# Patient Record
Sex: MALE | Race: WHITE | NOT HISPANIC OR LATINO | Employment: OTHER | URBAN - METROPOLITAN AREA
[De-identification: names, ages, dates, MRNs, and addresses within clinical notes are randomized per-mention and may not be internally consistent; named-entity substitution may affect disease eponyms.]

---

## 2018-01-01 ENCOUNTER — READMISSION MANAGEMENT (OUTPATIENT)
Dept: CALL CENTER | Facility: HOSPITAL | Age: 73
End: 2018-01-01

## 2018-01-01 ENCOUNTER — TELEPHONE (OUTPATIENT)
Dept: NEUROLOGY | Facility: CLINIC | Age: 73
End: 2018-01-01

## 2018-01-01 ENCOUNTER — LAB REQUISITION (OUTPATIENT)
Dept: LAB | Facility: HOSPITAL | Age: 73
End: 2018-01-01

## 2018-01-01 ENCOUNTER — HOSPITAL ENCOUNTER (INPATIENT)
Facility: HOSPITAL | Age: 73
LOS: 4 days | Discharge: HOME HEALTH | End: 2018-10-16
Attending: HOSPITALIST | Admitting: HOSPITALIST

## 2018-01-01 ENCOUNTER — TRANSCRIBE ORDERS (OUTPATIENT)
Dept: ADMINISTRATIVE | Facility: HOSPITAL | Age: 73
End: 2018-01-01

## 2018-01-01 ENCOUNTER — HOSPITAL ENCOUNTER (OUTPATIENT)
Dept: ULTRASOUND IMAGING | Facility: HOSPITAL | Age: 73
Discharge: HOME | End: 2018-12-28
Attending: INTERNAL MEDICINE | Admitting: INTERNAL MEDICINE

## 2018-01-01 ENCOUNTER — TELEPHONE (OUTPATIENT)
Dept: INFECTIOUS DISEASES | Facility: CLINIC | Age: 73
End: 2018-01-01

## 2018-01-01 ENCOUNTER — APPOINTMENT (OUTPATIENT)
Dept: CT IMAGING | Facility: HOSPITAL | Age: 73
End: 2018-01-01

## 2018-01-01 ENCOUNTER — OFFICE VISIT (OUTPATIENT)
Dept: NEUROLOGY | Facility: CLINIC | Age: 73
End: 2018-01-01

## 2018-01-01 ENCOUNTER — HOSPITAL ENCOUNTER (OUTPATIENT)
Dept: MRI IMAGING | Facility: HOSPITAL | Age: 73
Discharge: HOME | End: 2018-10-12

## 2018-01-01 ENCOUNTER — OFFICE VISIT (OUTPATIENT)
Dept: INFECTIOUS DISEASES | Facility: CLINIC | Age: 73
End: 2018-01-01

## 2018-01-01 VITALS
WEIGHT: 194.8 LBS | HEART RATE: 78 BPM | DIASTOLIC BLOOD PRESSURE: 81 MMHG | SYSTOLIC BLOOD PRESSURE: 143 MMHG | HEIGHT: 72 IN | BODY MASS INDEX: 26.38 KG/M2 | TEMPERATURE: 98.2 F

## 2018-01-01 VITALS
WEIGHT: 192 LBS | DIASTOLIC BLOOD PRESSURE: 92 MMHG | HEIGHT: 72 IN | SYSTOLIC BLOOD PRESSURE: 144 MMHG | HEART RATE: 79 BPM | BODY MASS INDEX: 26.01 KG/M2 | OXYGEN SATURATION: 98 %

## 2018-01-01 VITALS
WEIGHT: 193.12 LBS | SYSTOLIC BLOOD PRESSURE: 130 MMHG | HEART RATE: 83 BPM | BODY MASS INDEX: 26.16 KG/M2 | RESPIRATION RATE: 18 BRPM | TEMPERATURE: 98.4 F | OXYGEN SATURATION: 97 % | HEIGHT: 72 IN | DIASTOLIC BLOOD PRESSURE: 79 MMHG

## 2018-01-01 DIAGNOSIS — M54.9 OTHER CHRONIC BACK PAIN: Primary | ICD-10-CM

## 2018-01-01 DIAGNOSIS — M60.09 INFECTIVE MYOSITIS OF MULTIPLE SITES: ICD-10-CM

## 2018-01-01 DIAGNOSIS — Z13.6 SCREENING FOR ISCHEMIC HEART DISEASE: ICD-10-CM

## 2018-01-01 DIAGNOSIS — G89.29 OTHER CHRONIC BACK PAIN: Primary | ICD-10-CM

## 2018-01-01 DIAGNOSIS — F17.200 TOBACCO USE DISORDER: ICD-10-CM

## 2018-01-01 DIAGNOSIS — G60.9 IDIOPATHIC PERIPHERAL NEUROPATHY: Primary | ICD-10-CM

## 2018-01-01 DIAGNOSIS — M46.26 OSTEOMYELITIS OF VERTEBRA OF LUMBAR REGION (HCC): ICD-10-CM

## 2018-01-01 DIAGNOSIS — M54.10 RADICULAR LEG PAIN: ICD-10-CM

## 2018-01-01 DIAGNOSIS — Z00.00 ENCOUNTER FOR GENERAL ADULT MEDICAL EXAMINATION WITHOUT ABNORMAL FINDINGS: ICD-10-CM

## 2018-01-01 DIAGNOSIS — M46.46 LUMBAR DISCITIS: Primary | ICD-10-CM

## 2018-01-01 DIAGNOSIS — Z13.6 SCREENING FOR ISCHEMIC HEART DISEASE: Primary | ICD-10-CM

## 2018-01-01 DIAGNOSIS — R53.1 WEAKNESS: Primary | ICD-10-CM

## 2018-01-01 DIAGNOSIS — Z79.2 LONG TERM (CURRENT) USE OF ANTIBIOTICS: ICD-10-CM

## 2018-01-01 DIAGNOSIS — R53.1 WEAKNESS: ICD-10-CM

## 2018-01-01 DIAGNOSIS — Z74.09 IMPAIRED MOBILITY: Primary | ICD-10-CM

## 2018-01-01 DIAGNOSIS — M54.9 BACK PAIN, UNSPECIFIED BACK LOCATION, UNSPECIFIED BACK PAIN LATERALITY, UNSPECIFIED CHRONICITY: ICD-10-CM

## 2018-01-01 DIAGNOSIS — M54.5 LOW BACK PAIN, UNSPECIFIED BACK PAIN LATERALITY, UNSPECIFIED CHRONICITY, WITH SCIATICA PRESENCE UNSPECIFIED: ICD-10-CM

## 2018-01-01 DIAGNOSIS — M06.9 RHEUMATOID ARTHRITIS, INVOLVING UNSPECIFIED SITE, UNSPECIFIED RHEUMATOID FACTOR PRESENCE: ICD-10-CM

## 2018-01-01 LAB
ALBUMIN SERPL-MCNC: 3.4 G/DL (ref 3.5–5.2)
ALBUMIN SERPL-MCNC: 4 G/DL (ref 3.5–5.2)
ALBUMIN/GLOB SERPL: 1.2 G/DL
ALP SERPL-CCNC: 118 U/L (ref 39–117)
ALT SERPL W P-5'-P-CCNC: 11 U/L (ref 1–41)
ANION GAP SERPL CALCULATED.3IONS-SCNC: 10 MMOL/L
ANION GAP SERPL CALCULATED.3IONS-SCNC: 10.8 MMOL/L
ANION GAP SERPL CALCULATED.3IONS-SCNC: 12.5 MMOL/L
ANION GAP SERPL CALCULATED.3IONS-SCNC: 13 MMOL/L
ANION GAP SERPL CALCULATED.3IONS-SCNC: 13.1 MMOL/L
ANISOCYTOSIS BLD QL: ABNORMAL
ANISOCYTOSIS BLD QL: NORMAL
APTT PPP: 33.7 SECONDS (ref 22.7–35.4)
APTT PPP: 33.8 SECONDS (ref 22.7–35.4)
AST SERPL-CCNC: 10 U/L (ref 1–40)
BACTERIA FLD CULT: ABNORMAL
BACTERIA SPEC AEROBE CULT: ABNORMAL
BACTERIA SPEC AEROBE CULT: NORMAL
BACTERIA SPEC AEROBE CULT: NORMAL
BASOPHILS # BLD AUTO: 0.02 10*3/MM3 (ref 0–0.2)
BASOPHILS # BLD AUTO: 0.02 10*3/MM3 (ref 0–0.2)
BASOPHILS # BLD AUTO: 0.03 10*3/MM3 (ref 0–0.2)
BASOPHILS # BLD AUTO: 0.04 10*3/MM3 (ref 0–0.2)
BASOPHILS # BLD AUTO: 0.05 10*3/MM3 (ref 0–0.2)
BASOPHILS # BLD AUTO: 0.05 10*3/MM3 (ref 0–0.2)
BASOPHILS NFR BLD AUTO: 1.1 % (ref 0–2)
BASOPHILS NFR BLD AUTO: 1.3 % (ref 0–2)
BASOPHILS NFR BLD AUTO: 1.5 % (ref 0–1.5)
BASOPHILS NFR BLD AUTO: 1.7 % (ref 0–1.5)
BASOPHILS NFR BLD AUTO: 1.9 % (ref 0–1.5)
BASOPHILS NFR BLD AUTO: 2 % (ref 0–1.5)
BASOPHILS NFR BLD AUTO: 2 % (ref 0–1.5)
BASOPHILS NFR BLD AUTO: 2.1 % (ref 0–1.5)
BASOPHILS NFR BLD AUTO: 2.1 % (ref 0–1.5)
BILIRUB SERPL-MCNC: 0.6 MG/DL (ref 0.1–1.2)
BUN BLD-MCNC: 10 MG/DL (ref 8–23)
BUN BLD-MCNC: 7 MG/DL (ref 8–23)
BUN BLD-MCNC: 7 MG/DL (ref 8–23)
BUN BLD-MCNC: 8 MG/DL (ref 8–23)
BUN BLD-MCNC: 9 MG/DL (ref 8–23)
BUN/CREAT SERPL: 10.1 (ref 7–25)
BUN/CREAT SERPL: 11 (ref 7–25)
BUN/CREAT SERPL: 11.1 (ref 7–25)
BUN/CREAT SERPL: 11.5 (ref 7–25)
BUN/CREAT SERPL: 11.9 (ref 7–25)
CALCIUM SPEC-SCNC: 8.7 MG/DL (ref 8.6–10.5)
CALCIUM SPEC-SCNC: 8.8 MG/DL (ref 8.6–10.5)
CALCIUM SPEC-SCNC: 8.8 MG/DL (ref 8.6–10.5)
CALCIUM SPEC-SCNC: 9 MG/DL (ref 8.6–10.5)
CALCIUM SPEC-SCNC: 9.3 MG/DL (ref 8.6–10.5)
CHLORIDE SERPL-SCNC: 101 MMOL/L (ref 98–107)
CHLORIDE SERPL-SCNC: 103 MMOL/L (ref 98–107)
CHLORIDE SERPL-SCNC: 105 MMOL/L (ref 98–107)
CHLORIDE SERPL-SCNC: 105 MMOL/L (ref 98–107)
CHLORIDE SERPL-SCNC: 98 MMOL/L (ref 98–107)
CK SERPL-CCNC: 56 U/L (ref 20–200)
CO2 SERPL-SCNC: 23.5 MMOL/L (ref 22–29)
CO2 SERPL-SCNC: 25.9 MMOL/L (ref 22–29)
CO2 SERPL-SCNC: 26 MMOL/L (ref 22–29)
CO2 SERPL-SCNC: 26.2 MMOL/L (ref 22–29)
CO2 SERPL-SCNC: 27 MMOL/L (ref 22–29)
CREAT BLD-MCNC: 0.63 MG/DL (ref 0.76–1.27)
CREAT BLD-MCNC: 0.67 MG/DL (ref 0.76–1.27)
CREAT BLD-MCNC: 0.69 MG/DL (ref 0.76–1.27)
CREAT BLD-MCNC: 0.73 MG/DL (ref 0.76–1.27)
CREAT BLD-MCNC: 0.75 MG/DL (ref 0.76–1.27)
CREAT BLD-MCNC: 0.78 MG/DL (ref 0.76–1.27)
CREAT BLD-MCNC: 0.78 MG/DL (ref 0.76–1.27)
CREAT BLD-MCNC: 0.8 MG/DL (ref 0.76–1.27)
CREAT BLD-MCNC: 0.81 MG/DL (ref 0.76–1.27)
CREAT BLD-MCNC: 0.82 MG/DL (ref 0.76–1.27)
CREAT BLD-MCNC: 0.84 MG/DL (ref 0.76–1.27)
CREAT BLDA-MCNC: 0.8 MG/DL (ref 0.6–1.3)
CRP SERPL-MCNC: 2.38 MG/DL (ref 0–0.5)
CRP SERPL-MCNC: 4.23 MG/DL (ref 0–0.5)
CRP SERPL-MCNC: 5.32 MG/DL (ref 0–0.5)
DAT POLY-SP REAG RBC QL: NEGATIVE
DEPRECATED RDW RBC AUTO: 48.1 FL (ref 37–54)
DEPRECATED RDW RBC AUTO: 48.6 FL (ref 37–54)
DEPRECATED RDW RBC AUTO: 48.7 FL (ref 37–54)
DEPRECATED RDW RBC AUTO: 48.8 FL (ref 37–54)
DEPRECATED RDW RBC AUTO: 49.3 FL (ref 37–54)
DEPRECATED RDW RBC AUTO: 52.4 FL (ref 37–54)
DEPRECATED RDW RBC AUTO: 52.7 FL (ref 37–54)
DEPRECATED RDW RBC AUTO: 52.8 FL (ref 37–54)
DEPRECATED RDW RBC AUTO: 57.7 FL (ref 37–54)
DEPRECATED RDW RBC AUTO: 57.7 FL (ref 37–54)
DEPRECATED RDW RBC AUTO: 62 FL (ref 37–54)
EOSINOPHIL # BLD AUTO: 0.07 10*3/MM3 (ref 0.1–0.3)
EOSINOPHIL # BLD AUTO: 0.07 10*3/MM3 (ref 0–0.7)
EOSINOPHIL # BLD AUTO: 0.08 10*3/MM3 (ref 0–0.7)
EOSINOPHIL # BLD AUTO: 0.09 10*3/MM3 (ref 0–0.7)
EOSINOPHIL # BLD AUTO: 0.1 10*3/MM3 (ref 0.1–0.3)
EOSINOPHIL # BLD AUTO: 0.1 10*3/MM3 (ref 0–0.7)
EOSINOPHIL # BLD AUTO: 0.11 10*3/MM3 (ref 0–0.7)
EOSINOPHIL # BLD MANUAL: 0.07 10*3/MM3 (ref 0–0.7)
EOSINOPHIL NFR BLD AUTO: 2.6 % (ref 0.3–6.2)
EOSINOPHIL NFR BLD AUTO: 2.8 % (ref 0.3–6.2)
EOSINOPHIL NFR BLD AUTO: 3.1 % (ref 0.3–6.2)
EOSINOPHIL NFR BLD AUTO: 3.6 % (ref 0.3–6.2)
EOSINOPHIL NFR BLD AUTO: 4.3 % (ref 0.3–6.2)
EOSINOPHIL NFR BLD AUTO: 4.5 % (ref 0–4)
EOSINOPHIL NFR BLD AUTO: 5 % (ref 0.3–6.2)
EOSINOPHIL NFR BLD AUTO: 5.4 % (ref 0–4)
EOSINOPHIL NFR BLD AUTO: 6.9 % (ref 0.3–6.2)
EOSINOPHIL NFR BLD MANUAL: 3 % (ref 0.3–6.2)
ERYTHROCYTE [DISTWIDTH] IN BLOOD BY AUTOMATED COUNT: 16.8 % (ref 11.5–14.5)
ERYTHROCYTE [DISTWIDTH] IN BLOOD BY AUTOMATED COUNT: 16.9 % (ref 11.5–14.5)
ERYTHROCYTE [DISTWIDTH] IN BLOOD BY AUTOMATED COUNT: 17 % (ref 11.5–14.5)
ERYTHROCYTE [DISTWIDTH] IN BLOOD BY AUTOMATED COUNT: 17.9 % (ref 11.5–14.5)
ERYTHROCYTE [DISTWIDTH] IN BLOOD BY AUTOMATED COUNT: 18.1 % (ref 11.5–14.5)
ERYTHROCYTE [DISTWIDTH] IN BLOOD BY AUTOMATED COUNT: 18.4 % (ref 11.5–14.5)
ERYTHROCYTE [DISTWIDTH] IN BLOOD BY AUTOMATED COUNT: 19.5 % (ref 11.5–14.5)
ERYTHROCYTE [DISTWIDTH] IN BLOOD BY AUTOMATED COUNT: 19.6 % (ref 11.5–14.5)
ERYTHROCYTE [DISTWIDTH] IN BLOOD BY AUTOMATED COUNT: 20 % (ref 11.5–14.5)
ERYTHROCYTE [SEDIMENTATION RATE] IN BLOOD: 65 MM/HR (ref 0–20)
ERYTHROCYTE [SEDIMENTATION RATE] IN BLOOD: 65 MM/HR (ref 0–20)
FERRITIN SERPL-MCNC: 299.2 NG/ML (ref 30–400)
FIBRINOGEN PPP-MCNC: 662 MG/DL (ref 219–464)
FOLATE SERPL-MCNC: 12.5 NG/ML (ref 4.78–24.2)
GFR SERPL CREATININE-BSD FRML MDRD: 103 ML/MIN/1.73
GFR SERPL CREATININE-BSD FRML MDRD: 106 ML/MIN/1.73
GFR SERPL CREATININE-BSD FRML MDRD: 113 ML/MIN/1.73
GFR SERPL CREATININE-BSD FRML MDRD: 117 ML/MIN/1.73
GFR SERPL CREATININE-BSD FRML MDRD: 126 ML/MIN/1.73
GFR SERPL CREATININE-BSD FRML MDRD: 90 ML/MIN/1.73
GFR SERPL CREATININE-BSD FRML MDRD: 93 ML/MIN/1.73
GFR SERPL CREATININE-BSD FRML MDRD: 94 ML/MIN/1.73
GFR SERPL CREATININE-BSD FRML MDRD: 95 ML/MIN/1.73
GFR SERPL CREATININE-BSD FRML MDRD: 98 ML/MIN/1.73
GFR SERPL CREATININE-BSD FRML MDRD: 98 ML/MIN/1.73
GLOBULIN UR ELPH-MCNC: 3.4 GM/DL
GLUCOSE BLD-MCNC: 105 MG/DL (ref 65–99)
GLUCOSE BLD-MCNC: 106 MG/DL (ref 65–99)
GLUCOSE BLD-MCNC: 108 MG/DL (ref 65–99)
GLUCOSE BLD-MCNC: 125 MG/DL (ref 65–99)
GLUCOSE BLD-MCNC: 96 MG/DL (ref 65–99)
GRAM STN SPEC: ABNORMAL
HAPTOGLOB SERPL-MCNC: 212 MG/DL (ref 30–200)
HCT VFR BLD AUTO: 31.2 % (ref 40.4–52.2)
HCT VFR BLD AUTO: 31.8 % (ref 40.4–52.2)
HCT VFR BLD AUTO: 32.1 % (ref 40.4–52.2)
HCT VFR BLD AUTO: 32.4 % (ref 42–52)
HCT VFR BLD AUTO: 32.5 % (ref 40.4–52.2)
HCT VFR BLD AUTO: 32.7 % (ref 40.4–52.2)
HCT VFR BLD AUTO: 33.6 % (ref 40.4–52.2)
HCT VFR BLD AUTO: 33.6 % (ref 40.4–52.2)
HCT VFR BLD AUTO: 33.7 % (ref 40.4–52.2)
HCT VFR BLD AUTO: 33.8 % (ref 40.4–52.2)
HCT VFR BLD AUTO: 35.3 % (ref 42–52)
HGB BLD-MCNC: 10.3 G/DL (ref 13.7–17.6)
HGB BLD-MCNC: 10.3 G/DL (ref 14–18)
HGB BLD-MCNC: 10.4 G/DL (ref 13.7–17.6)
HGB BLD-MCNC: 10.5 G/DL (ref 13.7–17.6)
HGB BLD-MCNC: 10.6 G/DL (ref 13.7–17.6)
HGB BLD-MCNC: 10.7 G/DL (ref 13.7–17.6)
HGB BLD-MCNC: 10.7 G/DL (ref 13.7–17.6)
HGB BLD-MCNC: 10.9 G/DL (ref 13.7–17.6)
HGB BLD-MCNC: 10.9 G/DL (ref 13.7–17.6)
HGB BLD-MCNC: 11.6 G/DL (ref 14–18)
HGB BLD-MCNC: 9.9 G/DL (ref 13.7–17.6)
HGB RETIC QN: 29.4 PG (ref 32.7–38.6)
HYPOCHROMIA BLD QL: ABNORMAL
IMM GRANULOCYTES # BLD: 0 10*3/MM3 (ref 0–0.03)
IMM GRANULOCYTES # BLD: 0.01 10*3/MM3 (ref 0–0.03)
IMM GRANULOCYTES NFR BLD: 0 % (ref 0–0.5)
IMM GRANULOCYTES NFR BLD: 0.3 % (ref 0–0.5)
IMM GRANULOCYTES NFR BLD: 0.4 % (ref 0–0.5)
IMM GRANULOCYTES NFR BLD: 0.5 % (ref 0–0.5)
IMM RETICS NFR: 9.8 % (ref 0.7–13.7)
INR PPP: 1.02 (ref 0.9–1.1)
INR PPP: 1.04 (ref 0.9–1.1)
IRON 24H UR-MRATE: 27 MCG/DL (ref 59–158)
IRON SATN MFR SERPL: 10 % (ref 20–50)
LDH SERPL-CCNC: 141 U/L (ref 135–225)
LYMPHOCYTES # BLD AUTO: 0.37 10*3/MM3 (ref 0.9–4.8)
LYMPHOCYTES # BLD AUTO: 0.39 10*3/MM3 (ref 0.9–4.8)
LYMPHOCYTES # BLD AUTO: 0.4 10*3/MM3 (ref 0.6–4.8)
LYMPHOCYTES # BLD AUTO: 0.44 10*3/MM3 (ref 0.9–4.8)
LYMPHOCYTES # BLD AUTO: 0.46 10*3/MM3 (ref 0.6–4.8)
LYMPHOCYTES # BLD AUTO: 0.49 10*3/MM3 (ref 0.9–4.8)
LYMPHOCYTES # BLD AUTO: 0.5 10*3/MM3 (ref 0.9–4.8)
LYMPHOCYTES # BLD AUTO: 0.52 10*3/MM3 (ref 0.9–4.8)
LYMPHOCYTES # BLD AUTO: 0.57 10*3/MM3 (ref 0.9–4.8)
LYMPHOCYTES # BLD MANUAL: 0.46 10*3/MM3 (ref 0.9–4.8)
LYMPHOCYTES NFR BLD AUTO: 16.3 % (ref 19.6–45.3)
LYMPHOCYTES NFR BLD AUTO: 19.1 % (ref 19.6–45.3)
LYMPHOCYTES NFR BLD AUTO: 19.6 % (ref 19.6–45.3)
LYMPHOCYTES NFR BLD AUTO: 20.5 % (ref 19.6–45.3)
LYMPHOCYTES NFR BLD AUTO: 20.7 % (ref 19.6–45.3)
LYMPHOCYTES NFR BLD AUTO: 24.8 % (ref 19.6–45.3)
LYMPHOCYTES NFR BLD AUTO: 25 % (ref 20–45)
LYMPHOCYTES NFR BLD AUTO: 25.8 % (ref 20–45)
LYMPHOCYTES NFR BLD AUTO: 30.6 % (ref 19.6–45.3)
LYMPHOCYTES NFR BLD MANUAL: 21 % (ref 19.6–45.3)
LYMPHOCYTES NFR BLD MANUAL: 7 % (ref 5–12)
MAGNESIUM SERPL-MCNC: 2.1 MG/DL (ref 1.6–2.4)
MCH RBC QN AUTO: 28.5 PG (ref 27–32.7)
MCH RBC QN AUTO: 28.6 PG (ref 27–32.7)
MCH RBC QN AUTO: 28.7 PG (ref 27–32.7)
MCH RBC QN AUTO: 28.8 PG (ref 27–32.7)
MCH RBC QN AUTO: 28.9 PG (ref 27–32.7)
MCH RBC QN AUTO: 28.9 PG (ref 27–32.7)
MCH RBC QN AUTO: 29.1 PG (ref 27–32.7)
MCH RBC QN AUTO: 29.2 PG (ref 27–32.7)
MCH RBC QN AUTO: 29.2 PG (ref 27–32.7)
MCH RBC QN AUTO: 29.6 PG (ref 27–31)
MCH RBC QN AUTO: 29.9 PG (ref 27–31)
MCHC RBC AUTO-ENTMCNC: 31.4 G/DL (ref 32.6–36.4)
MCHC RBC AUTO-ENTMCNC: 31.7 G/DL (ref 32.6–36.4)
MCHC RBC AUTO-ENTMCNC: 31.7 G/DL (ref 32.6–36.4)
MCHC RBC AUTO-ENTMCNC: 31.8 G/DL (ref 31–37)
MCHC RBC AUTO-ENTMCNC: 31.8 G/DL (ref 32.6–36.4)
MCHC RBC AUTO-ENTMCNC: 32.3 G/DL (ref 32.6–36.4)
MCHC RBC AUTO-ENTMCNC: 32.4 G/DL (ref 32.6–36.4)
MCHC RBC AUTO-ENTMCNC: 32.7 G/DL (ref 32.6–36.4)
MCHC RBC AUTO-ENTMCNC: 32.9 G/DL (ref 31–37)
MCV RBC AUTO: 87.9 FL (ref 79.8–96.2)
MCV RBC AUTO: 88.3 FL (ref 79.8–96.2)
MCV RBC AUTO: 89.1 FL (ref 79.8–96.2)
MCV RBC AUTO: 89.6 FL (ref 79.8–96.2)
MCV RBC AUTO: 89.6 FL (ref 79.8–96.2)
MCV RBC AUTO: 90.3 FL (ref 79.8–96.2)
MCV RBC AUTO: 90.5 FL (ref 79.8–96.2)
MCV RBC AUTO: 91 FL (ref 79.8–96.2)
MCV RBC AUTO: 91 FL (ref 80–94)
MCV RBC AUTO: 91.1 FL (ref 79.8–96.2)
MCV RBC AUTO: 93.1 FL (ref 80–94)
MONOCYTES # BLD AUTO: 0.07 10*3/MM3 (ref 0.2–1.2)
MONOCYTES # BLD AUTO: 0.08 10*3/MM3 (ref 0.2–1.2)
MONOCYTES # BLD AUTO: 0.1 10*3/MM3 (ref 0.2–1.2)
MONOCYTES # BLD AUTO: 0.11 10*3/MM3 (ref 0–1)
MONOCYTES # BLD AUTO: 0.12 10*3/MM3 (ref 0–1)
MONOCYTES # BLD AUTO: 0.14 10*3/MM3 (ref 0.2–1.2)
MONOCYTES # BLD AUTO: 0.15 10*3/MM3 (ref 0.2–1.2)
MONOCYTES # BLD AUTO: 0.2 10*3/MM3 (ref 0.2–1.2)
MONOCYTES NFR BLD AUTO: 10.3 % (ref 5–12)
MONOCYTES NFR BLD AUTO: 2.7 % (ref 5–12)
MONOCYTES NFR BLD AUTO: 2.8 % (ref 5–12)
MONOCYTES NFR BLD AUTO: 4 % (ref 5–12)
MONOCYTES NFR BLD AUTO: 5 % (ref 5–12)
MONOCYTES NFR BLD AUTO: 5.2 % (ref 5–12)
MONOCYTES NFR BLD AUTO: 5.3 % (ref 5–12)
MONOCYTES NFR BLD AUTO: 6 % (ref 3–8)
MONOCYTES NFR BLD AUTO: 7.7 % (ref 3–8)
NEUTROPHILS # BLD AUTO: 0.89 10*3/MM3 (ref 1.9–8.1)
NEUTROPHILS # BLD AUTO: 0.94 10*3/MM3 (ref 1.5–8.3)
NEUTROPHILS # BLD AUTO: 1.14 10*3/MM3 (ref 1.5–8.3)
NEUTROPHILS # BLD AUTO: 1.26 10*3/MM3 (ref 1.9–8.1)
NEUTROPHILS # BLD AUTO: 1.27 10*3/MM3 (ref 1.9–8.1)
NEUTROPHILS # BLD AUTO: 1.3 10*3/MM3 (ref 1.9–8.1)
NEUTROPHILS # BLD AUTO: 1.52 10*3/MM3 (ref 1.9–8.1)
NEUTROPHILS # BLD AUTO: 1.83 10*3/MM3 (ref 1.9–8.1)
NEUTROPHILS # BLD AUTO: 2.01 10*3/MM3 (ref 1.9–8.1)
NEUTROPHILS # BLD AUTO: 2.12 10*3/MM3 (ref 1.9–8.1)
NEUTROPHILS NFR BLD AUTO: 55.6 % (ref 42.7–76)
NEUTROPHILS NFR BLD AUTO: 60.7 % (ref 45–70)
NEUTROPHILS NFR BLD AUTO: 62 % (ref 45–70)
NEUTROPHILS NFR BLD AUTO: 64.2 % (ref 42.7–76)
NEUTROPHILS NFR BLD AUTO: 64.9 % (ref 42.7–76)
NEUTROPHILS NFR BLD AUTO: 67.6 % (ref 42.7–76)
NEUTROPHILS NFR BLD AUTO: 71.9 % (ref 42.7–76)
NEUTROPHILS NFR BLD AUTO: 72.9 % (ref 42.7–76)
NEUTROPHILS NFR BLD AUTO: 74.4 % (ref 42.7–76)
NEUTROPHILS NFR BLD MANUAL: 69 % (ref 42.7–76)
NRBC BLD MANUAL-RTO: 0 /100 WBC (ref 0–0)
OVALOCYTES BLD QL SMEAR: ABNORMAL
PHOSPHATE SERPL-MCNC: 3.1 MG/DL (ref 2.5–4.5)
PHOSPHATE SERPL-MCNC: 3.2 MG/DL (ref 2.5–4.5)
PLAT MORPH BLD: NORMAL
PLATELET # BLD AUTO: 102 10*3/MM3 (ref 140–500)
PLATELET # BLD AUTO: 110 10*3/MM3 (ref 140–500)
PLATELET # BLD AUTO: 117 10*3/MM3 (ref 140–500)
PLATELET # BLD AUTO: 120 10*3/MM3 (ref 140–500)
PLATELET # BLD AUTO: 125 10*3/MM3 (ref 140–500)
PLATELET # BLD AUTO: 127 10*3/MM3 (ref 140–500)
PLATELET # BLD AUTO: 132 10*3/MM3 (ref 140–500)
PLATELET # BLD AUTO: 137 10*3/MM3 (ref 140–500)
PLATELET # BLD AUTO: 147 10*3/MM3 (ref 140–500)
PLATELET # BLD AUTO: 153 10*3/MM3 (ref 140–500)
PLATELET # BLD AUTO: 153 10*3/MM3 (ref 140–500)
PLATELET # BLD AUTO: 160 10*3/MM3 (ref 140–500)
PLATELETS.RETICULATED NFR BLD AUTO: 4.1 % (ref 0.9–6.5)
PMV BLD AUTO: 8.2 FL (ref 6–12)
PMV BLD AUTO: 8.2 FL (ref 6–12)
PMV BLD AUTO: 8.3 FL (ref 7.4–10.4)
PMV BLD AUTO: 8.5 FL (ref 6–12)
PMV BLD AUTO: 8.5 FL (ref 7.4–10.4)
PMV BLD AUTO: 8.6 FL (ref 6–12)
PMV BLD AUTO: 8.7 FL (ref 6–12)
PMV BLD AUTO: 8.7 FL (ref 6–12)
PMV BLD AUTO: 8.8 FL (ref 6–12)
POTASSIUM BLD-SCNC: 3.7 MMOL/L (ref 3.5–5.2)
POTASSIUM BLD-SCNC: 3.8 MMOL/L (ref 3.5–5.2)
POTASSIUM BLD-SCNC: 4 MMOL/L (ref 3.5–5.2)
PROCALCITONIN SERPL-MCNC: 0.1 NG/ML (ref 0.1–0.25)
PROT SERPL-MCNC: 7.4 G/DL (ref 6–8.5)
PROTHROMBIN TIME: 13.2 SECONDS (ref 11.7–14.2)
PROTHROMBIN TIME: 13.4 SECONDS (ref 11.7–14.2)
RBC # BLD AUTO: 3.43 10*6/MM3 (ref 4.6–6)
RBC # BLD AUTO: 3.48 10*6/MM3 (ref 4.7–6.1)
RBC # BLD AUTO: 3.59 10*6/MM3 (ref 4.6–6)
RBC # BLD AUTO: 3.6 10*6/MM3 (ref 4.6–6)
RBC # BLD AUTO: 3.65 10*6/MM3 (ref 4.6–6)
RBC # BLD AUTO: 3.67 10*6/MM3 (ref 4.6–6)
RBC # BLD AUTO: 3.71 10*6/MM3 (ref 4.6–6)
RBC # BLD AUTO: 3.73 10*6/MM3 (ref 4.6–6)
RBC # BLD AUTO: 3.75 10*6/MM3 (ref 4.6–6)
RBC # BLD AUTO: 3.75 10*6/MM3 (ref 4.6–6)
RBC # BLD AUTO: 3.88 10*6/MM3 (ref 4.7–6.1)
RETICS/RBC NFR AUTO: 1.46 % (ref 0.5–1.5)
SCHISTOCYTES BLD QL SMEAR: NORMAL
SCHISTOCYTES BLD QL SMEAR: NORMAL
SODIUM BLD-SCNC: 137 MMOL/L (ref 136–145)
SODIUM BLD-SCNC: 140 MMOL/L (ref 136–145)
SODIUM BLD-SCNC: 140 MMOL/L (ref 136–145)
SODIUM BLD-SCNC: 141 MMOL/L (ref 136–145)
SODIUM BLD-SCNC: 142 MMOL/L (ref 136–145)
SPHEROCYTES BLD QL SMEAR: NORMAL
TIBC SERPL-MCNC: 265 MCG/DL (ref 298–536)
TRANSFERRIN SERPL-MCNC: 178 MG/DL (ref 200–360)
VANCOMYCIN TROUGH SERPL-MCNC: 12.5 MCG/ML (ref 5–20)
VIT B12 BLD-MCNC: >2000 PG/ML (ref 211–946)
WBC MORPH BLD: NORMAL
WBC NRBC COR # BLD: 1.55 10*3/MM3 (ref 4.8–10.8)
WBC NRBC COR # BLD: 1.6 10*3/MM3 (ref 4.5–10.7)
WBC NRBC COR # BLD: 1.84 10*3/MM3 (ref 4.8–10.8)
WBC NRBC COR # BLD: 1.88 10*3/MM3 (ref 4.5–10.7)
WBC NRBC COR # BLD: 1.94 10*3/MM3 (ref 4.5–10.7)
WBC NRBC COR # BLD: 2.02 10*3/MM3 (ref 4.5–10.7)
WBC NRBC COR # BLD: 2.21 10*3/MM3 (ref 4.5–10.7)
WBC NRBC COR # BLD: 2.31 10*3/MM3 (ref 4.5–10.7)
WBC NRBC COR # BLD: 2.54 10*3/MM3 (ref 4.5–10.7)
WBC NRBC COR # BLD: 2.7 10*3/MM3 (ref 4.5–10.7)
WBC NRBC COR # BLD: 2.91 10*3/MM3 (ref 4.5–10.7)

## 2018-01-01 PROCEDURE — 85025 COMPLETE CBC W/AUTO DIFF WBC: CPT | Performed by: INTERNAL MEDICINE

## 2018-01-01 PROCEDURE — 76706 US ABDL AORTA SCREEN AAA: CPT

## 2018-01-01 PROCEDURE — 82607 VITAMIN B-12: CPT | Performed by: INTERNAL MEDICINE

## 2018-01-01 PROCEDURE — 85027 COMPLETE CBC AUTOMATED: CPT | Performed by: HOSPITALIST

## 2018-01-01 PROCEDURE — 87015 SPECIMEN INFECT AGNT CONCNTJ: CPT | Performed by: INTERNAL MEDICINE

## 2018-01-01 PROCEDURE — 82565 ASSAY OF CREATININE: CPT | Performed by: INTERNAL MEDICINE

## 2018-01-01 PROCEDURE — 87070 CULTURE OTHR SPECIMN AEROBIC: CPT | Performed by: INTERNAL MEDICINE

## 2018-01-01 PROCEDURE — 87147 CULTURE TYPE IMMUNOLOGIC: CPT | Performed by: INTERNAL MEDICINE

## 2018-01-01 PROCEDURE — 97162 PT EVAL MOD COMPLEX 30 MIN: CPT | Performed by: PHYSICAL THERAPIST

## 2018-01-01 PROCEDURE — 87040 BLOOD CULTURE FOR BACTERIA: CPT | Performed by: HOSPITALIST

## 2018-01-01 PROCEDURE — 85610 PROTHROMBIN TIME: CPT | Performed by: INTERNAL MEDICINE

## 2018-01-01 PROCEDURE — 86140 C-REACTIVE PROTEIN: CPT | Performed by: NEUROLOGICAL SURGERY

## 2018-01-01 PROCEDURE — 83540 ASSAY OF IRON: CPT | Performed by: INTERNAL MEDICINE

## 2018-01-01 PROCEDURE — 99223 1ST HOSP IP/OBS HIGH 75: CPT | Performed by: INTERNAL MEDICINE

## 2018-01-01 PROCEDURE — 85610 PROTHROMBIN TIME: CPT | Performed by: HOSPITALIST

## 2018-01-01 PROCEDURE — 99232 SBSQ HOSP IP/OBS MODERATE 35: CPT | Performed by: INTERNAL MEDICINE

## 2018-01-01 PROCEDURE — 80069 RENAL FUNCTION PANEL: CPT | Performed by: HOSPITALIST

## 2018-01-01 PROCEDURE — 82550 ASSAY OF CK (CPK): CPT | Performed by: HOSPITALIST

## 2018-01-01 PROCEDURE — 99214 OFFICE O/P EST MOD 30 MIN: CPT | Performed by: PSYCHIATRY & NEUROLOGY

## 2018-01-01 PROCEDURE — 85384 FIBRINOGEN ACTIVITY: CPT | Performed by: INTERNAL MEDICINE

## 2018-01-01 PROCEDURE — 85025 COMPLETE CBC W/AUTO DIFF WBC: CPT | Performed by: NEUROLOGICAL SURGERY

## 2018-01-01 PROCEDURE — 25010000002 VANCOMYCIN 10 G RECONSTITUTED SOLUTION: Performed by: INTERNAL MEDICINE

## 2018-01-01 PROCEDURE — 82728 ASSAY OF FERRITIN: CPT | Performed by: INTERNAL MEDICINE

## 2018-01-01 PROCEDURE — C1751 CATH, INF, PER/CENT/MIDLINE: HCPCS

## 2018-01-01 PROCEDURE — 84145 PROCALCITONIN (PCT): CPT | Performed by: HOSPITALIST

## 2018-01-01 PROCEDURE — 86140 C-REACTIVE PROTEIN: CPT | Performed by: HOSPITALIST

## 2018-01-01 PROCEDURE — 85652 RBC SED RATE AUTOMATED: CPT | Performed by: NEUROLOGICAL SURGERY

## 2018-01-01 PROCEDURE — 87205 SMEAR GRAM STAIN: CPT | Performed by: INTERNAL MEDICINE

## 2018-01-01 PROCEDURE — 85730 THROMBOPLASTIN TIME PARTIAL: CPT | Performed by: HOSPITALIST

## 2018-01-01 PROCEDURE — 85007 BL SMEAR W/DIFF WBC COUNT: CPT | Performed by: INTERNAL MEDICINE

## 2018-01-01 PROCEDURE — 80053 COMPREHEN METABOLIC PANEL: CPT | Performed by: HOSPITALIST

## 2018-01-01 PROCEDURE — A9577 INJ MULTIHANCE: HCPCS | Performed by: INTERNAL MEDICINE

## 2018-01-01 PROCEDURE — 83010 ASSAY OF HAPTOGLOBIN QUANT: CPT | Performed by: INTERNAL MEDICINE

## 2018-01-01 PROCEDURE — 0 GADOBENATE DIMEGLUMINE 529 MG/ML SOLUTION: Performed by: INTERNAL MEDICINE

## 2018-01-01 PROCEDURE — 86880 COOMBS TEST DIRECT: CPT | Performed by: INTERNAL MEDICINE

## 2018-01-01 PROCEDURE — 99255 IP/OBS CONSLTJ NEW/EST HI 80: CPT | Performed by: INTERNAL MEDICINE

## 2018-01-01 PROCEDURE — 80048 BASIC METABOLIC PNL TOTAL CA: CPT | Performed by: HOSPITALIST

## 2018-01-01 PROCEDURE — 82565 ASSAY OF CREATININE: CPT

## 2018-01-01 PROCEDURE — 86140 C-REACTIVE PROTEIN: CPT | Performed by: INTERNAL MEDICINE

## 2018-01-01 PROCEDURE — 83615 LACTATE (LD) (LDH) ENZYME: CPT | Performed by: INTERNAL MEDICINE

## 2018-01-01 PROCEDURE — 0S923ZX DRAINAGE OF LUMBAR VERTEBRAL DISC, PERCUTANEOUS APPROACH, DIAGNOSTIC: ICD-10-PCS | Performed by: RADIOLOGY

## 2018-01-01 PROCEDURE — 85730 THROMBOPLASTIN TIME PARTIAL: CPT | Performed by: INTERNAL MEDICINE

## 2018-01-01 PROCEDURE — 85046 RETICYTE/HGB CONCENTRATE: CPT | Performed by: INTERNAL MEDICINE

## 2018-01-01 PROCEDURE — 83735 ASSAY OF MAGNESIUM: CPT | Performed by: HOSPITALIST

## 2018-01-01 PROCEDURE — 85027 COMPLETE CBC AUTOMATED: CPT | Performed by: INTERNAL MEDICINE

## 2018-01-01 PROCEDURE — 80202 ASSAY OF VANCOMYCIN: CPT | Performed by: INTERNAL MEDICINE

## 2018-01-01 PROCEDURE — 82746 ASSAY OF FOLIC ACID SERUM: CPT | Performed by: INTERNAL MEDICINE

## 2018-01-01 PROCEDURE — 87186 SC STD MICRODIL/AGAR DIL: CPT | Performed by: INTERNAL MEDICINE

## 2018-01-01 PROCEDURE — 84466 ASSAY OF TRANSFERRIN: CPT | Performed by: INTERNAL MEDICINE

## 2018-01-01 PROCEDURE — 72158 MRI LUMBAR SPINE W/O & W/DYE: CPT

## 2018-01-01 PROCEDURE — 99214 OFFICE O/P EST MOD 30 MIN: CPT | Performed by: INTERNAL MEDICINE

## 2018-01-01 PROCEDURE — 80048 BASIC METABOLIC PNL TOTAL CA: CPT | Performed by: INTERNAL MEDICINE

## 2018-01-01 PROCEDURE — 85055 RETICULATED PLATELET ASSAY: CPT | Performed by: INTERNAL MEDICINE

## 2018-01-01 PROCEDURE — 84100 ASSAY OF PHOSPHORUS: CPT | Performed by: HOSPITALIST

## 2018-01-01 PROCEDURE — 85025 COMPLETE CBC W/AUTO DIFF WBC: CPT | Performed by: HOSPITALIST

## 2018-01-01 PROCEDURE — 85652 RBC SED RATE AUTOMATED: CPT | Performed by: HOSPITALIST

## 2018-01-01 PROCEDURE — 87176 TISSUE HOMOGENIZATION CULTR: CPT | Performed by: INTERNAL MEDICINE

## 2018-01-01 PROCEDURE — 36415 COLL VENOUS BLD VENIPUNCTURE: CPT | Performed by: INTERNAL MEDICINE

## 2018-01-01 RX ORDER — HYDROCODONE BITARTRATE AND ACETAMINOPHEN 5; 325 MG/1; MG/1
1 TABLET ORAL EVERY 4 HOURS PRN
Qty: 24 TABLET | Refills: 0 | Status: SHIPPED | OUTPATIENT
Start: 2018-01-01 | End: 2018-01-01

## 2018-01-01 RX ORDER — SODIUM CHLORIDE 0.9 % (FLUSH) 0.9 %
3 SYRINGE (ML) INJECTION EVERY 12 HOURS SCHEDULED
Status: DISCONTINUED | OUTPATIENT
Start: 2018-01-01 | End: 2018-01-01

## 2018-01-01 RX ORDER — NITROGLYCERIN 0.4 MG/1
0.4 TABLET SUBLINGUAL
Status: DISCONTINUED | OUTPATIENT
Start: 2018-01-01 | End: 2018-01-01 | Stop reason: HOSPADM

## 2018-01-01 RX ORDER — BISACODYL 10 MG
10 SUPPOSITORY, RECTAL RECTAL DAILY PRN
Status: DISCONTINUED | OUTPATIENT
Start: 2018-01-01 | End: 2018-01-01 | Stop reason: HOSPADM

## 2018-01-01 RX ORDER — SODIUM CHLORIDE 0.9 % (FLUSH) 0.9 %
10 SYRINGE (ML) INJECTION EVERY 12 HOURS SCHEDULED
Status: DISCONTINUED | OUTPATIENT
Start: 2018-01-01 | End: 2018-01-01 | Stop reason: HOSPADM

## 2018-01-01 RX ORDER — ONDANSETRON 4 MG/1
4 TABLET, FILM COATED ORAL EVERY 6 HOURS PRN
Status: DISCONTINUED | OUTPATIENT
Start: 2018-01-01 | End: 2018-01-01 | Stop reason: HOSPADM

## 2018-01-01 RX ORDER — SODIUM CHLORIDE 0.9 % (FLUSH) 0.9 %
3-10 SYRINGE (ML) INJECTION AS NEEDED
Status: DISCONTINUED | OUTPATIENT
Start: 2018-01-01 | End: 2018-01-01 | Stop reason: HOSPADM

## 2018-01-01 RX ORDER — SODIUM CHLORIDE 0.9 % (FLUSH) 0.9 %
10 SYRINGE (ML) INJECTION AS NEEDED
Status: DISCONTINUED | OUTPATIENT
Start: 2018-01-01 | End: 2018-01-01 | Stop reason: HOSPADM

## 2018-01-01 RX ORDER — KETOROLAC TROMETHAMINE 30 MG/ML
15 INJECTION, SOLUTION INTRAMUSCULAR; INTRAVENOUS EVERY 6 HOURS PRN
Status: DISCONTINUED | OUTPATIENT
Start: 2018-01-01 | End: 2018-01-01 | Stop reason: HOSPADM

## 2018-01-01 RX ORDER — ACETAMINOPHEN 325 MG/1
650 TABLET ORAL EVERY 4 HOURS PRN
Status: DISCONTINUED | OUTPATIENT
Start: 2018-01-01 | End: 2018-01-01 | Stop reason: HOSPADM

## 2018-01-01 RX ORDER — ONDANSETRON 2 MG/ML
4 INJECTION INTRAMUSCULAR; INTRAVENOUS EVERY 6 HOURS PRN
Status: DISCONTINUED | OUTPATIENT
Start: 2018-01-01 | End: 2018-01-01 | Stop reason: HOSPADM

## 2018-01-01 RX ORDER — BISACODYL 5 MG/1
5 TABLET, DELAYED RELEASE ORAL DAILY PRN
Status: DISCONTINUED | OUTPATIENT
Start: 2018-01-01 | End: 2018-01-01 | Stop reason: HOSPADM

## 2018-01-01 RX ORDER — SODIUM CHLORIDE 0.9 % (FLUSH) 0.9 %
20 SYRINGE (ML) INJECTION AS NEEDED
Status: DISCONTINUED | OUTPATIENT
Start: 2018-01-01 | End: 2018-01-01 | Stop reason: HOSPADM

## 2018-01-01 RX ORDER — NALOXONE HCL 0.4 MG/ML
0.4 VIAL (ML) INJECTION
Status: DISCONTINUED | OUTPATIENT
Start: 2018-01-01 | End: 2018-01-01 | Stop reason: HOSPADM

## 2018-01-01 RX ORDER — SODIUM CHLORIDE 9 MG/ML
100 INJECTION, SOLUTION INTRAVENOUS CONTINUOUS
Status: DISCONTINUED | OUTPATIENT
Start: 2018-01-01 | End: 2018-01-01

## 2018-01-01 RX ORDER — ONDANSETRON 4 MG/1
4 TABLET, ORALLY DISINTEGRATING ORAL EVERY 6 HOURS PRN
Status: DISCONTINUED | OUTPATIENT
Start: 2018-01-01 | End: 2018-01-01 | Stop reason: HOSPADM

## 2018-01-01 RX ORDER — HYDROCODONE BITARTRATE AND ACETAMINOPHEN 5; 325 MG/1; MG/1
1 TABLET ORAL EVERY 4 HOURS PRN
Status: DISCONTINUED | OUTPATIENT
Start: 2018-01-01 | End: 2018-01-01 | Stop reason: HOSPADM

## 2018-01-01 RX ORDER — SODIUM CHLORIDE 0.9 % (FLUSH) 0.9 %
1-10 SYRINGE (ML) INJECTION AS NEEDED
Status: CANCELLED | OUTPATIENT
Start: 2018-01-01

## 2018-01-01 RX ORDER — SODIUM CHLORIDE 0.9 % (FLUSH) 0.9 %
3 SYRINGE (ML) INJECTION EVERY 12 HOURS SCHEDULED
Status: CANCELLED | OUTPATIENT
Start: 2018-01-01

## 2018-01-01 RX ORDER — LIDOCAINE HYDROCHLORIDE 10 MG/ML
20 INJECTION, SOLUTION INFILTRATION; PERINEURAL ONCE
Status: COMPLETED | OUTPATIENT
Start: 2018-01-01 | End: 2018-01-01

## 2018-01-01 RX ORDER — MORPHINE SULFATE 2 MG/ML
2 INJECTION, SOLUTION INTRAMUSCULAR; INTRAVENOUS
Status: DISCONTINUED | OUTPATIENT
Start: 2018-01-01 | End: 2018-01-01 | Stop reason: HOSPADM

## 2018-01-01 RX ADMIN — VANCOMYCIN HYDROCHLORIDE 1500 MG: 10 INJECTION, POWDER, LYOPHILIZED, FOR SOLUTION INTRAVENOUS at 15:07

## 2018-01-01 RX ADMIN — SODIUM CHLORIDE 100 ML/HR: 9 INJECTION, SOLUTION INTRAVENOUS at 20:22

## 2018-01-01 RX ADMIN — SODIUM CHLORIDE 100 ML/HR: 9 INJECTION, SOLUTION INTRAVENOUS at 14:48

## 2018-01-01 RX ADMIN — LIDOCAINE HYDROCHLORIDE 20 ML: 10 INJECTION, SOLUTION INFILTRATION; PERINEURAL at 12:05

## 2018-01-01 RX ADMIN — BISACODYL 5 MG: 5 TABLET, COATED ORAL at 11:00

## 2018-01-01 RX ADMIN — HYDROCODONE BITARTRATE AND ACETAMINOPHEN 1 TABLET: 5; 325 TABLET ORAL at 12:35

## 2018-01-01 RX ADMIN — ACETAMINOPHEN 650 MG: 325 TABLET, FILM COATED ORAL at 09:02

## 2018-01-01 RX ADMIN — ACETAMINOPHEN 650 MG: 325 TABLET, FILM COATED ORAL at 03:56

## 2018-01-01 RX ADMIN — ACETAMINOPHEN 650 MG: 325 TABLET, FILM COATED ORAL at 19:50

## 2018-01-01 RX ADMIN — HYDROCODONE BITARTRATE AND ACETAMINOPHEN 1 TABLET: 5; 325 TABLET ORAL at 17:57

## 2018-01-01 RX ADMIN — HYDROCODONE BITARTRATE AND ACETAMINOPHEN 1 TABLET: 5; 325 TABLET ORAL at 16:11

## 2018-01-01 RX ADMIN — ACETAMINOPHEN 650 MG: 325 TABLET, FILM COATED ORAL at 21:26

## 2018-01-01 RX ADMIN — HYDROCODONE BITARTRATE AND ACETAMINOPHEN 1 TABLET: 5; 325 TABLET ORAL at 13:49

## 2018-01-01 RX ADMIN — GADOBENATE DIMEGLUMINE 18 ML: 529 INJECTION, SOLUTION INTRAVENOUS at 14:36

## 2018-01-01 RX ADMIN — ACETAMINOPHEN 650 MG: 325 TABLET, FILM COATED ORAL at 03:40

## 2018-01-01 RX ADMIN — HYDROCODONE BITARTRATE AND ACETAMINOPHEN 1 TABLET: 5; 325 TABLET ORAL at 05:02

## 2018-01-01 RX ADMIN — HYDROCODONE BITARTRATE AND ACETAMINOPHEN 1 TABLET: 5; 325 TABLET ORAL at 20:25

## 2018-05-15 ENCOUNTER — OFFICE VISIT (OUTPATIENT)
Dept: NEUROLOGY | Facility: CLINIC | Age: 73
End: 2018-05-15

## 2018-05-15 VITALS — HEIGHT: 72 IN | BODY MASS INDEX: 27.09 KG/M2 | WEIGHT: 200 LBS

## 2018-05-15 DIAGNOSIS — G60.9 IDIOPATHIC PERIPHERAL NEUROPATHY: Primary | ICD-10-CM

## 2018-05-15 PROCEDURE — 99243 OFF/OP CNSLTJ NEW/EST LOW 30: CPT | Performed by: PSYCHIATRY & NEUROLOGY

## 2018-05-15 RX ORDER — UBIDECARENONE 50 MG
CAPSULE ORAL DAILY
COMMUNITY
End: 2018-01-01 | Stop reason: HOSPADM

## 2018-05-15 RX ORDER — CHLORAL HYDRATE 500 MG
1000 CAPSULE ORAL DAILY
COMMUNITY

## 2018-05-15 RX ORDER — OMEPRAZOLE 20 MG/1
20 CAPSULE, DELAYED RELEASE ORAL DAILY
Refills: 1 | COMMUNITY
Start: 2018-03-11 | End: 2019-01-01 | Stop reason: SDUPTHER

## 2018-05-15 RX ORDER — THIAMINE HCL 100 MG
5000 TABLET ORAL DAILY
COMMUNITY
End: 2019-01-01 | Stop reason: HOSPADM

## 2018-05-15 RX ORDER — ASPIRIN 81 MG/1
81 TABLET ORAL DAILY
COMMUNITY
End: 2019-01-01

## 2018-05-15 RX ORDER — FOLIC ACID 1 MG/1
TABLET ORAL
Refills: 2 | COMMUNITY
Start: 2018-03-06 | End: 2018-01-01 | Stop reason: HOSPADM

## 2018-05-15 RX ORDER — FLUTICASONE PROPIONATE 50 MCG
2 SPRAY, SUSPENSION (ML) NASAL DAILY PRN
Refills: 3 | COMMUNITY
Start: 2018-03-27

## 2018-05-15 RX ORDER — ASCORBIC ACID 500 MG
1000 TABLET ORAL DAILY
COMMUNITY

## 2018-05-15 NOTE — PROGRESS NOTES
CC: Numbness and tingling in the feet    HPI:  Baldemar Wellington is a  72 y.o.  right-handed white male who was sent for neurologic consultation by Dr. Proctor regarding numbness and tingling in the feet.  The patient states he has had some symptoms of this type for several years and it predominantly involves the feet but to a much lesser degree in the lower portion of the lower legs.  He subsequently was diagnosed with prostate cancer and he received 42 radiation therapy treatments in 2017.  He had no chemotherapy.  He has rheumatoid arthritis and was treated with Remicade and was off for 6 months and then started on Orencia instead.  He indicates that he spoke with Dr. Barrera who is his rheumatologist who did not feel that the Remicade was related to his neuropathy.    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  He denies a history of diabetes or thyroid trouble.  He says his feet just feel like they're asleep all the time.  He says when he was undergoing radiation therapy has balance was off but this improved and he says he has no balance trouble.  He has no numbness or tingling in the hands but his hands do feel stiff.      Past Medical History:   Diagnosis Date   • Leiomyosarcoma    • Prostate cancer          History reviewed. No pertinent surgical history.        Current Outpatient Prescriptions:   •  Abatacept (ORENCIA IV), Infuse  into a venous catheter., Disp: , Rfl:   •  aspirin 81 MG EC tablet, Take 81 mg by mouth Daily., Disp: , Rfl:   •  coenzyme Q10 50 MG capsule capsule, Take  by mouth Daily., Disp: , Rfl:   •  fluticasone (FLONASE) 50 MCG/ACT nasal spray, 2 sprays by Each Nare route Daily., Disp: , Rfl: 3  •  folic acid (FOLVITE) 1 MG tablet, TAKE 1-4 TABLETS EVERY DAY, Disp: , Rfl: 2  •  loratadine-pseudoephedrine (CLARITIN-D 24-hour)  MG per 24 hr tablet, Take 1 tablet by mouth., Disp: , Rfl:   •  Omega-3 Fatty Acids (FISH OIL) 1000 MG  capsule capsule, Take  by mouth Daily With Breakfast., Disp: , Rfl:   •  omeprazole (priLOSEC) 20 MG capsule, Take 20 mg by mouth Daily., Disp: , Rfl: 1  •  vitamin B-12 (CYANOCOBALAMIN) 2500 MCG sublingual tablet tablet, Place  under the tongue Daily., Disp: , Rfl:   •  vitamin C (ASCORBIC ACID) 500 MG tablet, Take 500 mg by mouth Daily., Disp: , Rfl:   •  methotrexate 2.5 MG tablet, TAKE 4 TABLETS BY MOUTH ONCE WEEKLY, Disp: , Rfl: 2      History reviewed. No pertinent family history.      Social History     Social History   • Marital status:      Spouse name: N/A   • Number of children: N/A   • Years of education: N/A     Occupational History   • Not on file.     Social History Main Topics   • Smoking status: Never Smoker   • Smokeless tobacco: Not on file   • Alcohol use No   • Drug use: Unknown   • Sexual activity: Not on file     Other Topics Concern   • Not on file     Social History Narrative   • No narrative on file         Allergies   Allergen Reactions   • Lorazepam Hives         Pain Scale:Only on the walking pebble concrete this he describes pain in the feet        ROS:  Review of Systems   Constitutional: Negative for activity change, appetite change and fatigue.   HENT: Negative for ear pain, facial swelling and hearing loss.    Eyes: Negative for pain, redness and itching.   Respiratory: Negative for cough, choking and shortness of breath.    Cardiovascular: Positive for leg swelling (feet swelling). Negative for chest pain.   Gastrointestinal: Negative for abdominal pain, nausea and vomiting.   Endocrine: Negative for cold intolerance and heat intolerance.   Skin: Negative for color change, pallor, rash and wound.   Allergic/Immunologic: Negative for environmental allergies and food allergies.   Neurological: Positive for numbness. Negative for dizziness, tremors, seizures, syncope, facial asymmetry, speech difficulty, weakness, light-headedness and headaches.   Psychiatric/Behavioral:  "Negative for agitation, behavioral problems, confusion, decreased concentration, dysphoric mood, hallucinations, self-injury, sleep disturbance and suicidal ideas. The patient is not nervous/anxious and is not hyperactive.            Physical Exam:  Vitals:    05/15/18 1321   Weight: 90.7 kg (200 lb)   Height: 182.9 cm (72\")     Body mass index is 27.12 kg/m².    Physical Exam  Gen.: Overweight white male no acute distress  HEENT: Normocephalic no evidence of trauma.  Discs flat.  Throat negative.  No A-V nicking.  Neck: Supple.  No thyromegaly.Cervical bruits.  Radial pulses are strong and simultaneous.  Heart: Regular rate and rhythm without murmurs  Left great toe is somewhat hammered and the pad on the ball of foot has unusual texture and feels as though there is a floating structure present.  (The patient states this is how his foot has been for his whole life that he can recall)    Neurological Exam:   Mental status: Awake alert oriented and conversant without evidence of an affective disorder thought disorder delusions or hallucinations.  HCF: No aphasia or apraxia or dysarthria.  Recent and remote memory intact.  Knowledge of recent events intact.  Cranial nerves: 2-12 intact  Motor: Normal tone and bulk with full power in all muscles tested in the arms and legs with exception of toe extension is mildly weak.  Sensory: Normal light touch and pinprick over the upper extremities.  In the lower extremities these are normal.  Vibration sense was absent at the ankles and at the knees.  Position sense was mildly abnormal at the great toes more on the left  Cerebellar: Finger to nose rapid movements heel-to-shin were intact  Gait and Station: Casual toe heel and tandem walk were normal no Romberg no drift        Results:      No results found for: GLUCOSE, BUN, CREATININE, EGFRIFNONA, EGFRIFAFRI, BCR, CO2, CALCIUM, PROTENTOTREF, ALBUMIN, LABIL2, AST, ALT    No results found for: WBC, HGB, HCT, MCV, PLT      .No " results found for: RPR      No results found for: TSH, V2QMIGL, G6NUZOO, THYROIDAB      No results found for: [id number]      No results found for: FOLATE      No results found for: HGBA1C            Assessment:   1.  Peripheral neuropathy with mostly sensory findings including more dorsal column findings without clear spinothalamic tract findings  2.  Symptoms worse after radiation therapy for prostate cancer        Plan:  1.  EMG legs  2.  Labs for common treatable causes: Sedimentation rate, RPR, hemoglobin A1c, thyroid functions, B12 and folate, heavy metals, copper level, SPEP and IEP, cryoglobulins  3.  Further discuss results when I see him for his EMG                          Dictated utilizing Dragon dictation.

## 2018-07-17 ENCOUNTER — LAB (OUTPATIENT)
Dept: LAB | Facility: HOSPITAL | Age: 73
End: 2018-07-17

## 2018-07-17 ENCOUNTER — PROCEDURE VISIT (OUTPATIENT)
Dept: NEUROLOGY | Facility: CLINIC | Age: 73
End: 2018-07-17

## 2018-07-17 VITALS — HEIGHT: 72 IN | WEIGHT: 200 LBS | BODY MASS INDEX: 27.09 KG/M2

## 2018-07-17 DIAGNOSIS — G60.9 IDIOPATHIC PERIPHERAL NEUROPATHY: ICD-10-CM

## 2018-07-17 DIAGNOSIS — M54.17 LUMBOSACRAL RADICULOPATHY: Primary | ICD-10-CM

## 2018-07-17 LAB
ERYTHROCYTE [SEDIMENTATION RATE] IN BLOOD: 7 MM/HR (ref 0–20)
FOLATE SERPL-MCNC: 15.77 NG/ML (ref 4.78–24.2)
HBA1C MFR BLD: 4.78 % (ref 4.8–5.6)
T4 FREE SERPL-MCNC: 1.09 NG/DL (ref 0.93–1.7)
TSH SERPL DL<=0.05 MIU/L-ACNC: 2.64 MIU/ML (ref 0.27–4.2)
VIT B12 BLD-MCNC: >2000 PG/ML (ref 211–946)

## 2018-07-17 PROCEDURE — 84443 ASSAY THYROID STIM HORMONE: CPT | Performed by: PSYCHIATRY & NEUROLOGY

## 2018-07-17 PROCEDURE — 82746 ASSAY OF FOLIC ACID SERUM: CPT | Performed by: PSYCHIATRY & NEUROLOGY

## 2018-07-17 PROCEDURE — 82175 ASSAY OF ARSENIC: CPT | Performed by: PSYCHIATRY & NEUROLOGY

## 2018-07-17 PROCEDURE — 82784 ASSAY IGA/IGD/IGG/IGM EACH: CPT

## 2018-07-17 PROCEDURE — 36415 COLL VENOUS BLD VENIPUNCTURE: CPT | Performed by: PSYCHIATRY & NEUROLOGY

## 2018-07-17 PROCEDURE — 84165 PROTEIN E-PHORESIS SERUM: CPT | Performed by: PSYCHIATRY & NEUROLOGY

## 2018-07-17 PROCEDURE — 82525 ASSAY OF COPPER: CPT

## 2018-07-17 PROCEDURE — 82607 VITAMIN B-12: CPT | Performed by: PSYCHIATRY & NEUROLOGY

## 2018-07-17 PROCEDURE — 84439 ASSAY OF FREE THYROXINE: CPT | Performed by: PSYCHIATRY & NEUROLOGY

## 2018-07-17 PROCEDURE — 82595 ASSAY OF CRYOGLOBULIN: CPT

## 2018-07-17 PROCEDURE — 86334 IMMUNOFIX E-PHORESIS SERUM: CPT

## 2018-07-17 PROCEDURE — 84155 ASSAY OF PROTEIN SERUM: CPT | Performed by: PSYCHIATRY & NEUROLOGY

## 2018-07-17 PROCEDURE — 86592 SYPHILIS TEST NON-TREP QUAL: CPT | Performed by: PSYCHIATRY & NEUROLOGY

## 2018-07-17 PROCEDURE — 95886 MUSC TEST DONE W/N TEST COMP: CPT | Performed by: PSYCHIATRY & NEUROLOGY

## 2018-07-17 PROCEDURE — 83036 HEMOGLOBIN GLYCOSYLATED A1C: CPT | Performed by: PSYCHIATRY & NEUROLOGY

## 2018-07-17 PROCEDURE — 85652 RBC SED RATE AUTOMATED: CPT | Performed by: PSYCHIATRY & NEUROLOGY

## 2018-07-17 PROCEDURE — 83655 ASSAY OF LEAD: CPT | Performed by: PSYCHIATRY & NEUROLOGY

## 2018-07-17 PROCEDURE — 95909 NRV CNDJ TST 5-6 STUDIES: CPT | Performed by: PSYCHIATRY & NEUROLOGY

## 2018-07-17 PROCEDURE — 83825 ASSAY OF MERCURY: CPT | Performed by: PSYCHIATRY & NEUROLOGY

## 2018-07-17 NOTE — PROGRESS NOTES
EMG and Nerve Conduction Studies    Please see data sheets for details on methods, temperatures and lab standards. EMG muscles tested for upper extremity studies include the deltoid, biceps, triceps, pronator teres, extensor digitorum communis, first dorsal interosseous and abductor pollicis brevis.  EMG muscles tested for lower extremity studies include the vastus lateralis, tibialis anterior, peroneus longus, medial gastrocnemius and extensor digitorum brevis.  Additional muscles tested as needed.  Paraspinal muscles tested as needed.  The complete report includes the data sheets.    Indication: Peripheral neuropathy  History: 72-year-old male with numbness in the feet.  He denies back pain      Ht: 182.9 cm  Wt: 90.7 kg; BMI 27.1  HbA1C: No results found for: HGBA1C  TSH: No results found for: TSH    Technical summary:  Nerve conduction studies were obtained in the left leg with some comparison on the right.  The feet were warmed but it was difficult to keep them above 32°C.  Temperature correction was used if indicated.  Needle examination was obtained on selected muscles in both legs.    Results:  1.  Mildly prolonged left sural sensory latency at 4.1 ms with low amplitude of 2 µV.  2.  Normal left superficial peroneal sensory latency with low amplitude of 2.3 µV.  3.  Severely prolonged left peroneal motor latency at 9.5 ms with slow velocity below the knee at 30.3 m/s with normal velocity in the short segment across the fibular head.  Mildly low amplitude of 1.3 mV without significant conduction block proximally.  Or.  Slow tibial motor velocities bilaterally at 31.6 m/s on the left and 34.8 m/s on the right.  The distal latencies were at the upper end of normal.  The amplitude was normal on the left from ankle stimulation at 2.4 mV but a little low on the right at 1.7 mV.  5.  Needle examination of selected muscles in both legs showed some irritative changes in the gastrocnemius and extensor digitorum brevis  muscles bilaterally.  There was increased number of large motor units in the extensor digitorum brevis with increased firing rate and reduced interference pattern with the remaining muscles tested showing normal motor units and recruitment.  Lumbar paraspinals at L5 showed occasional positive sharp waves bilaterally.    Impression: Abnormal study showing moderate polyneuropathy.  In addition there were some needle exam changes that can be consistent with bilateral S1 radiculopathies versus root level involvement of the polyneuropathy.  Clinical correlation is suggested.  Study results were discussed with the patient.        Amilcar Wellington M.D.    Addendum:  Given root level involvement on needle exam will obtain MRI lumbar spine  He was also told to go to the lab to obtain the labs that it previously been ordered for common treatable causes of neuropathy        Dictated utilizing Dragon dictation.

## 2018-07-18 LAB
ALBUMIN SERPL-MCNC: 3.8 G/DL (ref 2.9–4.4)
ALBUMIN/GLOB SERPL: 1.4 {RATIO} (ref 0.7–1.7)
ALPHA1 GLOB FLD ELPH-MCNC: 0.2 G/DL (ref 0–0.4)
ALPHA2 GLOB SERPL ELPH-MCNC: 0.6 G/DL (ref 0.4–1)
B-GLOBULIN SERPL ELPH-MCNC: 1 G/DL (ref 0.7–1.3)
GAMMA GLOB SERPL ELPH-MCNC: 1 G/DL (ref 0.4–1.8)
GLOBULIN SER CALC-MCNC: 2.8 G/DL (ref 2.2–3.9)
IGA SERPL-MCNC: 179 MG/DL (ref 61–437)
IGG SERPL-MCNC: 746 MG/DL (ref 700–1600)
IGM SERPL-MCNC: 233 MG/DL (ref 20–172)
Lab: ABNORMAL
M-SPIKE: 0.3 G/DL
PROT PATTERN SERPL ELPH-IMP: ABNORMAL
PROT PATTERN SERPL IFE-IMP: ABNORMAL
PROT SERPL-MCNC: 6.6 G/DL (ref 6–8.5)
RPR SER QL: NORMAL

## 2018-07-19 LAB
ARSENIC BLD-MCNC: 6 UG/L (ref 2–23)
COPPER SERPL-MCNC: 94 UG/DL (ref 72–166)
LEAD BLD-MCNC: 2 UG/DL (ref 0–19)
MERCURY BLD-MCNC: 1.6 UG/L (ref 0–14.9)

## 2018-07-23 LAB — CRYOGLOB SER QL 1D COLD INC: NORMAL

## 2018-07-24 ENCOUNTER — TELEPHONE (OUTPATIENT)
Dept: NEUROLOGY | Facility: CLINIC | Age: 73
End: 2018-07-24

## 2018-07-24 NOTE — TELEPHONE ENCOUNTER
----- Message from Aicha Cueva sent at 7/24/2018 12:31 PM EDT -----  Contact: [phone]  PT RETURNING PHONE CALL PLEASE ADVISE AND CALL PT BACK [phone]

## 2018-07-30 ENCOUNTER — HOSPITAL ENCOUNTER (OUTPATIENT)
Dept: MRI IMAGING | Facility: HOSPITAL | Age: 73
Discharge: HOME | End: 2018-07-30
Attending: PSYCHIATRY & NEUROLOGY | Admitting: PSYCHIATRY & NEUROLOGY

## 2018-07-30 DIAGNOSIS — M54.17 LUMBOSACRAL RADICULOPATHY: ICD-10-CM

## 2018-07-30 LAB — CREAT BLDA-MCNC: 0.9 MG/DL (ref 0.6–1.3)

## 2018-07-30 PROCEDURE — A9577 INJ MULTIHANCE: HCPCS | Performed by: PSYCHIATRY & NEUROLOGY

## 2018-07-30 PROCEDURE — 72158 MRI LUMBAR SPINE W/O & W/DYE: CPT

## 2018-07-30 PROCEDURE — 0 GADOBENATE DIMEGLUMINE 529 MG/ML SOLUTION: Performed by: PSYCHIATRY & NEUROLOGY

## 2018-07-30 PROCEDURE — 82565 ASSAY OF CREATININE: CPT

## 2018-07-30 RX ADMIN — GADOBENATE DIMEGLUMINE 19 ML: 529 INJECTION, SOLUTION INTRAVENOUS at 10:55

## 2018-07-31 ENCOUNTER — TELEPHONE (OUTPATIENT)
Dept: NEUROLOGY | Facility: CLINIC | Age: 73
End: 2018-07-31

## 2018-07-31 ENCOUNTER — TRANSCRIBE ORDERS (OUTPATIENT)
Dept: ADMINISTRATIVE | Facility: HOSPITAL | Age: 73
End: 2018-07-31

## 2018-07-31 DIAGNOSIS — M25.561 RIGHT KNEE PAIN, UNSPECIFIED CHRONICITY: Primary | ICD-10-CM

## 2018-07-31 NOTE — TELEPHONE ENCOUNTER
----- Message from Isabelle Rodriguez sent at 7/31/2018 11:09 AM EDT -----  Regarding: TESTS TO BE ORDERED  Contact: [phone]  PT WAS WANTING TO CHECK WITH YOU ABOUT SOME TESTS YOU WERE GOING TO ORDER.      THANK YOU

## 2018-07-31 NOTE — TELEPHONE ENCOUNTER
S/w pt let know Dr. Wellington wants him to see Mehreen HERRING first before doing additional test.

## 2018-08-07 ENCOUNTER — HOSPITAL ENCOUNTER (OUTPATIENT)
Dept: MRI IMAGING | Facility: HOSPITAL | Age: 73
Discharge: HOME | End: 2018-08-07
Attending: INTERNAL MEDICINE | Admitting: INTERNAL MEDICINE

## 2018-08-07 DIAGNOSIS — M25.561 RIGHT KNEE PAIN, UNSPECIFIED CHRONICITY: ICD-10-CM

## 2018-08-07 PROCEDURE — 73721 MRI JNT OF LWR EXTRE W/O DYE: CPT

## 2018-08-08 ENCOUNTER — TELEPHONE (OUTPATIENT)
Dept: NEUROLOGY | Facility: CLINIC | Age: 73
End: 2018-08-08

## 2018-08-08 NOTE — TELEPHONE ENCOUNTER
----- Message from NIMA Hernandez sent at 8/7/2018  8:43 AM EDT -----  Ok. Thanks!  ----- Message -----  From: Kathleen Yanez MA  Sent: 7/24/2018  10:07 AM  To: NIMA Hernandez Dr. wanted you to see pt in clinic. I put this patient on your list and I'm guessing when schedule opens to go ahead and schedule?    ----- Message -----  From: Nell Taylor MA  Sent: 7/24/2018   8:37 AM  To: Kathleen Yanez MA    I'm going to call him and let him know everything  ----- Message -----  From: Amilcar Wellington MD  Sent: 7/22/2018  10:50 AM  To: NIMA Hernandez, Nell Taylor MA    Heavy metals negative  SPE/IEP with MGUS IgM 0.3 g/dl  RPR NR  B12 >2000, folate 15.77  TSH 2.64, FT4 1.09  ESR 7  HbA1C 4.78    He will need a Motor and Sensory neuropathy panel    He needs to F/U with Mehreen Gomez NP soon for discussion regarding the M+S neuropathy panel as well as LP    gns

## 2018-08-14 ENCOUNTER — PREP FOR SURGERY (OUTPATIENT)
Dept: OTHER | Facility: HOSPITAL | Age: 73
End: 2018-08-14

## 2018-08-14 ENCOUNTER — OFFICE VISIT (OUTPATIENT)
Dept: NEUROLOGY | Facility: CLINIC | Age: 73
End: 2018-08-14

## 2018-08-14 VITALS
WEIGHT: 198 LBS | HEART RATE: 62 BPM | BODY MASS INDEX: 27.72 KG/M2 | SYSTOLIC BLOOD PRESSURE: 132 MMHG | HEIGHT: 71 IN | DIASTOLIC BLOOD PRESSURE: 92 MMHG | OXYGEN SATURATION: 98 %

## 2018-08-14 DIAGNOSIS — M54.17 LUMBOSACRAL RADICULOPATHY: ICD-10-CM

## 2018-08-14 DIAGNOSIS — G60.9 IDIOPATHIC PERIPHERAL NEUROPATHY: ICD-10-CM

## 2018-08-14 DIAGNOSIS — G62.9 POLYNEUROPATHY: Primary | ICD-10-CM

## 2018-08-14 PROCEDURE — 99213 OFFICE O/P EST LOW 20 MIN: CPT | Performed by: NURSE PRACTITIONER

## 2018-08-14 RX ORDER — AMPICILLIN TRIHYDRATE 250 MG
CAPSULE ORAL DAILY
COMMUNITY
End: 2018-01-01 | Stop reason: HOSPADM

## 2018-08-14 RX ORDER — PREDNISONE 1 MG/1
5 TABLET ORAL AS NEEDED
COMMUNITY
End: 2018-01-01 | Stop reason: HOSPADM

## 2018-08-14 NOTE — PROGRESS NOTES
CC: Bilateral foot numbness/tingling     HPI:  Baldemar Wellington is a  72 y.o.  right-handed white male who our service was asked to see d/t numbness and tingling in the feet. He reports that he as had some similar symptoms in the past (several years ago). He started noticing the symptoms around the time he was diagnosed w/ prostrate cancer. He subsequently received 42 radiation treatment (2017) and he notes that symptoms worsened throughout the treatment course.  Patient states that he thought the numbness and tingling could be d/t the fact that he has RA and had to be off his Remicade for 6 months during his radiation therapy.     Since last encounter patient reports that the numbness and tingling has not improved nor has it worsened. He had an EMG completed which was abnormal showing moderate polyneuropathy. Given root level involvement on needle exam Dr. Wellington ordered an MRI lumbar spine to assist with clinical correlation. MRI Lumbar spine revealed  T1 hyperdensity compatible with post radiation fatty marrow changes to visual last L5 through S3 vertebrae in there is abnormal marrow edema and enhancement in the right sacral from S1 to S3 or hairline nondisplaced acute to subacute vertical right sacral fracture from S1 to S3, mild spinal spondylosis is present no canal narrowing is seen in the lumbar spine no disc herniation noted is minimal foraminal narrowing at L4 5 and spurring of the endplates laterally at L5 S1.  Common treatable neuropathy labs completed sedimentation rate and RPR hemoglobin A1c B12 folate heavy metals and copper levels all within normal limits.  SPE/IEP with MGUS GM 0.3g/dl. He is here today to follow-up on the above results.         Past Medical History:   Diagnosis Date   • Leiomyosarcoma (CMS/HCC)    • Prostate cancer (CMS/HCC)          History reviewed. No pertinent surgical history.      Current Outpatient Prescriptions:   •  Abatacept (ORENCIA IV), Infuse  into a venous catheter., Disp: ,  Rfl:   •  aspirin 81 MG EC tablet, Take 81 mg by mouth Daily., Disp: , Rfl:   •  Calcium-Vitamin D-Vitamin K 500-100-40 MG-UNT-MCG chewable tablet, Chew., Disp: , Rfl:   •  coenzyme Q10 50 MG capsule capsule, Take  by mouth Daily., Disp: , Rfl:   •  fluticasone (FLONASE) 50 MCG/ACT nasal spray, 2 sprays by Each Nare route Daily., Disp: , Rfl: 3  •  folic acid (FOLVITE) 1 MG tablet, TAKE 1-4 TABLETS EVERY DAY, Disp: , Rfl: 2  •  loratadine-pseudoephedrine (CLARITIN-D 24-hour)  MG per 24 hr tablet, Take 1 tablet by mouth., Disp: , Rfl:   •  methotrexate 2.5 MG tablet, TAKE 4 TABLETS BY MOUTH ONCE WEEKLY, Disp: , Rfl: 2  •  Omega-3 Fatty Acids (FISH OIL) 1000 MG capsule capsule, Take  by mouth Daily With Breakfast., Disp: , Rfl:   •  omeprazole (priLOSEC) 20 MG capsule, Take 20 mg by mouth Daily., Disp: , Rfl: 1  •  predniSONE (DELTASONE) 5 MG tablet, Take 5 mg by mouth Daily., Disp: , Rfl:   •  Red Yeast Rice 600 MG capsule, Take  by mouth., Disp: , Rfl:   •  vitamin B-12 (CYANOCOBALAMIN) 2500 MCG sublingual tablet tablet, Place  under the tongue Daily., Disp: , Rfl:   •  vitamin C (ASCORBIC ACID) 500 MG tablet, Take 500 mg by mouth Daily., Disp: , Rfl:       Family History   Problem Relation Age of Onset   • Breast cancer Mother    • Prostate cancer Father    • Breast cancer Sister    • Leukemia Brother          Social History     Social History   • Marital status:      Spouse name: N/A   • Number of children: N/A   • Years of education: N/A     Occupational History   • Not on file.     Social History Main Topics   • Smoking status: Former Smoker   • Smokeless tobacco: Never Used      Comment: quit 50 years ago   • Alcohol use Yes      Comment: social   • Drug use: Unknown   • Sexual activity: Not on file     Other Topics Concern   • Not on file     Social History Narrative   • No narrative on file         Allergies   Allergen Reactions   • Lorazepam Hives         Pain Scale:0/10        ROS:  Review of  "Systems   Constitutional: Negative for activity change, appetite change, chills, diaphoresis, fatigue, fever and unexpected weight change.   HENT: Negative for drooling, hearing loss, tinnitus, trouble swallowing and voice change.    Eyes: Negative for photophobia, pain and visual disturbance.   Respiratory: Negative for chest tightness and shortness of breath.    Cardiovascular: Negative for chest pain, palpitations and leg swelling.   Gastrointestinal: Negative for blood in stool, nausea and vomiting.   Endocrine: Negative for cold intolerance and heat intolerance.   Genitourinary: Negative for difficulty urinating.   Musculoskeletal: Negative for gait problem, neck pain and neck stiffness.   Skin: Negative for rash.   Neurological: Positive for numbness (tingling in both feet). Negative for dizziness, tremors, seizures, syncope, facial asymmetry, speech difficulty, weakness, light-headedness and headaches.   Hematological: Does not bruise/bleed easily.   Psychiatric/Behavioral: Negative for agitation, behavioral problems, confusion, hallucinations, sleep disturbance and suicidal ideas. The patient is not nervous/anxious.            Physical Exam:  Vitals:    08/14/18 0940   BP: 132/92   BP Location: Left arm   Patient Position: Sitting   Cuff Size: Adult   Pulse: 62   SpO2: 98%   Weight: 89.8 kg (198 lb)   Height: 180 cm (70.87\")     Body mass index is 27.72 kg/m².    Physical Exam    Gen.: white male no acute distress  HEENT: Normocephalic no evidence of trauma. Throat negative.    Neck: Supple.  No thyromegaly.  Radial pulses are strong and simultaneous.  Heart: Regular rate and rhythm without murmurs  Heels of both feet very callused.      Neurological Exam:   Alert, relaxed, cooperative. Thought process coherent. Oriented to person, place and time. No aphasia or apraxia or dysarthria.  Recent and remote memory intact.  Knowledge of recent events intact.  CN II- XII intact. Good muscle bulk and tone. Strength " 5/5 throughout. Cerebellar: Rapid alternating movements, finger-to-nose, heel-to-shin intact. Gait with normal base. Romberg-maintains balance with eyes closed. No pronator drift. Sensory: Pinprick, light touch, position and vibration intact. Vibration sense was absent at the ankles and at the knees (unchanged from last assessment).  Position sense normal.      Results:  Lab Results   Component Value Date    CREATININE 0.90 07/30/2018    PROTENTOTREF 6.6 07/17/2018    ALBUMIN 3.8 07/17/2018    LABIL2 1.4 07/17/2018       No results found for: WBC, HGB, HCT, MCV, PLT      .  Lab Results   Component Value Date    RPR Non-Reactive 07/17/2018         Lab Results   Component Value Date    TSH 2.640 07/17/2018         Lab Results   Component Value Date    ACCSKCRL74 >2,000 (H) 07/17/2018         Lab Results   Component Value Date    FOLATE 15.77 07/17/2018         Lab Results   Component Value Date    HGBA1C 4.78 (L) 07/17/2018       Lab Results   Component Value Date    FOLATE 15.77 07/17/2018     Lab Results   Component Value Date    RPR Non-Reactive 07/17/2018     Lab Results   Component Value Date    SEDRATE 7 07/17/2018   Heavy metals negative  SPE/IEP with MGUS IgM 0.3 g/dl  RPR NR  B12 >2000, folate 15.77  TSH 2.64, FT4 1.09  ESR 7  HbA1C 4.78    MRI Right Knee   IMPRESSION:  Large right medial meniscus tear with a radial component  near the junction of the body with the posterior horn, horizontal  component along the meniscal body, and some irregularity along the white  white zone of the posterior horn. There is also chondromalacia involving  all 3 compartments of the knee, most advanced at the patellofemoral  compartment. No intra-articular body is identified. Ligaments and  tendons of the knee are intact.    MRI Lumbar Spine   IMPRESSION:  1. There is prominent T1 hyperintensity compatible with postradiation  fatty marrow change to the visualized L5 through S3 vertebrae and there  is abnormal marrow edema and  enhancement in the right sacral ala from S1  to S3 compatible with a hairline nondisplaced acute to subacute vertical  right sacral ala fracture from S1 to S3. The sacrum is incompletely  assessed on this exam and a dedicated pelvic MRI could be obtained to  further evaluate the sacrum for any additional fractures if clinically  indicated.  2. Mild lumbar spondylosis is present. There is mild facet overgrowth  with a 2 mm retrolisthesis of L2 on L3, a 2-3 mm retrolisthesis of L3 on  L4, a 3 mm retrolisthesis of L4 on L5, and a 2 mm retrolisthesis of L5  on S1. There is some disc space narrowing and mild degenerative endplate  changes at all these levels. There is minimal canal narrowing at L3-4.  Otherwise no canal narrowing is seen in the lumbar spine and no disc  herniation identified in the lumbar spine. At L3-4, there is minimal  right foraminal narrowing. There is eccentric disc osteophyte complex  extending into the inferior aspect of the left foramen and to the far  left laterally that at least mild up to mild to moderately narrows the  lateral aspect of the left foramen and abuts the anterior medial aspect  of the left L3 nerve root lateral to the left foramen to the far left  laterally at L3-4. There is minimal foraminal narrowing at L4-5. There  is eccentric spurring off the endplates into the inferior aspect of the  right foramen and to the far right laterally at L5-S1. Facet spurring  into the posterior superior lateral right foramen and there is moderate  narrowing of the right foramen at L5-S1 with endplate and facet spurs  abutting the exiting right L5 nerve root. The remainder of the lumbar  spine MRI is unremarkable.    EMG results   Impression: Abnormal study showing moderate polyneuropathy.  In addition there were some needle exam changes that can be consistent with bilateral S1 radiculopathies versus root level involvement of the polyneuropathy.  Clinical correlation is suggested.  Study results  were discussed with the patient.     Assessment:     · Abnormal EMG; moderate polyneuropathy  · Peripheral neuropathy   · Symptoms worse after radiation therapy for prostate cancer        Plan:  · Lumbar puncture w/ IgG index and IgG synthesis rate, oligoclonal bands (MS Panel)   · Motor/Sensory Neuropathy panel    · Call to schedule follow-up w/ Dr. Wellington after testing complete  ·  Keep planned follow-up w/ Ortho tomorrow; would like for Ortho to comment on hairline nondisplaced sacral fracture noted on MRI Lumbar spine imaging which both radiology and Dr. Wellington feel is secondary to radiation; not pathological disease       NIMA Hernandez              Discussed the above w/ Dr. Wellington and he agrees with plan above.      Dictated utilizing Dragon dictation.

## 2018-08-15 ENCOUNTER — OFFICE VISIT (OUTPATIENT)
Dept: ORTHOPEDIC SURGERY | Facility: CLINIC | Age: 73
End: 2018-08-15

## 2018-08-15 VITALS — WEIGHT: 200 LBS | HEIGHT: 71 IN | BODY MASS INDEX: 28 KG/M2

## 2018-08-15 DIAGNOSIS — G89.29 CHRONIC PAIN OF RIGHT KNEE: Primary | ICD-10-CM

## 2018-08-15 DIAGNOSIS — M25.561 CHRONIC PAIN OF RIGHT KNEE: Primary | ICD-10-CM

## 2018-08-15 PROCEDURE — 73562 X-RAY EXAM OF KNEE 3: CPT | Performed by: ORTHOPAEDIC SURGERY

## 2018-08-15 PROCEDURE — 99202 OFFICE O/P NEW SF 15 MIN: CPT | Performed by: ORTHOPAEDIC SURGERY

## 2018-08-19 RX ORDER — SILDENAFIL CITRATE 20 MG/1
TABLET ORAL
Refills: 11 | COMMUNITY
Start: 2018-08-08 | End: 2019-01-01 | Stop reason: HOSPADM

## 2018-08-19 NOTE — PROGRESS NOTES
Patient: Blademar Wellington    YOB: 1945    Medical Record Number: [id number]    Chief Complaints:  Referral for right knee pain    History of Present Illness:     72 y.o. male patient who presents for evaluation of his right knee.  2 months ago he began to have pain in the right knee.  He does not recall any specific inciting event or factor.  He had an injection that helped transiently but then his symptoms recurred.  Pain was moderate, intermittent and aching.  He did have some associated swelling and clicking.  He is an active amanda.  He walks 2 miles a day.  His symptoms prevented him from doing this.  Since he made this appointment, his symptoms are completely resolved.  He tells me that he feels like it is back to normal.    Allergies:   Allergies   Allergen Reactions   • Lorazepam Hives       Home Medications:      Current Outpatient Prescriptions:   •  Abatacept (ORENCIA IV), Infuse  into a venous catheter., Disp: , Rfl:   •  aspirin 81 MG EC tablet, Take 81 mg by mouth Daily., Disp: , Rfl:   •  Calcium-Vitamin D-Vitamin K 500-100-40 MG-UNT-MCG chewable tablet, Chew., Disp: , Rfl:   •  coenzyme Q10 50 MG capsule capsule, Take  by mouth Daily., Disp: , Rfl:   •  fluticasone (FLONASE) 50 MCG/ACT nasal spray, 2 sprays by Each Nare route Daily., Disp: , Rfl: 3  •  folic acid (FOLVITE) 1 MG tablet, TAKE 1-4 TABLETS EVERY DAY, Disp: , Rfl: 2  •  loratadine-pseudoephedrine (CLARITIN-D 24-hour)  MG per 24 hr tablet, Take 1 tablet by mouth., Disp: , Rfl:   •  methotrexate 2.5 MG tablet, TAKE 4 TABLETS BY MOUTH ONCE WEEKLY, Disp: , Rfl: 2  •  Omega-3 Fatty Acids (FISH OIL) 1000 MG capsule capsule, Take  by mouth Daily With Breakfast., Disp: , Rfl:   •  omeprazole (priLOSEC) 20 MG capsule, Take 20 mg by mouth Daily., Disp: , Rfl: 1  •  predniSONE (DELTASONE) 5 MG tablet, Take 5 mg by mouth Daily., Disp: , Rfl:   •  Red Yeast Rice 600 MG capsule, Take  by mouth., Disp: , Rfl:   •  vitamin B-12  "(CYANOCOBALAMIN) 2500 MCG sublingual tablet tablet, Place  under the tongue Daily., Disp: , Rfl:   •  vitamin C (ASCORBIC ACID) 500 MG tablet, Take 500 mg by mouth Daily., Disp: , Rfl:     Past Medical History:   Diagnosis Date   • Leiomyosarcoma (CMS/HCC)    • Prostate cancer (CMS/HCC)        History reviewed. No pertinent surgical history.    Social History     Occupational History   • Not on file.     Social History Main Topics   • Smoking status: Former Smoker   • Smokeless tobacco: Never Used      Comment: quit 50 years ago   • Alcohol use Yes      Comment: social   • Drug use: Unknown   • Sexual activity: Not on file      Social History     Social History Narrative   • No narrative on file       Family History   Problem Relation Age of Onset   • Breast cancer Mother    • Prostate cancer Father    • Breast cancer Sister    • Leukemia Brother        Review of Systems:      Constitutional: Denies fever, shaking or chills   Eyes: Denies change in visual acuity   HEENT: Denies nasal congestion or sore throat   Respiratory: Denies cough or shortness of breath   Cardiovascular: Denies chest pain or edema  Endocrine: Denies tremors, palpitations, intolerance of heat or cold, polyuria, polydipsia.  GI: Denies abdominal pain, nausea, vomiting, bloody stools or diarrhea  : Denies frequency, urgency, incontinence, retention, or nocturia.  Musculoskeletal: Denies numbness, tingling or loss of motor function except as above  Integument: Denies rash, lesion or ulceration   Neurologic: Denies headache or focal weakness, deficits  Heme: Denies spontaneous or excessive bleeding, epistaxis, hematuria, melena, fatigue, enlarged or tender lymph nodes.      All other pertinent positives and negatives as noted above in HPI.    Physical Exam: 72 y.o. male    Vitals:    08/15/18 1352   Weight: 90.7 kg (200 lb)   Height: 180.3 cm (71\")       General:  Patient is awake and alert.  Appears in no acute distress or discomfort.    Psych:  " Affect and demeanor are appropriate.    Eyes:  Conjunctiva and sclera appear grossly normal.  Eyes track well and EOM seem to be intact.    Ears:  No gross abnormalities.  Hearing adequate for the exam.    Cardiovascular:  Regular rate and rhythm.    Lungs:  Good chest expansion.  Breathing unlabored.    Lymph:  No palpable masses or adenopathy in the affected extremity    Extremities:  Right knee:  Skin is benign.  No gross abnormalities on inspection.  No palpable masses or adenopathy.  No effusion.  No significant joint line tenderness.  Full motion.  No instability.  Good strength with hip flexion, knee extension, ankle and toe plantarflexion and dorsiflexion.  Sensation is intact distally.  Brisk capillary refill in the toes with good skin turgor.         Radiology:   AP, merchants and lateral views the right knee are ordered and reviewed.  No comparison films are available.  He has mild degenerative changes but no other significant findings.  MRI of the right knee is reviewed along with the associated report.  There is a complex medial meniscus tear involving the posterior horn and mid body.  There is tricompartmental osteoarthritis which appears mild to moderate.  There is an area of full-thickness chondral loss in the medial femoral condyle.    Assessment/Plan:  Right knee osteoarthritis and degenerative meniscal tear    We discussed options for this.  I explained the natural history of these tears.  The tear will persist but there is no reason to intervene if he is asymptomatic.  He agrees.  He will follow-up as needed.    Paul Galarza MD    08/15/2018

## 2018-08-20 ENCOUNTER — TELEPHONE (OUTPATIENT)
Dept: NEUROLOGY | Facility: CLINIC | Age: 73
End: 2018-08-20

## 2018-08-20 NOTE — TELEPHONE ENCOUNTER
I s/w pt and he is aware the lab order has been placed per Mehreen Gomez and he can have that drawn Friday as well.

## 2018-08-20 NOTE — TELEPHONE ENCOUNTER
----- Message from Isabelle Rodriguez sent at 8/20/2018  1:29 PM EDT -----  Regarding: Lab work to be called in  Contact: [phone]  Pt [facility] lab orders were supposed to be sent to the lab at [facility] and they do not have them yet.  Would like to have his blood work done as soon as possible.

## 2018-08-22 ENCOUNTER — TELEPHONE (OUTPATIENT)
Dept: NEUROLOGY | Facility: CLINIC | Age: 73
End: 2018-08-22

## 2018-08-22 NOTE — TELEPHONE ENCOUNTER
Left a message letting him know lab order is in computer and ready whenever he wanted to have them done.       ----- Message from Aicha Cueva sent at 8/21/2018  1:03 PM EDT -----  Contact: [phone]  PT CALLING TO MAKE SURE BLOOD WORK ORDERS ARE PUT INTO THE COMPUTER SO HE CAN GET IT DRAWN Friday. PLEASE ADVISE AND CALL PT [phone]

## 2018-08-24 ENCOUNTER — LAB (OUTPATIENT)
Dept: LAB | Facility: HOSPITAL | Age: 73
End: 2018-08-24

## 2018-08-24 ENCOUNTER — HOSPITAL ENCOUNTER (OUTPATIENT)
Dept: GENERAL RADIOLOGY | Facility: HOSPITAL | Age: 73
Discharge: HOME | End: 2018-08-24
Admitting: NURSE PRACTITIONER

## 2018-08-24 VITALS
HEART RATE: 66 BPM | RESPIRATION RATE: 16 BRPM | DIASTOLIC BLOOD PRESSURE: 77 MMHG | SYSTOLIC BLOOD PRESSURE: 133 MMHG | HEIGHT: 72 IN | WEIGHT: 195 LBS | TEMPERATURE: 97.3 F | OXYGEN SATURATION: 97 % | BODY MASS INDEX: 26.41 KG/M2

## 2018-08-24 DIAGNOSIS — G62.9 POLYNEUROPATHY: ICD-10-CM

## 2018-08-24 DIAGNOSIS — G60.9 IDIOPATHIC PERIPHERAL NEUROPATHY: ICD-10-CM

## 2018-08-24 LAB
APPEARANCE CSF: ABNORMAL
APPEARANCE CSF: ABNORMAL
COLOR CSF: ABNORMAL
COLOR CSF: ABNORMAL
COLOR SPUN CSF: COLORLESS
EOSINOPHIL NFR CSF MANUAL: 3 %
GLUCOSE CSF-MCNC: 53 MG/DL (ref 40–70)
LYMPHOCYTES NFR CSF MANUAL: 21 %
METHOD: ABNORMAL
MONOCYTES NFR CSF MANUAL: 5 %
NEUTROPHILS NFR CSF MICRO: 71 %
NUC CELL # CSF MANUAL: 1 /MM3 (ref 0–5)
NUC CELL # CSF MANUAL: 14 /MM3 (ref 0–5)
PROT CSF-MCNC: 86 MG/DL (ref 15–45)
RBC # CSF MANUAL: 2209 /MM3 (ref 0–0)
RBC # CSF MANUAL: ABNORMAL /MM3 (ref 0–0)
SPECIMEN VOL CSF: ABNORMAL ML
TUBE # CSF: 1
TUBE # CSF: 4

## 2018-08-24 PROCEDURE — 83516 IMMUNOASSAY NONANTIBODY: CPT

## 2018-08-24 PROCEDURE — 36415 COLL VENOUS BLD VENIPUNCTURE: CPT

## 2018-08-24 PROCEDURE — 83916 OLIGOCLONAL BANDS: CPT | Performed by: NURSE PRACTITIONER

## 2018-08-24 PROCEDURE — 89051 BODY FLUID CELL COUNT: CPT | Performed by: NURSE PRACTITIONER

## 2018-08-24 PROCEDURE — 84157 ASSAY OF PROTEIN OTHER: CPT | Performed by: NURSE PRACTITIONER

## 2018-08-24 PROCEDURE — 86592 SYPHILIS TEST NON-TREP QUAL: CPT | Performed by: NURSE PRACTITIONER

## 2018-08-24 PROCEDURE — 86256 FLUORESCENT ANTIBODY TITER: CPT

## 2018-08-24 PROCEDURE — 89050 BODY FLUID CELL COUNT: CPT | Performed by: NURSE PRACTITIONER

## 2018-08-24 PROCEDURE — 77003 FLUOROGUIDE FOR SPINE INJECT: CPT

## 2018-08-24 PROCEDURE — 82784 ASSAY IGA/IGD/IGG/IGM EACH: CPT | Performed by: NURSE PRACTITIONER

## 2018-08-24 PROCEDURE — 87070 CULTURE OTHR SPECIMN AEROBIC: CPT | Performed by: NURSE PRACTITIONER

## 2018-08-24 PROCEDURE — 86255 FLUORESCENT ANTIBODY SCREEN: CPT

## 2018-08-24 PROCEDURE — 82945 GLUCOSE OTHER FLUID: CPT | Performed by: NURSE PRACTITIONER

## 2018-08-24 PROCEDURE — 82040 ASSAY OF SERUM ALBUMIN: CPT | Performed by: NURSE PRACTITIONER

## 2018-08-24 PROCEDURE — 82042 OTHER SOURCE ALBUMIN QUAN EA: CPT | Performed by: NURSE PRACTITIONER

## 2018-08-24 PROCEDURE — 87205 SMEAR GRAM STAIN: CPT | Performed by: NURSE PRACTITIONER

## 2018-08-24 PROCEDURE — 87015 SPECIMEN INFECT AGNT CONCNTJ: CPT | Performed by: NURSE PRACTITIONER

## 2018-08-24 RX ORDER — LIDOCAINE HYDROCHLORIDE 10 MG/ML
10 INJECTION, SOLUTION INFILTRATION; PERINEURAL ONCE
Status: COMPLETED | OUTPATIENT
Start: 2018-08-24 | End: 2018-08-24

## 2018-08-24 RX ADMIN — LIDOCAINE HYDROCHLORIDE 10 ML: 10 INJECTION, SOLUTION INFILTRATION; PERINEURAL at 15:02

## 2018-08-24 NOTE — DISCHARGE INSTRUCTIONS
EDUCATION /DISCHARGE INSTRUCTION   A lumbar puncture involves insertion of a sterile needle into the spinal canal by the physician   This procedure is performed for several reasons: to detect increased intracranial pressure, the presence of blood in cerebrospinal fluid (CFS), to obtain CSF specimens for laboratory studies, to administer drugs and to relieve pressure by removing CSF.    You will lie on your stomach with a pillow under your stomach.  This opens the vertebral space to allow access to the spinal needle.  The physician will sterilize the area using antiseptic solution and numb the area where the spinal needle will be placed.  If CSF is being removed for specimen, you may be asked to push or beardown to assist with the flow of the CSF. When the procedure is complete, a band aid will be placed over the injection site and you will be taken to the recovery area.    POTENTIAL RISKS OF A LUMBAR PUNCTURE INCLUDE BUT ARE NOT LIMITED TO:  *  Headache    *  Swelling at puncture site  *  Bleeding into the spinal canal *  Temporary difficulty urinating  *  Leakage of CSF   *  Fever  *  Pain caused by nerve irritation    BENEFIT OF PROCEDURE:  This is the only way to obtain spinal fluid or relieve pressure due to increased CSF.  There is no alternative.  THIS EDUCATION INFORMATION WAS REVIEWED PRIOR TO PROCEDURE AND CONSENT. Patient initials__________________Time_________________    FOLLOWING A LUMBAR PUNCTURE:  *  Drink plenty of liquids -  Caffeine is recommended   *  Lie down flat for the next 8 hours.  If you get a headache, lie down flat for 24 hours,        continue to force fluids and call your doctor if the headache persists.  *  Go straight home.  DO NOT DRIVE    CALL YOUR DOCTOR IF YOU HAVE:  *  A severe headache that will not go away  *  Redness, swelling or drainage from the puncture site  *  Neck stiffness, numbness or weakness  *  Any new or severe symptoms    Following the procedure, you should  continue to take all of your medications as directed by your primary physician unless otherwise instructed.  There have been no changes to the medications you provided us (with the following exceptions if applicable):    Resume taking your blood thinner or Aspirin on Saturday August 25, 2018 after 2pm    YOU ARE THE MOST IMPORTANT FACTOR IN YOUR RECOVERY.  IF YOU HAVE QUESTIONS OR CONCERNS PLEASE CALL THE RADIOLOGY NURSES [phone]

## 2018-08-24 NOTE — H&P (VIEW-ONLY)
CC: Bilateral foot numbness/tingling     HPI:  Baldemar Wellington is a  72 y.o.  right-handed white male who our service was asked to see d/t numbness and tingling in the feet. He reports that he as had some similar symptoms in the past (several years ago). He started noticing the symptoms around the time he was diagnosed w/ prostrate cancer. He subsequently received 42 radiation treatment (2017) and he notes that symptoms worsened throughout the treatment course.  Patient states that he thought the numbness and tingling could be d/t the fact that he has RA and had to be off his Remicade for 6 months during his radiation therapy.     Since last encounter patient reports that the numbness and tingling has not improved nor has it worsened. He had an EMG completed which was abnormal showing moderate polyneuropathy. Given root level involvement on needle exam Dr. Wellington ordered an MRI lumbar spine to assist with clinical correlation. MRI Lumbar spine revealed  T1 hyperdensity compatible with post radiation fatty marrow changes to visual last L5 through S3 vertebrae in there is abnormal marrow edema and enhancement in the right sacral from S1 to S3 or hairline nondisplaced acute to subacute vertical right sacral fracture from S1 to S3, mild spinal spondylosis is present no canal narrowing is seen in the lumbar spine no disc herniation noted is minimal foraminal narrowing at L4 5 and spurring of the endplates laterally at L5 S1.  Common treatable neuropathy labs completed sedimentation rate and RPR hemoglobin A1c B12 folate heavy metals and copper levels all within normal limits.  SPE/IEP with MGUS GM 0.3g/dl. He is here today to follow-up on the above results.         Past Medical History:   Diagnosis Date   • Leiomyosarcoma (CMS/HCC)    • Prostate cancer (CMS/HCC)          History reviewed. No pertinent surgical history.      Current Outpatient Prescriptions:   •  Abatacept (ORENCIA IV), Infuse  into a venous catheter., Disp: ,  Rfl:   •  aspirin 81 MG EC tablet, Take 81 mg by mouth Daily., Disp: , Rfl:   •  Calcium-Vitamin D-Vitamin K 500-100-40 MG-UNT-MCG chewable tablet, Chew., Disp: , Rfl:   •  coenzyme Q10 50 MG capsule capsule, Take  by mouth Daily., Disp: , Rfl:   •  fluticasone (FLONASE) 50 MCG/ACT nasal spray, 2 sprays by Each Nare route Daily., Disp: , Rfl: 3  •  folic acid (FOLVITE) 1 MG tablet, TAKE 1-4 TABLETS EVERY DAY, Disp: , Rfl: 2  •  loratadine-pseudoephedrine (CLARITIN-D 24-hour)  MG per 24 hr tablet, Take 1 tablet by mouth., Disp: , Rfl:   •  methotrexate 2.5 MG tablet, TAKE 4 TABLETS BY MOUTH ONCE WEEKLY, Disp: , Rfl: 2  •  Omega-3 Fatty Acids (FISH OIL) 1000 MG capsule capsule, Take  by mouth Daily With Breakfast., Disp: , Rfl:   •  omeprazole (priLOSEC) 20 MG capsule, Take 20 mg by mouth Daily., Disp: , Rfl: 1  •  predniSONE (DELTASONE) 5 MG tablet, Take 5 mg by mouth Daily., Disp: , Rfl:   •  Red Yeast Rice 600 MG capsule, Take  by mouth., Disp: , Rfl:   •  vitamin B-12 (CYANOCOBALAMIN) 2500 MCG sublingual tablet tablet, Place  under the tongue Daily., Disp: , Rfl:   •  vitamin C (ASCORBIC ACID) 500 MG tablet, Take 500 mg by mouth Daily., Disp: , Rfl:       Family History   Problem Relation Age of Onset   • Breast cancer Mother    • Prostate cancer Father    • Breast cancer Sister    • Leukemia Brother          Social History     Social History   • Marital status:      Spouse name: N/A   • Number of children: N/A   • Years of education: N/A     Occupational History   • Not on file.     Social History Main Topics   • Smoking status: Former Smoker   • Smokeless tobacco: Never Used      Comment: quit 50 years ago   • Alcohol use Yes      Comment: social   • Drug use: Unknown   • Sexual activity: Not on file     Other Topics Concern   • Not on file     Social History Narrative   • No narrative on file         Allergies   Allergen Reactions   • Lorazepam Hives         Pain Scale:0/10        ROS:  Review of  "Systems   Constitutional: Negative for activity change, appetite change, chills, diaphoresis, fatigue, fever and unexpected weight change.   HENT: Negative for drooling, hearing loss, tinnitus, trouble swallowing and voice change.    Eyes: Negative for photophobia, pain and visual disturbance.   Respiratory: Negative for chest tightness and shortness of breath.    Cardiovascular: Negative for chest pain, palpitations and leg swelling.   Gastrointestinal: Negative for blood in stool, nausea and vomiting.   Endocrine: Negative for cold intolerance and heat intolerance.   Genitourinary: Negative for difficulty urinating.   Musculoskeletal: Negative for gait problem, neck pain and neck stiffness.   Skin: Negative for rash.   Neurological: Positive for numbness (tingling in both feet). Negative for dizziness, tremors, seizures, syncope, facial asymmetry, speech difficulty, weakness, light-headedness and headaches.   Hematological: Does not bruise/bleed easily.   Psychiatric/Behavioral: Negative for agitation, behavioral problems, confusion, hallucinations, sleep disturbance and suicidal ideas. The patient is not nervous/anxious.            Physical Exam:  Vitals:    08/14/18 0940   BP: 132/92   BP Location: Left arm   Patient Position: Sitting   Cuff Size: Adult   Pulse: 62   SpO2: 98%   Weight: 89.8 kg (198 lb)   Height: 180 cm (70.87\")     Body mass index is 27.72 kg/m².    Physical Exam    Gen.: white male no acute distress  HEENT: Normocephalic no evidence of trauma. Throat negative.    Neck: Supple.  No thyromegaly.  Radial pulses are strong and simultaneous.  Heart: Regular rate and rhythm without murmurs  Heels of both feet very callused.      Neurological Exam:   Alert, relaxed, cooperative. Thought process coherent. Oriented to person, place and time. No aphasia or apraxia or dysarthria.  Recent and remote memory intact.  Knowledge of recent events intact.  CN II- XII intact. Good muscle bulk and tone. Strength " 5/5 throughout. Cerebellar: Rapid alternating movements, finger-to-nose, heel-to-shin intact. Gait with normal base. Romberg-maintains balance with eyes closed. No pronator drift. Sensory: Pinprick, light touch, position and vibration intact. Vibration sense was absent at the ankles and at the knees (unchanged from last assessment).  Position sense normal.      Results:  Lab Results   Component Value Date    CREATININE 0.90 07/30/2018    PROTENTOTREF 6.6 07/17/2018    ALBUMIN 3.8 07/17/2018    LABIL2 1.4 07/17/2018       No results found for: WBC, HGB, HCT, MCV, PLT      .  Lab Results   Component Value Date    RPR Non-Reactive 07/17/2018         Lab Results   Component Value Date    TSH 2.640 07/17/2018         Lab Results   Component Value Date    HDCPFTKC46 >2,000 (H) 07/17/2018         Lab Results   Component Value Date    FOLATE 15.77 07/17/2018         Lab Results   Component Value Date    HGBA1C 4.78 (L) 07/17/2018       Lab Results   Component Value Date    FOLATE 15.77 07/17/2018     Lab Results   Component Value Date    RPR Non-Reactive 07/17/2018     Lab Results   Component Value Date    SEDRATE 7 07/17/2018   Heavy metals negative  SPE/IEP with MGUS IgM 0.3 g/dl  RPR NR  B12 >2000, folate 15.77  TSH 2.64, FT4 1.09  ESR 7  HbA1C 4.78    MRI Right Knee   IMPRESSION:  Large right medial meniscus tear with a radial component  near the junction of the body with the posterior horn, horizontal  component along the meniscal body, and some irregularity along the white  white zone of the posterior horn. There is also chondromalacia involving  all 3 compartments of the knee, most advanced at the patellofemoral  compartment. No intra-articular body is identified. Ligaments and  tendons of the knee are intact.    MRI Lumbar Spine   IMPRESSION:  1. There is prominent T1 hyperintensity compatible with postradiation  fatty marrow change to the visualized L5 through S3 vertebrae and there  is abnormal marrow edema and  enhancement in the right sacral ala from S1  to S3 compatible with a hairline nondisplaced acute to subacute vertical  right sacral ala fracture from S1 to S3. The sacrum is incompletely  assessed on this exam and a dedicated pelvic MRI could be obtained to  further evaluate the sacrum for any additional fractures if clinically  indicated.  2. Mild lumbar spondylosis is present. There is mild facet overgrowth  with a 2 mm retrolisthesis of L2 on L3, a 2-3 mm retrolisthesis of L3 on  L4, a 3 mm retrolisthesis of L4 on L5, and a 2 mm retrolisthesis of L5  on S1. There is some disc space narrowing and mild degenerative endplate  changes at all these levels. There is minimal canal narrowing at L3-4.  Otherwise no canal narrowing is seen in the lumbar spine and no disc  herniation identified in the lumbar spine. At L3-4, there is minimal  right foraminal narrowing. There is eccentric disc osteophyte complex  extending into the inferior aspect of the left foramen and to the far  left laterally that at least mild up to mild to moderately narrows the  lateral aspect of the left foramen and abuts the anterior medial aspect  of the left L3 nerve root lateral to the left foramen to the far left  laterally at L3-4. There is minimal foraminal narrowing at L4-5. There  is eccentric spurring off the endplates into the inferior aspect of the  right foramen and to the far right laterally at L5-S1. Facet spurring  into the posterior superior lateral right foramen and there is moderate  narrowing of the right foramen at L5-S1 with endplate and facet spurs  abutting the exiting right L5 nerve root. The remainder of the lumbar  spine MRI is unremarkable.    EMG results   Impression: Abnormal study showing moderate polyneuropathy.  In addition there were some needle exam changes that can be consistent with bilateral S1 radiculopathies versus root level involvement of the polyneuropathy.  Clinical correlation is suggested.  Study results  were discussed with the patient.     Assessment:     · Abnormal EMG; moderate polyneuropathy  · Peripheral neuropathy   · Symptoms worse after radiation therapy for prostate cancer        Plan:  · Lumbar puncture w/ IgG index and IgG synthesis rate, oligoclonal bands (MS Panel)   · Motor/Sensory Neuropathy panel    · Call to schedule follow-up w/ Dr. Wellington after testing complete  ·  Keep planned follow-up w/ Ortho tomorrow; would like for Ortho to comment on hairline nondisplaced sacral fracture noted on MRI Lumbar spine imaging which both radiology and Dr. Wellington feel is secondary to radiation; not pathological disease       NIMA Hernandez              Discussed the above w/ Dr. Wellington and he agrees with plan above.      Dictated utilizing Dragon dictation.

## 2018-08-25 ENCOUNTER — TELEPHONE (OUTPATIENT)
Dept: INTERVENTIONAL RADIOLOGY/VASCULAR | Facility: HOSPITAL | Age: 73
End: 2018-08-25

## 2018-08-27 LAB
BACTERIA SPEC AEROBE CULT: NORMAL
GRAM STN SPEC: NORMAL
GRAM STN SPEC: NORMAL
OLIGOCLONAL BANDS.IT SER+CSF QL: NORMAL

## 2018-08-28 ENCOUNTER — ANESTHESIA (OUTPATIENT)
Dept: PAIN MEDICINE | Facility: HOSPITAL | Age: 73
End: 2018-08-28

## 2018-08-28 ENCOUNTER — TELEPHONE (OUTPATIENT)
Dept: NEUROLOGY | Facility: CLINIC | Age: 73
End: 2018-08-28

## 2018-08-28 ENCOUNTER — HOSPITAL ENCOUNTER (OUTPATIENT)
Dept: GENERAL RADIOLOGY | Facility: HOSPITAL | Age: 73
Discharge: HOME | End: 2018-08-28

## 2018-08-28 ENCOUNTER — DOCUMENTATION (OUTPATIENT)
Dept: NEUROLOGY | Facility: CLINIC | Age: 73
End: 2018-08-28

## 2018-08-28 ENCOUNTER — ANESTHESIA EVENT (OUTPATIENT)
Dept: PAIN MEDICINE | Facility: HOSPITAL | Age: 73
End: 2018-08-28

## 2018-08-28 ENCOUNTER — HOSPITAL ENCOUNTER (OUTPATIENT)
Dept: PAIN MEDICINE | Facility: HOSPITAL | Age: 73
Discharge: HOME | End: 2018-08-28
Admitting: ANESTHESIOLOGY

## 2018-08-28 VITALS
OXYGEN SATURATION: 96 % | HEART RATE: 78 BPM | TEMPERATURE: 97.7 F | RESPIRATION RATE: 16 BRPM | SYSTOLIC BLOOD PRESSURE: 145 MMHG | DIASTOLIC BLOOD PRESSURE: 83 MMHG

## 2018-08-28 DIAGNOSIS — R52 PAIN: ICD-10-CM

## 2018-08-28 PROBLEM — G97.1 SPINAL PUNCTURE HEADACHE: Status: ACTIVE | Noted: 2018-08-28

## 2018-08-28 LAB
ALB CSF/SERPL: 15 {RATIO} (ref 0–8)
ALBUMIN CSF-MCNC: 69 MG/DL (ref 11–48)
ALBUMIN SERPL-MCNC: 4.6 G/DL (ref 3.5–4.8)
IGG CSF-MCNC: 6.8 MG/DL (ref 0–8.6)
IGG SERPL-MCNC: 838 MG/DL (ref 700–1600)
IGG SYNTH RATE SER+CSF CALC-MRATE: 3.5 MG/DAY
IGG/ALB CLEAR SER+CSF-RTO: 0.5 (ref 0–0.7)
IGG/ALB CSF: 0.1 {RATIO} (ref 0–0.25)
REAGIN AB CSF QL: NON REACTIVE

## 2018-08-28 PROCEDURE — 77003 FLUOROGUIDE FOR SPINE INJECT: CPT

## 2018-08-28 PROCEDURE — C1755 CATHETER, INTRASPINAL: HCPCS

## 2018-08-28 RX ORDER — SODIUM CHLORIDE 0.9 % (FLUSH) 0.9 %
1-10 SYRINGE (ML) INJECTION AS NEEDED
Status: DISCONTINUED | OUTPATIENT
Start: 2018-08-28 | End: 2018-08-29 | Stop reason: HOSPADM

## 2018-08-28 RX ORDER — METHYLPREDNISOLONE ACETATE 80 MG/ML
80 INJECTION, SUSPENSION INTRA-ARTICULAR; INTRALESIONAL; INTRAMUSCULAR; SOFT TISSUE ONCE
Status: DISCONTINUED | OUTPATIENT
Start: 2018-08-28 | End: 2018-08-29 | Stop reason: HOSPADM

## 2018-08-28 RX ORDER — FENTANYL CITRATE 50 UG/ML
50 INJECTION, SOLUTION INTRAMUSCULAR; INTRAVENOUS AS NEEDED
Status: DISCONTINUED | OUTPATIENT
Start: 2018-08-28 | End: 2018-08-29 | Stop reason: HOSPADM

## 2018-08-28 RX ORDER — MIDAZOLAM HYDROCHLORIDE 1 MG/ML
1 INJECTION INTRAMUSCULAR; INTRAVENOUS AS NEEDED
Status: DISCONTINUED | OUTPATIENT
Start: 2018-08-28 | End: 2018-08-29 | Stop reason: HOSPADM

## 2018-08-28 RX ORDER — LIDOCAINE HYDROCHLORIDE 10 MG/ML
1 INJECTION, SOLUTION INFILTRATION; PERINEURAL ONCE AS NEEDED
Status: DISCONTINUED | OUTPATIENT
Start: 2018-08-28 | End: 2018-08-29 | Stop reason: HOSPADM

## 2018-08-28 NOTE — PROGRESS NOTES
Patient's wife called today for patient regarding post-spinal headache since LP on the 8/24. He reports headache started on Saturday and has been persistent since.  Headache is worse with sitting up and improved by lying flat.  Wife reports mild intermittent nausea and photophobia.  Denies neck stiffness or any other headache specific complaints.  Will refer to pain management for blood patch. I called to speak w/ Deepthi in pain management [phone] who will arrange.     Plan communicated to wife at [phone].       12:39 PM  NIMA Hernandez

## 2018-08-28 NOTE — ANESTHESIA PROCEDURE NOTES
Blood Patch    Patient location during procedure: pain clinic  Blood patch placed: 8/28/2018 2:28 PM  Stop Time:8/28/2018 2:38 PM    Indications for Blood Patch: headache  Staff  Anesthesiologist: ANASTASIA ALCARAZ  Preanesthetic Checklist  Completed: patient identified, site marked, surgical consent, pre-op evaluation, timeout performed, IV checked, risks and benefits discussed and monitors and equipment checked  Blood Patch Prep  Patient position: prone  Sterile Tech: gloves, cap, mask and sterile barrier.  Prep: ChloraPrep  Patient monitoring: blood pressure monitoring, continuous pulse ox and EKG  Location: L5-S1  Blood Patch Procedure  Sedation:no    Approach: midline   Guidance: fluoroscopy  Needle Gauge 17 G  Needle Type: Tuohy  Solution used: autologous blood  Loss of Resistance: 6 cm  Loss of Resistance Medium: saline  Blood Patch Source:venous    Amount injected: 20 mL  Assessment  Patient tolerance:patient tolerated the procedure well with no apparent complications  Complications:no  Additional Notes  Fluoroscopy used for epidural placement  PDPH

## 2018-08-28 NOTE — H&P
[facility]    History and Physical    Patient Name: Baldemar Wellington  :  1945  MRN:  [id number]  Date of Admission: 2018    Subjective     Patient is a 72 y.o. male presents with chief complaint of acute, severe headache pain.  Onset of symptoms was abrupt starting 2 days ago.  Symptoms are associated/aggravated by sitting or standing. Symptoms improve with lying down.  Pt had spinal tap Friday to help dx cause for neuropathy.  Headache started  night.  Despite conservative therapies, headache persists.  Was sent for epidural blood patch.      The following portions of the patients history were reviewed and updated as appropriate: current medications, allergies, past medical history, past surgical history, past family history, past social history and problem list                Objective     Past Medical History:   Past Medical History:   Diagnosis Date   • Arthritis     Reumatoid   • GERD (gastroesophageal reflux disease)    • Leiomyosarcoma (CMS/HCC)    • Prostate cancer (CMS/HCC)      Past Surgical History: No past surgical history on file.  Family History:   Family History   Problem Relation Age of Onset   • Breast cancer Mother    • Prostate cancer Father    • Breast cancer Sister    • Leukemia Brother      Social History:   Social History   Substance Use Topics   • Smoking status: Former Smoker   • Smokeless tobacco: Never Used      Comment: quit 50 years ago   • Alcohol use Yes      Comment: social       Vital Signs Range for the last 24 hours  Temperature:     Temp Source:     BP: BP: ()/()    Pulse:     Respirations:     SPO2:     O2 Amount (l/min):     O2 Devices     Weight:           --------------------------------------------------------------------------------    Current Outpatient Prescriptions   Medication Sig Dispense Refill   • Abatacept (ORENCIA IV) Infuse  into a venous catheter.     • aspirin 81 MG EC tablet Take 81 mg by mouth Daily.     • Calcium-Vitamin D-Vitamin K  500-100-40 MG-UNT-MCG chewable tablet Chew.     • coenzyme Q10 50 MG capsule capsule Take  by mouth Daily.     • fluticasone (FLONASE) 50 MCG/ACT nasal spray 2 sprays by Each Nare route Daily.  3   • folic acid (FOLVITE) 1 MG tablet TAKE 1-4 TABLETS EVERY DAY  2   • loratadine-pseudoephedrine (CLARITIN-D 24-hour)  MG per 24 hr tablet Take 1 tablet by mouth.     • methotrexate 2.5 MG tablet TAKE 4 TABLETS BY MOUTH ONCE WEEKLY  2   • Omega-3 Fatty Acids (FISH OIL) 1000 MG capsule capsule Take  by mouth Daily With Breakfast.     • omeprazole (priLOSEC) 20 MG capsule Take 20 mg by mouth Daily.  1   • predniSONE (DELTASONE) 5 MG tablet Take 5 mg by mouth Daily.     • Red Yeast Rice 600 MG capsule Take  by mouth.     • sildenafil (REVATIO) 20 MG tablet take 1-5 pills 30-60 mins before sexual activity. start with a lower dose & increase as needed  11   • vitamin B-12 (CYANOCOBALAMIN) 2500 MCG sublingual tablet tablet Place  under the tongue Daily.     • vitamin C (ASCORBIC ACID) 500 MG tablet Take 500 mg by mouth Daily.       No current facility-administered medications for this encounter.        --------------------------------------------------------------------------------  Assessment/Plan      Anesthesia Evaluation     Patient summary reviewed and Nursing notes reviewed                Airway   Mallampati: II  TM distance: >3 FB  Dental - normal exam     Pulmonary - normal exam   (+) a smoker Former,   Cardiovascular - negative cardio ROS and normal exam  Exercise tolerance: good (4-7 METS)        Neuro/Psych- neuro exam normal  (+) headaches, numbness,     GI/Hepatic/Renal/Endo    (+)  GERD,      Musculoskeletal (-) normal exam    Abdominal  - normal exam   Substance History - negative use     OB/GYN negative ob/gyn ROS         Other   (+) arthritis   history of cancer               Diagnosis and Plan    Treatment Plan  ASA 2      Procedures: Epidural blood patch, With fluoroscopy,       Anesthetic plan and risks  discussed with patient.          Diagnosis     * Post-dural puncture headache [G97.1]

## 2018-08-28 NOTE — TELEPHONE ENCOUNTER
----- Message from Chacha Oh sent at 8/28/2018  8:27 AM EDT -----  Contact: [phone]  Mr. Wellington is having a headache from his LP.  Would like orders to get a blood patch today.

## 2018-09-05 LAB
ANTI-GM1 (IGG)*: NORMAL TITER
ANTI-RI ANTIBODIES*: NORMAL TITER
GD1A GANGL IGG TITR SER IA: NORMAL TITER
GD1A GANGL IGM TITR SER IA: NORMAL TITER
GD1B GANGL IGG TITR SER: NORMAL TITER
GD1B GANGL IGM TITR SER: NORMAL TITER
GM1 ASIALO IGG TITR SER IA: NORMAL TITER
GM1 ASIALO IGM TITR SER IA: NORMAL TITER
GM1 GANGL IGM TITR SER IA: NORMAL TITER
HU1 AB TITR SER: NORMAL TITER
MAG IGM TITR SER: NORMAL TITER

## 2018-09-10 ENCOUNTER — TELEPHONE (OUTPATIENT)
Dept: NEUROLOGY | Facility: CLINIC | Age: 73
End: 2018-09-10

## 2018-09-10 NOTE — TELEPHONE ENCOUNTER
----- Message from Aicha Cueva sent at 9/6/2018  9:02 AM EDT -----  Contact: [phone]  YINA (WIFE) CALLING TO SEE IF THE RESULTS OF THE LUMBAR PUNCTURE ARE IN. PLEASE ADVISE AND CALL PT [phone]

## 2018-09-11 ENCOUNTER — OFFICE VISIT (OUTPATIENT)
Dept: NEUROLOGY | Facility: CLINIC | Age: 73
End: 2018-09-11

## 2018-09-11 VITALS
SYSTOLIC BLOOD PRESSURE: 140 MMHG | HEART RATE: 80 BPM | OXYGEN SATURATION: 96 % | DIASTOLIC BLOOD PRESSURE: 68 MMHG | HEIGHT: 72 IN

## 2018-09-11 DIAGNOSIS — G60.9 IDIOPATHIC POLYNEUROPATHY: Primary | ICD-10-CM

## 2018-09-11 PROCEDURE — 99215 OFFICE O/P EST HI 40 MIN: CPT | Performed by: PSYCHIATRY & NEUROLOGY

## 2018-09-11 RX ORDER — MELOXICAM 15 MG/1
TABLET ORAL
Refills: 0 | COMMUNITY
Start: 2018-08-31 | End: 2018-01-01

## 2018-09-11 RX ORDER — CYCLOBENZAPRINE HCL 10 MG
10 TABLET ORAL 3 TIMES DAILY PRN
Refills: 0 | COMMUNITY
Start: 2018-08-30 | End: 2018-09-11

## 2018-09-11 RX ORDER — METAXALONE 800 MG/1
TABLET ORAL
Refills: 0 | COMMUNITY
Start: 2018-08-31 | End: 2018-09-11

## 2018-09-11 NOTE — PROGRESS NOTES
CC: Peripheral neuropathy    HPI:  Baldemar Wellington is a  72 y.o. right-handed white male who is here on follow-up regarding his peripheral neuropathy.  His wife is Yris Wellington former  for neuroscience.  He has an approximate one-year history and there is no significant difference he states in the numbness in his feet compared to when I saw him initially 5/15/18.  His EMG was done 7/17/18.  He had moderate polyneuropathy.  The conduction velocities in the legs were in the 30s.  There were some needle exam changes in the lumbar paraspinals indicating root level involvement.  An MRI of the lumbar spine was obtained showing multilevel degenerative disc disease with some root effacement and a sacral insufficiency fracture.  There was radiation change in the bones.     Common treatable neuropathy labs completed cary sedimentation rate and RPR, hemoglobin A1c, B12, folate, heavy metals and copper levels all within normal limits.  SPE/IEP with MGUS IgM kappa  0.3g/dl.     He had a lumbar puncture showing glucose 56 and protein 51 which is slightly elevated.  The IgG index was normal.  The albumin level was elevated and the IgM level was normal.  The IgG synthesis rate was slightly elevated at 3.5.  Oligoclonal bands were 0.    He has developed no rash or other new symptoms of pain in the feet.  He has primarily numbness.      Past Medical History:   Diagnosis Date   • Arthritis     Reumatoid   • GERD (gastroesophageal reflux disease)    • Leiomyosarcoma (CMS/HCC)    • Prostate cancer (CMS/HCC)          No past surgical history on file.        Current Outpatient Prescriptions:   •  Abatacept (ORENCIA IV), Infuse  into a venous catheter., Disp: , Rfl:   •  aspirin 81 MG EC tablet, Take 81 mg by mouth Daily., Disp: , Rfl:   •  Calcium-Vitamin D-Vitamin K 500-100-40 MG-UNT-MCG chewable tablet, Chew., Disp: , Rfl:   •  coenzyme Q10 50 MG capsule capsule, Take  by mouth Daily., Disp: , Rfl:   •  fluticasone (FLONASE)  50 MCG/ACT nasal spray, 2 sprays by Each Nare route Daily., Disp: , Rfl: 3  •  folic acid (FOLVITE) 1 MG tablet, TAKE 1-4 TABLETS EVERY DAY, Disp: , Rfl: 2  •  loratadine-pseudoephedrine (CLARITIN-D 24-hour)  MG per 24 hr tablet, Take 1 tablet by mouth., Disp: , Rfl:   •  meloxicam (MOBIC) 15 MG tablet, TAKE 1 TABLET (15 MG) BY MOUTH DAILY AS NEEDED FOR PAIN, Disp: , Rfl: 0  •  Omega-3 Fatty Acids (FISH OIL) 1000 MG capsule capsule, Take  by mouth Daily With Breakfast., Disp: , Rfl:   •  omeprazole (priLOSEC) 20 MG capsule, Take 20 mg by mouth Daily., Disp: , Rfl: 1  •  predniSONE (DELTASONE) 5 MG tablet, Take 5 mg by mouth Daily., Disp: , Rfl:   •  Red Yeast Rice 600 MG capsule, Take  by mouth., Disp: , Rfl:   •  sildenafil (REVATIO) 20 MG tablet, take 1-5 pills 30-60 mins before sexual activity. start with a lower dose & increase as needed, Disp: , Rfl: 11  •  vitamin B-12 (CYANOCOBALAMIN) 2500 MCG sublingual tablet tablet, Place  under the tongue Daily., Disp: , Rfl:   •  vitamin C (ASCORBIC ACID) 500 MG tablet, Take 500 mg by mouth Daily., Disp: , Rfl:       Family History   Problem Relation Age of Onset   • Breast cancer Mother    • Prostate cancer Father    • Breast cancer Sister    • Leukemia Brother          Social History     Social History   • Marital status:      Spouse name: N/A   • Number of children: N/A   • Years of education: N/A     Occupational History   • Not on file.     Social History Main Topics   • Smoking status: Former Smoker   • Smokeless tobacco: Never Used      Comment: quit 50 years ago   • Alcohol use Yes      Comment: social   • Drug use: Unknown   • Sexual activity: Not on file     Other Topics Concern   • Not on file     Social History Narrative   • No narrative on file         Allergies   Allergen Reactions   • Lorazepam Hives         Pain Scale: 0/10        ROS:  Review of Systems   Constitutional: Negative for chills, fatigue and fever.   HENT: Negative for hearing  "loss, tinnitus and trouble swallowing.    Eyes: Negative for pain, redness and itching.   Respiratory: Negative for cough, shortness of breath and wheezing.    Cardiovascular: Negative for chest pain, palpitations and leg swelling.   Gastrointestinal: Negative for constipation, diarrhea, nausea and vomiting.   Endocrine: Negative for cold intolerance, heat intolerance and polydipsia.   Genitourinary: Negative for decreased urine volume, difficulty urinating, frequency and urgency.   Musculoskeletal: Negative for gait problem, neck pain and neck stiffness.   Skin: Negative for color change, rash and wound.   Allergic/Immunologic: Negative for environmental allergies, food allergies and immunocompromised state.   Neurological: Negative for dizziness, tremors, seizures, syncope, facial asymmetry, speech difficulty, weakness, light-headedness, numbness and headaches.   Hematological: Negative for adenopathy. Does not bruise/bleed easily.   Psychiatric/Behavioral: Negative for agitation, behavioral problems, confusion, decreased concentration, dysphoric mood, hallucinations, self-injury, sleep disturbance and suicidal ideas. The patient is not nervous/anxious and is not hyperactive.            Physical Exam:  Vitals:    09/11/18 1548   BP: 140/68   Pulse: 80   SpO2: 96%   Height: 182.9 cm (72\")     There is no height or weight on file to calculate BMI.    Physical Exam   Gen.: Well-developed white male no acute distress  HEENT: Normocephalic no evidence of trauma.  Neck: Supple.  Heart: Regular rate   No vasculitic rash on the legs     Neurological Exam:    mental status: Awake alert oriented conversant no evidence of an affective disorder thought disorder delusions or hallucinations.  HCF: No aphasia or apraxia or dysarthria.  Recent and remote memory intact.  Knowledge of recent events intact.  Cranial nerves: 2-12 intact  Motor: Normal tone and bulk with weakness to extension.  Reflexes symmetric  Sensory: Mildly " reduced light touch pinprick in the feet absent vibration sense at the ankles    cerebellar: Finger to nose rapid movements normal  Gait and station intact       Results:      Lab Results   Component Value Date    CREATININE 0.90 07/30/2018    PROTENTOTREF 6.6 07/17/2018    ALBUMIN 4.6 08/24/2018    LABIL2 1.4 07/17/2018       No results found for: WBC, HGB, HCT, MCV, PLT      .  Lab Results   Component Value Date    RPR Non-Reactive 07/17/2018         Lab Results   Component Value Date    TSH 2.640 07/17/2018         Lab Results   Component Value Date    PINCYWWH95 >2,000 (H) 07/17/2018         Lab Results   Component Value Date    FOLATE 15.77 07/17/2018         Lab Results   Component Value Date    HGBA1C 4.78 (L) 07/17/2018               Assessment:    1.  Idiopathic polyneuropathy-although the CSF protein was mildly elevated its mostly albumin.  The IgG index was normal.  Oligoclonal bands negative.  IgG synthesis rate slightly elevated at 3.5.  This is not convincing to me that he has autoimmune neuropathy as opposed to idiopathic neuropathy.            Plan:   1.  I had a lengthy discussion with the patient and his wife.  I explained the test results with conflicting evidence for autoimmune neuropathy.  I have advised a second opinion with Dr. Cliff Shankar at [place].  2.  The patient will follow up with me after he sees Dr. Shankar.              At least 25 minutes of this 40 minute follow-up were spent counseling the patient and his wife regarding the extensive evaluation that he has had and the difficulty arriving at a specific origin for his polyneuropathy.                Dictated utilizing Dragon dictation.

## 2018-10-03 NOTE — TELEPHONE ENCOUNTER
I dont see a referral for Dr Shankar.          ----- Message from Aicha Cueva sent at 10/2/2018 11:35 AM EDT -----  Contact: pt [phone]  Pt STILL HAS NOT RECEIVED AN CALL TO SCHEDULE EMG FOR THE SECOND OPINION AT [facility]. PT WONDERING IF REFERRAL WAS PUT IN AND WHY NO ONE HAS CALLED HIM. PLEASE ADVISE AND CALL PT [phone]

## 2018-10-08 NOTE — TELEPHONE ENCOUNTER
----- Message from Chacha Oh sent at 10/8/2018  2:01 PM EDT -----  Contact: [phone]  Mr Wellington is still having pain in his lumbar area from the LP.  Is there anything you can do or tell him to do for the pain.

## 2018-10-12 PROBLEM — M86.9 OSTEOMYELITIS (HCC): Status: ACTIVE | Noted: 2018-01-01

## 2018-10-12 PROBLEM — M06.9 RHEUMATOID ARTHRITIS (HCC): Status: ACTIVE | Noted: 2018-01-01

## 2018-10-12 PROBLEM — M60.09 INFECTIVE MYOSITIS OF MULTIPLE SITES: Status: ACTIVE | Noted: 2018-01-01

## 2018-10-12 PROBLEM — K21.9 GERD (GASTROESOPHAGEAL REFLUX DISEASE): Status: ACTIVE | Noted: 2018-01-01

## 2018-10-12 NOTE — H&P
Name: Baldemar Wellington ADMIT: (Not on file)   : 1945  PCP: Humphrey Proctor MD (Tony)    MRN: [id number] LOS: 0 days   AGE/SEX: 72 y.o. male  ROOM: [facility]/     No chief complaint on file.  CC:  Back Pain    Subjective   Patient is a 72 y.o. male who presents to [facility] with the above chief complaint.  Is all started with a workup for peripheral neuropathy.  He is being seen and evaluated by a neurologist in the outpatient setting.  The underwent an MRI without contrast and an lumbar puncture.  He had significant pain in his back and headache following the procedure and required a lumbar blood patch.  He tolerated that fairly well and headache resolved however he continued to have significant back pain.  He does have a history of rheumatoid arthritis and is on Orencia in the outpatient setting.  He denies any fevers chills nausea vomiting but has had some weight loss and decreased appetite.  He's also noticed some difficulty evacuating his bowels.  He's been trying stool softeners without much assistance.  He's also had urinary frequency.  Following the blood patch he's had progressive increasing back pain.  He's been seen by chiropractor and underwent adjustments they were excruciatingly painful.  He stopped going after the second treatment due to the symptoms.  He went back to see his primary care doctor today and per Dr. Barbosa's report he was significantly weak and had trouble getting up out of the chair.  At that point he proceeded with an MRI lumbar spine to further investigation.  In radiology here he was found to have significant discitis osteomyelitis and myositis of the spine and we been asked to admit him to the hospital for further evaluation management and treatment.        History of Present Illness    Past Medical History:   Diagnosis Date   • Arthritis     Reumatoid   • GERD (gastroesophageal reflux disease)    • Leiomyosarcoma (CMS/HCC)    • Prostate cancer (CMS/HCC)       No past surgical history on file.  Family History   Problem Relation Age of Onset   • Breast cancer Mother    • Prostate cancer Father    • Breast cancer Sister    • Leukemia Brother      Social History   Substance Use Topics   • Smoking status: Former Smoker   • Smokeless tobacco: Never Used      Comment: quit 50 years ago   • Alcohol use Yes      Comment: social     No prescriptions prior to admission.     Allergies:  Lorazepam    Review of Systems   Constitutional: Positive for fatigue. Negative for chills and fever.   HENT: Negative for congestion, sore throat and tinnitus.    Eyes: Negative for photophobia, redness and visual disturbance.   Respiratory: Negative for apnea, cough, choking, shortness of breath and wheezing.    Cardiovascular: Negative for chest pain, palpitations and leg swelling.   Gastrointestinal: Positive for constipation. Negative for diarrhea, nausea and vomiting.   Endocrine: Negative for polydipsia, polyphagia and polyuria.   Genitourinary: Positive for frequency. Negative for difficulty urinating and dysuria.   Musculoskeletal: Negative for arthralgias, back pain and gait problem.   Skin: Negative for pallor and rash.   Allergic/Immunologic: Negative for environmental allergies and immunocompromised state.   Neurological: Negative for dizziness, numbness and headaches.   Hematological: Negative for adenopathy. Does not bruise/bleed easily.   Psychiatric/Behavioral: Negative for agitation, behavioral problems and confusion.        Objective    Vital Signs        on   ;      There is no height or weight on file to calculate BMI.    Physical Exam   Constitutional: He is oriented to person, place, and time. He appears well-developed and well-nourished. No distress.   HENT:   Head: Normocephalic and atraumatic.   Nose: Nose normal.   Eyes: Conjunctivae and EOM are normal.   Cardiovascular: Normal rate, regular rhythm and normal heart sounds.    Pulmonary/Chest: Effort normal and breath  sounds normal.   Abdominal: Soft. Bowel sounds are normal.   Musculoskeletal: Normal range of motion. He exhibits no edema, tenderness or deformity.   No spinal process tenderness and no paraspinal muscle tenderness noted on exam   Neurological: He is alert and oriented to person, place, and time.   No upper extremity weakness or numbness noted on exam.  He's got some weakness in the right leg.  It's unclear if this is true muscle weakness or extremely limited by pain.  When he tries to wear leg raise against resistance or stent of the knee against resistance he has significant pain in the right side of his back.   Skin: Skin is warm and dry. Capillary refill takes less than 2 seconds.   Psychiatric: He has a normal mood and affect. His behavior is normal.   Nursing note and vitals reviewed.      Results Review:   I reviewed the patient's new clinical results.      Results from last 7 days  Lab Units 10/12/18  1355   CREATININE mg/dL 0.80   Estimated Creatinine Clearance: 107.6 mL/min (by C-G formula based on SCr of 0.8 mg/dL).        Invalid input(s): LDLCALC    No orders to display     Assessment/Plan       Idiopathic peripheral neuropathy    Rheumatoid arthritis (CMS/HCC)    GERD (gastroesophageal reflux disease)    Osteomyelitis (CMS/HCC)    Infective myositis of multiple sites      Assessment & Plan  He's being admitted to the hospital osteomyelitis discitis and myositis of the lumbar spine.  It's difficult to say how he developed this infection but it's probably secondary to the lumbar puncture and blood patch.  He's not having fevers and chills and doesn't look particularly toxic.  I did touch base with infectious disease and see if they wish to go and start broad-spectrum antibiotics or if they prefer to wait until a biopsy or aspiration could be done.  Planned also consult neurosurgery.  Will follow up their recommendations and make further adjustments to the plan at that time.  For the time being we'll  start on some IV fluids and pain medications.    I discussed the patients findings and my recommendations with patient, family and nursing staff.    Rufus Steward MD  [facility]  10/12/18  5:29 PM

## 2018-10-13 NOTE — PLAN OF CARE
Problem: Patient Care Overview  Goal: Plan of Care Review  Outcome: Ongoing (interventions implemented as appropriate)   10/13/18 0852   Coping/Psychosocial   Plan of Care Reviewed With patient;spouse   Plan of Care Review   Progress no change   OTHER   Outcome Summary Pt has not had much change in pain patterns, pt has denied pain medications but seems to do okay when at rest. Ambulating independently. CT needle biopsy is planned for Monday., any antibiotic therapy is being put off until those results are in. A&O x4. VSS.       Problem: Pain, Acute (Adult)  Goal: Identify Related Risk Factors and Signs and Symptoms  Outcome: Ongoing (interventions implemented as appropriate)    Goal: Acceptable Pain Control/Comfort Level  Outcome: Ongoing (interventions implemented as appropriate)

## 2018-10-13 NOTE — PROGRESS NOTES
LOS: 1 day     Name: Baldemar Wellington  Age/Sex: 72 y.o. male  :  1945        PCP: Humphrey Proctor MD (Tony)    Subjective   Pain is controlled no fevers rested well    General: No Fever or Chills, Cardiac: No Chest Pain or Palpitations, Resp: No Cough or SOA, GI: No Nausea, Vomiting, or Diarrhea and Other: No bleeding         sodium chloride 100 mL/hr Last Rate: 100 mL/hr (10/12/18 2022)       Objective   Vital Signs  Temp:  [97.8 °F (36.6 °C)-98.3 °F (36.8 °C)] 98 °F (36.7 °C)  Heart Rate:  [84-92] 92  Resp:  [18] 18  BP: (138-160)/(83-87) 160/83  Body mass index is 26.46 kg/m².    Intake/Output Summary (Last 24 hours) at 10/13/18 0750  Last data filed at 10/13/18 0448   Gross per 24 hour   Intake              850 ml   Output                0 ml   Net              850 ml       Physical Exam   Constitutional: He is oriented to person, place, and time. He appears well-developed and well-nourished.   HENT:   Head: Normocephalic and atraumatic.   Eyes: Conjunctivae and EOM are normal.   Neck: Neck supple. No JVD present.   Cardiovascular: Normal rate, regular rhythm and normal heart sounds.    Pulmonary/Chest: Effort normal and breath sounds normal.   Abdominal: Soft. Bowel sounds are normal.   Musculoskeletal: Normal range of motion.   Neurological: He is alert and oriented to person, place, and time.   Skin: Skin is warm and dry. Capillary refill takes less than 2 seconds.   Psychiatric: He has a normal mood and affect. His behavior is normal. Thought content normal.   Nursing note and vitals reviewed.        Results Review:       I reviewed the patient's new clinical results.    Results from last 7 days  Lab Units 10/13/18  0426 10/12/18  1952   WBC 10*3/mm3 2.31* 2.91*   HEMOGLOBIN g/dL 10.6* 10.9*   PLATELETS 10*3/mm3 137* 160     Results from last 7 days  Lab Units 10/13/18  0426 10/12/18  1952 10/12/18  1355   SODIUM mmol/L 140 137  --    POTASSIUM mmol/L 4.0 3.7  --    CHLORIDE mmol/L 103 98  --     CO2 mmol/L 27.0 25.9  --    BUN mg/dL 9 10  --    CREATININE mg/dL 0.78 0.84 0.80   CALCIUM mg/dL 8.8 9.3  --    MAGNESIUM mg/dL 2.1  --   --    PHOSPHORUS mg/dL 3.1  --   --    Estimated Creatinine Clearance: 107.6 mL/min (by C-G formula based on SCr of 0.78 mg/dL).      Assessment/Plan     Idiopathic peripheral neuropathy    Rheumatoid arthritis (CMS/HCC)    GERD (gastroesophageal reflux disease)    Osteomyelitis (CMS/HCC)    Infective myositis of multiple sites      PLAN  - noted pancytopenia likely secondary to underlying infection  - unfortunately unable to get biopsy until Monday AM.  Defer to infectious disease whether or not to start abx prior to biopsy  - await ROSA recs    Disposition        Rufus Steward MD  [facility]  10/13/18  7:50 AM

## 2018-10-13 NOTE — PLAN OF CARE
Problem: Patient Care Overview  Goal: Plan of Care Review  Outcome: Ongoing (interventions implemented as appropriate)   10/13/18 3175   Coping/Psychosocial   Plan of Care Reviewed With patient;spouse   Plan of Care Review   Progress no change   OTHER   Outcome Summary VSS. A&Ox4. Pain at rest 1-2/10. Standing activity increases pain to 6-8/10. Pain quickly returns to baseline when sitting. NPO after midnight. Dr. Guillen to consult in the am.

## 2018-10-13 NOTE — THERAPY DISCHARGE NOTE
Acute Care - Physical Therapy Initial Eval/Discharge  [facility]     Patient Name: Baldemar Wellington  : 1945  MRN: [id number]  Today's Date: 10/13/2018   Onset of Illness/Injury or Date of Surgery: 10/12/18  Date of Referral to PT: 10/12/18         Admit Date: 10/12/2018    Visit Dx:    ICD-10-CM ICD-9-CM   1. Impaired mobility Z74.09 799.89     Patient Active Problem List   Diagnosis   • Screening for colon cancer   • Idiopathic peripheral neuropathy   • Spinal puncture headache   • Rheumatoid arthritis (CMS/HCC)   • GERD (gastroesophageal reflux disease)   • Osteomyelitis (CMS/HCC)   • Infective myositis of multiple sites     Past Medical History:   Diagnosis Date   • Arthritis     Reumatoid   • GERD (gastroesophageal reflux disease)    • Leiomyosarcoma (CMS/HCC)    • Prostate cancer (CMS/HCC)      No past surgical history on file.       PT ASSESSMENT (last 12 hours)      Physical Therapy Evaluation     Row Name 10/13/18 1600          PT Evaluation Time/Intention    Subjective Information complains of;pain  -GJ     Document Type evaluation  -GJ     Mode of Treatment individual therapy;physical therapy  -GJ     Total Evaluation Minutes, Physical Therapy 18  -GJ     Patient Effort good  -GJ     Row Name 10/13/18 1600          General Information    Patient Profile Reviewed? yes  -GJ     Onset of Illness/Injury or Date of Surgery 10/12/18  -GJ     Patient Observations alert;cooperative;agree to therapy  -GJ     General Observations of Patient pt ambulating with wife in room whne PT entered  -GJ     Prior Level of Function independent:;all household mobility;community mobility;gait;transfer   reported he did need help donning socks secondayr to pain  -GJ     Equipment Currently Used at Home --   none  -GJ     Pertinent History of Current Functional Problem Mr. Wellington reports chronic/worsening LBP PTA. He was independet with mobility, lives with wife.  Here with discitis of L spine.   -GJ     Risks Reviewed  patient:;spouse/S.O.:;LOB;nausea/vomiting;dizziness;increased discomfort;change in vital signs  -GJ     Benefits Reviewed patient:;spouse/S.O.:;increase independence;improve function;increase strength;increase balance;decrease pain;decrease risk of DVT;increase knowledge  -GJ     Barriers to Rehab none identified  -GJ     Row Name 10/13/18 1600          Relationship/Environment    Primary Source of Support/Comfort spouse  -GJ     Lives With spouse  -GJ     Row Name 10/13/18 1600          Resource/Environmental Concerns    Current Living Arrangements home/apartment/condo  -GJ     Row Name 10/13/18 1600          Cognitive Assessment/Intervention- PT/OT    Orientation Status (Cognition) oriented x 4  -GJ     Follows Commands (Cognition) WNL  -GJ     Row Name 10/13/18 1600          Bed Mobility Assessment/Treatment    Bed Mobility Assessment/Treatment supine-sit-supine  -GJ     Supine-Sit-Supine [place] (Bed Mobility) conditional independence  -GJ     Row Name 10/13/18 1600          Transfer Assessment/Treatment    Transfer Assessment/Treatment sit-stand transfer;stand-sit transfer  -GJ     Sit-Stand [place] (Transfers) independent;supervision  -GJ     Stand-Sit [place] (Transfers) conditional independence;supervision  -GJ     Row Name 10/13/18 1600          Sit-Stand Transfer    Assistive Device (Sit-Stand Transfers) --   nothing  -GJ     Row Name 10/13/18 1600          Stand-Sit Transfer    Assistive Device (Stand-Sit Transfers) --   nothing  -GJ     Row Name 10/13/18 1600          Gait/Stairs Assessment/Training    [place] Level (Gait) independent  -GJ     Assistive Device (Gait) --   nothing  -GJ     Distance in Feet (Gait) 150  -GJ     Pattern (Gait) step-through  -GJ     Deviations/Abnormal Patterns (Gait) stride length decreased  -GJ     Row Name 10/13/18 1600          General ROM    GENERAL ROM COMMENTS BUE grossly 75% of full, BLE noted limited knee ext by 25 degrees (proable HS tightness)   -     Row Name 10/13/18 1600          MMT (Manual Muscle Testing)    General MMT Comments BUE grossly 4/5 in available range, BLE grossly 4/5 in available range  -GJ     Row Name 10/13/18 1600          Plan of Care Review    Plan of Care Reviewed With patient;spouse  -     Row Name 10/13/18 1600          Physical Therapy Clinical Impression    Date of Referral to PT 10/12/18  -GJ     Patient/Family Goals Statement (PT Clinical Impression) go home  -GJ     Criteria for Skilled Interventions Met (PT Clinical Impression) no  -GJ     Care Plan Review (PT) evaluation/treatment results reviewed;care plan/treatment goals reviewed;risks/benefits reviewed;current/potential barriers reviewed;patient/other agree to care plan  -     Row Name 10/13/18 1600          Positioning and Restraints    Pre-Treatment Position in bed  -GJ     Post Treatment Position bed  -GJ     In Bed notified nsg;supine;call light within reach;encouraged to call for assist;with family/caregiver  -     Row Name 10/13/18 1600          Living Environment    Home Accessibility --   ramp to enter, basemnet in side  -GJ       User Key  (r) = Recorded By, (t) = Taken By, (c) = Cosigned By    Initials Name Provider Type     Mauricio Blum, PT Physical Therapist          Physical Therapy Education     Title: PT OT SLP Therapies (Done)     Topic: Physical Therapy (Done)     Point: Mobility training (Done)    Learning Progress Summary     Learner Status Readiness Method Response Comment Documented by    Patient Done Acceptance PONCE SESAY D VU, DU discussed benefits of outpatient physical therapy  10/13/18 1648    Significant Other Done Acceptance PONCE SESAY D VU, DU discussed benefits of outpatient physical therapy  10/13/18 1648          Point: Home exercise program (Done)    Learning Progress Summary     Learner Status Readiness Method Response Comment Documented by    Patient Done Acceptance PONCE SESAY D VU, DU discussed benefits of outpatient physical therapy   10/13/18 1648    Significant Other Done Acceptance E,TB,D VU,DU discussed benefits of outpatient physical therapy  10/13/18 1648          Point: Body mechanics (Done)    Learning Progress Summary     Learner Status Readiness Method Response Comment Documented by    Patient Done Acceptance E,TB,D VU,DU discussed benefits of outpatient physical therapy  10/13/18 1648    Significant Other Done Acceptance E,TB,D VU,DU discussed benefits of outpatient physical therapy  10/13/18 1648          Point: Precautions (Done)    Learning Progress Summary     Learner Status Readiness Method Response Comment Documented by    Patient Done Acceptance E,TB,D VU,DU discussed benefits of outpatient physical therapy GJ 10/13/18 1648    Significant Other Done Acceptance E,TB,D VU,DU discussed benefits of outpatient physical therapy  10/13/18 1648                      User Key     Initials Effective Dates Name Provider Type Discipline     03/07/18 -  Mauricio Blum, PT Physical Therapist PT                PT Recommendation and Plan  Anticipated Discharge Disposition (PT): home  Therapy Frequency (PT Clinical Impression): evaluation only  Outcome Summary/Treatment Plan (PT)  Anticipated Discharge Disposition (PT): home  Plan of Care Reviewed With: patient, spouse          Outcome Measures     Row Name 10/13/18 1600             How much help from another person do you currently need...    Turning from your back to your side while in flat bed without using bedrails? 4  -GJ      Moving from lying on back to sitting on the side of a flat bed without bedrails? 4  -GJ      Moving to and from a bed to a chair (including a wheelchair)? 4  -GJ      Standing up from a chair using your arms (e.g., wheelchair, bedside chair)? 4  -GJ      Climbing 3-5 steps with a railing? 4  -GJ      To walk in hospital room? 4  -GJ      AM-PAC 6 Clicks Score 24  -GJ         Functional Assessment    Outcome Measure Options AM-PAC 6 Clicks Basic Mobility (PT)   -GJ        User Key  (r) = Recorded By, (t) = Taken By, (c) = Cosigned By    Initials Name Provider Type    Mauricio Elizabeth, PT Physical Therapist           Time Calculation:         PT Charges     Row Name 10/13/18 1651             Time Calculation    Start Time 1605  -GJ      Stop Time 1623  -GJ      Time Calculation (min) 18 min  -GJ      PT Received On 10/13/18  -GJ        User Key  (r) = Recorded By, (t) = Taken By, (c) = Cosigned By    Initials Name Provider Type    Mauricio Elizabeth, PT Physical Therapist        Therapy Suggested Charges     Code   Minutes Charges    None           Therapy Charges for Today     Code Description Service Date Service Provider Modifiers Qty    92971138501 HC PT EVAL MOD COMPLEXITY 1 10/13/2018 Mauricio Blum, PT GP 1          PT G-Codes  Outcome Measure Options: AM-PAC 6 Clicks Basic Mobility (PT)  AM-PAC 6 Clicks Score: 24    PT Discharge Summary  Anticipated Discharge Disposition (PT): home  Reason for Discharge: Independent  Discharge Destination: Home    Mauricio Blum, PT  10/13/2018

## 2018-10-13 NOTE — CONSULTS
Referring Provider: Rufus Steward MD  Reason for Consultation: Discitis      Subjective   History of present illness:    Very nice 72-year-old with rheumatoid arthritis admitted on 10/12/18 with abnormal back MRI.  Patient reports that he underwent lumbar puncture for evaluation of neuropathy about 2 months ago and started developing headaches and required a blood patch for CSF leak thereafter.  Subsequent to this, he started developing progressive spasm-like back pain that initially started in the lower back and then radiated down to the right side.  It progressed despite chiropractic care.  No associated fevers or chills or night sweats.  Patient does have a history of rheumatoid arthritis on immunosuppressives.  No steroids.  No drainage.  No bowel or bladder dysfunction or weakness.  He just feels like he has progressive pain.  Eventually he had an MRI done yesterday that showed discitis L3-L4, so was directly admitted.  Discussed this case with Dr. Steward recommended holding antibiotics until we could obtain aspiration    Past medical history: Rheumatoid arthritis on Abatacept, GERD, prostate cancer, Leiomyosarcoma  No family history of infectious diseases  Allergies: Lorazepam  Social history:      Review of Systems  Pertinent items are noted in HPI, all other systems reviewed and negative    Objective     Physical Exam:   Vital Signs   Temp:  [97.8 °F (36.6 °C)-98.3 °F (36.8 °C)] 98 °F (36.7 °C)  Heart Rate:  [84-92] 92  Resp:  [18] 18  BP: (138-160)/(83-87) 160/83    GENERAL: Awake and alert, in no acute distress.   HEENT: Oropharynx is clear. Hearing is grossly normal.   EYES: PERRL. No conjunctival injection. No lid lag.   LYMPHATICS: No lymphadenopathy of the neck or axillary regions.   HEART: Regular rate and rhythm. No peripheral edema.   LUNGS: Clear to auscultation anteriorly with normal respiratory effort.   GI: Soft, nontender, nondistended. No appreciable organomegaly.   SKIN: Warm and dry  without cutaneous eruptions   PSYCHIATRIC: Appropriate mood, affect, insight, and judgment.     Results Review:   I reviewed the patient's new clinical results.  Cr 0.78    WBC 2.3    H/H 10.6/34  PCt 0.1  CRP 4.2  ESR 65    Microbiology:  10/12 Bcx 2/2 NGTD    Radiology:  MRI lumbar spine from 10/12/18 shows discitis and osteomyelitis L3/L4 with edema and enhancement and adjacent stranding and paraspinal soft tissues at L3 and L4 and myositis of the psoas muscles bilaterally    Assessment/Plan   1.  L3/L4 discitis  2.  Rheumatoid arthritis on immunosuppression, complicating above  3.  Pancytopenia, complicating above.  Looks stable as compared to CBC in May 2017, ? MDS    Given lack of systemic symptoms and chronicity of symptoms, guidelines recommend bone biopsy to guide therapy.  I have ordered this as well as cultures and pathology to make sure that this isn't a malignant process given his recent history of prostate cancer.  Hold antibiotics.    Having some trouble finding a radiologist able to perform the biopsy, but discussed with Dr. Steward investigating potential transfer to a reputable medical institution that has interventional radiology.     Thank you for this consult.  We will continue to follow along and tailor antibiotics as the patient's clinical course evolves.    Evens Rayo MD  10/13/18  9:59 AM

## 2018-10-13 NOTE — CONSULTS
Patient Care Team:  Humphrey Proctor MD (Tony) as PCP - General (Internal Medicine)    Chief complaint back pain    Subjective .     History of present illness:  This patient was having some numbness and tingling in his leg were several months.  As a result of that he was seen by neurology who ordered an lumbar puncture which was done toward the end of August to evaluate for diseases other than peripheral neuropathy which is what was considered the diagnosis.  After lumbar puncture the patient developed a post-spinal headache and ultimately had a blood patch which helped the headache but after that he had a fair amount of back pain.  The back pain has continued since the end of August.  He doesn't have very much pain in his legs at all and he has no difficulty with bowel or bladder control or other associated symptoms.  The pain is worse with activity and little bit better with rest but nothing gets rid of it entirely.  Since the pain began it has continued and is neither gotten worse nor better.  He has had no other treatment so far.  He does have a history of rheumatoid arthritis and history of 2 different types of cancer as well as gastroesophageal reflux but for the most part is relatively healthy.  He does take medications for his rheumatoid.    Review of Systems  Pertinent items are noted in HPI    History  Past Medical History:   Diagnosis Date   • Arthritis     Reumatoid   • GERD (gastroesophageal reflux disease)    • Leiomyosarcoma (CMS/HCC)    • Prostate cancer (CMS/HCC)    , No past surgical history on file.,   Family History   Problem Relation Age of Onset   • Breast cancer Mother    • Prostate cancer Father    • Breast cancer Sister    • Leukemia Brother    ,   Social History   Substance Use Topics   • Smoking status: Former Smoker   • Smokeless tobacco: Never Used      Comment: quit 50 years ago   • Alcohol use Yes      Comment: social   ,   Prescriptions Prior to Admission   Medication Sig  Dispense Refill Last Dose   • predniSONE (DELTASONE) 5 MG tablet Take 5 mg by mouth As Needed.   Patient Taking Differently at Unknown time   • Red Yeast Rice 600 MG capsule Take  by mouth Daily.   Patient Taking Differently at Unknown time   • Abatacept (ORENCIA IV) Infuse  into a venous catheter.   Taking   • aspirin 81 MG EC tablet Take 81 mg by mouth Daily.   Taking   • Calcium-Vitamin D-Vitamin K 500-100-40 MG-UNT-MCG chewable tablet Chew.   Taking   • coenzyme Q10 50 MG capsule capsule Take  by mouth Daily.   Taking   • fluticasone (FLONASE) 50 MCG/ACT nasal spray 2 sprays by Each Nare route Daily.  3 Taking   • folic acid (FOLVITE) 1 MG tablet TAKE 1-4 TABLETS EVERY DAY  2 Taking   • loratadine-pseudoephedrine (CLARITIN-D 24-hour)  MG per 24 hr tablet Take 1 tablet by mouth.   Taking   • meloxicam (MOBIC) 15 MG tablet TAKE 1 TABLET (15 MG) BY MOUTH DAILY AS NEEDED FOR PAIN  0 Taking   • Omega-3 Fatty Acids (FISH OIL) 1000 MG capsule capsule Take  by mouth Daily With Breakfast.   Taking   • omeprazole (priLOSEC) 20 MG capsule Take 20 mg by mouth Daily.  1 Taking   • sildenafil (REVATIO) 20 MG tablet take 1-5 pills 30-60 mins before sexual activity. start with a lower dose & increase as needed  11 Taking   • vitamin B-12 (CYANOCOBALAMIN) 2500 MCG sublingual tablet tablet Place  under the tongue Daily.   Taking   • vitamin C (ASCORBIC ACID) 500 MG tablet Take 500 mg by mouth Daily.   Taking    and Allergies:  Lorazepam    Objective     Vital Signs   Temp:  [97.8 °F (36.6 °C)-98.4 °F (36.9 °C)] 98.4 °F (36.9 °C)  Heart Rate:  [84-92] 88  Resp:  [18] 18  BP: (137-160)/(83-89) 137/89    Physical Exam:   Physical Exam   Constitutional: He is oriented to person, place, and time. He appears well-developed and well-nourished.   HENT:   Head: Normocephalic and atraumatic.   Eyes: Pupils are equal, round, and reactive to light. EOM are normal.   Neck: Normal range of motion.   Cardiovascular: Normal rate.     Pulmonary/Chest: Effort normal.   Abdominal: Soft.   Neurological: He is oriented to person, place, and time. He has a normal Finger-Nose-Finger Test and a normal Heel to Shin Test. Gait normal.   Reflex Scores:       Tricep reflexes are 2+ on the right side and 2+ on the left side.       Bicep reflexes are 2+ on the right side and 2+ on the left side.       Brachioradialis reflexes are 2+ on the right side and 2+ on the left side.       Patellar reflexes are 2+ on the right side and 2+ on the left side.       Achilles reflexes are 2+ on the right side and 2+ on the left side.  Psychiatric: His speech is normal.        Neurologic Exam     Mental Status   Oriented to person, place, and time.   Registration of memory: Good recent and remote memory.   Attention: normal. Concentration: normal.   Speech: speech is normal   Level of consciousness: alert  Knowledge: consistent with education.     Cranial Nerves     CN II   Visual fields full to confrontation.   Visual acuity: normal    CN III, IV, VI   Pupils are equal, round, and reactive to light.  Extraocular motions are normal.     CN V   Facial sensation intact.   Right corneal reflex: normal  Left corneal reflex: normal    CN VII   Facial expression full, symmetric.   Right facial weakness: none  Left facial weakness: none    CN VIII   Hearing: intact    CN IX, X   Palate: symmetric    CN XI   Right sternocleidomastoid strength: normal  Left sternocleidomastoid strength: normal    CN XII   Tongue: not atrophic  Tongue deviation: none    Motor Exam   Muscle bulk: normal  Right arm tone: normal  Left arm tone: normal  Right leg tone: normal  Left leg tone: normal    Strength   Strength 5/5 except as noted.     Sensory Exam   Light touch normal.     Gait, Coordination, and Reflexes     Gait  Gait: normal    Coordination   Finger to nose coordination: normal  Heel to shin coordination: normal    Reflexes   Right brachioradialis: 2+  Left brachioradialis: 2+  Right  biceps: 2+  Left biceps: 2+  Right triceps: 2+  Left triceps: 2+  Right patellar: 2+  Left patellar: 2+  Right achilles: 2+  Left achilles: 2+  Right : 2+  Left : 2+      Results Review:   I reviewed the patient's new clinical results.  I reviewed his MRI of the lumbar spine and compared it to one done at the end of July.  This does show definite osteomyelitis and discitis at L3 4.  All of this is contained within the disc space or the bone and there is no evidence of epidural abscess and no compression of the neural elements.  There is no fracture.  There was no evidence of this abnormality at the end of July.      Assessment/Plan       Idiopathic peripheral neuropathy    Rheumatoid arthritis (CMS/HCC)    GERD (gastroesophageal reflux disease)    Osteomyelitis (CMS/HCC)    Infective myositis of multiple sites      I told the patient and his wife about the MRI.  Realistically I think he probably does have osteomyelitis and discitis at L3 4.  A lot of times these types of conditions are self-limited and since he has not gotten clinically worse since the pain began at the end of August I am not sure he needs treatment with antibiotics at this point.  I think we can probably follow him clinically and with laboratory evaluation for the present time.  I would ultimately defer to infectious disease as to whether to start antibiotics but realistically he is been over 6 weeks at this point not on antibiotics and has not gotten worse and so are not sure we really need them.  We will continue to follow him.    I discussed the patients findings and my recommendations with patient and family    Manuel Guillen MD  10/13/18  10:35 AM

## 2018-10-13 NOTE — PLAN OF CARE
Problem: Patient Care Overview  Goal: Plan of Care Review  Mr. Wellington is independent with all functional mobility and appears to be at his baseline. He does demonstrate some tightness in his HS, hip flexor tissues and decreased arm swing with gait which may benefit from outpatient skilled physical therapy interventions to assist with his chronic LBP.  He is not a candidate for skilled acute care physical therapy at this time.  Please re-order if things should change.

## 2018-10-14 NOTE — PROGRESS NOTES
LOS: 2 days     Name: Baldemar Wellington  Age/Sex: 72 y.o. male  :  1945        PCP: Humphrey Proctor MD (Tony)    Subjective   Pain is controlled no fevers rested well    General: No Fever or Chills, Cardiac: No Chest Pain or Palpitations, Resp: No Cough or SOA, GI: No Nausea, Vomiting, or Diarrhea and Other: No bleeding            Objective   Vital Signs  Temp:  [98.1 °F (36.7 °C)-99.5 °F (37.5 °C)] 98.2 °F (36.8 °C)  Heart Rate:  [75-90] 75  Resp:  [18-19] 18  BP: (112-143)/(55-89) 143/77  Body mass index is 26.46 kg/m².  No intake or output data in the 24 hours ending 10/14/18 06    Physical Exam   Constitutional: He is oriented to person, place, and time. He appears well-developed and well-nourished.   HENT:   Head: Normocephalic and atraumatic.   Eyes: Conjunctivae and EOM are normal.   Neck: Neck supple. No JVD present.   Cardiovascular: Normal rate, regular rhythm and normal heart sounds.    Pulmonary/Chest: Effort normal and breath sounds normal.   Abdominal: Soft. Bowel sounds are normal.   Musculoskeletal: Normal range of motion.   Neurological: He is alert and oriented to person, place, and time.   Skin: Skin is warm and dry. Capillary refill takes less than 2 seconds.   Psychiatric: He has a normal mood and affect. His behavior is normal. Thought content normal.   Nursing note and vitals reviewed.        Results Review:       I reviewed the patient's new clinical results.    Results from last 7 days  Lab Units 10/14/18  0515 10/13/18  0426 10/12/18  1952   WBC 10*3/mm3 2.70* 2.31* 2.91*   HEMOGLOBIN g/dL 10.3* 10.6* 10.9*   PLATELETS 10*3/mm3 117* 137* 160       Results from last 7 days  Lab Units 10/14/18  0515 10/13/18  0426 10/12/18  1952 10/12/18  1355   SODIUM mmol/L 141 140 137  --    POTASSIUM mmol/L 3.7 4.0 3.7  --    CHLORIDE mmol/L 105 103 98  --    CO2 mmol/L 23.5 27.0 25.9  --    BUN mg/dL 7* 9 10  --    CREATININE mg/dL 0.63* 0.78 0.84 0.80   CALCIUM mg/dL 8.7 8.8 9.3  --     MAGNESIUM mg/dL  --  2.1  --   --    PHOSPHORUS mg/dL  --  3.1  --   --    Estimated Creatinine Clearance: 107.6 mL/min (A) (by C-G formula based on SCr of 0.63 mg/dL (L)).    Assessment/Plan     Idiopathic peripheral neuropathy    Rheumatoid arthritis (CMS/HCC)    GERD (gastroesophageal reflux disease)    Osteomyelitis (CMS/HCC)    Infective myositis of multiple sites    PLAN  - plan biopsy in the AM  - defer antibiotics to ID  - in the meantime continue supportive care  - pancytopenia ? 2/2 infection or underlying bone marrow suppressive issue, consider outpatient heme referral   - lengthy discussion with the patient and his daughter    Disposition        Rufus Steward MD  [facility]  10/14/18  6:06 AM

## 2018-10-14 NOTE — PROGRESS NOTES
INFECTIOUS DISEASES PROGRESS NOTE    CC: f/u discitis    S:   Back pain is stable.  He is not having fevers or chills or night sweats.  Is having urinary frequency with the IV fluids.    O:  Physical Exam:  Temp:  [98.1 °F (36.7 °C)-99.5 °F (37.5 °C)] 98.2 °F (36.8 °C)  Heart Rate:  [75-90] 75  Resp:  [18-19] 18  BP: (112-143)/(55-77) 143/77  Physical Exam   Constitutional: He appears well-developed.   Pulmonary/Chest: Effort normal.   Abdominal: Soft. He exhibits no distension. There is no tenderness.   Neurological: He is alert.   Skin: Skin is warm and dry.   Psychiatric: He has a normal mood and affect. His behavior is normal.    lumbar back pain on palpation of the spinous processes     Diagnostics:     Cr 0.6  WBC 2.7 (p74, L 16, M 5, E3)    H/h 10/33  Bcx ngtd     Assessment/Plan   1.  L3/L4 discitis  2.  Rheumatoid arthritis on immunosuppression, complicating above  3.  Pancytopenia, complicating above.  Looks stable as compared to CBC in May 2017, ? MDS  4. Psoas myositis    Discussed with neurosurgery going to pursue CT-guided biopsy.  I don't think an open biopsy under general anesthesia is necessary at this point although neurosurgery is open to doing this if he fails to improve.      Evens Rayo MD  10:23 AM  10/14/18

## 2018-10-14 NOTE — PLAN OF CARE
Problem: Patient Care Overview  Goal: Plan of Care Review  Outcome: Ongoing (interventions implemented as appropriate)   10/14/18 0228   Coping/Psychosocial   Plan of Care Reviewed With patient   Plan of Care Review   Progress no change   OTHER   Outcome Summary VSS. A&Ox4. Awaiting CT guided needle biopsy for ID of organism- likely Monday. Dr. Rayo does not want to initiate antibiotics until organism identified.

## 2018-10-14 NOTE — PROGRESS NOTES
LOS: 2 days   Patient Care Team:  Humphrey Proctor MD (Tony) as PCP - General (Internal Medicine)    Chief Complaint:  Back pain    Subjective     Patient continues with back pain but it is neither any worse or any better than it was yesterday.    Interval History:     History taken from: patient chart family    Objective      The patient has good movement in both lower extremities.    Vital Signs  Temp:  [98.1 °F (36.7 °C)-99.5 °F (37.5 °C)] 98.2 °F (36.8 °C)  Heart Rate:  [75-90] 75  Resp:  [18-19] 18  BP: (112-143)/(55-77) 143/77       Results Review:     I reviewed the patient's new clinical results.  He is afebrile.  His CRP and sedimentation rate are both elevated as before.      Assessment/Plan       Idiopathic peripheral neuropathy    Rheumatoid arthritis (CMS/HCC)    GERD (gastroesophageal reflux disease)    Osteomyelitis (CMS/HCC)    Infective myositis of multiple sites      I had a long discussion with Dr. Rayo this morning.  He is a little uncomfortable not trying to get an organism for this especially with his history of rheumatoid and the medications associated with that.  He suggested that we consider having an interventional radiologist do an aspiration of fluid just outside of the spine and the paraspinous musculature.  I have no particular disagreement with that.  We could certainly consider a bone biopsy if absolutely necessary but that would involve a general anesthetic and I am not sure that based on his current clinical course it would be appropriate to do that at this point.  His pain is not horrible most of the time and so I think if necessary we could certainly follow him clinically.  In my experience it is not uncommon for vertebral discitis and osteomyelitis to literally burn itself out without treatment.  We will see about getting IR to see him tomorrow for an aspiration.  The patient and his wife are in agreement with this plan.      Manuel Guillen MD  10/14/18  10:40  AM

## 2018-10-14 NOTE — PLAN OF CARE
Problem: Patient Care Overview  Goal: Plan of Care Review  Outcome: Ongoing (interventions implemented as appropriate)   10/14/18 2996   Coping/Psychosocial   Plan of Care Reviewed With patient   Plan of Care Review   Progress improving   OTHER   Outcome Summary Pt AOx4, VSS, pain controlled with current PRN meds. Awaiting CT guided needle biopsy for ID of organism, likely Monday. Pt up ad riaz, steady gait. Tolerating regular diet well, takes pills whole with water. Will continue to monitor.        Problem: Pain, Acute (Adult)  Goal: Identify Related Risk Factors and Signs and Symptoms  Outcome: Ongoing (interventions implemented as appropriate)    Goal: Acceptable Pain Control/Comfort Level  Outcome: Ongoing (interventions implemented as appropriate)

## 2018-10-15 NOTE — PROGRESS NOTES
INFECTIOUS DISEASES PROGRESS NOTE    CC: f/u discitis    S:   Back pain stable  No f/c/ns    O:  Physical Exam:  Temp:  [97.9 °F (36.6 °C)-98.3 °F (36.8 °C)] 97.9 °F (36.6 °C)  Heart Rate:  [79-89] 86  Resp:  [16-20] 16  BP: (121-154)/(70-97) 140/87  Physical Exam   Constitutional: He appears well-developed. No distress.   Pulmonary/Chest: Effort normal.   Abdominal: Soft. He exhibits no distension. There is no tenderness.   Neurological: He is alert.   Skin: Skin is warm and dry.   Psychiatric: He has a normal mood and affect. His behavior is normal.        Diagnostics:     BCx ngtd      Assessment/Plan   1.  L3/L4 discitis  2.  Rheumatoid arthritis on immunosuppression, complicating above  3.  Pancytopenia, complicating above.  Looks stable as compared to CBC in May 2017, ? MDS    For biopsy today by IR.  Hold abx for now  I will ask for heme onc opinion regarding pancytopenia as well need as robust immune system as possible to help fight off the infection      Evens Rayo MD  11:04 AM  10/15/18

## 2018-10-15 NOTE — PROGRESS NOTES
[facility]               ASSOCIATES     LOS: 3 days     Name: Baldemar Wellington  Age: 72 y.o.  Sex: male  :  1945  MRN: [id number]         Primary Care Physician: Humphrey Proctor MD (Tony)    Diet Regular    Subjective   no complaints. sore from biopsy but able to ambulate in halls.    Objective   Temp:  [97.9 °F (36.6 °C)-98.3 °F (36.8 °C)] 97.9 °F (36.6 °C)  Heart Rate:  [79-89] 84  Resp:  [16-20] 16  BP: (121-152)/(70-87) 152/87  SpO2:  [92 %-100 %] 100 %  on   ;   Device (Oxygen Therapy): room air  Body mass index is 26.19 kg/m².    Physical Exam   Constitutional: He is oriented to person, place, and time. No distress.   Cardiovascular: Normal rate and regular rhythm.    Pulmonary/Chest: Effort normal and breath sounds normal. No respiratory distress.   Abdominal: Soft. There is no tenderness.   Musculoskeletal: He exhibits no edema.   Neurological: He is alert and oriented to person, place, and time.   Skin: Skin is warm and dry.   Psychiatric: He has a normal mood and affect. His behavior is normal.     Reviewed medications and new clinical results      Results from last 7 days  Lab Units 10/15/18  1430 10/14/18  0515 10/13/18  0426 10/12/18  1952   WBC 10*3/mm3  --  2.70* 2.31* 2.91*   HEMOGLOBIN g/dL  --  10.3* 10.6* 10.9*   PLATELETS 10*3/mm3 127* 117* 137* 160     Results from last 7 days  Lab Units 10/15/18  0424 10/14/18  0515 10/13/18  0426 10/12/18  1952 10/12/18  1355   SODIUM mmol/L 142 141 140 137  --    POTASSIUM mmol/L 3.8 3.7 4.0 3.7  --    CHLORIDE mmol/L 105 105 103 98  --    CO2 mmol/L 26.2 23.5 27.0 25.9  --    BUN mg/dL 7* 7* 9 10  --    CREATININE mg/dL 0.69* 0.63* 0.78 0.84 0.80   CALCIUM mg/dL 8.8 8.7 8.8 9.3  --    GLUCOSE mg/dL 105* 106* 96 125*  --      Lab Results   Component Value Date    ANIONGAP 10.8 10/15/2018     Estimated Creatinine Clearance: 106.5 mL/min (A) (by C-G formula based on SCr of 0.69 mg/dL (L)).    Assessment/Plan   Active  Hospital Problems    Diagnosis Date Noted   • Rheumatoid arthritis (CMS/[facility]) [M06.9] 10/12/2018   • GERD (gastroesophageal reflux disease) [K21.9] 10/12/2018   • Osteomyelitis (CMS/[facility]) [M86.9] 10/12/2018   • Infective myositis of multiple sites [M60.09] 10/12/2018   • Idiopathic peripheral neuropathy [G60.9] 05/15/2018      Resolved Hospital Problems    Diagnosis Date Noted Date Resolved   No resolved problems to display.     · L3/L4 discitis/osteomyelitis  · s/p CT guided aspiration  · hold antibiotics for now  · ID and ROSA following  · pancytopenia  · per CBC may be in part from RA  · patient declined BM biopsy  · lab workup per CBC  · disposition  · TBD  · I discussed the patient's findings and my recommendations with patient and family.    Akshat Lagunas MD   10/15/18  4:11 PM

## 2018-10-15 NOTE — INTERVAL H&P NOTE
H&P reviewed. The patient was examined and there are no changes to the H&P.   Risks discussed included but not limited to pain, bleeding, infection, damaging surrounding structures (including blood vessels and nerve roots) and need for further procedures.  Denied blood thinners

## 2018-10-15 NOTE — PLAN OF CARE
Problem: Patient Care Overview  Goal: Plan of Care Review  Outcome: Ongoing (interventions implemented as appropriate)   10/15/18 2090   Coping/Psychosocial   Plan of Care Reviewed With patient;spouse   Plan of Care Review   Progress no change   OTHER   Outcome Summary Plan is to get results of labs and for oncology and rest of team to formulate a plan. Ambulating in room and read. Pain controlled by oral pain medications. Will continue to monitor.        Problem: Pain, Acute (Adult)  Goal: Identify Related Risk Factors and Signs and Symptoms  Outcome: Ongoing (interventions implemented as appropriate)    Goal: Acceptable Pain Control/Comfort Level  Outcome: Ongoing (interventions implemented as appropriate)

## 2018-10-15 NOTE — PLAN OF CARE
Problem: Patient Care Overview  Goal: Plan of Care Review  Outcome: Ongoing (interventions implemented as appropriate)   10/14/18 1616 10/14/18 1950 10/15/18 0248   Coping/Psychosocial   Plan of Care Reviewed With --  patient --    Plan of Care Review   Progress improving --  --    OTHER   Outcome Summary --  --  Pt. remains A&Ox4 throughout shift. Up ad riaz. Plan for possible CT guided needle biopsy for ID in am. Tolerating diet. Family at bedside.       Problem: Pain, Acute (Adult)  Goal: Identify Related Risk Factors and Signs and Symptoms  Outcome: Ongoing (interventions implemented as appropriate)    Goal: Acceptable Pain Control/Comfort Level  Outcome: Ongoing (interventions implemented as appropriate)

## 2018-10-15 NOTE — H&P (VIEW-ONLY)
Referring Provider: Rufus Steward MD  Reason for Consultation: Discitis      Subjective   History of present illness:    Very nice 72-year-old with rheumatoid arthritis admitted on 10/12/18 with abnormal back MRI.  Patient reports that he underwent lumbar puncture for evaluation of neuropathy about 2 months ago and started developing headaches and required a blood patch for CSF leak thereafter.  Subsequent to this, he started developing progressive spasm-like back pain that initially started in the lower back and then radiated down to the right side.  It progressed despite chiropractic care.  No associated fevers or chills or night sweats.  Patient does have a history of rheumatoid arthritis on immunosuppressives.  No steroids.  No drainage.  No bowel or bladder dysfunction or weakness.  He just feels like he has progressive pain.  Eventually he had an MRI done yesterday that showed discitis L3-L4, so was directly admitted.  Discussed this case with Dr. Steward recommended holding antibiotics until we could obtain aspiration    Past medical history: Rheumatoid arthritis on Abatacept, GERD, prostate cancer, Leiomyosarcoma  No family history of infectious diseases  Allergies: Lorazepam  Social history:      Review of Systems  Pertinent items are noted in HPI, all other systems reviewed and negative    Objective     Physical Exam:   Vital Signs   Temp:  [97.8 °F (36.6 °C)-98.3 °F (36.8 °C)] 98 °F (36.7 °C)  Heart Rate:  [84-92] 92  Resp:  [18] 18  BP: (138-160)/(83-87) 160/83    GENERAL: Awake and alert, in no acute distress.   HEENT: Oropharynx is clear. Hearing is grossly normal.   EYES: PERRL. No conjunctival injection. No lid lag.   LYMPHATICS: No lymphadenopathy of the neck or axillary regions.   HEART: Regular rate and rhythm. No peripheral edema.   LUNGS: Clear to auscultation anteriorly with normal respiratory effort.   GI: Soft, nontender, nondistended. No appreciable organomegaly.   SKIN: Warm and dry  without cutaneous eruptions   PSYCHIATRIC: Appropriate mood, affect, insight, and judgment.     Results Review:   I reviewed the patient's new clinical results.  Cr 0.78    WBC 2.3    H/H 10.6/34  PCt 0.1  CRP 4.2  ESR 65    Microbiology:  10/12 Bcx 2/2 NGTD    Radiology:  MRI lumbar spine from 10/12/18 shows discitis and osteomyelitis L3/L4 with edema and enhancement and adjacent stranding and paraspinal soft tissues at L3 and L4 and myositis of the psoas muscles bilaterally    Assessment/Plan   1.  L3/L4 discitis  2.  Rheumatoid arthritis on immunosuppression, complicating above  3.  Pancytopenia, complicating above.  Looks stable as compared to CBC in May 2017, ? MDS    Given lack of systemic symptoms and chronicity of symptoms, guidelines recommend bone biopsy to guide therapy.  I have ordered this as well as cultures and pathology to make sure that this isn't a malignant process given his recent history of prostate cancer.  Hold antibiotics.    Having some trouble finding a radiologist able to perform the biopsy, but discussed with Dr. Steward investigating potential transfer to a reputable medical institution that has interventional radiology.     Thank you for this consult.  We will continue to follow along and tailor antibiotics as the patient's clinical course evolves.    Evesn Rayo MD  10/13/18  9:59 AM

## 2018-10-15 NOTE — CONSULTS
.     REASON FOR CONSULTATION:   Pancytopenia  Provide an opinion on any further workup or treatment                             REQUESTING PHYSICIAN: Rufus Steward MD  RECORDS OBTAINED:  Records of the patients history including those from the electronic medical record were reviewed and summarized in detail.    HISTORY OF PRESENT ILLNESS:  The patient is a 72 y.o. year old male  who is here for follow-up with the above-mentioned history.      Admitted 10/12/18 due to abnormal back MRI.  Has rheumatoid arthritis.  Lumbar puncture to evaluate neuropathy about 2 months prior due to headaches.  CSF leak requiring a blood patch due to headaches.  Progressive spasm-like back pain radiating to the right side.  Chiropractic manipulation did not help.  MRI revealed L3-L4 discitis.  He has been evaluated by Dr. Rayo of infectious disease.  Aspiration of the area by CT guidance occurred today, 10/15/18.  Dr. Rayo asked us to see the patient due to pancytopenia to see if we can improve the blood counts that help fight infection.  Diagnosed with rheumatoid arthritis around 2012.  Methotrexate and Remicade through Dr. Barrera from diagnosis until about June 2017.  Treatment was stopped due to diagnosis of prostate cancer, treated with radiation.  Dr. Ar Wellington is the urologist.  Around July 2017, rheumatoid arthritis treatment resumed with a change to Orencia monthly.    Patient states he has known about leukocytopenia for about 2 years.  He states he was not told he had anemia or thrombocytopenia in the past.    Labs this admission showed WBC 2.3-2.9, Hb 10.3-10.9, -160.  Elevated ESR.  Neutrophils 75%.  ANC >2000.      outside labs, [facility]:  On 5/30/17, WBC 1.8, Hb 13.2, .  On 6/14/17, WBC 3.5, Hb 13.1, .  On 10/27/17, WBC 4, Hb 12, .    Patient states he rarely gets infections.    Denies fevers, chills, weight loss, night sweats.    Past Medical History:   Diagnosis Date   •  Arthritis     Reumatoid   • GERD (gastroesophageal reflux disease)    • Leiomyosarcoma (CMS/HCC)    • Prostate cancer (CMS/HCC)      No past surgical history on file.    MEDICATIONS    Current Facility-Administered Medications:   •  acetaminophen (TYLENOL) tablet 650 mg, 650 mg, Oral, Q4H PRN, Rufus Steward MD, 650 mg at 10/15/18 0340  •  bisacodyl (DULCOLAX) EC tablet 5 mg, 5 mg, Oral, Daily PRN, Rufus Steward MD  •  bisacodyl (DULCOLAX) suppository 10 mg, 10 mg, Rectal, Daily PRN, Rufus Steward MD  •  HYDROcodone-acetaminophen (NORCO) 5-325 MG per tablet 1 tablet, 1 tablet, Oral, Q4H PRN, Rufus Steward MD  •  ketorolac (TORADOL) injection 15 mg, 15 mg, Intravenous, Q6H PRN, Rufus Steward MD  •  morphine injection 2 mg, 2 mg, Intravenous, Q2H PRN **AND** naloxone (NARCAN) injection 0.4 mg, 0.4 mg, Intravenous, Q5 Min PRN, Rufus Steward MD  •  nitroglycerin (NITROSTAT) SL tablet 0.4 mg, 0.4 mg, Sublingual, Q5 Min PRN, Rufus Steward MD  •  ondansetron (ZOFRAN) tablet 4 mg, 4 mg, Oral, Q6H PRN **OR** ondansetron ODT (ZOFRAN-ODT) disintegrating tablet 4 mg, 4 mg, Oral, Q6H PRN **OR** ondansetron (ZOFRAN) injection 4 mg, 4 mg, Intravenous, Q6H PRN, Rufus Steward MD  •  sodium chloride 0.9 % flush 3-10 mL, 3-10 mL, Intravenous, PRN, Rufus Steward MD    ALLERGIES:     Allergies   Allergen Reactions   • Lorazepam Hives       SOCIAL HISTORY:       Social History     Social History   • Marital status:      Spouse name: N/A   • Number of children: N/A   • Years of education: N/A     Occupational History   • Not on file.     Social History Main Topics   • Smoking status: Former Smoker   • Smokeless tobacco: Never Used      Comment: quit 50 years ago   • Alcohol use Yes      Comment: social   • Drug use: Unknown   • Sexual activity: Not on file     Other Topics Concern   • Not on file     Social History Narrative   • No narrative on file         FAMILY  HISTORY:  Family History   Problem Relation Age of Onset   • Breast cancer Mother    • Prostate cancer Father    • Breast cancer Sister    • Leukemia Brother        REVIEW OF SYSTEMS:  Review of Systems           Vitals:    10/14/18 1352 10/14/18 2033 10/15/18 0500 10/15/18 1047   BP: 154/97 130/70 121/72 140/87   BP Location: Left arm Right arm Right arm Right arm   Patient Position: Sitting Lying Lying Lying   Pulse: 80 89 79 86   Resp: 18 18 20 16   Temp: 98 °F (36.7 °C) 98.3 °F (36.8 °C) 97.9 °F (36.6 °C)    TempSrc: Oral Oral Oral    SpO2: 92% 92% 98% 99%   Weight:       Height:         No flowsheet data found.   PHYSICAL EXAM:    CONSTITUTIONAL:  Vital signs reviewed.  No distress, looks comfortable.  EYES:  Conjunctiva and lids unremarkable.  PERRLA  EARS,NOSE,MOUTH,THROAT:  Ears and nose appear unremarkable.  Lips, teeth, gums appear unremarkable.  RESPIRATORY:  Normal respiratory effort.  Lungs clear to auscultation bilaterally.  CARDIOVASCULAR:  Normal S1, S2.  No murmurs rubs or gallops.  No significant lower extremity edema.  GASTROINTESTINAL: Abdomen appears unremarkable.  Nontender.  No hepatomegaly.  No splenomegaly.  LYMPHATIC:  No cervical, supraclavicular, axillary lymphadenopathy.  NEURO: cranial nerves 2-12 grossly intact.  No focal deficits.  Appears to have equal strength all 4 extremities.  MUSCULOSKELETAL:  Unremarkable digits/nails.  No cyanosis or clubbing.  No apparent joint deformities.  SKIN:  Warm.  No rashes.  PSYCHIATRIC:  Normal judgment and insight.  Normal mood and affect.     RECENT LABS:        WBC   Date Value Ref Range Status   10/14/2018 2.70 (L) 4.50 - 10.70 10*3/mm3 Final   10/13/2018 2.31 (L) 4.50 - 10.70 10*3/mm3 Final   10/12/2018 2.91 (L) 4.50 - 10.70 10*3/mm3 Final     Hemoglobin   Date Value Ref Range Status   10/14/2018 10.3 (L) 13.7 - 17.6 g/dL Final   10/13/2018 10.6 (L) 13.7 - 17.6 g/dL Final   10/12/2018 10.9 (L) 13.7 - 17.6 g/dL Final     Platelets   Date Value  Ref Range Status   10/14/2018 117 (L) 140 - 500 10*3/mm3 Final   10/13/2018 137 (L) 140 - 500 10*3/mm3 Final   10/12/2018 160 140 - 500 10*3/mm3 Final       Assessment/Plan   *Leukocytopenia, since at least May 2017.  WBC 1.8-4  Not neutropenic.    *Anemia.  Hb 10.3-13.2 since at least May 2017.    *Thrombocytopenia.  -160 since at least May 2017.    *Rheumatoid arthritis.  On  Orencia since around July 2017.  Previously was on Remicade and methotrexate from diagnosis of rheumatoid arthritis, 2012, through around June 2017 when these medicines were stopped due to the finding prostate cancer.    *Prostate cancer.  Dr. Ar Wellington.  Treated with radiation, around June 2017.    *Leiomyosarcoma of the back.  Resected by Dr. Trung Coronado with adjuvant radiation, 2017.    *History of B12 deficiency.  PCP started B12, oral, daily, sometime around April 2018, her patient.    *Family history of malignancies.  Mother and sister with breast cancer and father with prostate cancer in patient with a history of prostate cancer.  I recommended a genetics referral.  Patient states he has already done this.    Plan  · Since he is not neutropenic, there is truly no indication or Neupogen.  (No clear indication for Neupogen even if he was neutropenic, but sometimes this is done if a severe infection is found).  · Cytopenias have been long-standing.  I suspect they are at least in part due to rheumatoid arthritis.  (Orencia is not associated with cytopenias).  Perhaps the cytopenias are related to the radiation for prostate cancer and history of adjuvant radiation for resected leiomyosarcoma (perhaps marrow damage)  · I offered a bone marrow biopsy.  He absolutely does not want another biopsy.  I doubt a bone marrow biopsy would   · Check B12, folate, manual differential, iron labs, DIC labs.    Wife assisted with history.

## 2018-10-16 NOTE — PROGRESS NOTES
"Pharmacokinetic Consult - Vancomycin Dosing (Initial Note)    Baldemar Wellington has been consulted for pharmacy to dose vancomycin for Bone/joint infection (L3/L4 Discitis).  Pharmacy dosing vancomycin per Dr. Rayo's request.   Goal trough: 15-20 mg/L   DOT: stop date 11/27/18    Relevant clinical data and objective history reviewed:  72 y.o. male 182.9 cm (72.01\") 87.6 kg (193 lb 2 oz)    Past Medical History:   Diagnosis Date   • Arthritis     Reumatoid   • GERD (gastroesophageal reflux disease)    • Leiomyosarcoma (CMS/HCC)    • Prostate cancer (CMS/HCC)      Creatinine   Date Value Ref Range Status   10/15/2018 0.69 (L) 0.76 - 1.27 mg/dL Final   10/14/2018 0.63 (L) 0.76 - 1.27 mg/dL Final   10/13/2018 0.78 0.76 - 1.27 mg/dL Final   10/12/2018 0.80 0.60 - 1.30 mg/dL Final     Comment:     Serial Number: [id number]:  [id number]   07/30/2018 0.90 0.60 - 1.30 mg/dL Final     Comment:     Serial Number: [id number]:  [id number]     BUN   Date Value Ref Range Status   10/15/2018 7 (L) 8 - 23 mg/dL Final     Estimated Creatinine Clearance: 106.5 mL/min (A) (by C-G formula based on SCr of 0.69 mg/dL (L)).    Lab Results   Component Value Date    WBC 2.21 (L) 10/16/2018     Temp Readings from Last 3 Encounters:   10/16/18 98.1 °F (36.7 °C) (Oral)   08/28/18 97.7 °F (36.5 °C) (Oral)   08/24/18 97.3 °F (36.3 °C) (Oral)        Assessment/Plan  Will start vancomycin 1500mg IV q12h x84 doses. (stop date 11/27/18 per Dr. Rayo) Trough level prior to the 1200 10/18/18 dose. Pharmacy will continue to follow daily while on the vancomycin and adjust as needed.     Joes Rome, [facility]  "

## 2018-10-16 NOTE — PLAN OF CARE
Problem: Patient Care Overview  Goal: Plan of Care Review  Outcome: Ongoing (interventions implemented as appropriate)   10/15/18 1622 10/15/18 2025 10/16/18 0322   Coping/Psychosocial   Plan of Care Reviewed With --  patient;spouse --    Plan of Care Review   Progress no change --  --    OTHER   Outcome Summary --  --  Pt. remains A&Ox4 throughout shift. Pain controlled decently with PRN PO pain meds. Bandaid to biopsy site with scant drainage. Up ad riaz. awaiting results of labs and cultures for further plan.       Problem: Pain, Acute (Adult)  Goal: Identify Related Risk Factors and Signs and Symptoms  Outcome: Ongoing (interventions implemented as appropriate)    Goal: Acceptable Pain Control/Comfort Level  Outcome: Ongoing (interventions implemented as appropriate)

## 2018-10-16 NOTE — PROGRESS NOTES
REASON FOR FOLLOWUP/CHIEF COMPLAINT:  Pancytopenia    HISTORY OF PRESENT ILLNESS:   No new events overnight.  Having a PICC line placed now.  He plans to go home today.    Past Medical History, Past Surgical History, Social History, Family History have been reviewed and are without significant changes except as mentioned.    Review of Systems   Review of Systems   Constitutional: Negative for activity change.   HENT: Negative for nosebleeds and trouble swallowing.    Respiratory: Negative for shortness of breath and wheezing.    Cardiovascular: Negative for chest pain and palpitations.   Gastrointestinal: Negative for constipation, diarrhea and nausea.   Genitourinary: Negative for dysuria and hematuria.   Musculoskeletal: Negative for arthralgias and myalgias.   Neurological: Negative for seizures and syncope.   Hematological: Negative for adenopathy. Does not bruise/bleed easily.   Psychiatric/Behavioral: Negative for confusion.       Medications:  The current medication list was reviewed in the EMR    ALLERGIES:    Allergies   Allergen Reactions   • Lorazepam Hives              Vitals:    10/15/18 1731 10/15/18 2038 10/16/18 0504 10/16/18 1016   BP: 142/76 136/81 147/89 134/86   BP Location: Left arm Right arm Right arm Left arm   Patient Position: Sitting Lying Lying Sitting   Pulse: 80  75 79   Resp: 16 18 17 18   Temp: 98.2 °F (36.8 °C) 97.3 °F (36.3 °C) 97.6 °F (36.4 °C) 98.1 °F (36.7 °C)   TempSrc: Oral Oral Oral Oral   SpO2: 100%  97% 100%   Weight:       Height:         Physical Exam    CONSTITUTIONAL:  Vital signs reviewed.  No distress, looks comfortable.  EYES:  Conjunctiva and lids unremarkable.  PERRLA  EARS,NOSE,MOUTH,THROAT:  Ears and nose appear unremarkable.  Lips, teeth, gums appear unremarkable.  RESPIRATORY:  Normal respiratory effort.  Lungs clear to auscultation bilaterally.  CARDIOVASCULAR:  Normal S1, S2.  No murmurs rubs or gallops.  No significant lower extremity  edema.  GASTROINTESTINAL: Abdomen appears unremarkable.  Nontender.  No hepatomegaly.  No splenomegaly.  NEURO: cranial nerves 2-12 grossly intact.  No focal deficits.  Appears to have equal strength all 4 extremities.  MUSCULOSKELETAL:  Unremarkable digits/nails.  No cyanosis or clubbing.  SKIN:  Warm.  No rashes.  PSYCHIATRIC:  Normal judgment and insight.  Normal mood and affect.       RECENT LABS:  WBC   Date Value Ref Range Status   10/16/2018 2.21 (L) 4.50 - 10.70 10*3/mm3 Final   10/14/2018 2.70 (L) 4.50 - 10.70 10*3/mm3 Final     Hemoglobin   Date Value Ref Range Status   10/16/2018 10.7 (L) 13.7 - 17.6 g/dL Final   10/14/2018 10.3 (L) 13.7 - 17.6 g/dL Final     Platelets   Date Value Ref Range Status   10/16/2018 132 (L) 140 - 500 10*3/mm3 Final   10/15/2018 127 (L) 140 - 500 10*3/mm3 Final   10/14/2018 117 (L) 140 - 500 10*3/mm3 Final       ASSESSMENT/PLAN:  *Leukocytopenia, since at least May 2017.  WBC 1.8-4  ANC minimally low today at 1.52.     *Anemia.  Hb 10.3-13.2 since at least May 2017.  Unremarkable: B12, folate, haptoglobin, LDH, bilirubin, Shayla'  Iron saturation 10% but ferritin 299.  Reticulated hemoglobin low.     *Thrombocytopenia.  -160 since at least May 2017.  IPF normal at 4.1%.  Unremarkable: INR, PTT, fibrinogen.     *Rheumatoid arthritis.  On  Orencia since around July 2017.  Previously was on Remicade and methotrexate from diagnosis of rheumatoid arthritis, 2012, through around June 2017 when these medicines were stopped due to the finding prostate cancer.     *Prostate cancer.  Dr. Ar Wellington.  Treated with radiation, around June 2017.     *Leiomyosarcoma of the back.  Resected by Dr. Trung Coronado with adjuvant radiation, 2017.     *History of B12 deficiency.  PCP started B12, oral, daily, sometime around April 2018, her patient.     *Family history of malignancies.  Mother and sister with breast cancer and father with prostate cancer in patient with a history of prostate  cancer.  I recommended a genetics referral.  Patient states he has already done this.     *L3/L4 discitis/osteomyelitis.  Treatment as per ID.    Plan  · Since he is not neutropenic, there is truly no indication or Neupogen.  (No clear indication for Neupogen even if he was neutropenic, but sometimes this is done if a severe infection is found).  · Cytopenias have been long-standing.  I suspect they are at least in part due to rheumatoid arthritis.  (Orencia is not associated with cytopenias).  Perhaps the cytopenias are related to the radiation for prostate cancer and history of adjuvant radiation for resected leiomyosarcoma (perhaps marrow damage)  · I offered a bone marrow biopsy.  He absolutely does not want another biopsy.  I doubt a bone marrow biopsy would   · I offered follow-up in the office with me but he declines.  The cytopenias have been stable and he gets his labs checked regularly through his rheumatologist.  Therefore, I think that is reasonable.     I don't think I'm adding much at this point.  Will sign off.  Please call if I can be of any further assistance.  Thanks for the opportunity to help.

## 2018-10-16 NOTE — PROGRESS NOTES
Unable to see patient as he is currently undergoing placement of PICC line.  We will see tomorrow.

## 2018-10-16 NOTE — PROGRESS NOTES
INFECTIOUS DISEASES PROGRESS NOTE    CC: f/u discitis    S:   Back pain stable  Having some neck pain as had to position neck awkwardly for biopsy yesterday  No f/c/ns    O:  Physical Exam:  Temp:  [97.3 °F (36.3 °C)-98.2 °F (36.8 °C)] 97.6 °F (36.4 °C)  Heart Rate:  [75-86] 75  Resp:  [16-18] 17  BP: (136-152)/(76-89) 147/89  Physical Exam   Constitutional: He appears well-developed. No distress.   Pulmonary/Chest: Effort normal.   Abdominal: Soft. He exhibits no distension. There is no tenderness.   Neurological: He is alert.   Skin: Skin is warm and dry.   Psychiatric: He has a normal mood and affect. His behavior is normal.        Diagnostics:     WBC 2.21 (p69, L 21, M 7, E3)  H/h 10.7/34  plt 132        bcX NGTD  Back cx: GPC    Assessment/Plan   1.  L3/L4 discitis  2.  Rheumatoid arthritis on immunosuppression, complicating above  3.  Pancytopenia, complicating above.  Looks stable as compared to CBC in May 2017,    S/p biopsy yesterday and already growing GPC in less than 24 hours.  D/w patient and wants to go home  Will therefore place picc and start empiric vanc. Will follow cx and adjust abx as outpatient  Ask CCP to help arrange home IV abx  Hold RA meds while infected  Appreciate heme onc eval, pancytopenia most likely 2/2 RA    Final Infectious Diseases treatment recommendations:  The patient should receive the following antibiotics:  1. Vancomycin, as dosed by pharmacy for goal level of 15-20; stop date 11/27    Laboratory monitoring:  The next set of labs should be drawn on: 10/18    It is recommended that the patient have the following laboratories while he/she is receiving antibiotic therapy as an inpatient and outpatient: CBC with differential weekly, serum creatinine weekly, vancomycin trough weekly    Please fax the results of all labs to [facility] at [phone]    Follow up:  The patient will follow-up in the Infectious Disease clinic on 11/27           Evens Corado  MD Perri  10:05 AM  10/16/18

## 2018-10-16 NOTE — DISCHARGE SUMMARY
[facility]    Date of Discharge:  10/16/2018    PCP: Humphrey Proctor MD (Tony)    Discharge Diagnosis:   Active Hospital Problems    Diagnosis Date Noted   • **Infective myositis of multiple sites [M60.09] 10/12/2018   • Rheumatoid arthritis (CMS/HCC) [M06.9] 10/12/2018   • GERD (gastroesophageal reflux disease) [K21.9] 10/12/2018   • Osteomyelitis (CMS/HCC) [M86.9] 10/12/2018   • Idiopathic peripheral neuropathy [G60.9] 05/15/2018      Resolved Hospital Problems    Diagnosis Date Noted Date Resolved   No resolved problems to display.        Consults     Date and Time Order Name Status Description    10/15/2018 1107 Hematology & Oncology Inpatient Consult Completed     10/12/2018 1842 Inpatient Neurosurgery Consult Completed     10/12/2018 1840 Inpatient Infectious Diseases Consult Completed         Hospital Course  Please see history and physical for details. Patient is a 72 y.o. male with a history of RA on immunosuppressives directly admitted an MRI showed discitis L3-L4. Patient had LP for neuropathy a couple of months ago and had a blood patch afterwards for CSF leak. Patient had been feeling progressive pain. ID and neurosurgery saw the patient. He underwent biopsy on 10/15/18 and cultures already grew GPC. Patient would like to go home and he discussed with ID. PICC line has been placed and he will be getting his first dose of vancomycin today and home health will follow hin for IV antibiotics. Hematology saw the patient and he had pancytopenia felt related to RA. ID recommends     · Vancomycin, as dosed by pharmacy for goal level of 15-20; stop date 11/27  · CBC with differential weekly, serum creatinine weekly, vancomycin trough weekly  · fax the results of all labs to [facility] Infect[facility] clinic at [phone]  · hold RA meds while infected    I discussed the patient's findings and my recommendations with patient, family and nursing  staff. Reviewed ofe. He is requiring pain medication and is able to ambulate.    Condition on Discharge: Improved.     Temp:  [97.3 °F (36.3 °C)-98.2 °F (36.8 °C)] 98.1 °F (36.7 °C)  Heart Rate:  [75-86] 86  Resp:  [16-18] 18  BP: (134-149)/(76-89) 149/84  Body mass index is 26.19 kg/m².    Physical Exam   Constitutional: He is oriented to person, place, and time. No distress.   Cardiovascular: Normal rate and regular rhythm.    Pulmonary/Chest: Effort normal and breath sounds normal. No respiratory distress.   Abdominal: Soft. There is no tenderness.   Neurological: He is alert and oriented to person, place, and time.   Skin: Skin is warm and dry.        Discharge Medications      New Medications      Instructions Start Date   HYDROcodone-acetaminophen 5-325 MG per tablet  Commonly known as:  NORCO   1 tablet, Oral, Every 4 Hours PRN      vancomycin   1,500 mg, Intravenous, Every 12 Hours, 1. Vancomycin, as dosed by pharmacy for goal level of 15-20         Continue These Medications      Instructions Start Date   aspirin 81 MG EC tablet   81 mg, Oral, Daily      Calcium-Vitamin D-Vitamin K 500-100-40 MG-UNT-MCG chewable tablet   Oral      fish oil 1000 MG capsule capsule   Oral, Daily With Breakfast      fluticasone 50 MCG/ACT nasal spray  Commonly known as:  FLONASE   2 sprays, Each Nare, Daily      loratadine-pseudoephedrine  MG per 24 hr tablet  Commonly known as:  CLARITIN-D 24-hour   1 tablet, Oral      meloxicam 15 MG tablet  Commonly known as:  MOBIC   TAKE 1 TABLET (15 MG) BY MOUTH DAILY AS NEEDED FOR PAIN      omeprazole 20 MG capsule  Commonly known as:  priLOSEC   20 mg, Oral, Daily      sildenafil 20 MG tablet  Commonly known as:  REVATIO   take 1-5 pills 30-60 mins before sexual activity. start with a lower dose & increase as needed      vitamin B-12 2500 MCG sublingual tablet tablet  Commonly known as:  CYANOCOBALAMIN   Sublingual, Daily      vitamin C 500 MG tablet  Commonly known as:   ASCORBIC ACID   500 mg, Oral, Daily         Stop These Medications    coenzyme Q10 50 MG capsule capsule     folic acid 1 MG tablet  Commonly known as:  FOLVITE     ORENCIA IV     predniSONE 5 MG tablet  Commonly known as:  DELTASONE     Red Yeast Rice 600 MG capsule           Diet Instructions     Diet: Regular       Discharge Diet:  Regular         Activity Instructions     Activity as Tolerated            Additional Instructions for the Follow-ups that You Need to Schedule     Call MD for problems / concerns.    As directed      CBC & Differential  (Once a week)   Oct 16, 2019      Manual Differential:  No         Creatinine, Serum  (Once a week)   Oct 16, 2019      fax results to [phone]    Order Comments:  fax results to [phone]          Vancomycin, Trough  (Once a week)   Oct 16, 2019      fax results to [phone]    Order Comments:  fax results to [phone]          Additional information on Labs and Follow-ups:      Vanc trough on 10/18/2018 @ 1130. Do not infuse antibiotic prior to drawing the trough.                 Follow-up Information     Humphrey Proctor MD (Tony) Follow up.    Specialty:  Internal Medicine  Contact information:  [address] [address] [phone]             Evens Rayo MD Follow up on 11/27/2018.    Specialty:  Infectious Diseases  Contact information:  [address] [address] [phone]                 Test Results Pending at Discharge   Order Current Status    Tissue Pathology Exam Collected (10/15/18 1215)    Blood Culture - Blood, Preliminary result    Blood Culture - Blood, Preliminary result    Body Fluid Culture - Body Fluid, Retroperitoneum Preliminary result    Tissue / Bone Culture - Bone, Back Preliminary result         Akshat Lagunas MD  10/16/18  4:51 PM    Discharge time spent greater than 30 minutes.

## 2018-10-16 NOTE — PROGRESS NOTES
Discharge Planning Assessment  [facility]     Patient Name: Baldemar Wellington  MRN: [id number]  Today's Date: 10/16/2018    Admit Date: 10/12/2018          Discharge Needs Assessment     Row Name 10/16/18 1300       Living Environment    Lives With spouse    Name(s) of Who Lives With Patient Yris Wellington    Current Living Arrangements home/apartment/condo    Primary Care Provided by self    Provides Primary Care For no one    Family Caregiver if Needed spouse    Quality of Family Relationships supportive;involved;helpful       Resource/Environmental Concerns    Resource/Environmental Concerns none    Transportation Concerns car, none       Transition Planning    Patient/Family Anticipates Transition to home with family;home with help/services    Patient/Family Anticipated Services at Transition none    Transportation Anticipated family or friend will provide       Discharge Needs Assessment    Concerns to be Addressed --   IVABx therapy    Equipment Currently Used at Home none    Equipment Needed After Discharge none    Discharge Facility/Level of Care Needs home with home health            Discharge Plan     Row Name 10/16/18 130       Plan    Plan Home w/ spouse and [facility] for IV antibiotics    Patient/Family in Agreement with Plan yes    Plan Comments Met w/ patient's spouse in lounge (patient getting PICC line placed).  Introduced self and explained role.  Patient has never used [facility] in the past- reviewed options and obtained referral for [facility].          Destination     No service coordination in this encounter.      Durable Medical Equipment     No service coordination in this encounter.      Dialysis/Infusion     No service coordination in this encounter.      Home Medical Care     Service Request Status Selected Specialties Address Phone Number Fax Number    [facility] Accepted N/A [address] [id number] [phone]        Camilla Howard RN 10/16/2018  1304    Referral called to Bill.  Camilla Howard, RN                   Social Care     No service coordination in this encounter.                Demographic Summary     Row Name 10/16/18 1300       General Information    Admission Type inpatient    Arrived From home    Reason for Consult discharge planning            Functional Status     Row Name 10/16/18 1300       Functional Status    Usual Activity Tolerance excellent    Current Activity Tolerance good       Functional Status, IADL    Medications independent    Meal Preparation independent    Housekeeping independent    Laundry independent    Shopping independent       Mental Status    General Appearance WDL WDL       Mental Status Summary    Recent Changes in Mental Status/Cognitive Functioning unable to assess                  Camilla Howard RN

## 2018-10-17 NOTE — OUTREACH NOTE
Prep Survey      Responses   Facility patient discharged from?  [facility]   Is patient eligible?  Yes   Discharge diagnosis  Infective myositis of multiple sites,     PICC line has been placed    Does the patient have one of the following disease processes/diagnoses(primary or secondary)?  Other   Does the patient have Home health ordered?  Yes   What is the Home health agency?   [facility]   Is there a DME ordered?  No   Prep survey completed?  Yes          Jasmin Leal RN

## 2018-10-17 NOTE — PROGRESS NOTES
Case Management Discharge Note    Final Note: Spoke with Bill/ [facility].   [facility] did receive orders from Dr. Rayo.    Destination     No service has been selected for the patient.      Durable Medical Equipment     No service has been selected for the patient.      Dialysis/Infusion     No service has been selected for the patient.      Home Medical Care - Selection Complete     Service Request Status Selected Specialties Address Phone Number Fax Number    [facility] Selected Home Health Services [address] [address] [phone]        Camilla Howard RN 10/16/2018 1312    Referral called to [facility].  Camilla Howard RN                   Social Care     No service has been selected for the patient.             Final Discharge Disposition Code: 06 - home with home health care ([facility])

## 2018-10-17 NOTE — PAYOR COMM NOTE
"UR CONTACT:   ROMELIA              P: [phone]  F: [phone]          Compa Wellington  (72 y.o. Male)     Date of Birth Social Security Number Address Home Phone MRN    1945  [id number] [facility] [phone] [id number]    [employer] Marital Status          Orthodox        Admission Date Admission Type Admitting Provider Attending Provider Department, Room/Bed    10/12/18 Urgent Rufus Steward MD  [address], P581/1    Discharge Date Discharge Disposition Discharge Destination        10/16/2018 Home or Self Care              Attending Provider:  (none)   Allergies:  Lorazepam    Isolation:  None   Infection:  None   Code Status:  Prior    Ht:  182.9 cm (72.01\")   Wt:  87.6 kg (193 lb 2 oz)    Admission Cmt:  None   Principal Problem:  Infective myositis of multiple sites [M60.09]                 Active Insurance as of 10/12/2018     Primary Coverage     Payor Plan Insurance Group Employer/Plan Group    [employer] [facility] [id number]     Payor Plan Address Payor Plan Phone Number Effective From Effective To    [address] [phone] 1/1/2014     [facility] [id number]       Subscriber Name Subscriber Birth Date Member ID       COMPA WELLINGTON 1945 [id number]           Secondary Coverage     Payor Plan Insurance Group Employer/Plan Group    MEDICARE MEDICARE A ONLY      Payor Plan Address Payor Plan Phone Number Effective From Effective To    [address] [phone] 7/1/2013     [facility] [id number]       Subscriber Name Subscriber Birth Date Member ID       COMPA WELLINGTON 1945 [id number]                 Emergency Contacts      (Rel.) Home Phone Work Phone Mobile Phone    WellingtonNikkia (Spouse) [phone] -- --    RyleyModestaa (Daughter) [phone] -- --    AmishPaul (Son) [phone] -- --             Physician Progress Notes       Leidy Chance, APRN at 10/16/2018  2:50 PM     Attestation signed " by Manuel Guillen MD at 10/17/2018  6:51 AM    I have reviewed the documentation above and agree.                  Unable to see patient as he is currently undergoing placement of PICC line.  We will see tomorrow.    Electronically signed by Manuel Guillen MD at 10/17/2018  6:51 AM     Isiah, Jose SHEN II, MD at 10/16/2018 11:17 AM            Subjective      REASON FOR FOLLOWUP/CHIEF COMPLAINT:  Pancytopenia    HISTORY OF PRESENT ILLNESS:   No new events overnight.  Having a PICC line placed now.  He plans to go home today.    Past Medical History, Past Surgical History, Social History, Family History have been reviewed and are without significant changes except as mentioned.    Review of Systems   Review of Systems   Constitutional: Negative for activity change.   HENT: Negative for nosebleeds and trouble swallowing.    Respiratory: Negative for shortness of breath and wheezing.    Cardiovascular: Negative for chest pain and palpitations.   Gastrointestinal: Negative for constipation, diarrhea and nausea.   Genitourinary: Negative for dysuria and hematuria.   Musculoskeletal: Negative for arthralgias and myalgias.   Neurological: Negative for seizures and syncope.   Hematological: Negative for adenopathy. Does not bruise/bleed easily.   Psychiatric/Behavioral: Negative for confusion.       Medications:  The current medication list was reviewed in the EMR    ALLERGIES:    Allergies   Allergen Reactions   • Lorazepam Hives       Objective       Vitals:    10/15/18 1731 10/15/18 2038 10/16/18 0504 10/16/18 1016   BP: 142/76 136/81 147/89 134/86   BP Location: Left arm Right arm Right arm Left arm   Patient Position: Sitting Lying Lying Sitting   Pulse: 80  75 79   Resp: 16 18 17 18   Temp: 98.2 °F (36.8 °C) 97.3 °F (36.3 °C) 97.6 °F (36.4 °C) 98.1 °F (36.7 °C)   TempSrc: Oral Oral Oral Oral   SpO2: 100%  97% 100%   Weight:       Height:         Physical Exam    CONSTITUTIONAL:  Vital signs reviewed.  No distress,  looks comfortable.  EYES:  Conjunctiva and lids unremarkable.  PERRLA  EARS,NOSE,MOUTH,THROAT:  Ears and nose appear unremarkable.  Lips, teeth, gums appear unremarkable.  RESPIRATORY:  Normal respiratory effort.  Lungs clear to auscultation bilaterally.  CARDIOVASCULAR:  Normal S1, S2.  No murmurs rubs or gallops.  No significant lower extremity edema.  GASTROINTESTINAL: Abdomen appears unremarkable.  Nontender.  No hepatomegaly.  No splenomegaly.  NEURO: cranial nerves 2-12 grossly intact.  No focal deficits.  Appears to have equal strength all 4 extremities.  MUSCULOSKELETAL:  Unremarkable digits/nails.  No cyanosis or clubbing.  SKIN:  Warm.  No rashes.  PSYCHIATRIC:  Normal judgment and insight.  Normal mood and affect.       RECENT LABS:  WBC   Date Value Ref Range Status   10/16/2018 2.21 (L) 4.50 - 10.70 10*3/mm3 Final   10/14/2018 2.70 (L) 4.50 - 10.70 10*3/mm3 Final     Hemoglobin   Date Value Ref Range Status   10/16/2018 10.7 (L) 13.7 - 17.6 g/dL Final   10/14/2018 10.3 (L) 13.7 - 17.6 g/dL Final     Platelets   Date Value Ref Range Status   10/16/2018 132 (L) 140 - 500 10*3/mm3 Final   10/15/2018 127 (L) 140 - 500 10*3/mm3 Final   10/14/2018 117 (L) 140 - 500 10*3/mm3 Final     Assessment/Plan  ASSESSMENT/PLAN:  *Leukocytopenia, since at least May 2017.  WBC 1.8-4  ANC minimally low today at 1.52.     *Anemia.  Hb 10.3-13.2 since at least May 2017.  Unremarkable: B12, folate, haptoglobin, LDH, bilirubin, Shayla'  Iron saturation 10% but ferritin 299.  Reticulated hemoglobin low.     *Thrombocytopenia.  -160 since at least May 2017.  IPF normal at 4.1%.  Unremarkable: INR, PTT, fibrinogen.     *Rheumatoid arthritis.  On  Orencia since around July 2017.  Previously was on Remicade and methotrexate from diagnosis of rheumatoid arthritis, 2012, through around June 2017 when these medicines were stopped due to the finding prostate cancer.     *Prostate cancer.  Dr. Ar Wellington.  Treated with  radiation, around June 2017.     *Leiomyosarcoma of the back.  Resected by Dr. Trung Coronado with adjuvant radiation, 2017.     *History of B12 deficiency.  PCP started B12, oral, daily, sometime around April 2018, her patient.     *Family history of malignancies.  Mother and sister with breast cancer and father with prostate cancer in patient with a history of prostate cancer.  I recommended a genetics referral.  Patient states he has already done this.     *L3/L4 discitis/osteomyelitis.  Treatment as per ID.    Plan  · Since he is not neutropenic, there is truly no indication or Neupogen.  (No clear indication for Neupogen even if he was neutropenic, but sometimes this is done if a severe infection is found).  · Cytopenias have been long-standing.  I suspect they are at least in part due to rheumatoid arthritis.  (Orencia is not associated with cytopenias).  Perhaps the cytopenias are related to the radiation for prostate cancer and history of adjuvant radiation for resected leiomyosarcoma (perhaps marrow damage)  · I offered a bone marrow biopsy.  He absolutely does not want another biopsy.  I doubt a bone marrow biopsy would   · I offered follow-up in the office with me but he declines.  The cytopenias have been stable and he gets his labs checked regularly through his rheumatologist.  Therefore, I think that is reasonable.     I don't think I'm adding much at this point.  Will sign off.  Please call if I can be of any further assistance.  Thanks for the opportunity to help.           Electronically signed by Jose Joyce II, MD at 10/16/2018  1:29 PM     Evens Rayo MD at 10/16/2018 10:05 AM        INFECTIOUS DISEASES PROGRESS NOTE    CC: f/u discitis    S:   Back pain stable  Having some neck pain as had to position neck awkwardly for biopsy yesterday  No f/c/ns    O:  Physical Exam:  Temp:  [97.3 °F (36.3 °C)-98.2 °F (36.8 °C)] 97.6 °F (36.4 °C)  Heart Rate:  [75-86] 75  Resp:   [16-18] 17  BP: (136-152)/(76-89) 147/89  Physical Exam   Constitutional: He appears well-developed. No distress.   Pulmonary/Chest: Effort normal.   Abdominal: Soft. He exhibits no distension. There is no tenderness.   Neurological: He is alert.   Skin: Skin is warm and dry.   Psychiatric: He has a normal mood and affect. His behavior is normal.        Diagnostics:     WBC 2.21 (p69, L 21, M 7, E3)  H/h 10.7/34  plt 132        bcX NGTD  Back cx: GPC    Assessment/Plan   1.  L3/L4 discitis  2.  Rheumatoid arthritis on immunosuppression, complicating above  3.  Pancytopenia, complicating above.  Looks stable as compared to CBC in May 2017,    S/p biopsy yesterday and already growing GPC in less than 24 hours.  D/w patient and wants to go home  Will therefore place picc and start empiric vanc. Will follow cx and adjust abx as outpatient  Ask CCP to help arrange home IV abx  Hold RA meds while infected  Appreciate heme onc eval, pancytopenia most likely 2/2 RA    Final Infectious Diseases treatment recommendations:  The patient should receive the following antibiotics:  1. Vancomycin, as dosed by pharmacy for goal level of 15-20; stop date 11/27    Laboratory monitoring:  The next set of labs should be drawn on: 10/18    It is recommended that the patient have the following laboratories while he/she is receiving antibiotic therapy as an inpatient and outpatient: CBC with differential weekly, serum creatinine weekly, vancomycin trough weekly    Please fax the results of all labs to [facility] at [phone]    Follow up:  The patient will follow-up in the Infectious Disease clinic on 11/27           Evens Rayo MD  10:05 AM  10/16/18           Electronically signed by Evens Rayo MD at 10/16/2018 10:14 AM     Akshat Lagunas MD at 10/15/2018  4:11 PM                              [facility]     LOS: 3 days     Name: Baldemar Wellington  Age:  72 y.o.  Sex: male  :  1945  MRN: [id number]         Primary Care Physician: Humphrey Proctor MD (Tony)    Diet Regular    Subjective   no complaints. sore from biopsy but able to ambulate in halls.    Objective   Temp:  [97.9 °F (36.6 °C)-98.3 °F (36.8 °C)] 97.9 °F (36.6 °C)  Heart Rate:  [79-89] 84  Resp:  [16-20] 16  BP: (121-152)/(70-87) 152/87  SpO2:  [92 %-100 %] 100 %  on   ;   Device (Oxygen Therapy): room air  Body mass index is 26.19 kg/m².    Physical Exam   Constitutional: He is oriented to person, place, and time. No distress.   Cardiovascular: Normal rate and regular rhythm.    Pulmonary/Chest: Effort normal and breath sounds normal. No respiratory distress.   Abdominal: Soft. There is no tenderness.   Musculoskeletal: He exhibits no edema.   Neurological: He is alert and oriented to person, place, and time.   Skin: Skin is warm and dry.   Psychiatric: He has a normal mood and affect. His behavior is normal.     Reviewed medications and new clinical results      Results from last 7 days  Lab Units 10/15/18  1430 10/14/18  0515 10/13/18  0426 10/12/18  1952   WBC 10*3/mm3  --  2.70* 2.31* 2.91*   HEMOGLOBIN g/dL  --  10.3* 10.6* 10.9*   PLATELETS 10*3/mm3 127* 117* 137* 160     Results from last 7 days  Lab Units 10/15/18  0424 10/14/18  0515 10/13/18  0426 10/12/18  1952 10/12/18  1355   SODIUM mmol/L 142 141 140 137  --    POTASSIUM mmol/L 3.8 3.7 4.0 3.7  --    CHLORIDE mmol/L 105 105 103 98  --    CO2 mmol/L 26.2 23.5 27.0 25.9  --    BUN mg/dL 7* 7* 9 10  --    CREATININE mg/dL 0.69* 0.63* 0.78 0.84 0.80   CALCIUM mg/dL 8.8 8.7 8.8 9.3  --    GLUCOSE mg/dL 105* 106* 96 125*  --      Lab Results   Component Value Date    ANIONGAP 10.8 10/15/2018     Estimated Creatinine Clearance: 106.5 mL/min (A) (by C-G formula based on SCr of 0.69 mg/dL (L)).    Assessment/Plan   Active Hospital Problems    Diagnosis Date Noted   • Rheumatoid arthritis (CMS/HCC) [M06.9] 10/12/2018   • GERD  (gastroesophageal reflux disease) [K21.9] 10/12/2018   • Osteomyelitis (CMS/HCC) [M86.9] 10/12/2018   • Infective myositis of multiple sites [M60.09] 10/12/2018   • Idiopathic peripheral neuropathy [G60.9] 05/15/2018      Resolved Hospital Problems    Diagnosis Date Noted Date Resolved   No resolved problems to display.     · L3/L4 discitis/osteomyelitis  · s/p CT guided aspiration  · hold antibiotics for now  · ID and ROSA following  · pancytopenia  · per CBC may be in part from RA  · patient declined BM biopsy  · lab workup per CBC  · disposition  · TBD  · I discussed the patient's findings and my recommendations with patient and family.    Akshat Lagunas MD   10/15/18  4:11 PM      Electronically signed by Akshat Lagunas MD at 10/15/2018  4:15 PM     Leidy Chance APRN at 10/15/2018 12:38 PM     Attestation signed by Manuel Guillen MD at 10/16/2018  3:06 PM    I have reviewed the documentation above and agree.                  Off the floor getting the CT-guided aspiration of disc space.  We will check back tomorrow.    Electronically signed by Manuel Guillen MD at 10/16/2018  3:06 PM     Evens Rayo MD at 10/15/2018 11:04 AM        INFECTIOUS DISEASES PROGRESS NOTE    CC: f/u discitis    S:   Back pain stable  No f/c/ns    O:  Physical Exam:  Temp:  [97.9 °F (36.6 °C)-98.3 °F (36.8 °C)] 97.9 °F (36.6 °C)  Heart Rate:  [79-89] 86  Resp:  [16-20] 16  BP: (121-154)/(70-97) 140/87  Physical Exam   Constitutional: He appears well-developed. No distress.   Pulmonary/Chest: Effort normal.   Abdominal: Soft. He exhibits no distension. There is no tenderness.   Neurological: He is alert.   Skin: Skin is warm and dry.   Psychiatric: He has a normal mood and affect. His behavior is normal.        Diagnostics:     BCx ngtd      Assessment/Plan   1.  L3/L4 discitis  2.  Rheumatoid arthritis on immunosuppression, complicating above  3.  Pancytopenia, complicating above.  Looks stable as compared to  CBC in May 2017, ? MDS    For biopsy today by IR.  Hold abx for now  I will ask for heme onc opinion regarding pancytopenia as well need as robust immune system as possible to help fight off the infection      Evens Rayo MD  11:04 AM  10/15/18           Electronically signed by Evens Rayo MD at 10/15/2018 11:06 AM     Manuel Guillen MD at 10/14/2018 10:40 AM             LOS: 2 days   Patient Care Team:  Humphrey Proctor MD (Tony) as PCP - General (Internal Medicine)    Chief Complaint:  Back pain    Subjective     Patient continues with back pain but it is neither any worse or any better than it was yesterday.    Interval History:     History taken from: patient chart family    Objective      The patient has good movement in both lower extremities.    Vital Signs  Temp:  [98.1 °F (36.7 °C)-99.5 °F (37.5 °C)] 98.2 °F (36.8 °C)  Heart Rate:  [75-90] 75  Resp:  [18-19] 18  BP: (112-143)/(55-77) 143/77       Results Review:     I reviewed the patient's new clinical results.  He is afebrile.  His CRP and sedimentation rate are both elevated as before.      Assessment/Plan       Idiopathic peripheral neuropathy    Rheumatoid arthritis (CMS/HCC)    GERD (gastroesophageal reflux disease)    Osteomyelitis (CMS/HCC)    Infective myositis of multiple sites      I had a long discussion with Dr. Rayo this morning.  He is a little uncomfortable not trying to get an organism for this especially with his history of rheumatoid and the medications associated with that.  He suggested that we consider having an interventional radiologist do an aspiration of fluid just outside of the spine and the paraspinous musculature.  I have no particular disagreement with that.  We could certainly consider a bone biopsy if absolutely necessary but that would involve a general anesthetic and I am not sure that based on his current clinical course it would be appropriate to do that at this point.  His pain  is not horrible most of the time and so I think if necessary we could certainly follow him clinically.  In my experience it is not uncommon for vertebral discitis and osteomyelitis to literally burn itself out without treatment.  We will see about getting IR to see him tomorrow for an aspiration.  The patient and his wife are in agreement with this plan.      Maunel Guillen MD  10/14/18  10:40 AM          Electronically signed by Manuel Guillen MD at 10/14/2018 10:43 AM     Evens Rayo MD at 10/14/2018 10:23 AM        INFECTIOUS DISEASES PROGRESS NOTE    CC: f/u discitis    S:   Back pain is stable.  He is not having fevers or chills or night sweats.  Is having urinary frequency with the IV fluids.    O:  Physical Exam:  Temp:  [98.1 °F (36.7 °C)-99.5 °F (37.5 °C)] 98.2 °F (36.8 °C)  Heart Rate:  [75-90] 75  Resp:  [18-19] 18  BP: (112-143)/(55-77) 143/77  Physical Exam   Constitutional: He appears well-developed.   Pulmonary/Chest: Effort normal.   Abdominal: Soft. He exhibits no distension. There is no tenderness.   Neurological: He is alert.   Skin: Skin is warm and dry.   Psychiatric: He has a normal mood and affect. His behavior is normal.    lumbar back pain on palpation of the spinous processes     Diagnostics:     Cr 0.6  WBC 2.7 (p74, L 16, M 5, E3)    H/h 10/33  Bcx ngtd     Assessment/Plan   1.  L3/L4 discitis  2.  Rheumatoid arthritis on immunosuppression, complicating above  3.  Pancytopenia, complicating above.  Looks stable as compared to CBC in May 2017, ? MDS  4. Psoas myositis    Discussed with neurosurgery going to pursue CT-guided biopsy.  I don't think an open biopsy under general anesthesia is necessary at this point although neurosurgery is open to doing this if he fails to improve.      Evens Rayo MD  10:23 AM  10/14/18           Electronically signed by Evens Rayo MD at 10/14/2018 10:57 AM     Rufus Steward MD at 10/14/2018   6:06 AM               LOS: 2 days     Name: Baldemar Wellington  Age/Sex: 72 y.o. male  :  1945        PCP: Humphrey Proctor MD (Tony)    Subjective   Pain is controlled no fevers rested well    General: No Fever or Chills, Cardiac: No Chest Pain or Palpitations, Resp: No Cough or SOA, GI: No Nausea, Vomiting, or Diarrhea and Other: No bleeding            Objective   Vital Signs  Temp:  [98.1 °F (36.7 °C)-99.5 °F (37.5 °C)] 98.2 °F (36.8 °C)  Heart Rate:  [75-90] 75  Resp:  [18-19] 18  BP: (112-143)/(55-89) 143/77  Body mass index is 26.46 kg/m².  No intake or output data in the 24 hours ending 10/14/18 06    Physical Exam   Constitutional: He is oriented to person, place, and time. He appears well-developed and well-nourished.   HENT:   Head: Normocephalic and atraumatic.   Eyes: Conjunctivae and EOM are normal.   Neck: Neck supple. No JVD present.   Cardiovascular: Normal rate, regular rhythm and normal heart sounds.    Pulmonary/Chest: Effort normal and breath sounds normal.   Abdominal: Soft. Bowel sounds are normal.   Musculoskeletal: Normal range of motion.   Neurological: He is alert and oriented to person, place, and time.   Skin: Skin is warm and dry. Capillary refill takes less than 2 seconds.   Psychiatric: He has a normal mood and affect. His behavior is normal. Thought content normal.   Nursing note and vitals reviewed.        Results Review:       I reviewed the patient's new clinical results.    Results from last 7 days  Lab Units 10/14/18  0515 10/13/18  0426 10/12/18  1952   WBC 10*3/mm3 2.70* 2.31* 2.91*   HEMOGLOBIN g/dL 10.3* 10.6* 10.9*   PLATELETS 10*3/mm3 117* 137* 160       Results from last 7 days  Lab Units 10/14/18  0515 10/13/18  0426 10/12/18  1952 10/12/18  1355   SODIUM mmol/L 141 140 137  --    POTASSIUM mmol/L 3.7 4.0 3.7  --    CHLORIDE mmol/L 105 103 98  --    CO2 mmol/L 23.5 27.0 25.9  --    BUN mg/dL 7* 9 10  --    CREATININE mg/dL 0.63* 0.78 0.84 0.80   CALCIUM mg/dL 8.7  8.8 9.3  --    MAGNESIUM mg/dL  --  2.1  --   --    PHOSPHORUS mg/dL  --  3.1  --   --    Estimated Creatinine Clearance: 107.6 mL/min (A) (by C-G formula based on SCr of 0.63 mg/dL (L)).    Assessment/Plan     Idiopathic peripheral neuropathy    Rheumatoid arthritis (CMS/HCC)    GERD (gastroesophageal reflux disease)    Osteomyelitis (CMS/HCC)    Infective myositis of multiple sites    PLAN  - plan biopsy in the AM  - defer antibiotics to ID  - in the meantime continue supportive care  - pancytopenia ? 2/2 infection or underlying bone marrow suppressive issue, consider outpatient heme referral   - lengthy discussion with the patient and his daughter    Disposition        Rufus Steward MD  [facility]  10/14/18  6:06 AM            Electronically signed by Rufus Steward MD at 10/14/2018 12:57 PM     Rufus Steward MD at 10/13/2018  7:50 AM               LOS: 1 day     Name: Baldemar Wellington  Age/Sex: 72 y.o. male  :  1945        PCP: Humphrey Proctor MD (Tony)    Subjective   Pain is controlled no fevers rested well    General: No Fever or Chills, Cardiac: No Chest Pain or Palpitations, Resp: No Cough or SOA, GI: No Nausea, Vomiting, or Diarrhea and Other: No bleeding         sodium chloride 100 mL/hr Last Rate: 100 mL/hr (10/12/18 2022)       Objective   Vital Signs  Temp:  [97.8 °F (36.6 °C)-98.3 °F (36.8 °C)] 98 °F (36.7 °C)  Heart Rate:  [84-92] 92  Resp:  [18] 18  BP: (138-160)/(83-87) 160/83  Body mass index is 26.46 kg/m².    Intake/Output Summary (Last 24 hours) at 10/13/18 0750  Last data filed at 10/13/18 0448   Gross per 24 hour   Intake              850 ml   Output                0 ml   Net              850 ml       Physical Exam   Constitutional: He is oriented to person, place, and time. He appears well-developed and well-nourished.   HENT:   Head: Normocephalic and atraumatic.   Eyes: Conjunctivae and EOM are normal.   Neck: Neck supple. No JVD  present.   Cardiovascular: Normal rate, regular rhythm and normal heart sounds.    Pulmonary/Chest: Effort normal and breath sounds normal.   Abdominal: Soft. Bowel sounds are normal.   Musculoskeletal: Normal range of motion.   Neurological: He is alert and oriented to person, place, and time.   Skin: Skin is warm and dry. Capillary refill takes less than 2 seconds.   Psychiatric: He has a normal mood and affect. His behavior is normal. Thought content normal.   Nursing note and vitals reviewed.        Results Review:       I reviewed the patient's new clinical results.    Results from last 7 days  Lab Units 10/13/18  0426 10/12/18  1952   WBC 10*3/mm3 2.31* 2.91*   HEMOGLOBIN g/dL 10.6* 10.9*   PLATELETS 10*3/mm3 137* 160     Results from last 7 days  Lab Units 10/13/18  0426 10/12/18  1952 10/12/18  1355   SODIUM mmol/L 140 137  --    POTASSIUM mmol/L 4.0 3.7  --    CHLORIDE mmol/L 103 98  --    CO2 mmol/L 27.0 25.9  --    BUN mg/dL 9 10  --    CREATININE mg/dL 0.78 0.84 0.80   CALCIUM mg/dL 8.8 9.3  --    MAGNESIUM mg/dL 2.1  --   --    PHOSPHORUS mg/dL 3.1  --   --    Estimated Creatinine Clearance: 107.6 mL/min (by C-G formula based on SCr of 0.78 mg/dL).      Assessment/Plan     Idiopathic peripheral neuropathy    Rheumatoid arthritis (CMS/HCC)    GERD (gastroesophageal reflux disease)    Osteomyelitis (CMS/HCC)    Infective myositis of multiple sites      PLAN  - noted pancytopenia likely secondary to underlying infection  - unfortunately unable to get biopsy until Monday AM.  Defer to infectious disease whether or not to start abx prior to biopsy  - await ROSA recs    Disposition        Rufus Steward MD  [facility]  10/13/18  7:50 AM            Electronically signed by Rufus Steward MD at 10/13/2018 11:56 AM          Consult Notes       Jose Joyce II, MD at 10/15/2018 11:33 AM      Consult Orders:    1. Hematology & Oncology Inpatient Consult [[id number]] ordered by  Perri, Evens Corado MD at 10/15/18 1107                  Subjective  .     REASON FOR CONSULTATION:   Pancytopenia  Provide an opinion on any further workup or treatment                             REQUESTING PHYSICIAN: Rufus Steward MD  RECORDS OBTAINED:  Records of the patients history including those from the electronic medical record were reviewed and summarized in detail.    HISTORY OF PRESENT ILLNESS:  The patient is a 72 y.o. year old male  who is here for follow-up with the above-mentioned history.      Admitted 10/12/18 due to abnormal back MRI.  Has rheumatoid arthritis.  Lumbar puncture to evaluate neuropathy about 2 months prior due to headaches.  CSF leak requiring a blood patch due to headaches.  Progressive spasm-like back pain radiating to the right side.  Chiropractic manipulation did not help.  MRI revealed L3-L4 discitis.  He has been evaluated by Dr. Rayo of infectious disease.  Aspiration of the area by CT guidance occurred today, 10/15/18.  Dr. Rayo asked us to see the patient due to pancytopenia to see if we can improve the blood counts that help fight infection.  Diagnosed with rheumatoid arthritis around 2012.  Methotrexate and Remicade through Dr. Barrera from diagnosis until about June 2017.  Treatment was stopped due to diagnosis of prostate cancer, treated with radiation.  Dr. Ar Wellington is the urologist.  Around July 2017, rheumatoid arthritis treatment resumed with a change to Orencia monthly.    Patient states he has known about leukocytopenia for about 2 years.  He states he was not told he had anemia or thrombocytopenia in the past.    Labs this admission showed WBC 2.3-2.9, Hb 10.3-10.9, -160.  Elevated ESR.  Neutrophils 75%.  ANC >2000.      outside labs, [place]:  On 5/30/17, WBC 1.8, Hb 13.2, .  On 6/14/17, WBC 3.5, Hb 13.1, .  On 10/27/17, WBC 4, Hb 12, .    Patient states he rarely gets infections.    Denies fevers, chills, weight  loss, night sweats.    Past Medical History:   Diagnosis Date   • Arthritis     Reumatoid   • GERD (gastroesophageal reflux disease)    • Leiomyosarcoma (CMS/HCC)    • Prostate cancer (CMS/HCC)      No past surgical history on file.    MEDICATIONS    Current Facility-Administered Medications:   •  acetaminophen (TYLENOL) tablet 650 mg, 650 mg, Oral, Q4H PRN, Ruufs Steward MD, 650 mg at 10/15/18 0340  •  bisacodyl (DULCOLAX) EC tablet 5 mg, 5 mg, Oral, Daily PRN, Rufus Steward MD  •  bisacodyl (DULCOLAX) suppository 10 mg, 10 mg, Rectal, Daily PRN, Rufus Steward MD  •  HYDROcodone-acetaminophen (NORCO) 5-325 MG per tablet 1 tablet, 1 tablet, Oral, Q4H PRN, Rufus Steward MD  •  ketorolac (TORADOL) injection 15 mg, 15 mg, Intravenous, Q6H PRN, Rufus Steward MD  •  morphine injection 2 mg, 2 mg, Intravenous, Q2H PRN **AND** naloxone (NARCAN) injection 0.4 mg, 0.4 mg, Intravenous, Q5 Min PRN, Rufus Steward MD  •  nitroglycerin (NITROSTAT) SL tablet 0.4 mg, 0.4 mg, Sublingual, Q5 Min PRN, Rufus Steward MD  •  ondansetron (ZOFRAN) tablet 4 mg, 4 mg, Oral, Q6H PRN **OR** ondansetron ODT (ZOFRAN-ODT) disintegrating tablet 4 mg, 4 mg, Oral, Q6H PRN **OR** ondansetron (ZOFRAN) injection 4 mg, 4 mg, Intravenous, Q6H PRN, Rufus Steward MD  •  sodium chloride 0.9 % flush 3-10 mL, 3-10 mL, Intravenous, PRN, Rufus Steward MD    ALLERGIES:     Allergies   Allergen Reactions   • Lorazepam Hives       SOCIAL HISTORY:       Social History     Social History   • Marital status:      Spouse name: N/A   • Number of children: N/A   • Years of education: N/A     Occupational History   • Not on file.     Social History Main Topics   • Smoking status: Former Smoker   • Smokeless tobacco: Never Used      Comment: quit 50 years ago   • Alcohol use Yes      Comment: social   • Drug use: Unknown   • Sexual activity: Not on file     Other Topics Concern   • Not on file      Social History Narrative   • No narrative on file         FAMILY HISTORY:  Family History   Problem Relation Age of Onset   • Breast cancer Mother    • Prostate cancer Father    • Breast cancer Sister    • Leukemia Brother        REVIEW OF SYSTEMS:  Review of Systems      Objective     Vitals:    10/14/18 1352 10/14/18 2033 10/15/18 0500 10/15/18 1047   BP: 154/97 130/70 121/72 140/87   BP Location: Left arm Right arm Right arm Right arm   Patient Position: Sitting Lying Lying Lying   Pulse: 80 89 79 86   Resp: 18 18 20 16   Temp: 98 °F (36.7 °C) 98.3 °F (36.8 °C) 97.9 °F (36.6 °C)    TempSrc: Oral Oral Oral    SpO2: 92% 92% 98% 99%   Weight:       Height:         No flowsheet data found.   PHYSICAL EXAM:    CONSTITUTIONAL:  Vital signs reviewed.  No distress, looks comfortable.  EYES:  Conjunctiva and lids unremarkable.  PERRLA  EARS,NOSE,MOUTH,THROAT:  Ears and nose appear unremarkable.  Lips, teeth, gums appear unremarkable.  RESPIRATORY:  Normal respiratory effort.  Lungs clear to auscultation bilaterally.  CARDIOVASCULAR:  Normal S1, S2.  No murmurs rubs or gallops.  No significant lower extremity edema.  GASTROINTESTINAL: Abdomen appears unremarkable.  Nontender.  No hepatomegaly.  No splenomegaly.  LYMPHATIC:  No cervical, supraclavicular, axillary lymphadenopathy.  NEURO: cranial nerves 2-12 grossly intact.  No focal deficits.  Appears to have equal strength all 4 extremities.  MUSCULOSKELETAL:  Unremarkable digits/nails.  No cyanosis or clubbing.  No apparent joint deformities.  SKIN:  Warm.  No rashes.  PSYCHIATRIC:  Normal judgment and insight.  Normal mood and affect.     RECENT LABS:        WBC   Date Value Ref Range Status   10/14/2018 2.70 (L) 4.50 - 10.70 10*3/mm3 Final   10/13/2018 2.31 (L) 4.50 - 10.70 10*3/mm3 Final   10/12/2018 2.91 (L) 4.50 - 10.70 10*3/mm3 Final     Hemoglobin   Date Value Ref Range Status   10/14/2018 10.3 (L) 13.7 - 17.6 g/dL Final   10/13/2018 10.6 (L) 13.7 - 17.6 g/dL  Final   10/12/2018 10.9 (L) 13.7 - 17.6 g/dL Final     Platelets   Date Value Ref Range Status   10/14/2018 117 (L) 140 - 500 10*3/mm3 Final   10/13/2018 137 (L) 140 - 500 10*3/mm3 Final   10/12/2018 160 140 - 500 10*3/mm3 Final       Assessment/Plan   *Leukocytopenia, since at least May 2017.  WBC 1.8-4  Not neutropenic.    *Anemia.  Hb 10.3-13.2 since at least May 2017.    *Thrombocytopenia.  -160 since at least May 2017.    *Rheumatoid arthritis.  On  Orencia since around July 2017.  Previously was on Remicade and methotrexate from diagnosis of rheumatoid arthritis, 2012, through around June 2017 when these medicines were stopped due to the finding prostate cancer.    *Prostate cancer.  Dr. Ar Wellington.  Treated with radiation, around June 2017.    *Leiomyosarcoma of the back.  Resected by Dr. Trung Coronado with adjuvant radiation, 2017.    *History of B12 deficiency.  PCP started B12, oral, daily, sometime around April 2018, her patient.    *Family history of malignancies.  Mother and sister with breast cancer and father with prostate cancer in patient with a history of prostate cancer.  I recommended a genetics referral.  Patient states he has already done this.    Plan  · Since he is not neutropenic, there is truly no indication or Neupogen.  (No clear indication for Neupogen even if he was neutropenic, but sometimes this is done if a severe infection is found).  · Cytopenias have been long-standing.  I suspect they are at least in part due to rheumatoid arthritis.  (Orencia is not associated with cytopenias).  Perhaps the cytopenias are related to the radiation for prostate cancer and history of adjuvant radiation for resected leiomyosarcoma (perhaps marrow damage)  · I offered a bone marrow biopsy.  He absolutely does not want another biopsy.  I doubt a bone marrow biopsy would   · Check B12, folate, manual differential, iron labs, DIC labs.    Wife assisted with  history.                      Electronically signed by Jose Joyce II, MD at 10/15/2018  1:55 PM     Manuel Guillen MD at 10/13/2018 10:35 AM      Consult Orders:    1. Inpatient Neurosurgery Consult [[id number]] ordered by Rufus Steward MD at 10/12/18 1842                  Patient Care Team:  Humphrey Proctor MD (Tony) as PCP - General (Internal Medicine)    Chief complaint back pain    Subjective .     History of present illness:  This patient was having some numbness and tingling in his leg were several months.  As a result of that he was seen by neurology who ordered an lumbar puncture which was done toward the end of August to evaluate for diseases other than peripheral neuropathy which is what was considered the diagnosis.  After lumbar puncture the patient developed a post-spinal headache and ultimately had a blood patch which helped the headache but after that he had a fair amount of back pain.  The back pain has continued since the end of August.  He doesn't have very much pain in his legs at all and he has no difficulty with bowel or bladder control or other associated symptoms.  The pain is worse with activity and little bit better with rest but nothing gets rid of it entirely.  Since the pain began it has continued and is neither gotten worse nor better.  He has had no other treatment so far.  He does have a history of rheumatoid arthritis and history of 2 different types of cancer as well as gastroesophageal reflux but for the most part is relatively healthy.  He does take medications for his rheumatoid.    Review of Systems  Pertinent items are noted in HPI    History  Past Medical History:   Diagnosis Date   • Arthritis     Reumatoid   • GERD (gastroesophageal reflux disease)    • Leiomyosarcoma (CMS/HCC)    • Prostate cancer (CMS/HCC)    , No past surgical history on file.,   Family History   Problem Relation Age of Onset   • Breast cancer Mother    • Prostate cancer Father    •  Breast cancer Sister    • Leukemia Brother    ,   Social History   Substance Use Topics   • Smoking status: Former Smoker   • Smokeless tobacco: Never Used      Comment: quit 50 years ago   • Alcohol use Yes      Comment: social   ,   Prescriptions Prior to Admission   Medication Sig Dispense Refill Last Dose   • predniSONE (DELTASONE) 5 MG tablet Take 5 mg by mouth As Needed.   Patient Taking Differently at Unknown time   • Red Yeast Rice 600 MG capsule Take  by mouth Daily.   Patient Taking Differently at Unknown time   • Abatacept (ORENCIA IV) Infuse  into a venous catheter.   Taking   • aspirin 81 MG EC tablet Take 81 mg by mouth Daily.   Taking   • Calcium-Vitamin D-Vitamin K 500-100-40 MG-UNT-MCG chewable tablet Chew.   Taking   • coenzyme Q10 50 MG capsule capsule Take  by mouth Daily.   Taking   • fluticasone (FLONASE) 50 MCG/ACT nasal spray 2 sprays by Each Nare route Daily.  3 Taking   • folic acid (FOLVITE) 1 MG tablet TAKE 1-4 TABLETS EVERY DAY  2 Taking   • loratadine-pseudoephedrine (CLARITIN-D 24-hour)  MG per 24 hr tablet Take 1 tablet by mouth.   Taking   • meloxicam (MOBIC) 15 MG tablet TAKE 1 TABLET (15 MG) BY MOUTH DAILY AS NEEDED FOR PAIN  0 Taking   • Omega-3 Fatty Acids (FISH OIL) 1000 MG capsule capsule Take  by mouth Daily With Breakfast.   Taking   • omeprazole (priLOSEC) 20 MG capsule Take 20 mg by mouth Daily.  1 Taking   • sildenafil (REVATIO) 20 MG tablet take 1-5 pills 30-60 mins before sexual activity. start with a lower dose & increase as needed  11 Taking   • vitamin B-12 (CYANOCOBALAMIN) 2500 MCG sublingual tablet tablet Place  under the tongue Daily.   Taking   • vitamin C (ASCORBIC ACID) 500 MG tablet Take 500 mg by mouth Daily.   Taking    and Allergies:  Lorazepam    Objective     Vital Signs   Temp:  [97.8 °F (36.6 °C)-98.4 °F (36.9 °C)] 98.4 °F (36.9 °C)  Heart Rate:  [84-92] 88  Resp:  [18] 18  BP: (137-160)/(83-89) 137/89    Physical Exam:   Physical Exam    Constitutional: He is oriented to person, place, and time. He appears well-developed and well-nourished.   HENT:   Head: Normocephalic and atraumatic.   Eyes: Pupils are equal, round, and reactive to light. EOM are normal.   Neck: Normal range of motion.   Cardiovascular: Normal rate.    Pulmonary/Chest: Effort normal.   Abdominal: Soft.   Neurological: He is oriented to person, place, and time. He has a normal Finger-Nose-Finger Test and a normal Heel to Shin Test. Gait normal.   Reflex Scores:       Tricep reflexes are 2+ on the right side and 2+ on the left side.       Bicep reflexes are 2+ on the right side and 2+ on the left side.       Brachioradialis reflexes are 2+ on the right side and 2+ on the left side.       Patellar reflexes are 2+ on the right side and 2+ on the left side.       Achilles reflexes are 2+ on the right side and 2+ on the left side.  Psychiatric: His speech is normal.        Neurologic Exam     Mental Status   Oriented to person, place, and time.   Registration of memory: Good recent and remote memory.   Attention: normal. Concentration: normal.   Speech: speech is normal   Level of consciousness: alert  Knowledge: consistent with education.     Cranial Nerves     CN II   Visual fields full to confrontation.   Visual acuity: normal    CN III, IV, VI   Pupils are equal, round, and reactive to light.  Extraocular motions are normal.     CN V   Facial sensation intact.   Right corneal reflex: normal  Left corneal reflex: normal    CN VII   Facial expression full, symmetric.   Right facial weakness: none  Left facial weakness: none    CN VIII   Hearing: intact    CN IX, X   Palate: symmetric    CN XI   Right sternocleidomastoid strength: normal  Left sternocleidomastoid strength: normal    CN XII   Tongue: not atrophic  Tongue deviation: none    Motor Exam   Muscle bulk: normal  Right arm tone: normal  Left arm tone: normal  Right leg tone: normal  Left leg tone: normal    Strength    Strength 5/5 except as noted.     Sensory Exam   Light touch normal.     Gait, Coordination, and Reflexes     Gait  Gait: normal    Coordination   Finger to nose coordination: normal  Heel to shin coordination: normal    Reflexes   Right brachioradialis: 2+  Left brachioradialis: 2+  Right biceps: 2+  Left biceps: 2+  Right triceps: 2+  Left triceps: 2+  Right patellar: 2+  Left patellar: 2+  Right achilles: 2+  Left achilles: 2+  Right : 2+  Left : 2+      Results Review:   I reviewed the patient's new clinical results.  I reviewed his MRI of the lumbar spine and compared it to one done at the end of July.  This does show definite osteomyelitis and discitis at L3 4.  All of this is contained within the disc space or the bone and there is no evidence of epidural abscess and no compression of the neural elements.  There is no fracture.  There was no evidence of this abnormality at the end of July.      Assessment/Plan       Idiopathic peripheral neuropathy    Rheumatoid arthritis (CMS/HCC)    GERD (gastroesophageal reflux disease)    Osteomyelitis (CMS/HCC)    Infective myositis of multiple sites      I told the patient and his wife about the MRI.  Realistically I think he probably does have osteomyelitis and discitis at L3 4.  A lot of times these types of conditions are self-limited and since he has not gotten clinically worse since the pain began at the end of August I am not sure he needs treatment with antibiotics at this point.  I think we can probably follow him clinically and with laboratory evaluation for the present time.  I would ultimately defer to infectious disease as to whether to start antibiotics but realistically he is been over 6 weeks at this point not on antibiotics and has not gotten worse and so are not sure we really need them.  We will continue to follow him.    I discussed the patients findings and my recommendations with patient and family    Manuel Guillen MD  10/13/18  10:35  AM          Electronically signed by Manuel Guillen MD at 10/13/2018 10:40 AM     Evens Rayo MD at 10/13/2018  9:59 AM      Consult Orders:    1. Inpatient Infectious Diseases Consult [[id number]] ordered by Rufus Steward MD at 10/12/18 2680              Referring Provider: Rufus Steward MD  Reason for Consultation: Discitis      Subjective   History of present illness:    Very nice 72-year-old with rheumatoid arthritis admitted on 10/12/18 with abnormal back MRI.  Patient reports that he underwent lumbar puncture for evaluation of neuropathy about 2 months ago and started developing headaches and required a blood patch for CSF leak thereafter.  Subsequent to this, he started developing progressive spasm-like back pain that initially started in the lower back and then radiated down to the right side.  It progressed despite chiropractic care.  No associated fevers or chills or night sweats.  Patient does have a history of rheumatoid arthritis on immunosuppressives.  No steroids.  No drainage.  No bowel or bladder dysfunction or weakness.  He just feels like he has progressive pain.  Eventually he had an MRI done yesterday that showed discitis L3-L4, so was directly admitted.  Discussed this case with Dr. Steward recommended holding antibiotics until we could obtain aspiration    Past medical history: Rheumatoid arthritis on Abatacept, GERD, prostate cancer, Leiomyosarcoma  No family history of infectious diseases  Allergies: Lorazepam  Social history:      Review of Systems  Pertinent items are noted in HPI, all other systems reviewed and negative    Objective     Physical Exam:   Vital Signs   Temp:  [97.8 °F (36.6 °C)-98.3 °F (36.8 °C)] 98 °F (36.7 °C)  Heart Rate:  [84-92] 92  Resp:  [18] 18  BP: (138-160)/(83-87) 160/83    GENERAL: Awake and alert, in no acute distress.   HEENT: Oropharynx is clear. Hearing is grossly normal.   EYES: PERRL. No conjunctival injection. No lid lag.    LYMPHATICS: No lymphadenopathy of the neck or axillary regions.   HEART: Regular rate and rhythm. No peripheral edema.   LUNGS: Clear to auscultation anteriorly with normal respiratory effort.   GI: Soft, nontender, nondistended. No appreciable organomegaly.   SKIN: Warm and dry without cutaneous eruptions   PSYCHIATRIC: Appropriate mood, affect, insight, and judgment.     Results Review:   I reviewed the patient's new clinical results.  Cr 0.78    WBC 2.3    H/H 10.6/34  PCt 0.1  CRP 4.2  ESR 65    Microbiology:  10/12 Bcx 2/2 NGTD    Radiology:  MRI lumbar spine from 10/12/18 shows discitis and osteomyelitis L3/L4 with edema and enhancement and adjacent stranding and paraspinal soft tissues at L3 and L4 and myositis of the psoas muscles bilaterally    Assessment/Plan   1.  L3/L4 discitis  2.  Rheumatoid arthritis on immunosuppression, complicating above  3.  Pancytopenia, complicating above.  Looks stable as compared to CBC in May 2017, ? MDS    Given lack of systemic symptoms and chronicity of symptoms, guidelines recommend bone biopsy to guide therapy.  I have ordered this as well as cultures and pathology to make sure that this isn't a malignant process given his recent history of prostate cancer.  Hold antibiotics.    Having some trouble finding a radiologist able to perform the biopsy, but discussed with Dr. Steward investigating potential transfer to a reputable medical institution that has interventional radiology.     Thank you for this consult.  We will continue to follow along and tailor antibiotics as the patient's clinical course evolves.    Evens Rayo MD  10/13/18  9:59 AM          Electronically signed by Evens Rayo MD at 10/13/2018 11:20 AM          Discharge Summary      Akshat Lagunas MD at 10/16/2018  4:42 PM                              [facility]    Date of Discharge:  10/16/2018    PCP: Humphrey Proctor (Tony)  MD Mauricio    Discharge Diagnosis:   Active Hospital Problems    Diagnosis Date Noted   • **Infective myositis of multiple sites [M60.09] 10/12/2018   • Rheumatoid arthritis (CMS/HCC) [M06.9] 10/12/2018   • GERD (gastroesophageal reflux disease) [K21.9] 10/12/2018   • Osteomyelitis (CMS/[facility]) [M86.9] 10/12/2018   • Idiopathic peripheral neuropathy [G60.9] 05/15/2018      Resolved Hospital Problems    Diagnosis Date Noted Date Resolved   No resolved problems to display.        Consults     Date and Time Order Name Status Description    10/15/2018 1107 Hematology & Oncology Inpatient Consult Completed     10/12/2018 1842 Inpatient Neurosurgery Consult Completed     10/12/2018 1840 Inpatient Infectious Diseases Consult Completed         Hospital Course  Please see history and physical for details. Patient is a 72 y.o. male with a history of RA on immunosuppressives directly admitted an MRI showed discitis L3-L4. Patient had LP for neuropathy a couple of months ago and had a blood patch afterwards for CSF leak. Patient had been feeling progressive pain. ID and neurosurgery saw the patient. He underwent biopsy on 10/15/18 and cultures already grew GPC. Patient would like to go home and he discussed with ID. PICC line has been placed and he will be getting his first dose of vancomycin today and home health will follow hin for IV antibiotics. Hematology saw the patient and he had pancytopenia felt related to RA. ID recommends     · Vancomycin, as dosed by pharmacy for goal level of 15-20; stop date 11/27  · CBC with differential weekly, serum creatinine weekly, vancomycin trough weekly  · fax the results of all labs to [facility] Infectious Diseases clinic at [phone]  · hold RA meds while infected    I discussed the patient's findings and my recommendations with patient, family and nursing staff. Reviewed ofe. He is requiring pain medication and is able to ambulate.    Condition on Discharge: Improved.     Temp:  [97.3 °F  (36.3 °C)-98.2 °F (36.8 °C)] 98.1 °F (36.7 °C)  Heart Rate:  [75-86] 86  Resp:  [16-18] 18  BP: (134-149)/(76-89) 149/84  Body mass index is 26.19 kg/m².    Physical Exam   Constitutional: He is oriented to person, place, and time. No distress.   Cardiovascular: Normal rate and regular rhythm.    Pulmonary/Chest: Effort normal and breath sounds normal. No respiratory distress.   Abdominal: Soft. There is no tenderness.   Neurological: He is alert and oriented to person, place, and time.   Skin: Skin is warm and dry.        Discharge Medications      New Medications      Instructions Start Date   HYDROcodone-acetaminophen 5-325 MG per tablet  Commonly known as:  NORCO   1 tablet, Oral, Every 4 Hours PRN      vancomycin   1,500 mg, Intravenous, Every 12 Hours, 1. Vancomycin, as dosed by pharmacy for goal level of 15-20         Continue These Medications      Instructions Start Date   aspirin 81 MG EC tablet   81 mg, Oral, Daily      Calcium-Vitamin D-Vitamin K 500-100-40 MG-UNT-MCG chewable tablet   Oral      fish oil 1000 MG capsule capsule   Oral, Daily With Breakfast      fluticasone 50 MCG/ACT nasal spray  Commonly known as:  FLONASE   2 sprays, Each Nare, Daily      loratadine-pseudoephedrine  MG per 24 hr tablet  Commonly known as:  CLARITIN-D 24-hour   1 tablet, Oral      meloxicam 15 MG tablet  Commonly known as:  MOBIC   TAKE 1 TABLET (15 MG) BY MOUTH DAILY AS NEEDED FOR PAIN      omeprazole 20 MG capsule  Commonly known as:  priLOSEC   20 mg, Oral, Daily      sildenafil 20 MG tablet  Commonly known as:  REVATIO   take 1-5 pills 30-60 mins before sexual activity. start with a lower dose & increase as needed      vitamin B-12 2500 MCG sublingual tablet tablet  Commonly known as:  CYANOCOBALAMIN   Sublingual, Daily      vitamin C 500 MG tablet  Commonly known as:  ASCORBIC ACID   500 mg, Oral, Daily         Stop These Medications    coenzyme Q10 50 MG capsule capsule     folic acid 1 MG tablet  Commonly  known as:  FOLVITE     ORENCIA IV     predniSONE 5 MG tablet  Commonly known as:  DELTASONE     Red Yeast Rice 600 MG capsule           Diet Instructions     Diet: Regular       Discharge Diet:  Regular         Activity Instructions     Activity as Tolerated            Additional Instructions for the Follow-ups that You Need to Schedule     Call MD for problems / concerns.    As directed      CBC & Differential  (Once a week)   Oct 16, 2019      Manual Differential:  No         Creatinine, Serum  (Once a week)   Oct 16, 2019      fax results to [phone]    Order Comments:  fax results to [phone]          Vancomycin, Trough  (Once a week)   Oct 16, 2019      fax results to [phone]    Order Comments:  fax results to [phone]          Additional information on Labs and Follow-ups:      Vanc trough on 10/18/2018 @ 1130. Do not infuse antibiotic prior to drawing the trough.                 Follow-up Information     Humphrey Proctor MD (Tony) Follow up.    Specialty:  Internal Medicine  Contact information:  [address] [address] [phone]             Evens Rayo MD Follow up on 11/27/2018.    Specialty:  Infectious Diseases  Contact information:  [address] [facility] [address]  [place] [id number] [phone]                 Test Results Pending at Discharge   Order Current Status    Tissue Pathology Exam Collected (10/15/18 1215)    Blood Culture - Blood, Preliminary result    Blood Culture - Blood, Preliminary result    Body Fluid Culture - Body Fluid, Retroperitoneum Preliminary result    Tissue / Bone Culture - Bone, Back Preliminary result         Akshat Lagunas MD  10/16/18  4:51 PM    Discharge time spent greater than 30 minutes.      Electronically signed by Akshat Lagunas MD at 10/16/2018  4:51 PM

## 2018-10-19 NOTE — OUTREACH NOTE
Medical Week 1 Survey      Responses   Facility patient discharged from?  [place]   Does the patient have one of the following disease processes/diagnoses(primary or secondary)?  Other   Is there a successful TCM telephone encounter documented?  No   Week 1 attempt successful?  Yes   Call start time  1212   Call end time  1216   Is patient permission given to speak with other caregiver?  Yes   List who call center can speak with  cam Arndt   Medication alerts for this patient  vancomycin was changed to something else tid, started on Rocephin   Meds reviewed with patient/caregiver?  Yes   Is the patient having any side effects they believe may be caused by any medication additions or changes?  No   Does the patient have all medications ordered at discharge?  Yes   Is the patient taking all medications as directed (includes completed medication regime)?  Yes   Comments regarding appointments  has appointment in place and will be keeping them   Does the patient have a primary care provider?   Yes   Does the patient have an appointment with their PCP within 7 days of discharge?  Yes   Has the patient kept scheduled appointments due by today?  Yes   What is the Home health agency?   [facility]   Has home health visited the patient within 72 hours of discharge?  Yes   What DME was ordered?  PICC line only   Did the patient receive a copy of their discharge instructions?  Yes   Nursing interventions  Educated on MyChart   What is the patient's perception of their health status since discharge?  Improving   Is the patient/caregiver able to teach back signs and symptoms related to disease process for when to call PCP?  Yes   Is the patient/caregiver able to teach back signs and symptoms related to disease process for when to call 911?  Yes   Is the patient/caregiver able to teach back the hierarchy of who to call/visit for symptoms/problems? PCP, Specialist, Home health nurse, Urgent Care, ED, 911  Yes   Additional teach back  comments  has My Chart in place   Week 1 call completed?  Yes   Wrap up additional comments  wife stated he is doing as will as to be expected          Leigh Corado RN

## 2018-10-29 NOTE — OUTREACH NOTE
Medical Week 2 Survey      Responses   Facility patient discharged from?  [place]   Does the patient have one of the following disease processes/diagnoses(primary or secondary)?  Other   Week 2 attempt successful?  Yes   Call start time  1422   Discharge diagnosis  Infective myositis of multiple sites,     PICC line has been placed    Call end time  1432   Is patient permission given to speak with other caregiver?  Yes   List who call center can speak with  cam Arndt reviewed with patient/caregiver?  Yes   Is the patient having any side effects they believe may be caused by any medication additions or changes?  No   Does the patient have all medications ordered at discharge?  Yes   Is the patient taking all medications as directed (includes completed medication regime)?  Yes   Does the patient have a primary care provider?   Yes   Comments regarding PCP  PCP Dr Proctor   Has the patient kept scheduled appointments due by today?  Yes   Comments  Appt with Dr Rayo 11/27/18.    What is the Home health agency?   [facility]   Has home health visited the patient within 72 hours of discharge?  Yes   Psychosocial issues?  No   Comments  Patient continues to received IV antibiotics thru PICC line. Estimated course for about 8 weeks per patient report.    Did the patient receive a copy of their discharge instructions?  Yes   Nursing interventions  Reviewed instructions with patient   What is the patient's perception of their health status since discharge?  Improving   Is the patient/caregiver able to teach back signs and symptoms related to disease process for when to call PCP?  Yes   Is the patient/caregiver able to teach back signs and symptoms related to disease process for when to call 911?  Yes   Is the patient/caregiver able to teach back the hierarchy of who to call/visit for symptoms/problems? PCP, Specialist, Home health nurse, Urgent Care, ED, 911  Yes   Week 2 Call Completed?  Yes          Gabby Rod RN

## 2018-11-08 NOTE — OUTREACH NOTE
Medical Week 3 Survey      Responses   Facility patient discharged from?  [place]   Does the patient have one of the following disease processes/diagnoses(primary or secondary)?  Other   Call end time  1418   Medication alerts for this patient  continues on Rocephin   Meds reviewed with patient/caregiver?  Yes   Is the patient having any side effects they believe may be caused by any medication additions or changes?  No   Does the patient have all medications ordered at discharge?  Yes   Is the patient taking all medications as directed (includes completed medication regime)?  Yes   Comments regarding PCP  PCP Dr Proctor.  Pt states PCP is aware    Does the patient have an appointment with their PCP within 7 days of discharge?  Yes   Has the patient kept scheduled appointments due by today?  Yes   Comments  Appt with Dr. Mandujano 11/27.   What is the Home health agency?   [facility] once a week on mondays   Has home health visited the patient within 72 hours of discharge?  Yes   Did the patient receive a copy of their discharge instructions?  Yes   What is the patient's perception of their health status since discharge?  Improving   Is the patient/caregiver able to teach back signs and symptoms related to disease process for when to call PCP?  Yes   Is the patient/caregiver able to teach back signs and symptoms related to disease process for when to call 911?  Yes   Week 3 Call Completed?  Yes          Pricilla Finch RN

## 2018-11-16 NOTE — OUTREACH NOTE
Medical Week 4 Survey      Responses   Facility patient discharged from?  [place]   Does the patient have one of the following disease processes/diagnoses(primary or secondary)?  Other   Week 4 attempt successful?  Yes   Call start time  1151   Call end time  1153   Discharge diagnosis  Infective myositis of multiple sites,     PICC line has been placed    Is patient permission given to speak with other caregiver?  Yes   Medication alerts for this patient  continues on Rocephin   Meds reviewed with patient/caregiver?  Yes   Is the patient having any side effects they believe may be caused by any medication additions or changes?  No   Is the patient taking all medications as directed (includes completed medication regime)?  Yes   Has the patient kept scheduled appointments due by today?  Yes   Comments  Appt with Dr. Mandujano 11/27.   Is the patient still receiving Home Health Services?  Yes   Psychosocial issues?  No   Comments  Patient continues to received IV antibiotics thru PICC line. Estimated course for about 8 weeks per patient report.    What is the patient's perception of their health status since discharge?  Improving   Is the patient/caregiver able to teach back signs and symptoms related to disease process for when to call PCP?  Yes   Is the patient/caregiver able to teach back signs and symptoms related to disease process for when to call 911?  Yes   Is the patient/caregiver able to teach back the hierarchy of who to call/visit for symptoms/problems? PCP, Specialist, Home health nurse, Urgent Care, ED, 911  Yes   Week 4 Call Completed?  Yes   Would the patient like one additional call?  No   Graduated  Yes   Did the patient feel the follow up calls were helpful during their recovery period?  Yes   Was the number of calls appropriate?  Yes          Crystal Guardado RN

## 2018-11-26 NOTE — TELEPHONE ENCOUNTER
The lab phone a critical WBC of 1.55.  I see where Dr. Rayo has already reviewed the lab and is aware that patients WBC runs low at this time.

## 2018-11-27 NOTE — PROGRESS NOTES
"cc: Patient here for follow-up discitis    Briefly was hospitalized in October 2018 for discitis.  He underwent CT-guided biopsy with cultures growing Staphylococcus epidermidis.  He was discharged on vancomycin and changed to cefazolin once confirmed his bacteria were oxacillin sensitive.    Patient reports these done exceedingly well.  He's basically been asymptomatic in terms of his back for about the past 2 weeks.  He is \"100%\" better than when hospitalized. No pain. Good ROM. No f/c/ns. In terms of his long-term use antibiotics he denies missed doses or side effects.  In terms of his rheumatoid arthritis this is remained fairly quiescent.  He's not been on immunosuppression and is due to see his rheumatologist in January 2019.    Past medical history: Rheumatoid arthritis on Abatacept, GERD, prostate cancer, Leiomyosarcoma, lumbar discitis in October 2018    Review of Systems: All other reviewed and negative except as per HPI    Blood pressure 143/81, pulse 78, temperature 98.2 °F (36.8 °C), height 182.9 cm (72.01\"), weight 88.4 kg (194 lb 12.8 oz).  GENERAL: Awake and alert, in no acute distress.   MSK: No spinal cord tenderness palpation  SKIN: Warm and dry without cutaneous eruptions   PSYCHIATRIC: Appropriate mood, affect, insight, and judgment.       DIAGNOSTICS:  Creatinine 0.8.  White count 1.55.  His white count is ranged between 1.5 and 2.5 and has chronic pancytopenia for which hematology evaluated him during this admission.      Assessment and Plan  1.  L3/L4 discitis secondary to Staphylococcus epidermidis  --Doing quite well.  Asymptomatic.  Status post 6 weeks of IV cefazolin  --Discontinue PICC line and IV antibiotics  --Discussed with patient and his wife signs and symptoms of recrudescent infection and when to seek medical attention    2.  Rheumatoid arthritis on immunosuppression, complicating above  --Recommended he hold his immunosuppression unless absolutely necessary until 8 weeks after " discontinuing the antibiotics (approx Feb 1) in case of the unlikely even he has recrudescent infection    3. Long term (current) use of antibiotics  --Antibiotic monitoring labs reviewed  --Chronic pancytopenia  --Discontinuing antibiotics    I have not scheduled him follow-up appointment, but he knows he can return to see me anytime he needs.

## 2018-12-11 NOTE — PROGRESS NOTES
CC: Idiopathic peripheral neuropathy associated with MGUS    HPI:  Baldemar Wellington is a  73 y.o.  right-handed white male who I am seeing as a follow-up who has peripheral neuropathy with associated MGUS.  His wife Yris is a former  for neuroscience who could not make it today.  The remainder of his extensive evaluation was negative.  An autoimmune source was thought possible and he was sent for a second opinion to Dr. Shankar.  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  Additional labs included a 24-hour urine for protein electrophoresis and serum free light chains.  The studies are pending.  Hematology consult was recommended and it appears he was being sent to Dr. Leal for an evaluation.    The patient has rheumatoid arthritis and has been on biologic's but was taken off because he had developed osteomyelitis after his lumbar puncture.  There is some thought that perhaps his CIDP and his rheumatoid arthritis could be treated with the same medication.  This question is being posed to his rheumatologist Dr. Barrera.    The patient notes no significant change in his symptoms.  He has primarily numbness in his feet.  He denies numbness in the hands.    Past Medical History:   Diagnosis Date   • Arthritis     Reumatoid   • GERD (gastroesophageal reflux disease)    • Leiomyosarcoma (CMS/HCC)    • Prostate cancer (CMS/HCC)          History reviewed. No pertinent surgical history.        Current Outpatient Medications:   •  aspirin 81 MG EC tablet, Take 81 mg by mouth Daily., Disp: , Rfl:   •  fluticasone (FLONASE) 50 MCG/ACT nasal spray, 2 sprays by Each Nare route Daily., Disp: , Rfl: 3  •  loratadine-pseudoephedrine (CLARITIN-D 24-hour)  MG per 24 hr tablet, Take 1 tablet by mouth., Disp: , Rfl:   •  Omega-3 Fatty Acids (FISH OIL) 1000 MG capsule capsule, Take  by mouth Daily With Breakfast., Disp: , Rfl:   •  omeprazole (priLOSEC) 20 MG capsule, Take 20 mg by mouth Daily., Disp: , Rfl:  1  •  sildenafil (REVATIO) 20 MG tablet, take 1-5 pills 30-60 mins before sexual activity. start with a lower dose & increase as needed, Disp: , Rfl: 11  •  vitamin B-12 (CYANOCOBALAMIN) 2500 MCG sublingual tablet tablet, Place  under the tongue Daily., Disp: , Rfl:   •  vitamin C (ASCORBIC ACID) 500 MG tablet, Take 500 mg by mouth Daily., Disp: , Rfl:   •  Calcium-Vitamin D-Vitamin K 500-100-40 MG-UNT-MCG chewable tablet, Chew., Disp: , Rfl:       Family History   Problem Relation Age of Onset   • Breast cancer Mother    • Prostate cancer Father    • Breast cancer Sister    • Leukemia Brother          Social History     Socioeconomic History   • Marital status:      Spouse name: Not on file   • Number of children: Not on file   • Years of education: Not on file   • Highest education level: Not on file   Social Needs   • Financial resource strain: Not on file   • Food insecurity - worry: Not on file   • Food insecurity - inability: Not on file   • Transportation needs - medical: Not on file   • Transportation needs - non-medical: Not on file   Occupational History   • Not on file   Tobacco Use   • Smoking status: Former Smoker   • Smokeless tobacco: Never Used   • Tobacco comment: quit 50 years ago   Substance and Sexual Activity   • Alcohol use: Yes     Comment: social   • Drug use: Not on file   • Sexual activity: Not on file   Other Topics Concern   • Not on file   Social History Narrative   • Not on file         Allergies   Allergen Reactions   • Lorazepam Hives         Pain Scale: 0/10        ROS:  Review of Systems   Constitutional: Negative for activity change, appetite change and fatigue.   HENT: Negative for ear pain, facial swelling and hearing loss.    Eyes: Negative for pain, redness and itching.   Respiratory: Negative for cough, choking and shortness of breath.    Cardiovascular: Negative for chest pain and leg swelling.   Gastrointestinal: Negative for abdominal pain, nausea and vomiting.  "  Endocrine: Negative for cold intolerance and heat intolerance.   Musculoskeletal: Negative for arthralgias, back pain and joint swelling.   Skin: Negative for color change, pallor, rash and wound.   Allergic/Immunologic: Negative for environmental allergies and food allergies.   Neurological: Positive for numbness. Negative for dizziness, tremors, seizures, syncope, facial asymmetry, speech difficulty, weakness, light-headedness and headaches.   Hematological: Negative for adenopathy. Does not bruise/bleed easily.   Psychiatric/Behavioral: Negative for agitation, behavioral problems, confusion, decreased concentration, dysphoric mood, hallucinations, self-injury, sleep disturbance and suicidal ideas. The patient is not nervous/anxious and is not hyperactive.            Physical Exam:  Vitals:    12/11/18 1007   BP: 144/92   Pulse: 79   SpO2: 98%   Weight: 87.1 kg (192 lb)   Height: 182.9 cm (72.01\")     Body mass index is 26.03 kg/m².    Physical Exam  Gen.: Well-developed white male no acute distress  HEENT: Normocephalic no evidence of trauma.  Neck: Supple.  Heart: Regular rate and rhythm      Neurological Exam:   Mental status: Awake alert oriented and conversant without evidence of an affective disorder thought disorder delusions or hallucinations.  HCF: No aphasia or apraxia or dysarthria.  Recent and remote memory intact.  Knowledge of recent events intact.  Cranial nerves: 2-12 intact  Motor: Normal tone and bulk.  Full power in all muscles tested in the arms.  In the legs there was some weakness of toe extension bilaterally.  Reflexes are diffusely reduced but present in the biceps, brachialis and knee jerks but some counter pressure is needed.  Cerebellar: Finger to nose and rapid movements are intact  Gait and Station: Casual walk is perhaps mildly broad-based.  Otherwise toe walk and heel walk are adequate tandem walk was reasonable.  No Romberg no drift        Results:      Lab Results   Component " Value Date    GLUCOSE 108 (H) 10/22/2018    BUN 8 10/22/2018    CREATININE 0.78 11/26/2018    EGFRIFNONA 98 11/26/2018    BCR 11.0 10/22/2018    CO2 26.0 10/22/2018    CALCIUM 9.0 10/22/2018    PROTENTOTREF 6.6 07/17/2018    ALBUMIN 3.40 (L) 10/15/2018    LABIL2 1.4 07/17/2018    AST 10 10/12/2018    ALT 11 10/12/2018       Lab Results   Component Value Date    WBC 1.55 (C) 11/26/2018    HGB 10.3 (L) 11/26/2018    HCT 32.4 (L) 11/26/2018    MCV 93.1 11/26/2018     (L) 11/26/2018         .  Lab Results   Component Value Date    RPR Non-Reactive 07/17/2018         Lab Results   Component Value Date    TSH 2.640 07/17/2018         Lab Results   Component Value Date    SMRYGNYE53 >2,000 (H) 10/15/2018         Lab Results   Component Value Date    FOLATE 12.50 10/15/2018         Lab Results   Component Value Date    HGBA1C 4.78 (L) 07/17/2018               Assessment:   1.  Peripheral neuropathy associated with an MGUS.  Dr. 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  Await the 2 labs ordered by Dr. Shankar  2.  Await opinion from Dr. Barrera once he is seen in January if there are any options to treat both his rheumatoid arthritis and CIDP.  Otherwise IV immunoglobulin is the likely choice for treating CIDP.  3.  Follow-up to see me in 3 months                          Dictated utilizing Dragon dictation.

## 2019-01-01 ENCOUNTER — HOSPITAL ENCOUNTER (OUTPATIENT)
Dept: INFUSION THERAPY | Facility: HOSPITAL | Age: 74
Setting detail: INFUSION SERIES
Discharge: HOME | End: 2019-09-19

## 2019-01-01 ENCOUNTER — APPOINTMENT (OUTPATIENT)
Dept: ONCOLOGY | Facility: HOSPITAL | Age: 74
End: 2019-01-01

## 2019-01-01 ENCOUNTER — LAB (OUTPATIENT)
Dept: LAB | Facility: HOSPITAL | Age: 74
End: 2019-01-01

## 2019-01-01 ENCOUNTER — HOSPITAL ENCOUNTER (OUTPATIENT)
Dept: INFUSION THERAPY | Facility: HOSPITAL | Age: 74
Setting detail: INFUSION SERIES
Discharge: HOME | End: 2019-09-04

## 2019-01-01 ENCOUNTER — APPOINTMENT (OUTPATIENT)
Dept: CARDIOLOGY | Facility: HOSPITAL | Age: 74
End: 2019-01-01

## 2019-01-01 ENCOUNTER — APPOINTMENT (OUTPATIENT)
Dept: CT IMAGING | Facility: HOSPITAL | Age: 74
End: 2019-01-01

## 2019-01-01 ENCOUNTER — ANESTHESIA EVENT (OUTPATIENT)
Dept: PERIOP | Facility: HOSPITAL | Age: 74
End: 2019-01-01

## 2019-01-01 ENCOUNTER — APPOINTMENT (OUTPATIENT)
Dept: GENERAL RADIOLOGY | Facility: HOSPITAL | Age: 74
End: 2019-01-01

## 2019-01-01 ENCOUNTER — TELEPHONE (OUTPATIENT)
Dept: ONCOLOGY | Facility: CLINIC | Age: 74
End: 2019-01-01

## 2019-01-01 ENCOUNTER — INFUSION (OUTPATIENT)
Dept: ONCOLOGY | Facility: HOSPITAL | Age: 74
End: 2019-01-01

## 2019-01-01 ENCOUNTER — READMISSION MANAGEMENT (OUTPATIENT)
Dept: CALL CENTER | Facility: HOSPITAL | Age: 74
End: 2019-01-01

## 2019-01-01 ENCOUNTER — DOCUMENTATION (OUTPATIENT)
Dept: ONCOLOGY | Facility: CLINIC | Age: 74
End: 2019-01-01

## 2019-01-01 ENCOUNTER — TELEPHONE (OUTPATIENT)
Dept: SURGERY | Facility: CLINIC | Age: 74
End: 2019-01-01

## 2019-01-01 ENCOUNTER — OFFICE VISIT (OUTPATIENT)
Dept: SURGERY | Facility: CLINIC | Age: 74
End: 2019-01-01

## 2019-01-01 ENCOUNTER — TELEPHONE (OUTPATIENT)
Dept: NEUROLOGY | Facility: CLINIC | Age: 74
End: 2019-01-01

## 2019-01-01 ENCOUNTER — APPOINTMENT (OUTPATIENT)
Dept: ONCOLOGY | Facility: CLINIC | Age: 74
End: 2019-01-01

## 2019-01-01 ENCOUNTER — OFFICE VISIT (OUTPATIENT)
Dept: ONCOLOGY | Facility: CLINIC | Age: 74
End: 2019-01-01

## 2019-01-01 ENCOUNTER — INFUSION (OUTPATIENT)
Dept: LAB | Facility: HOSPITAL | Age: 74
End: 2019-01-01

## 2019-01-01 ENCOUNTER — TRANSCRIBE ORDERS (OUTPATIENT)
Dept: LAB | Facility: HOSPITAL | Age: 74
End: 2019-01-01

## 2019-01-01 ENCOUNTER — HOSPITAL ENCOUNTER (OUTPATIENT)
Dept: INFUSION THERAPY | Facility: HOSPITAL | Age: 74
Discharge: HOME | End: 2019-08-16
Admitting: INTERNAL MEDICINE

## 2019-01-01 ENCOUNTER — PROCEDURE VISIT (OUTPATIENT)
Dept: ONCOLOGY | Facility: HOSPITAL | Age: 74
End: 2019-01-01

## 2019-01-01 ENCOUNTER — CLINICAL SUPPORT (OUTPATIENT)
Dept: ONCOLOGY | Facility: HOSPITAL | Age: 74
End: 2019-01-01

## 2019-01-01 ENCOUNTER — APPOINTMENT (OUTPATIENT)
Dept: LAB | Facility: HOSPITAL | Age: 74
End: 2019-01-01

## 2019-01-01 ENCOUNTER — HOSPITAL ENCOUNTER (EMERGENCY)
Facility: HOSPITAL | Age: 74
Discharge: HOME | End: 2019-08-02
Attending: EMERGENCY MEDICINE | Admitting: EMERGENCY MEDICINE

## 2019-01-01 ENCOUNTER — OFFICE VISIT (OUTPATIENT)
Dept: CARDIOLOGY | Facility: CLINIC | Age: 74
End: 2019-01-01

## 2019-01-01 ENCOUNTER — DOCUMENTATION (OUTPATIENT)
Dept: ONCOLOGY | Facility: HOSPITAL | Age: 74
End: 2019-01-01

## 2019-01-01 ENCOUNTER — OFFICE VISIT (OUTPATIENT)
Dept: NEUROLOGY | Facility: CLINIC | Age: 74
End: 2019-01-01

## 2019-01-01 ENCOUNTER — HOSPITAL ENCOUNTER (INPATIENT)
Facility: HOSPITAL | Age: 74
LOS: 2 days | Discharge: HOME | End: 2019-07-19
Attending: EMERGENCY MEDICINE | Admitting: HOSPITALIST

## 2019-01-01 ENCOUNTER — PREP FOR SURGERY (OUTPATIENT)
Dept: OTHER | Facility: HOSPITAL | Age: 74
End: 2019-01-01

## 2019-01-01 ENCOUNTER — HOSPITAL ENCOUNTER (OUTPATIENT)
Facility: HOSPITAL | Age: 74
Setting detail: HOSPITAL OUTPATIENT SURGERY
Discharge: HOME | End: 2019-07-31
Attending: SURGERY | Admitting: SURGERY

## 2019-01-01 ENCOUNTER — HOSPITAL ENCOUNTER (OUTPATIENT)
Dept: INFUSION THERAPY | Facility: HOSPITAL | Age: 74
Setting detail: INFUSION SERIES
Discharge: HOME | End: 2019-08-09

## 2019-01-01 ENCOUNTER — LAB (OUTPATIENT)
Dept: ONCOLOGY | Facility: HOSPITAL | Age: 74
End: 2019-01-01

## 2019-01-01 ENCOUNTER — HOSPITAL ENCOUNTER (OUTPATIENT)
Dept: INFUSION THERAPY | Facility: HOSPITAL | Age: 74
Discharge: HOME | End: 2019-08-21
Admitting: NURSE PRACTITIONER

## 2019-01-01 ENCOUNTER — HOSPITAL ENCOUNTER (INPATIENT)
Facility: HOSPITAL | Age: 74
LOS: 4 days | Discharge: HOME | End: 2019-08-28
Attending: EMERGENCY MEDICINE | Admitting: COLON & RECTAL SURGERY

## 2019-01-01 ENCOUNTER — TRANSCRIBE ORDERS (OUTPATIENT)
Dept: ADMINISTRATIVE | Facility: HOSPITAL | Age: 74
End: 2019-01-01

## 2019-01-01 ENCOUNTER — ANESTHESIA (OUTPATIENT)
Dept: PERIOP | Facility: HOSPITAL | Age: 74
End: 2019-01-01

## 2019-01-01 ENCOUNTER — HOSPITAL ENCOUNTER (INPATIENT)
Facility: HOSPITAL | Age: 74
LOS: 9 days | End: 2019-10-03
Attending: EMERGENCY MEDICINE | Admitting: HOSPITALIST

## 2019-01-01 ENCOUNTER — TELEPHONE (OUTPATIENT)
Dept: GENERAL RADIOLOGY | Facility: HOSPITAL | Age: 74
End: 2019-01-01

## 2019-01-01 ENCOUNTER — HOSPITAL ENCOUNTER (EMERGENCY)
Facility: HOSPITAL | Age: 74
Discharge: HOME | End: 2019-09-09
Attending: EMERGENCY MEDICINE | Admitting: EMERGENCY MEDICINE

## 2019-01-01 VITALS
WEIGHT: 179.2 LBS | OXYGEN SATURATION: 100 % | HEART RATE: 113 BPM | SYSTOLIC BLOOD PRESSURE: 145 MMHG | TEMPERATURE: 97.9 F | DIASTOLIC BLOOD PRESSURE: 78 MMHG | BODY MASS INDEX: 24.3 KG/M2

## 2019-01-01 VITALS
OXYGEN SATURATION: 100 % | RESPIRATION RATE: 20 BRPM | SYSTOLIC BLOOD PRESSURE: 131 MMHG | DIASTOLIC BLOOD PRESSURE: 68 MMHG | HEART RATE: 93 BPM | TEMPERATURE: 99.5 F

## 2019-01-01 VITALS
DIASTOLIC BLOOD PRESSURE: 77 MMHG | BODY MASS INDEX: 25.2 KG/M2 | TEMPERATURE: 97.6 F | SYSTOLIC BLOOD PRESSURE: 132 MMHG | OXYGEN SATURATION: 98 % | RESPIRATION RATE: 16 BRPM | HEIGHT: 72 IN | HEART RATE: 66 BPM | WEIGHT: 186.07 LBS

## 2019-01-01 VITALS
TEMPERATURE: 98.4 F | OXYGEN SATURATION: 98 % | DIASTOLIC BLOOD PRESSURE: 62 MMHG | SYSTOLIC BLOOD PRESSURE: 128 MMHG | HEART RATE: 89 BPM | WEIGHT: 188.2 LBS | RESPIRATION RATE: 16 BRPM | BODY MASS INDEX: 25.52 KG/M2

## 2019-01-01 VITALS
HEART RATE: 80 BPM | DIASTOLIC BLOOD PRESSURE: 67 MMHG | RESPIRATION RATE: 15 BRPM | WEIGHT: 187.5 LBS | OXYGEN SATURATION: 99 % | BODY MASS INDEX: 25.4 KG/M2 | SYSTOLIC BLOOD PRESSURE: 129 MMHG | TEMPERATURE: 97.9 F | HEIGHT: 72 IN

## 2019-01-01 VITALS
RESPIRATION RATE: 18 BRPM | BODY MASS INDEX: 24.35 KG/M2 | WEIGHT: 179.6 LBS | SYSTOLIC BLOOD PRESSURE: 155 MMHG | HEART RATE: 116 BPM | DIASTOLIC BLOOD PRESSURE: 77 MMHG

## 2019-01-01 VITALS
HEART RATE: 94 BPM | WEIGHT: 196.6 LBS | DIASTOLIC BLOOD PRESSURE: 80 MMHG | SYSTOLIC BLOOD PRESSURE: 140 MMHG | OXYGEN SATURATION: 99 % | HEIGHT: 72 IN | BODY MASS INDEX: 26.63 KG/M2

## 2019-01-01 VITALS
DIASTOLIC BLOOD PRESSURE: 71 MMHG | OXYGEN SATURATION: 97 % | HEART RATE: 72 BPM | WEIGHT: 184.9 LBS | TEMPERATURE: 98.2 F | RESPIRATION RATE: 16 BRPM | SYSTOLIC BLOOD PRESSURE: 128 MMHG | HEIGHT: 72 IN | BODY MASS INDEX: 25.04 KG/M2

## 2019-01-01 VITALS — HEART RATE: 76 BPM | SYSTOLIC BLOOD PRESSURE: 150 MMHG | TEMPERATURE: 98.7 F | DIASTOLIC BLOOD PRESSURE: 71 MMHG

## 2019-01-01 VITALS
TEMPERATURE: 98 F | HEART RATE: 87 BPM | SYSTOLIC BLOOD PRESSURE: 116 MMHG | OXYGEN SATURATION: 99 % | DIASTOLIC BLOOD PRESSURE: 64 MMHG

## 2019-01-01 VITALS
SYSTOLIC BLOOD PRESSURE: 130 MMHG | HEART RATE: 84 BPM | TEMPERATURE: 98.4 F | RESPIRATION RATE: 18 BRPM | OXYGEN SATURATION: 99 % | DIASTOLIC BLOOD PRESSURE: 71 MMHG

## 2019-01-01 VITALS
HEIGHT: 72 IN | RESPIRATION RATE: 16 BRPM | WEIGHT: 188.1 LBS | SYSTOLIC BLOOD PRESSURE: 165 MMHG | BODY MASS INDEX: 25.48 KG/M2 | TEMPERATURE: 98.3 F | DIASTOLIC BLOOD PRESSURE: 69 MMHG | OXYGEN SATURATION: 100 % | HEART RATE: 85 BPM

## 2019-01-01 VITALS
OXYGEN SATURATION: 100 % | RESPIRATION RATE: 18 BRPM | DIASTOLIC BLOOD PRESSURE: 72 MMHG | HEART RATE: 72 BPM | TEMPERATURE: 97.7 F | SYSTOLIC BLOOD PRESSURE: 146 MMHG

## 2019-01-01 VITALS
TEMPERATURE: 98.1 F | SYSTOLIC BLOOD PRESSURE: 125 MMHG | HEART RATE: 87 BPM | OXYGEN SATURATION: 98 % | DIASTOLIC BLOOD PRESSURE: 74 MMHG

## 2019-01-01 VITALS
RESPIRATION RATE: 18 BRPM | TEMPERATURE: 97.7 F | DIASTOLIC BLOOD PRESSURE: 75 MMHG | HEART RATE: 74 BPM | SYSTOLIC BLOOD PRESSURE: 127 MMHG | OXYGEN SATURATION: 100 %

## 2019-01-01 VITALS
HEIGHT: 72 IN | TEMPERATURE: 98.4 F | HEART RATE: 112 BPM | WEIGHT: 177 LBS | BODY MASS INDEX: 23.98 KG/M2 | OXYGEN SATURATION: 98 % | SYSTOLIC BLOOD PRESSURE: 146 MMHG | DIASTOLIC BLOOD PRESSURE: 80 MMHG

## 2019-01-01 VITALS
SYSTOLIC BLOOD PRESSURE: 138 MMHG | BODY MASS INDEX: 25.6 KG/M2 | HEART RATE: 82 BPM | TEMPERATURE: 98.1 F | HEIGHT: 72 IN | RESPIRATION RATE: 16 BRPM | OXYGEN SATURATION: 98 % | WEIGHT: 189 LBS | DIASTOLIC BLOOD PRESSURE: 70 MMHG

## 2019-01-01 VITALS
WEIGHT: 177.8 LBS | BODY MASS INDEX: 24.11 KG/M2 | DIASTOLIC BLOOD PRESSURE: 72 MMHG | TEMPERATURE: 98.2 F | OXYGEN SATURATION: 99 % | HEART RATE: 107 BPM | SYSTOLIC BLOOD PRESSURE: 122 MMHG

## 2019-01-01 VITALS
DIASTOLIC BLOOD PRESSURE: 80 MMHG | HEART RATE: 121 BPM | TEMPERATURE: 96 F | OXYGEN SATURATION: 100 % | WEIGHT: 180 LBS | SYSTOLIC BLOOD PRESSURE: 117 MMHG | HEIGHT: 72 IN | RESPIRATION RATE: 18 BRPM | BODY MASS INDEX: 24.38 KG/M2

## 2019-01-01 VITALS
BODY MASS INDEX: 24.77 KG/M2 | WEIGHT: 182.9 LBS | HEIGHT: 72 IN | RESPIRATION RATE: 12 BRPM | HEART RATE: 112 BPM | OXYGEN SATURATION: 100 % | TEMPERATURE: 99.3 F | DIASTOLIC BLOOD PRESSURE: 70 MMHG | SYSTOLIC BLOOD PRESSURE: 150 MMHG

## 2019-01-01 VITALS
HEART RATE: 94 BPM | SYSTOLIC BLOOD PRESSURE: 137 MMHG | DIASTOLIC BLOOD PRESSURE: 83 MMHG | TEMPERATURE: 98 F | OXYGEN SATURATION: 98 % | RESPIRATION RATE: 16 BRPM

## 2019-01-01 VITALS
HEART RATE: 87 BPM | OXYGEN SATURATION: 99 % | WEIGHT: 173 LBS | HEIGHT: 72 IN | BODY MASS INDEX: 23.43 KG/M2 | SYSTOLIC BLOOD PRESSURE: 128 MMHG | RESPIRATION RATE: 18 BRPM | DIASTOLIC BLOOD PRESSURE: 70 MMHG

## 2019-01-01 VITALS
DIASTOLIC BLOOD PRESSURE: 72 MMHG | SYSTOLIC BLOOD PRESSURE: 126 MMHG | HEART RATE: 94 BPM | RESPIRATION RATE: 16 BRPM | TEMPERATURE: 98.8 F | OXYGEN SATURATION: 95 % | WEIGHT: 173.6 LBS | BODY MASS INDEX: 23.51 KG/M2 | HEIGHT: 72 IN

## 2019-01-01 VITALS
SYSTOLIC BLOOD PRESSURE: 113 MMHG | OXYGEN SATURATION: 98 % | HEIGHT: 72 IN | WEIGHT: 179.5 LBS | HEART RATE: 89 BPM | DIASTOLIC BLOOD PRESSURE: 60 MMHG | BODY MASS INDEX: 24.31 KG/M2 | RESPIRATION RATE: 16 BRPM | TEMPERATURE: 97.9 F

## 2019-01-01 VITALS
WEIGHT: 184.3 LBS | RESPIRATION RATE: 16 BRPM | SYSTOLIC BLOOD PRESSURE: 135 MMHG | BODY MASS INDEX: 24.96 KG/M2 | DIASTOLIC BLOOD PRESSURE: 72 MMHG | HEIGHT: 72 IN | TEMPERATURE: 98.8 F | OXYGEN SATURATION: 100 % | HEART RATE: 109 BPM

## 2019-01-01 VITALS
HEART RATE: 79 BPM | TEMPERATURE: 98.7 F | DIASTOLIC BLOOD PRESSURE: 81 MMHG | SYSTOLIC BLOOD PRESSURE: 145 MMHG | OXYGEN SATURATION: 99 %

## 2019-01-01 VITALS
OXYGEN SATURATION: 96 % | SYSTOLIC BLOOD PRESSURE: 128 MMHG | WEIGHT: 172.8 LBS | HEART RATE: 53 BPM | DIASTOLIC BLOOD PRESSURE: 60 MMHG | TEMPERATURE: 98.5 F | BODY MASS INDEX: 23.4 KG/M2 | HEIGHT: 72 IN

## 2019-01-01 VITALS
DIASTOLIC BLOOD PRESSURE: 73 MMHG | SYSTOLIC BLOOD PRESSURE: 116 MMHG | WEIGHT: 181.6 LBS | TEMPERATURE: 97.9 F | RESPIRATION RATE: 12 BRPM | BODY MASS INDEX: 24.62 KG/M2 | OXYGEN SATURATION: 99 % | HEART RATE: 102 BPM

## 2019-01-01 VITALS
HEART RATE: 80 BPM | DIASTOLIC BLOOD PRESSURE: 71 MMHG | RESPIRATION RATE: 18 BRPM | BODY MASS INDEX: 25.47 KG/M2 | OXYGEN SATURATION: 99 % | TEMPERATURE: 98.3 F | WEIGHT: 188 LBS | HEIGHT: 72 IN | SYSTOLIC BLOOD PRESSURE: 165 MMHG

## 2019-01-01 VITALS
HEART RATE: 82 BPM | DIASTOLIC BLOOD PRESSURE: 84 MMHG | HEIGHT: 72 IN | WEIGHT: 187.9 LBS | BODY MASS INDEX: 25.45 KG/M2 | RESPIRATION RATE: 16 BRPM | TEMPERATURE: 98.3 F | OXYGEN SATURATION: 99 % | SYSTOLIC BLOOD PRESSURE: 171 MMHG

## 2019-01-01 VITALS
SYSTOLIC BLOOD PRESSURE: 145 MMHG | OXYGEN SATURATION: 99 % | HEART RATE: 109 BPM | WEIGHT: 181.8 LBS | DIASTOLIC BLOOD PRESSURE: 71 MMHG | TEMPERATURE: 97.6 F | BODY MASS INDEX: 24.65 KG/M2

## 2019-01-01 VITALS — BODY MASS INDEX: 25.51 KG/M2 | WEIGHT: 188.1 LBS

## 2019-01-01 VITALS — DIASTOLIC BLOOD PRESSURE: 80 MMHG | SYSTOLIC BLOOD PRESSURE: 117 MMHG

## 2019-01-01 VITALS — TEMPERATURE: 98.7 F

## 2019-01-01 DIAGNOSIS — D61.818 PANCYTOPENIA (HCC): ICD-10-CM

## 2019-01-01 DIAGNOSIS — Z45.2 ENCOUNTER FOR FITTING AND ADJUSTMENT OF VASCULAR CATHETER: ICD-10-CM

## 2019-01-01 DIAGNOSIS — K61.0 CELLULITIS OF PERIANAL AREA: ICD-10-CM

## 2019-01-01 DIAGNOSIS — C92.00 ACUTE MYELOID LEUKEMIA NOT HAVING ACHIEVED REMISSION (HCC): Primary | ICD-10-CM

## 2019-01-01 DIAGNOSIS — C92.00 ACUTE MYELOID LEUKEMIA NOT HAVING ACHIEVED REMISSION (HCC): ICD-10-CM

## 2019-01-01 DIAGNOSIS — K61.1 PERIRECTAL ABSCESS: Primary | ICD-10-CM

## 2019-01-01 DIAGNOSIS — D70.9 NEUTROPENIA, UNSPECIFIED TYPE (HCC): ICD-10-CM

## 2019-01-01 DIAGNOSIS — R50.81 NEUTROPENIC FEVER (HCC): ICD-10-CM

## 2019-01-01 DIAGNOSIS — R53.83 FATIGUE, UNSPECIFIED TYPE: ICD-10-CM

## 2019-01-01 DIAGNOSIS — M45.9 RHEUMATOID ARTHRITIS INVOLVING VERTEBRA, UNSPECIFIED RHEUMATOID FACTOR PRESENCE: Primary | ICD-10-CM

## 2019-01-01 DIAGNOSIS — R50.81 NEUTROPENIC FEVER (HCC): Primary | ICD-10-CM

## 2019-01-01 DIAGNOSIS — R00.2 PALPITATIONS: ICD-10-CM

## 2019-01-01 DIAGNOSIS — K61.1 PERIRECTAL ABSCESS: ICD-10-CM

## 2019-01-01 DIAGNOSIS — Z85.9 HISTORY OF CANCER: ICD-10-CM

## 2019-01-01 DIAGNOSIS — R06.09 DYSPNEA ON EXERTION: Primary | ICD-10-CM

## 2019-01-01 DIAGNOSIS — I48.91 ATRIAL FIBRILLATION, NEW ONSET (HCC): Primary | ICD-10-CM

## 2019-01-01 DIAGNOSIS — D70.9 NEUTROPENIA, UNSPECIFIED TYPE (HCC): Primary | ICD-10-CM

## 2019-01-01 DIAGNOSIS — Z85.46 HISTORY OF PROSTATE CANCER: Primary | ICD-10-CM

## 2019-01-01 DIAGNOSIS — D70.9 NEUTROPENIC FEVER (HCC): Primary | ICD-10-CM

## 2019-01-01 DIAGNOSIS — G61.81 CIDP (CHRONIC INFLAMMATORY DEMYELINATING POLYNEUROPATHY) (HCC): Primary | ICD-10-CM

## 2019-01-01 DIAGNOSIS — I49.9 IRREGULAR HEART BEAT: ICD-10-CM

## 2019-01-01 DIAGNOSIS — R94.31 ABNORMAL EKG: ICD-10-CM

## 2019-01-01 DIAGNOSIS — Z92.25 STATUS POST RECENT IMMUNOSUPPRESSIVE THERAPY: ICD-10-CM

## 2019-01-01 DIAGNOSIS — K62.6 RECTAL/ANAL ULCER: Primary | ICD-10-CM

## 2019-01-01 DIAGNOSIS — C92.00 ACUTE MYELOID LEUKEMIA (AML), M0 (HCC): Primary | ICD-10-CM

## 2019-01-01 DIAGNOSIS — A41.9 SEPSIS, DUE TO UNSPECIFIED ORGANISM: ICD-10-CM

## 2019-01-01 DIAGNOSIS — D70.9 NEUTROPENIC FEVER (HCC): ICD-10-CM

## 2019-01-01 DIAGNOSIS — I48.0 PAF (PAROXYSMAL ATRIAL FIBRILLATION) (HCC): Primary | ICD-10-CM

## 2019-01-01 DIAGNOSIS — Z85.46 HISTORY OF PROSTATE CANCER: ICD-10-CM

## 2019-01-01 DIAGNOSIS — J18.9 PNEUMONIA OF BOTH LOWER LOBES DUE TO INFECTIOUS ORGANISM: ICD-10-CM

## 2019-01-01 DIAGNOSIS — J18.9 PNEUMONIA OF BOTH LOWER LOBES DUE TO INFECTIOUS ORGANISM: Primary | ICD-10-CM

## 2019-01-01 DIAGNOSIS — M45.9 RHEUMATOID ARTHRITIS INVOLVING VERTEBRA, UNSPECIFIED RHEUMATOID FACTOR PRESENCE: ICD-10-CM

## 2019-01-01 LAB
ABO + RH BLD: NORMAL
ABO GROUP BLD: NORMAL
ALBUMIN SERPL-MCNC: 2.3 G/DL (ref 3.5–5.2)
ALBUMIN SERPL-MCNC: 2.4 G/DL (ref 3.5–5.2)
ALBUMIN SERPL-MCNC: 2.6 G/DL (ref 3.5–5.2)
ALBUMIN SERPL-MCNC: 3.4 G/DL (ref 3.5–5.2)
ALBUMIN SERPL-MCNC: 3.5 G/DL (ref 3.5–5.2)
ALBUMIN SERPL-MCNC: 3.6 G/DL (ref 3.5–5.2)
ALBUMIN SERPL-MCNC: 3.6 G/DL (ref 3.5–5.2)
ALBUMIN SERPL-MCNC: 3.7 G/DL (ref 3.5–5.2)
ALBUMIN SERPL-MCNC: 3.8 G/DL (ref 3.5–5.2)
ALBUMIN SERPL-MCNC: 3.8 G/DL (ref 3.5–5.2)
ALBUMIN SERPL-MCNC: 4 G/DL (ref 3.5–5.2)
ALBUMIN/GLOB SERPL: 1 G/DL
ALBUMIN/GLOB SERPL: 1 G/DL
ALBUMIN/GLOB SERPL: 1.1 G/DL
ALBUMIN/GLOB SERPL: 1.1 G/DL
ALBUMIN/GLOB SERPL: 1.2 G/DL
ALBUMIN/GLOB SERPL: 1.3 G/DL
ALBUMIN/GLOB SERPL: 1.3 G/DL (ref 1.1–2.4)
ALBUMIN/GLOB SERPL: 1.5 G/DL
ALBUMIN/GLOB SERPL: 1.5 G/DL (ref 1.1–2.4)
ALBUMIN/GLOB SERPL: 1.6 G/DL
ALBUMIN/GLOB SERPL: 1.6 G/DL (ref 1.1–2.4)
ALBUMIN/GLOB SERPL: 1.6 G/DL (ref 1.1–2.4)
ALBUMIN/GLOB SERPL: 1.7 G/DL
ALP SERPL-CCNC: 172 U/L (ref 39–117)
ALP SERPL-CCNC: 175 U/L (ref 39–117)
ALP SERPL-CCNC: 180 U/L (ref 39–117)
ALP SERPL-CCNC: 223 U/L (ref 39–117)
ALP SERPL-CCNC: 58 U/L (ref 39–117)
ALP SERPL-CCNC: 62 U/L (ref 38–116)
ALP SERPL-CCNC: 65 U/L (ref 39–117)
ALP SERPL-CCNC: 66 U/L (ref 38–116)
ALP SERPL-CCNC: 66 U/L (ref 39–117)
ALP SERPL-CCNC: 67 U/L (ref 38–116)
ALP SERPL-CCNC: 67 U/L (ref 38–116)
ALP SERPL-CCNC: 68 U/L (ref 39–117)
ALP SERPL-CCNC: 75 U/L (ref 39–117)
ALT SERPL W P-5'-P-CCNC: 107 U/L (ref 1–41)
ALT SERPL W P-5'-P-CCNC: 17 U/L (ref 0–41)
ALT SERPL W P-5'-P-CCNC: 44 U/L (ref 1–41)
ALT SERPL W P-5'-P-CCNC: 5 U/L (ref 1–41)
ALT SERPL W P-5'-P-CCNC: 55 U/L (ref 1–41)
ALT SERPL W P-5'-P-CCNC: 56 U/L (ref 1–41)
ALT SERPL W P-5'-P-CCNC: 6 U/L (ref 0–41)
ALT SERPL W P-5'-P-CCNC: 6 U/L (ref 0–41)
ALT SERPL W P-5'-P-CCNC: 8 U/L (ref 1–41)
ALT SERPL W P-5'-P-CCNC: 9 U/L (ref 1–41)
ALT SERPL W P-5'-P-CCNC: <5 U/L (ref 0–41)
ANION GAP SERPL CALCULATED.3IONS-SCNC: 10 MMOL/L (ref 5–15)
ANION GAP SERPL CALCULATED.3IONS-SCNC: 10.4 MMOL/L (ref 5–15)
ANION GAP SERPL CALCULATED.3IONS-SCNC: 10.6 MMOL/L (ref 5–15)
ANION GAP SERPL CALCULATED.3IONS-SCNC: 10.7 MMOL/L (ref 5–15)
ANION GAP SERPL CALCULATED.3IONS-SCNC: 10.8 MMOL/L (ref 5–15)
ANION GAP SERPL CALCULATED.3IONS-SCNC: 11 MMOL/L (ref 5–15)
ANION GAP SERPL CALCULATED.3IONS-SCNC: 11.1 MMOL/L (ref 5–15)
ANION GAP SERPL CALCULATED.3IONS-SCNC: 11.4 MMOL/L (ref 5–15)
ANION GAP SERPL CALCULATED.3IONS-SCNC: 11.5 MMOL/L (ref 5–15)
ANION GAP SERPL CALCULATED.3IONS-SCNC: 11.9 MMOL/L (ref 5–15)
ANION GAP SERPL CALCULATED.3IONS-SCNC: 12.3 MMOL/L (ref 5–15)
ANION GAP SERPL CALCULATED.3IONS-SCNC: 12.4 MMOL/L (ref 5–15)
ANION GAP SERPL CALCULATED.3IONS-SCNC: 12.6 MMOL/L (ref 5–15)
ANION GAP SERPL CALCULATED.3IONS-SCNC: 12.6 MMOL/L (ref 5–15)
ANION GAP SERPL CALCULATED.3IONS-SCNC: 12.7 MMOL/L (ref 5–15)
ANION GAP SERPL CALCULATED.3IONS-SCNC: 7.2 MMOL/L (ref 5–15)
ANION GAP SERPL CALCULATED.3IONS-SCNC: 7.4 MMOL/L (ref 5–15)
ANION GAP SERPL CALCULATED.3IONS-SCNC: 9.2 MMOL/L (ref 5–15)
ANION GAP SERPL CALCULATED.3IONS-SCNC: 9.4 MMOL/L (ref 5–15)
ANION GAP SERPL CALCULATED.3IONS-SCNC: 9.5 MMOL/L (ref 5–15)
ANION GAP SERPL CALCULATED.3IONS-SCNC: 9.8 MMOL/L (ref 5–15)
AORTIC DIMENSIONLESS INDEX: 0.9 (DI)
APTT PPP: 31.2 SECONDS (ref 22.7–35.4)
AST SERPL-CCNC: 10 U/L (ref 0–40)
AST SERPL-CCNC: 11 U/L (ref 0–40)
AST SERPL-CCNC: 11 U/L (ref 1–40)
AST SERPL-CCNC: 11 U/L (ref 1–40)
AST SERPL-CCNC: 13 U/L (ref 0–40)
AST SERPL-CCNC: 13 U/L (ref 1–40)
AST SERPL-CCNC: 22 U/L (ref 1–40)
AST SERPL-CCNC: 23 U/L (ref 1–40)
AST SERPL-CCNC: 29 U/L (ref 1–40)
AST SERPL-CCNC: 47 U/L (ref 1–40)
AST SERPL-CCNC: 8 U/L (ref 0–40)
AST SERPL-CCNC: 9 U/L (ref 1–40)
AST SERPL-CCNC: 9 U/L (ref 1–40)
B PARAPERT DNA SPEC QL NAA+PROBE: NOT DETECTED
B PARAPERT DNA SPEC QL NAA+PROBE: NOT DETECTED
B PERT DNA SPEC QL NAA+PROBE: NOT DETECTED
B PERT DNA SPEC QL NAA+PROBE: NOT DETECTED
BACTERIA SPEC AEROBE CULT: NORMAL
BACTERIA SPEC ANAEROBE CULT: ABNORMAL
BACTERIA SPEC RESP CULT: NORMAL
BACTERIA UR QL AUTO: NORMAL /HPF
BASOPHILS # BLD AUTO: 0 10*3/MM3 (ref 0–0.2)
BASOPHILS # BLD AUTO: 0.01 10*3/MM3 (ref 0–0.2)
BASOPHILS # BLD AUTO: 0.01 10*3/MM3 (ref 0–0.2)
BASOPHILS # BLD AUTO: 0.02 10*3/MM3 (ref 0–0.2)
BASOPHILS # BLD AUTO: 0.03 10*3/MM3 (ref 0–0.2)
BASOPHILS # BLD AUTO: 0.04 10*3/MM3 (ref 0–0.2)
BASOPHILS NFR BLD AUTO: 0 % (ref 0–1.5)
BASOPHILS NFR BLD AUTO: 11.8 % (ref 0–1.5)
BASOPHILS NFR BLD AUTO: 12.9 % (ref 0–1.5)
BASOPHILS NFR BLD AUTO: 3.1 % (ref 0–1.5)
BASOPHILS NFR BLD AUTO: 4 % (ref 0–1.5)
BASOPHILS NFR BLD AUTO: 5 % (ref 0–1.5)
BASOPHILS NFR BLD AUTO: 5.3 % (ref 0–1.5)
BASOPHILS NFR BLD AUTO: 5.3 % (ref 0–1.5)
BASOPHILS NFR BLD AUTO: 6.3 % (ref 0–1.5)
BASOPHILS NFR BLD AUTO: 8 % (ref 0–1.5)
BASOPHILS NFR BLD AUTO: 8.2 % (ref 0–1.5)
BASOPHILS NFR BLD AUTO: 9.1 % (ref 0–1.5)
BASOPHILS NFR BLD AUTO: 9.1 % (ref 0–1.5)
BH BB BLOOD EXPIRATION DATE: NORMAL
BH BB BLOOD TYPE BARCODE: 1700
BH BB BLOOD TYPE BARCODE: 5100
BH BB BLOOD TYPE BARCODE: 8400
BH BB DISPENSE STATUS: NORMAL
BH BB PRODUCT CODE: NORMAL
BH BB UNIT NUMBER: NORMAL
BH CV ECHO MEAS - ACS: 2.3 CM
BH CV ECHO MEAS - AO MAX PG (FULL): -0.4 MMHG
BH CV ECHO MEAS - AO MAX PG: 6.2 MMHG
BH CV ECHO MEAS - AO MEAN PG (FULL): 0 MMHG
BH CV ECHO MEAS - AO MEAN PG: 3 MMHG
BH CV ECHO MEAS - AO ROOT AREA (BSA CORRECTED): 1.7
BH CV ECHO MEAS - AO ROOT AREA: 9.1 CM^2
BH CV ECHO MEAS - AO ROOT DIAM: 3.4 CM
BH CV ECHO MEAS - AO V2 MAX: 124 CM/SEC
BH CV ECHO MEAS - AO V2 MEAN: 86.6 CM/SEC
BH CV ECHO MEAS - AO V2 VTI: 21.9 CM
BH CV ECHO MEAS - AVA(I,A): 3.2 CM^2
BH CV ECHO MEAS - AVA(I,D): 3.2 CM^2
BH CV ECHO MEAS - AVA(V,A): 3.6 CM^2
BH CV ECHO MEAS - AVA(V,D): 3.6 CM^2
BH CV ECHO MEAS - BSA(HAYCOCK): 2 M^2
BH CV ECHO MEAS - BSA: 2 M^2
BH CV ECHO MEAS - BZI_BMI: 23.1 KILOGRAMS/M^2
BH CV ECHO MEAS - BZI_METRIC_HEIGHT: 182.9 CM
BH CV ECHO MEAS - BZI_METRIC_WEIGHT: 77.1 KG
BH CV ECHO MEAS - EDV(CUBED): 117.6 ML
BH CV ECHO MEAS - EDV(MOD-SP2): 132 ML
BH CV ECHO MEAS - EDV(MOD-SP4): 167 ML
BH CV ECHO MEAS - EDV(TEICH): 112.8 ML
BH CV ECHO MEAS - EF(CUBED): 63.6 %
BH CV ECHO MEAS - EF(MOD-BP): 57 %
BH CV ECHO MEAS - EF(MOD-SP2): 57.6 %
BH CV ECHO MEAS - EF(MOD-SP4): 60.5 %
BH CV ECHO MEAS - EF(TEICH): 54.9 %
BH CV ECHO MEAS - ESV(CUBED): 42.9 ML
BH CV ECHO MEAS - ESV(MOD-SP2): 56 ML
BH CV ECHO MEAS - ESV(MOD-SP4): 66 ML
BH CV ECHO MEAS - ESV(TEICH): 50.9 ML
BH CV ECHO MEAS - FS: 28.6 %
BH CV ECHO MEAS - IVS/LVPW: 1.1
BH CV ECHO MEAS - IVSD: 0.9 CM
BH CV ECHO MEAS - LAT PEAK E' VEL: 10 CM/SEC
BH CV ECHO MEAS - LV DIASTOLIC VOL/BSA (35-75): 84 ML/M^2
BH CV ECHO MEAS - LV MASS(C)D: 141.9 GRAMS
BH CV ECHO MEAS - LV MASS(C)DI: 71.4 GRAMS/M^2
BH CV ECHO MEAS - LV MAX PG: 6.6 MMHG
BH CV ECHO MEAS - LV MEAN PG: 3 MMHG
BH CV ECHO MEAS - LV SYSTOLIC VOL/BSA (12-30): 33.2 ML/M^2
BH CV ECHO MEAS - LV V1 MAX: 128 CM/SEC
BH CV ECHO MEAS - LV V1 MEAN: 76 CM/SEC
BH CV ECHO MEAS - LV V1 VTI: 20.2 CM
BH CV ECHO MEAS - LVIDD: 4.9 CM
BH CV ECHO MEAS - LVIDS: 3.5 CM
BH CV ECHO MEAS - LVLD AP2: 10.4 CM
BH CV ECHO MEAS - LVLD AP4: 10.2 CM
BH CV ECHO MEAS - LVLS AP2: 7.8 CM
BH CV ECHO MEAS - LVLS AP4: 8.7 CM
BH CV ECHO MEAS - LVOT AREA (M): 3.5 CM^2
BH CV ECHO MEAS - LVOT AREA: 3.5 CM^2
BH CV ECHO MEAS - LVOT DIAM: 2.1 CM
BH CV ECHO MEAS - LVPWD: 0.8 CM
BH CV ECHO MEAS - MED PEAK E' VEL: 10 CM/SEC
BH CV ECHO MEAS - MV A DUR: 0.15 SEC
BH CV ECHO MEAS - MV A MAX VEL: 85.4 CM/SEC
BH CV ECHO MEAS - MV DEC SLOPE: 518 CM/SEC^2
BH CV ECHO MEAS - MV DEC TIME: 200 SEC
BH CV ECHO MEAS - MV E MAX VEL: 69.4 CM/SEC
BH CV ECHO MEAS - MV E/A: 0.81
BH CV ECHO MEAS - MV MAX PG: 3.3 MMHG
BH CV ECHO MEAS - MV MEAN PG: 1 MMHG
BH CV ECHO MEAS - MV P1/2T MAX VEL: 83.3 CM/SEC
BH CV ECHO MEAS - MV P1/2T: 47.1 MSEC
BH CV ECHO MEAS - MV V2 MAX: 90.5 CM/SEC
BH CV ECHO MEAS - MV V2 MEAN: 49.5 CM/SEC
BH CV ECHO MEAS - MV V2 VTI: 18.9 CM
BH CV ECHO MEAS - MVA P1/2T LCG: 2.6 CM^2
BH CV ECHO MEAS - MVA(P1/2T): 4.7 CM^2
BH CV ECHO MEAS - MVA(VTI): 3.7 CM^2
BH CV ECHO MEAS - PA ACC TIME: 0.08 SEC
BH CV ECHO MEAS - PA MAX PG (FULL): 2.9 MMHG
BH CV ECHO MEAS - PA MAX PG: 5.5 MMHG
BH CV ECHO MEAS - PA PR(ACCEL): 43.5 MMHG
BH CV ECHO MEAS - PA V2 MAX: 117 CM/SEC
BH CV ECHO MEAS - PULM A REVS DUR: 0.15 SEC
BH CV ECHO MEAS - PULM A REVS VEL: 45.2 CM/SEC
BH CV ECHO MEAS - PULM DIAS VEL: 33.3 CM/SEC
BH CV ECHO MEAS - PULM S/D: 1.6
BH CV ECHO MEAS - PULM SYS VEL: 51.9 CM/SEC
BH CV ECHO MEAS - PVA(V,A): 3.3 CM^2
BH CV ECHO MEAS - PVA(V,D): 3.3 CM^2
BH CV ECHO MEAS - QP/QS: 0.98
BH CV ECHO MEAS - RV MAX PG: 2.5 MMHG
BH CV ECHO MEAS - RV MEAN PG: 1 MMHG
BH CV ECHO MEAS - RV V1 MAX: 79.8 CM/SEC
BH CV ECHO MEAS - RV V1 MEAN: 54.2 CM/SEC
BH CV ECHO MEAS - RV V1 VTI: 13.9 CM
BH CV ECHO MEAS - RVOT AREA: 4.9 CM^2
BH CV ECHO MEAS - RVOT DIAM: 2.5 CM
BH CV ECHO MEAS - SI(AO): 100 ML/M^2
BH CV ECHO MEAS - SI(CUBED): 37.6 ML/M^2
BH CV ECHO MEAS - SI(LVOT): 35.2 ML/M^2
BH CV ECHO MEAS - SI(MOD-SP2): 38.2 ML/M^2
BH CV ECHO MEAS - SI(MOD-SP4): 50.8 ML/M^2
BH CV ECHO MEAS - SI(TEICH): 31.2 ML/M^2
BH CV ECHO MEAS - SV(AO): 198.8 ML
BH CV ECHO MEAS - SV(CUBED): 74.8 ML
BH CV ECHO MEAS - SV(LVOT): 70 ML
BH CV ECHO MEAS - SV(MOD-SP2): 76 ML
BH CV ECHO MEAS - SV(MOD-SP4): 101 ML
BH CV ECHO MEAS - SV(RVOT): 68.2 ML
BH CV ECHO MEAS - SV(TEICH): 61.9 ML
BH CV ECHO MEAS - TAPSE (>1.6): 2.2 CM
BH CV ECHO MEASUREMENTS AVERAGE E/E' RATIO: 6.94
BH CV LOW VAS LEFT POPLITEAL SPONT: 1
BH CV LOWER VASCULAR LEFT COMMON FEMORAL AUGMENT: NORMAL
BH CV LOWER VASCULAR LEFT COMMON FEMORAL COMPETENT: NORMAL
BH CV LOWER VASCULAR LEFT COMMON FEMORAL COMPRESS: NORMAL
BH CV LOWER VASCULAR LEFT COMMON FEMORAL PHASIC: NORMAL
BH CV LOWER VASCULAR LEFT COMMON FEMORAL SPONT: NORMAL
BH CV LOWER VASCULAR LEFT DISTAL FEMORAL COMPRESS: NORMAL
BH CV LOWER VASCULAR LEFT GASTRONEMIUS COMPRESS: NORMAL
BH CV LOWER VASCULAR LEFT GREATER SAPH AK COMPRESS: NORMAL
BH CV LOWER VASCULAR LEFT GREATER SAPH BK COMPRESS: NORMAL
BH CV LOWER VASCULAR LEFT MID FEMORAL AUGMENT: NORMAL
BH CV LOWER VASCULAR LEFT MID FEMORAL COMPETENT: NORMAL
BH CV LOWER VASCULAR LEFT MID FEMORAL COMPRESS: NORMAL
BH CV LOWER VASCULAR LEFT MID FEMORAL PHASIC: NORMAL
BH CV LOWER VASCULAR LEFT MID FEMORAL SPONT: NORMAL
BH CV LOWER VASCULAR LEFT PERONEAL COMPRESS: NORMAL
BH CV LOWER VASCULAR LEFT POPLITEAL AUGMENT: NORMAL
BH CV LOWER VASCULAR LEFT POPLITEAL COMPETENT: NORMAL
BH CV LOWER VASCULAR LEFT POPLITEAL COMPRESS: NORMAL
BH CV LOWER VASCULAR LEFT POPLITEAL PHASIC: NORMAL
BH CV LOWER VASCULAR LEFT POPLITEAL SPONT: NORMAL
BH CV LOWER VASCULAR LEFT POSTERIOR TIBIAL COMPRESS: NORMAL
BH CV LOWER VASCULAR LEFT PROXIMAL FEMORAL COMPRESS: NORMAL
BH CV LOWER VASCULAR LEFT SAPHENOFEMORAL JUNCTION COMPRESS: NORMAL
BH CV LOWER VASCULAR RIGHT COMMON FEMORAL AUGMENT: NORMAL
BH CV LOWER VASCULAR RIGHT COMMON FEMORAL COMPETENT: NORMAL
BH CV LOWER VASCULAR RIGHT COMMON FEMORAL COMPRESS: NORMAL
BH CV LOWER VASCULAR RIGHT COMMON FEMORAL PHASIC: NORMAL
BH CV LOWER VASCULAR RIGHT COMMON FEMORAL SPONT: NORMAL
BH CV LOWER VASCULAR RIGHT DISTAL FEMORAL COMPRESS: NORMAL
BH CV LOWER VASCULAR RIGHT GASTRONEMIUS COMPRESS: NORMAL
BH CV LOWER VASCULAR RIGHT GREATER SAPH AK COMPRESS: NORMAL
BH CV LOWER VASCULAR RIGHT GREATER SAPH BK COMPRESS: NORMAL
BH CV LOWER VASCULAR RIGHT MID FEMORAL AUGMENT: NORMAL
BH CV LOWER VASCULAR RIGHT MID FEMORAL COMPETENT: NORMAL
BH CV LOWER VASCULAR RIGHT MID FEMORAL COMPRESS: NORMAL
BH CV LOWER VASCULAR RIGHT MID FEMORAL PHASIC: NORMAL
BH CV LOWER VASCULAR RIGHT MID FEMORAL SPONT: NORMAL
BH CV LOWER VASCULAR RIGHT PERONEAL COMPRESS: NORMAL
BH CV LOWER VASCULAR RIGHT POPLITEAL AUGMENT: NORMAL
BH CV LOWER VASCULAR RIGHT POPLITEAL COMPETENT: NORMAL
BH CV LOWER VASCULAR RIGHT POPLITEAL COMPRESS: NORMAL
BH CV LOWER VASCULAR RIGHT POPLITEAL PHASIC: NORMAL
BH CV LOWER VASCULAR RIGHT POPLITEAL SPONT: NORMAL
BH CV LOWER VASCULAR RIGHT POSTERIOR TIBIAL COMPRESS: NORMAL
BH CV LOWER VASCULAR RIGHT PROXIMAL FEMORAL COMPRESS: NORMAL
BH CV LOWER VASCULAR RIGHT SAPHENOFEMORAL JUNCTION COMPRESS: NORMAL
BH CV POP FLUID COLLECT LEFT: 1
BH CV UPPER VENOUS LEFT AXILLARY AUGMENT: NORMAL
BH CV UPPER VENOUS LEFT AXILLARY COMPETENT: NORMAL
BH CV UPPER VENOUS LEFT AXILLARY COMPRESS: NORMAL
BH CV UPPER VENOUS LEFT AXILLARY PHASIC: NORMAL
BH CV UPPER VENOUS LEFT AXILLARY SPONT: NORMAL
BH CV UPPER VENOUS LEFT BASILIC FOREARM COMPRESS: NORMAL
BH CV UPPER VENOUS LEFT BASILIC UPPER COMPRESS: NORMAL
BH CV UPPER VENOUS LEFT BRACHIAL COMPRESS: NORMAL
BH CV UPPER VENOUS LEFT CEPHALIC FOREARM COLOR: 1
BH CV UPPER VENOUS LEFT CEPHALIC FOREARM COMPRESS: NORMAL
BH CV UPPER VENOUS LEFT CEPHALIC FOREARM THROMBUS: NORMAL
BH CV UPPER VENOUS LEFT CEPHALIC UPPER COMPRESS: NORMAL
BH CV UPPER VENOUS LEFT INTERNAL JUGULAR AUGMENT: NORMAL
BH CV UPPER VENOUS LEFT INTERNAL JUGULAR COMPETENT: NORMAL
BH CV UPPER VENOUS LEFT INTERNAL JUGULAR COMPRESS: NORMAL
BH CV UPPER VENOUS LEFT INTERNAL JUGULAR PHASIC: NORMAL
BH CV UPPER VENOUS LEFT INTERNAL JUGULAR SPONT: NORMAL
BH CV UPPER VENOUS LEFT RADIAL COMPRESS: NORMAL
BH CV UPPER VENOUS LEFT SUBCLAVIAN AUGMENT: NORMAL
BH CV UPPER VENOUS LEFT SUBCLAVIAN COMPETENT: NORMAL
BH CV UPPER VENOUS LEFT SUBCLAVIAN COMPRESS: NORMAL
BH CV UPPER VENOUS LEFT SUBCLAVIAN PHASIC: NORMAL
BH CV UPPER VENOUS LEFT SUBCLAVIAN SPONT: NORMAL
BH CV UPPER VENOUS LEFT ULNAR COMPRESS: NORMAL
BH CV UPPER VENOUS RIGHT AXILLARY AUGMENT: NORMAL
BH CV UPPER VENOUS RIGHT AXILLARY COMPETENT: NORMAL
BH CV UPPER VENOUS RIGHT AXILLARY COMPRESS: NORMAL
BH CV UPPER VENOUS RIGHT AXILLARY PHASIC: NORMAL
BH CV UPPER VENOUS RIGHT AXILLARY SPONT: NORMAL
BH CV UPPER VENOUS RIGHT BASILIC FOREARM COMPRESS: NORMAL
BH CV UPPER VENOUS RIGHT BASILIC UPPER COMPRESS: NORMAL
BH CV UPPER VENOUS RIGHT BRACHIAL COMPRESS: NORMAL
BH CV UPPER VENOUS RIGHT CEPHALIC FOREARM COLOR: 1
BH CV UPPER VENOUS RIGHT CEPHALIC FOREARM COMPRESS: NORMAL
BH CV UPPER VENOUS RIGHT CEPHALIC FOREARM THROMBUS: NORMAL
BH CV UPPER VENOUS RIGHT CEPHALIC UPPER COMPRESS: NORMAL
BH CV UPPER VENOUS RIGHT INTERNAL JUGULAR AUGMENT: NORMAL
BH CV UPPER VENOUS RIGHT INTERNAL JUGULAR COMPETENT: NORMAL
BH CV UPPER VENOUS RIGHT INTERNAL JUGULAR COMPRESS: NORMAL
BH CV UPPER VENOUS RIGHT INTERNAL JUGULAR PHASIC: NORMAL
BH CV UPPER VENOUS RIGHT INTERNAL JUGULAR SPONT: NORMAL
BH CV UPPER VENOUS RIGHT RADIAL COMPRESS: NORMAL
BH CV UPPER VENOUS RIGHT SUBCLAVIAN AUGMENT: NORMAL
BH CV UPPER VENOUS RIGHT SUBCLAVIAN COMPETENT: NORMAL
BH CV UPPER VENOUS RIGHT SUBCLAVIAN COMPRESS: NORMAL
BH CV UPPER VENOUS RIGHT SUBCLAVIAN PHASIC: NORMAL
BH CV UPPER VENOUS RIGHT SUBCLAVIAN SPONT: NORMAL
BH CV UPPER VENOUS RIGHT ULNAR COMPRESS: NORMAL
BH CV VAS BP RIGHT ARM: NORMAL MMHG
BH CV XLRA - RV BASE: 3.6 CM
BH CV XLRA - TDI S': 20 CM/SEC
BILIRUB SERPL-MCNC: 0.5 MG/DL (ref 0.2–1.2)
BILIRUB SERPL-MCNC: 0.7 MG/DL (ref 0.2–1.2)
BILIRUB SERPL-MCNC: 0.7 MG/DL (ref 0.2–1.2)
BILIRUB SERPL-MCNC: 0.9 MG/DL (ref 0.2–1.2)
BILIRUB SERPL-MCNC: 1 MG/DL (ref 0.2–1.2)
BILIRUB SERPL-MCNC: 1.1 MG/DL (ref 0.2–1.2)
BILIRUB SERPL-MCNC: 1.3 MG/DL (ref 0.2–1.2)
BILIRUB SERPL-MCNC: 1.5 MG/DL (ref 0.2–1.2)
BILIRUB SERPL-MCNC: 1.7 MG/DL (ref 0.2–1.2)
BILIRUB SERPL-MCNC: 2.1 MG/DL (ref 0.2–1.2)
BILIRUB UR QL STRIP: NEGATIVE
BILIRUB UR QL STRIP: NEGATIVE
BLD GP AB SCN SERPL QL: NEGATIVE
BUN BLD-MCNC: 10 MG/DL (ref 8–23)
BUN BLD-MCNC: 11 MG/DL (ref 6–20)
BUN BLD-MCNC: 11 MG/DL (ref 8–23)
BUN BLD-MCNC: 11 MG/DL (ref 8–23)
BUN BLD-MCNC: 12 MG/DL (ref 6–20)
BUN BLD-MCNC: 13 MG/DL (ref 6–20)
BUN BLD-MCNC: 13 MG/DL (ref 8–23)
BUN BLD-MCNC: 13 MG/DL (ref 8–23)
BUN BLD-MCNC: 15 MG/DL (ref 8–23)
BUN BLD-MCNC: 15 MG/DL (ref 8–23)
BUN BLD-MCNC: 7 MG/DL (ref 8–23)
BUN BLD-MCNC: 7 MG/DL (ref 8–23)
BUN BLD-MCNC: 8 MG/DL (ref 6–20)
BUN BLD-MCNC: 8 MG/DL (ref 8–23)
BUN BLD-MCNC: 9 MG/DL (ref 8–23)
BUN BLD-MCNC: 9 MG/DL (ref 8–23)
BUN/CREAT SERPL: 10.1 (ref 7–25)
BUN/CREAT SERPL: 10.8 (ref 7.3–30)
BUN/CREAT SERPL: 11.3 (ref 7–25)
BUN/CREAT SERPL: 11.4 (ref 7–25)
BUN/CREAT SERPL: 12.3 (ref 7–25)
BUN/CREAT SERPL: 12.4 (ref 7.3–30)
BUN/CREAT SERPL: 12.5 (ref 7–25)
BUN/CREAT SERPL: 12.6 (ref 7–25)
BUN/CREAT SERPL: 12.8 (ref 7–25)
BUN/CREAT SERPL: 13.3 (ref 7–25)
BUN/CREAT SERPL: 13.7 (ref 7–25)
BUN/CREAT SERPL: 13.8 (ref 7–25)
BUN/CREAT SERPL: 14.3 (ref 7.3–30)
BUN/CREAT SERPL: 14.5 (ref 7–25)
BUN/CREAT SERPL: 14.9 (ref 7–25)
BUN/CREAT SERPL: 16.5 (ref 7–25)
BUN/CREAT SERPL: 16.9 (ref 7–25)
BUN/CREAT SERPL: 17.4 (ref 7–25)
BUN/CREAT SERPL: 18.8 (ref 7.3–30)
BUN/CREAT SERPL: 21 (ref 7–25)
BUN/CREAT SERPL: 9.2 (ref 7–25)
C PNEUM DNA NPH QL NAA+NON-PROBE: NOT DETECTED
C PNEUM DNA NPH QL NAA+NON-PROBE: NOT DETECTED
CALCIUM SPEC-SCNC: 6.7 MG/DL (ref 8.6–10.5)
CALCIUM SPEC-SCNC: 6.8 MG/DL (ref 8.6–10.5)
CALCIUM SPEC-SCNC: 6.9 MG/DL (ref 8.6–10.5)
CALCIUM SPEC-SCNC: 7 MG/DL (ref 8.6–10.5)
CALCIUM SPEC-SCNC: 7.2 MG/DL (ref 8.6–10.5)
CALCIUM SPEC-SCNC: 7.4 MG/DL (ref 8.6–10.5)
CALCIUM SPEC-SCNC: 7.8 MG/DL (ref 8.6–10.5)
CALCIUM SPEC-SCNC: 7.9 MG/DL (ref 8.6–10.5)
CALCIUM SPEC-SCNC: 8 MG/DL (ref 8.6–10.5)
CALCIUM SPEC-SCNC: 8.1 MG/DL (ref 8.6–10.5)
CALCIUM SPEC-SCNC: 8.2 MG/DL (ref 8.5–10.2)
CALCIUM SPEC-SCNC: 8.3 MG/DL (ref 8.5–10.2)
CALCIUM SPEC-SCNC: 8.3 MG/DL (ref 8.6–10.5)
CALCIUM SPEC-SCNC: 8.4 MG/DL (ref 8.5–10.2)
CALCIUM SPEC-SCNC: 8.4 MG/DL (ref 8.6–10.5)
CALCIUM SPEC-SCNC: 8.4 MG/DL (ref 8.6–10.5)
CALCIUM SPEC-SCNC: 8.7 MG/DL (ref 8.5–10.2)
CALCIUM SPEC-SCNC: 8.7 MG/DL (ref 8.6–10.5)
CALCIUM SPEC-SCNC: 8.7 MG/DL (ref 8.6–10.5)
CHLORIDE SERPL-SCNC: 101 MMOL/L (ref 98–107)
CHLORIDE SERPL-SCNC: 102 MMOL/L (ref 98–107)
CHLORIDE SERPL-SCNC: 102 MMOL/L (ref 98–107)
CHLORIDE SERPL-SCNC: 103 MMOL/L (ref 98–107)
CHLORIDE SERPL-SCNC: 104 MMOL/L (ref 98–107)
CHLORIDE SERPL-SCNC: 104 MMOL/L (ref 98–107)
CHLORIDE SERPL-SCNC: 105 MMOL/L (ref 98–107)
CHLORIDE SERPL-SCNC: 106 MMOL/L (ref 98–107)
CHLORIDE SERPL-SCNC: 107 MMOL/L (ref 98–107)
CHLORIDE SERPL-SCNC: 107 MMOL/L (ref 98–107)
CHLORIDE SERPL-SCNC: 98 MMOL/L (ref 98–107)
CHLORIDE SERPL-SCNC: 99 MMOL/L (ref 98–107)
CHLORIDE SERPL-SCNC: 99 MMOL/L (ref 98–107)
CHOLEST SERPL-MCNC: 113 MG/DL (ref 0–200)
CHOLEST SERPL-MCNC: 133 MG/DL (ref 0–200)
CLARITY UR: CLEAR
CLARITY UR: CLEAR
CO2 SERPL-SCNC: 21.2 MMOL/L (ref 22–29)
CO2 SERPL-SCNC: 21.5 MMOL/L (ref 22–29)
CO2 SERPL-SCNC: 22.6 MMOL/L (ref 22–29)
CO2 SERPL-SCNC: 22.8 MMOL/L (ref 22–29)
CO2 SERPL-SCNC: 23.1 MMOL/L (ref 22–29)
CO2 SERPL-SCNC: 23.4 MMOL/L (ref 22–29)
CO2 SERPL-SCNC: 23.7 MMOL/L (ref 22–29)
CO2 SERPL-SCNC: 24 MMOL/L (ref 22–29)
CO2 SERPL-SCNC: 24.4 MMOL/L (ref 22–29)
CO2 SERPL-SCNC: 24.5 MMOL/L (ref 22–29)
CO2 SERPL-SCNC: 24.6 MMOL/L (ref 22–29)
CO2 SERPL-SCNC: 25 MMOL/L (ref 22–29)
CO2 SERPL-SCNC: 25.2 MMOL/L (ref 22–29)
CO2 SERPL-SCNC: 25.3 MMOL/L (ref 22–29)
CO2 SERPL-SCNC: 25.6 MMOL/L (ref 22–29)
CO2 SERPL-SCNC: 25.9 MMOL/L (ref 22–29)
CO2 SERPL-SCNC: 26.3 MMOL/L (ref 22–29)
CO2 SERPL-SCNC: 26.8 MMOL/L (ref 22–29)
CO2 SERPL-SCNC: 27.4 MMOL/L (ref 22–29)
COLOR UR: ABNORMAL
COLOR UR: YELLOW
CREAT BLD-MCNC: 0.58 MG/DL (ref 0.76–1.27)
CREAT BLD-MCNC: 0.62 MG/DL (ref 0.76–1.27)
CREAT BLD-MCNC: 0.62 MG/DL (ref 0.76–1.27)
CREAT BLD-MCNC: 0.64 MG/DL (ref 0.76–1.27)
CREAT BLD-MCNC: 0.69 MG/DL (ref 0.76–1.27)
CREAT BLD-MCNC: 0.69 MG/DL (ref 0.76–1.27)
CREAT BLD-MCNC: 0.69 MG/DL (ref 0.7–1.3)
CREAT BLD-MCNC: 0.73 MG/DL (ref 0.76–1.27)
CREAT BLD-MCNC: 0.73 MG/DL (ref 0.76–1.27)
CREAT BLD-MCNC: 0.74 MG/DL (ref 0.76–1.27)
CREAT BLD-MCNC: 0.74 MG/DL (ref 0.7–1.3)
CREAT BLD-MCNC: 0.75 MG/DL (ref 0.76–1.27)
CREAT BLD-MCNC: 0.77 MG/DL (ref 0.76–1.27)
CREAT BLD-MCNC: 0.78 MG/DL (ref 0.76–1.27)
CREAT BLD-MCNC: 0.79 MG/DL (ref 0.76–1.27)
CREAT BLD-MCNC: 0.84 MG/DL (ref 0.7–1.3)
CREAT BLD-MCNC: 0.86 MG/DL (ref 0.76–1.27)
CREAT BLD-MCNC: 0.87 MG/DL (ref 0.76–1.27)
CREAT BLD-MCNC: 0.87 MG/DL (ref 0.76–1.27)
CREAT BLD-MCNC: 0.89 MG/DL (ref 0.7–1.3)
CREAT BLD-MCNC: 0.91 MG/DL (ref 0.76–1.27)
CRYPTOC AG CSF QL: NEGATIVE
CYTO UR: NORMAL
D DIMER PPP FEU-MCNC: 0.92 MCGFEU/ML (ref 0–0.49)
D-LACTATE SERPL-SCNC: 0.9 MMOL/L (ref 0.5–2)
D-LACTATE SERPL-SCNC: 1.3 MMOL/L (ref 0.5–2)
D-LACTATE SERPL-SCNC: 1.5 MMOL/L (ref 0.5–2)
DEPRECATED RDW RBC AUTO: 41.3 FL (ref 37–54)
DEPRECATED RDW RBC AUTO: 41.7 FL (ref 37–54)
DEPRECATED RDW RBC AUTO: 41.8 FL (ref 37–54)
DEPRECATED RDW RBC AUTO: 42 FL (ref 37–54)
DEPRECATED RDW RBC AUTO: 42.5 FL (ref 37–54)
DEPRECATED RDW RBC AUTO: 42.7 FL (ref 37–54)
DEPRECATED RDW RBC AUTO: 43 FL (ref 37–54)
DEPRECATED RDW RBC AUTO: 43.5 FL (ref 37–54)
DEPRECATED RDW RBC AUTO: 43.9 FL (ref 37–54)
DEPRECATED RDW RBC AUTO: 44.6 FL (ref 37–54)
DEPRECATED RDW RBC AUTO: 44.7 FL (ref 37–54)
DEPRECATED RDW RBC AUTO: 44.9 FL (ref 37–54)
DEPRECATED RDW RBC AUTO: 47.1 FL (ref 37–54)
DEPRECATED RDW RBC AUTO: 47.6 FL (ref 37–54)
DEPRECATED RDW RBC AUTO: 47.9 FL (ref 37–54)
DEPRECATED RDW RBC AUTO: 48.5 FL (ref 37–54)
DEPRECATED RDW RBC AUTO: 48.6 FL (ref 37–54)
DEPRECATED RDW RBC AUTO: 49.6 FL (ref 37–54)
DEPRECATED RDW RBC AUTO: 49.7 FL (ref 37–54)
DEPRECATED RDW RBC AUTO: 50.4 FL (ref 37–54)
DEPRECATED RDW RBC AUTO: 51.8 FL (ref 37–54)
DEPRECATED RDW RBC AUTO: 58.4 FL (ref 37–54)
DEPRECATED RDW RBC AUTO: 59.9 FL (ref 37–54)
DEPRECATED RDW RBC AUTO: 65.9 FL (ref 37–54)
DEPRECATED RDW RBC AUTO: 70.6 FL (ref 37–54)
DEPRECATED RDW RBC AUTO: 71.1 FL (ref 37–54)
DEPRECATED RDW RBC AUTO: 71.4 FL (ref 37–54)
DEPRECATED RDW RBC AUTO: 71.8 FL (ref 37–54)
DEPRECATED RDW RBC AUTO: 71.8 FL (ref 37–54)
DEPRECATED RDW RBC AUTO: 72 FL (ref 37–54)
DEPRECATED RDW RBC AUTO: 78.4 FL (ref 37–54)
EOSINOPHIL # BLD AUTO: 0 10*3/MM3 (ref 0–0.4)
EOSINOPHIL # BLD AUTO: 0.01 10*3/MM3 (ref 0–0.4)
EOSINOPHIL # BLD AUTO: 0.02 10*3/MM3 (ref 0–0.4)
EOSINOPHIL # BLD AUTO: 0.03 10*3/MM3 (ref 0–0.4)
EOSINOPHIL # BLD MANUAL: 0.02 10*3/MM3 (ref 0–0.4)
EOSINOPHIL NFR BLD AUTO: 0 % (ref 0.3–6.2)
EOSINOPHIL NFR BLD AUTO: 1.3 % (ref 0.3–6.2)
EOSINOPHIL NFR BLD AUTO: 3.8 % (ref 0.3–6.2)
EOSINOPHIL NFR BLD AUTO: 4 % (ref 0.3–6.2)
EOSINOPHIL NFR BLD MANUAL: 2 % (ref 0.3–6.2)
ERYTHROCYTE [DISTWIDTH] IN BLOOD BY AUTOMATED COUNT: 13.3 % (ref 12.3–15.4)
ERYTHROCYTE [DISTWIDTH] IN BLOOD BY AUTOMATED COUNT: 13.4 % (ref 12.3–15.4)
ERYTHROCYTE [DISTWIDTH] IN BLOOD BY AUTOMATED COUNT: 13.6 % (ref 12.3–15.4)
ERYTHROCYTE [DISTWIDTH] IN BLOOD BY AUTOMATED COUNT: 13.8 % (ref 12.3–15.4)
ERYTHROCYTE [DISTWIDTH] IN BLOOD BY AUTOMATED COUNT: 14 % (ref 12.3–15.4)
ERYTHROCYTE [DISTWIDTH] IN BLOOD BY AUTOMATED COUNT: 14.1 % (ref 12.3–15.4)
ERYTHROCYTE [DISTWIDTH] IN BLOOD BY AUTOMATED COUNT: 14.3 % (ref 12.3–15.4)
ERYTHROCYTE [DISTWIDTH] IN BLOOD BY AUTOMATED COUNT: 14.4 % (ref 12.3–15.4)
ERYTHROCYTE [DISTWIDTH] IN BLOOD BY AUTOMATED COUNT: 14.5 % (ref 12.3–15.4)
ERYTHROCYTE [DISTWIDTH] IN BLOOD BY AUTOMATED COUNT: 14.5 % (ref 12.3–15.4)
ERYTHROCYTE [DISTWIDTH] IN BLOOD BY AUTOMATED COUNT: 14.8 % (ref 12.3–15.4)
ERYTHROCYTE [DISTWIDTH] IN BLOOD BY AUTOMATED COUNT: 14.9 % (ref 12.3–15.4)
ERYTHROCYTE [DISTWIDTH] IN BLOOD BY AUTOMATED COUNT: 15.1 % (ref 12.3–15.4)
ERYTHROCYTE [DISTWIDTH] IN BLOOD BY AUTOMATED COUNT: 15.3 % (ref 12.3–15.4)
ERYTHROCYTE [DISTWIDTH] IN BLOOD BY AUTOMATED COUNT: 15.6 % (ref 12.3–15.4)
ERYTHROCYTE [DISTWIDTH] IN BLOOD BY AUTOMATED COUNT: 15.7 % (ref 12.3–15.4)
ERYTHROCYTE [DISTWIDTH] IN BLOOD BY AUTOMATED COUNT: 17.9 % (ref 12.3–15.4)
ERYTHROCYTE [DISTWIDTH] IN BLOOD BY AUTOMATED COUNT: 18.5 % (ref 12.3–15.4)
ERYTHROCYTE [DISTWIDTH] IN BLOOD BY AUTOMATED COUNT: 19.4 % (ref 12.3–15.4)
ERYTHROCYTE [DISTWIDTH] IN BLOOD BY AUTOMATED COUNT: 20.5 % (ref 12.3–15.4)
ERYTHROCYTE [DISTWIDTH] IN BLOOD BY AUTOMATED COUNT: 20.6 % (ref 12.3–15.4)
ERYTHROCYTE [DISTWIDTH] IN BLOOD BY AUTOMATED COUNT: 20.7 % (ref 12.3–15.4)
ERYTHROCYTE [DISTWIDTH] IN BLOOD BY AUTOMATED COUNT: 21 % (ref 12.3–15.4)
ERYTHROCYTE [DISTWIDTH] IN BLOOD BY AUTOMATED COUNT: 21.6 % (ref 12.3–15.4)
ERYTHROCYTE [DISTWIDTH] IN BLOOD BY AUTOMATED COUNT: 21.7 % (ref 12.3–15.4)
ERYTHROCYTE [DISTWIDTH] IN BLOOD BY AUTOMATED COUNT: 22.7 % (ref 12.3–15.4)
FLUAV H1 2009 PAND RNA NPH QL NAA+PROBE: NOT DETECTED
FLUAV H1 2009 PAND RNA NPH QL NAA+PROBE: NOT DETECTED
FLUAV H1 HA GENE NPH QL NAA+PROBE: NOT DETECTED
FLUAV H1 HA GENE NPH QL NAA+PROBE: NOT DETECTED
FLUAV H3 RNA NPH QL NAA+PROBE: NOT DETECTED
FLUAV H3 RNA NPH QL NAA+PROBE: NOT DETECTED
FLUAV SUBTYP SPEC NAA+PROBE: NOT DETECTED
FLUAV SUBTYP SPEC NAA+PROBE: NOT DETECTED
FLUBV RNA ISLT QL NAA+PROBE: NOT DETECTED
FLUBV RNA ISLT QL NAA+PROBE: NOT DETECTED
FOLATE SERPL-MCNC: 10.2 NG/ML (ref 4.78–24.2)
GALACTOMANNAN AG SPEC IA-ACNC: 0.02 INDEX (ref 0–0.49)
GFR SERPL CREATININE-BSD FRML MDRD: 100 ML/MIN/1.73
GFR SERPL CREATININE-BSD FRML MDRD: 103 ML/MIN/1.73
GFR SERPL CREATININE-BSD FRML MDRD: 104 ML/MIN/1.73
GFR SERPL CREATININE-BSD FRML MDRD: 104 ML/MIN/1.73
GFR SERPL CREATININE-BSD FRML MDRD: 106 ML/MIN/1.73
GFR SERPL CREATININE-BSD FRML MDRD: 106 ML/MIN/1.73
GFR SERPL CREATININE-BSD FRML MDRD: 113 ML/MIN/1.73
GFR SERPL CREATININE-BSD FRML MDRD: 123 ML/MIN/1.73
GFR SERPL CREATININE-BSD FRML MDRD: 128 ML/MIN/1.73
GFR SERPL CREATININE-BSD FRML MDRD: 128 ML/MIN/1.73
GFR SERPL CREATININE-BSD FRML MDRD: 138 ML/MIN/1.73
GFR SERPL CREATININE-BSD FRML MDRD: 82 ML/MIN/1.73
GFR SERPL CREATININE-BSD FRML MDRD: 84 ML/MIN/1.73
GFR SERPL CREATININE-BSD FRML MDRD: 87 ML/MIN/1.73
GFR SERPL CREATININE-BSD FRML MDRD: 87 ML/MIN/1.73
GFR SERPL CREATININE-BSD FRML MDRD: 88 ML/MIN/1.73
GFR SERPL CREATININE-BSD FRML MDRD: 90 ML/MIN/1.73
GFR SERPL CREATININE-BSD FRML MDRD: 97 ML/MIN/1.73
GFR SERPL CREATININE-BSD FRML MDRD: 98 ML/MIN/1.73
GLOBULIN UR ELPH-MCNC: 2.1 GM/DL
GLOBULIN UR ELPH-MCNC: 2.2 GM/DL
GLOBULIN UR ELPH-MCNC: 2.2 GM/DL
GLOBULIN UR ELPH-MCNC: 2.3 GM/DL
GLOBULIN UR ELPH-MCNC: 2.4 GM/DL
GLOBULIN UR ELPH-MCNC: 2.4 GM/DL (ref 1.8–3.5)
GLOBULIN UR ELPH-MCNC: 2.5 GM/DL (ref 1.8–3.5)
GLOBULIN UR ELPH-MCNC: 2.5 GM/DL (ref 1.8–3.5)
GLOBULIN UR ELPH-MCNC: 2.6 GM/DL
GLOBULIN UR ELPH-MCNC: 2.7 GM/DL
GLOBULIN UR ELPH-MCNC: 2.7 GM/DL (ref 1.8–3.5)
GLOBULIN UR ELPH-MCNC: 3.2 GM/DL
GLOBULIN UR ELPH-MCNC: 3.3 GM/DL
GLUCOSE BLD-MCNC: 100 MG/DL (ref 65–99)
GLUCOSE BLD-MCNC: 101 MG/DL (ref 65–99)
GLUCOSE BLD-MCNC: 102 MG/DL (ref 65–99)
GLUCOSE BLD-MCNC: 104 MG/DL (ref 65–99)
GLUCOSE BLD-MCNC: 104 MG/DL (ref 74–124)
GLUCOSE BLD-MCNC: 106 MG/DL (ref 65–99)
GLUCOSE BLD-MCNC: 111 MG/DL (ref 74–124)
GLUCOSE BLD-MCNC: 115 MG/DL (ref 65–99)
GLUCOSE BLD-MCNC: 116 MG/DL (ref 65–99)
GLUCOSE BLD-MCNC: 118 MG/DL (ref 65–99)
GLUCOSE BLD-MCNC: 119 MG/DL (ref 65–99)
GLUCOSE BLD-MCNC: 120 MG/DL (ref 65–99)
GLUCOSE BLD-MCNC: 120 MG/DL (ref 74–124)
GLUCOSE BLD-MCNC: 122 MG/DL (ref 74–124)
GLUCOSE BLD-MCNC: 130 MG/DL (ref 65–99)
GLUCOSE BLD-MCNC: 95 MG/DL (ref 65–99)
GLUCOSE BLD-MCNC: 95 MG/DL (ref 65–99)
GLUCOSE BLD-MCNC: 96 MG/DL (ref 65–99)
GLUCOSE BLD-MCNC: 97 MG/DL (ref 65–99)
GLUCOSE BLD-MCNC: 99 MG/DL (ref 65–99)
GLUCOSE BLD-MCNC: 99 MG/DL (ref 65–99)
GLUCOSE UR STRIP-MCNC: NEGATIVE MG/DL
GLUCOSE UR STRIP-MCNC: NEGATIVE MG/DL
GRAM STN SPEC: NORMAL
H CAPSUL AG SPEC QL: NORMAL
HADV DNA SPEC NAA+PROBE: NOT DETECTED
HADV DNA SPEC NAA+PROBE: NOT DETECTED
HBA1C MFR BLD: 4.8 % (ref 4.8–5.6)
HBV SURFACE AG SERPL QL IA: NORMAL
HCOV 229E RNA SPEC QL NAA+PROBE: NOT DETECTED
HCOV 229E RNA SPEC QL NAA+PROBE: NOT DETECTED
HCOV HKU1 RNA SPEC QL NAA+PROBE: NOT DETECTED
HCOV HKU1 RNA SPEC QL NAA+PROBE: NOT DETECTED
HCOV NL63 RNA SPEC QL NAA+PROBE: NOT DETECTED
HCOV NL63 RNA SPEC QL NAA+PROBE: NOT DETECTED
HCOV OC43 RNA SPEC QL NAA+PROBE: NOT DETECTED
HCOV OC43 RNA SPEC QL NAA+PROBE: NOT DETECTED
HCT VFR BLD AUTO: 21.4 % (ref 37.5–51)
HCT VFR BLD AUTO: 21.6 % (ref 37.5–51)
HCT VFR BLD AUTO: 21.6 % (ref 37.5–51)
HCT VFR BLD AUTO: 22.6 % (ref 37.5–51)
HCT VFR BLD AUTO: 22.8 % (ref 37.5–51)
HCT VFR BLD AUTO: 23.4 % (ref 37.5–51)
HCT VFR BLD AUTO: 23.5 % (ref 37.5–51)
HCT VFR BLD AUTO: 23.5 % (ref 37.5–51)
HCT VFR BLD AUTO: 23.9 % (ref 37.5–51)
HCT VFR BLD AUTO: 24.1 % (ref 37.5–51)
HCT VFR BLD AUTO: 24.2 % (ref 37.5–51)
HCT VFR BLD AUTO: 24.8 % (ref 37.5–51)
HCT VFR BLD AUTO: 25.1 % (ref 37.5–51)
HCT VFR BLD AUTO: 25.5 % (ref 37.5–51)
HCT VFR BLD AUTO: 25.6 % (ref 37.5–51)
HCT VFR BLD AUTO: 25.7 % (ref 37.5–51)
HCT VFR BLD AUTO: 26.1 % (ref 37.5–51)
HCT VFR BLD AUTO: 26.4 % (ref 37.5–51)
HCT VFR BLD AUTO: 26.6 % (ref 37.5–51)
HCT VFR BLD AUTO: 26.7 % (ref 37.5–51)
HCT VFR BLD AUTO: 27 % (ref 37.5–51)
HCT VFR BLD AUTO: 27.9 % (ref 37.5–51)
HCT VFR BLD AUTO: 28 % (ref 37.5–51)
HCT VFR BLD AUTO: 28.4 % (ref 37.5–51)
HCT VFR BLD AUTO: 29.3 % (ref 37.5–51)
HCT VFR BLD AUTO: 29.5 % (ref 37.5–51)
HCT VFR BLD AUTO: 30.1 % (ref 37.5–51)
HCV AB SER DONR QL: NORMAL
HDLC SERPL-MCNC: 44 MG/DL (ref 40–60)
HDLC SERPL-MCNC: 55 MG/DL (ref 40–60)
HEPATITIS C RNA-NAA: NEGATIVE
HGB BLD-MCNC: 10 G/DL (ref 13–17.7)
HGB BLD-MCNC: 10.4 G/DL (ref 13–17.7)
HGB BLD-MCNC: 7.2 G/DL (ref 13–17.7)
HGB BLD-MCNC: 7.5 G/DL (ref 13–17.7)
HGB BLD-MCNC: 7.6 G/DL (ref 13–17.7)
HGB BLD-MCNC: 7.9 G/DL (ref 13–17.7)
HGB BLD-MCNC: 8 G/DL (ref 13–17.7)
HGB BLD-MCNC: 8 G/DL (ref 13–17.7)
HGB BLD-MCNC: 8.1 G/DL (ref 13–17.7)
HGB BLD-MCNC: 8.3 G/DL (ref 13–17.7)
HGB BLD-MCNC: 8.3 G/DL (ref 13–17.7)
HGB BLD-MCNC: 8.4 G/DL (ref 13–17.7)
HGB BLD-MCNC: 8.5 G/DL (ref 13–17.7)
HGB BLD-MCNC: 8.5 G/DL (ref 13–17.7)
HGB BLD-MCNC: 8.6 G/DL (ref 13–17.7)
HGB BLD-MCNC: 8.7 G/DL (ref 13–17.7)
HGB BLD-MCNC: 8.8 G/DL (ref 13–17.7)
HGB BLD-MCNC: 8.8 G/DL (ref 13–17.7)
HGB BLD-MCNC: 8.9 G/DL (ref 13–17.7)
HGB BLD-MCNC: 9.1 G/DL (ref 13–17.7)
HGB BLD-MCNC: 9.3 G/DL (ref 13–17.7)
HGB BLD-MCNC: 9.3 G/DL (ref 13–17.7)
HGB BLD-MCNC: 9.4 G/DL (ref 13–17.7)
HGB BLD-MCNC: 9.6 G/DL (ref 13–17.7)
HGB BLD-MCNC: 9.6 G/DL (ref 13–17.7)
HGB BLD-MCNC: 9.9 G/DL (ref 13–17.7)
HGB UR QL STRIP.AUTO: NEGATIVE
HGB UR QL STRIP.AUTO: NEGATIVE
HIV1+2 AB SER QL: NORMAL
HIV1+2 AB SER QL: NORMAL
HMPV RNA NPH QL NAA+NON-PROBE: NOT DETECTED
HMPV RNA NPH QL NAA+NON-PROBE: NOT DETECTED
HOLD SPECIMEN: NORMAL
HPIV1 RNA SPEC QL NAA+PROBE: NOT DETECTED
HPIV1 RNA SPEC QL NAA+PROBE: NOT DETECTED
HPIV2 RNA SPEC QL NAA+PROBE: NOT DETECTED
HPIV2 RNA SPEC QL NAA+PROBE: NOT DETECTED
HPIV3 RNA NPH QL NAA+PROBE: NOT DETECTED
HPIV3 RNA NPH QL NAA+PROBE: NOT DETECTED
HPIV4 P GENE NPH QL NAA+PROBE: NOT DETECTED
HPIV4 P GENE NPH QL NAA+PROBE: NOT DETECTED
HYALINE CASTS UR QL AUTO: NORMAL /LPF
IMM GRANULOCYTES # BLD AUTO: 0 10*3/MM3 (ref 0–0.05)
IMM GRANULOCYTES # BLD AUTO: 0.01 10*3/MM3 (ref 0–0.05)
IMM GRANULOCYTES # BLD AUTO: 0.02 10*3/MM3 (ref 0–0.05)
IMM GRANULOCYTES # BLD AUTO: 0.1 10*3/MM3 (ref 0–0.05)
IMM GRANULOCYTES NFR BLD AUTO: 0 % (ref 0–0.5)
IMM GRANULOCYTES NFR BLD AUTO: 1.3 % (ref 0–0.5)
IMM GRANULOCYTES NFR BLD AUTO: 12.5 % (ref 0–0.5)
IMM GRANULOCYTES NFR BLD AUTO: 2 % (ref 0–0.5)
IMM GRANULOCYTES NFR BLD AUTO: 2 % (ref 0–0.5)
IMM GRANULOCYTES NFR BLD AUTO: 3 % (ref 0–0.5)
IMM GRANULOCYTES NFR BLD AUTO: 3.1 % (ref 0–0.5)
IMM GRANULOCYTES NFR BLD AUTO: 4.3 % (ref 0–0.5)
IMM GRANULOCYTES NFR BLD AUTO: 4.5 % (ref 0–0.5)
IMM GRANULOCYTES NFR BLD AUTO: 5.3 % (ref 0–0.5)
IMM GRANULOCYTES NFR BLD AUTO: 6.7 % (ref 0–0.5)
INR PPP: 1.01 (ref 0.9–1.1)
INR PPP: 1.03 (ref 0.9–1.1)
INR PPP: 1.24 (ref 0.9–1.1)
KETONES UR QL STRIP: NEGATIVE
KETONES UR QL STRIP: NEGATIVE
L PNEUMO1 AG UR QL IA: NEGATIVE
LAB AP CASE REPORT: NORMAL
LDH SERPL-CCNC: 174 U/L (ref 135–225)
LDH SERPL-CCNC: 178 U/L (ref 135–225)
LDH SERPL-CCNC: 180 U/L (ref 99–259)
LDH SERPL-CCNC: 185 U/L (ref 99–259)
LDH SERPL-CCNC: 195 U/L (ref 99–259)
LDLC SERPL CALC-MCNC: 62 MG/DL (ref 0–100)
LDLC SERPL CALC-MCNC: 71 MG/DL (ref 0–100)
LDLC/HDLC SERPL: 1.29 {RATIO}
LDLC/HDLC SERPL: 1.4 {RATIO}
LEFT ATRIUM VOLUME INDEX: 30 ML/M2
LEFT ATRIUM VOLUME: 55 CM3
LEUKOCYTE ESTERASE UR QL STRIP.AUTO: ABNORMAL
LEUKOCYTE ESTERASE UR QL STRIP.AUTO: NEGATIVE
LYMPHOCYTES # BLD AUTO: 0.04 10*3/MM3 (ref 0.7–3.1)
LYMPHOCYTES # BLD AUTO: 0.07 10*3/MM3 (ref 0.7–3.1)
LYMPHOCYTES # BLD AUTO: 0.1 10*3/MM3 (ref 0.7–3.1)
LYMPHOCYTES # BLD AUTO: 0.1 10*3/MM3 (ref 0.7–3.1)
LYMPHOCYTES # BLD AUTO: 0.12 10*3/MM3 (ref 0.7–3.1)
LYMPHOCYTES # BLD AUTO: 0.13 10*3/MM3 (ref 0.7–3.1)
LYMPHOCYTES # BLD AUTO: 0.15 10*3/MM3 (ref 0.7–3.1)
LYMPHOCYTES # BLD AUTO: 0.16 10*3/MM3 (ref 0.7–3.1)
LYMPHOCYTES # BLD AUTO: 0.17 10*3/MM3 (ref 0.7–3.1)
LYMPHOCYTES # BLD AUTO: 0.21 10*3/MM3 (ref 0.7–3.1)
LYMPHOCYTES # BLD AUTO: 0.21 10*3/MM3 (ref 0.7–3.1)
LYMPHOCYTES # BLD AUTO: 0.24 10*3/MM3 (ref 0.7–3.1)
LYMPHOCYTES # BLD AUTO: 0.31 10*3/MM3 (ref 0.7–3.1)
LYMPHOCYTES # BLD AUTO: 0.41 10*3/MM3 (ref 0.7–3.1)
LYMPHOCYTES # BLD MANUAL: 0.34 10*3/MM3 (ref 0.7–3.1)
LYMPHOCYTES # BLD MANUAL: NORMAL 10*3/UL
LYMPHOCYTES NFR BLD AUTO: 26.5 % (ref 19.6–45.3)
LYMPHOCYTES NFR BLD AUTO: 30.8 % (ref 19.6–45.3)
LYMPHOCYTES NFR BLD AUTO: 32.3 % (ref 19.6–45.3)
LYMPHOCYTES NFR BLD AUTO: 40.8 % (ref 19.6–45.3)
LYMPHOCYTES NFR BLD AUTO: 41.2 % (ref 19.6–45.3)
LYMPHOCYTES NFR BLD AUTO: 42 % (ref 19.6–45.3)
LYMPHOCYTES NFR BLD AUTO: 45.5 % (ref 19.6–45.3)
LYMPHOCYTES NFR BLD AUTO: 50 % (ref 19.6–45.3)
LYMPHOCYTES NFR BLD AUTO: 51.3 % (ref 19.6–45.3)
LYMPHOCYTES NFR BLD AUTO: 51.5 % (ref 19.6–45.3)
LYMPHOCYTES NFR BLD AUTO: 56.5 % (ref 19.6–45.3)
LYMPHOCYTES NFR BLD AUTO: 60 % (ref 19.6–45.3)
LYMPHOCYTES NFR BLD AUTO: 63.2 % (ref 19.6–45.3)
LYMPHOCYTES NFR BLD AUTO: 65.6 % (ref 19.6–45.3)
LYMPHOCYTES NFR BLD AUTO: 72.2 % (ref 19.6–45.3)
LYMPHOCYTES NFR BLD AUTO: 77 % (ref 19.6–45.3)
LYMPHOCYTES NFR BLD AUTO: 80 % (ref 19.6–45.3)
LYMPHOCYTES NFR BLD MANUAL: 13 % (ref 5–12)
LYMPHOCYTES NFR BLD MANUAL: 38 % (ref 19.6–45.3)
LYMPHOCYTES NFR BLD MANUAL: NORMAL %
Lab: NORMAL
M PNEUMO IGG SER IA-ACNC: NOT DETECTED
M PNEUMO IGG SER IA-ACNC: NOT DETECTED
MAGNESIUM SERPL-MCNC: 1.8 MG/DL (ref 1.6–2.4)
MAGNESIUM SERPL-MCNC: 1.8 MG/DL (ref 1.8–2.5)
MAGNESIUM SERPL-MCNC: 1.8 MG/DL (ref 1.8–2.5)
MAGNESIUM SERPL-MCNC: 1.9 MG/DL (ref 1.8–2.5)
MAGNESIUM SERPL-MCNC: 2 MG/DL (ref 1.6–2.4)
MAXIMAL PREDICTED HEART RATE: 149 BPM
MCH RBC QN AUTO: 28.7 PG (ref 26.6–33)
MCH RBC QN AUTO: 28.9 PG (ref 26.6–33)
MCH RBC QN AUTO: 29 PG (ref 26.6–33)
MCH RBC QN AUTO: 29.4 PG (ref 26.6–33)
MCH RBC QN AUTO: 29.4 PG (ref 26.6–33)
MCH RBC QN AUTO: 29.5 PG (ref 26.6–33)
MCH RBC QN AUTO: 29.6 PG (ref 26.6–33)
MCH RBC QN AUTO: 29.6 PG (ref 26.6–33)
MCH RBC QN AUTO: 29.9 PG (ref 26.6–33)
MCH RBC QN AUTO: 30.2 PG (ref 26.6–33)
MCH RBC QN AUTO: 30.4 PG (ref 26.6–33)
MCH RBC QN AUTO: 30.5 PG (ref 26.6–33)
MCH RBC QN AUTO: 30.6 PG (ref 26.6–33)
MCH RBC QN AUTO: 30.8 PG (ref 26.6–33)
MCH RBC QN AUTO: 30.9 PG (ref 26.6–33)
MCH RBC QN AUTO: 30.9 PG (ref 26.6–33)
MCH RBC QN AUTO: 31 PG (ref 26.6–33)
MCH RBC QN AUTO: 31.1 PG (ref 26.6–33)
MCH RBC QN AUTO: 31.2 PG (ref 26.6–33)
MCH RBC QN AUTO: 31.2 PG (ref 26.6–33)
MCH RBC QN AUTO: 31.6 PG (ref 26.6–33)
MCH RBC QN AUTO: 31.7 PG (ref 26.6–33)
MCH RBC QN AUTO: 31.7 PG (ref 26.6–33)
MCH RBC QN AUTO: 32 PG (ref 26.6–33)
MCH RBC QN AUTO: 33.1 PG (ref 26.6–33)
MCH RBC QN AUTO: 33.3 PG (ref 26.6–33)
MCH RBC QN AUTO: 33.4 PG (ref 26.6–33)
MCH RBC QN AUTO: 33.5 PG (ref 26.6–33)
MCH RBC QN AUTO: 33.7 PG (ref 26.6–33)
MCH RBC QN AUTO: 33.9 PG (ref 26.6–33)
MCH RBC QN AUTO: 34.3 PG (ref 26.6–33)
MCHC RBC AUTO-ENTMCNC: 32.3 G/DL (ref 31.5–35.7)
MCHC RBC AUTO-ENTMCNC: 32.7 G/DL (ref 31.5–35.7)
MCHC RBC AUTO-ENTMCNC: 33.1 G/DL (ref 31.5–35.7)
MCHC RBC AUTO-ENTMCNC: 33.2 G/DL (ref 31.5–35.7)
MCHC RBC AUTO-ENTMCNC: 33.2 G/DL (ref 31.5–35.7)
MCHC RBC AUTO-ENTMCNC: 33.3 G/DL (ref 31.5–35.7)
MCHC RBC AUTO-ENTMCNC: 33.5 G/DL (ref 31.5–35.7)
MCHC RBC AUTO-ENTMCNC: 33.5 G/DL (ref 31.5–35.7)
MCHC RBC AUTO-ENTMCNC: 33.6 G/DL (ref 31.5–35.7)
MCHC RBC AUTO-ENTMCNC: 33.8 G/DL (ref 31.5–35.7)
MCHC RBC AUTO-ENTMCNC: 33.8 G/DL (ref 31.5–35.7)
MCHC RBC AUTO-ENTMCNC: 33.9 G/DL (ref 31.5–35.7)
MCHC RBC AUTO-ENTMCNC: 34.1 G/DL (ref 31.5–35.7)
MCHC RBC AUTO-ENTMCNC: 34.2 G/DL (ref 31.5–35.7)
MCHC RBC AUTO-ENTMCNC: 34.2 G/DL (ref 31.5–35.7)
MCHC RBC AUTO-ENTMCNC: 34.3 G/DL (ref 31.5–35.7)
MCHC RBC AUTO-ENTMCNC: 34.4 G/DL (ref 31.5–35.7)
MCHC RBC AUTO-ENTMCNC: 34.4 G/DL (ref 31.5–35.7)
MCHC RBC AUTO-ENTMCNC: 34.6 G/DL (ref 31.5–35.7)
MCHC RBC AUTO-ENTMCNC: 34.7 G/DL (ref 31.5–35.7)
MCHC RBC AUTO-ENTMCNC: 34.7 G/DL (ref 31.5–35.7)
MCHC RBC AUTO-ENTMCNC: 34.8 G/DL (ref 31.5–35.7)
MCHC RBC AUTO-ENTMCNC: 35.1 G/DL (ref 31.5–35.7)
MCHC RBC AUTO-ENTMCNC: 35.1 G/DL (ref 31.5–35.7)
MCHC RBC AUTO-ENTMCNC: 35.3 G/DL (ref 31.5–35.7)
MCHC RBC AUTO-ENTMCNC: 35.5 G/DL (ref 31.5–35.7)
MCV RBC AUTO: 100.4 FL (ref 79–97)
MCV RBC AUTO: 104.7 FL (ref 79–97)
MCV RBC AUTO: 104.9 FL (ref 79–97)
MCV RBC AUTO: 84.9 FL (ref 79–97)
MCV RBC AUTO: 86 FL (ref 79–97)
MCV RBC AUTO: 86.6 FL (ref 79–97)
MCV RBC AUTO: 86.7 FL (ref 79–97)
MCV RBC AUTO: 87.1 FL (ref 79–97)
MCV RBC AUTO: 87.1 FL (ref 79–97)
MCV RBC AUTO: 87.2 FL (ref 79–97)
MCV RBC AUTO: 87.9 FL (ref 79–97)
MCV RBC AUTO: 88.3 FL (ref 79–97)
MCV RBC AUTO: 88.5 FL (ref 79–97)
MCV RBC AUTO: 88.9 FL (ref 79–97)
MCV RBC AUTO: 89.3 FL (ref 79–97)
MCV RBC AUTO: 89.4 FL (ref 79–97)
MCV RBC AUTO: 90.6 FL (ref 79–97)
MCV RBC AUTO: 90.7 FL (ref 79–97)
MCV RBC AUTO: 90.7 FL (ref 79–97)
MCV RBC AUTO: 91.1 FL (ref 79–97)
MCV RBC AUTO: 91.2 FL (ref 79–97)
MCV RBC AUTO: 91.3 FL (ref 79–97)
MCV RBC AUTO: 92.6 FL (ref 79–97)
MCV RBC AUTO: 92.7 FL (ref 79–97)
MCV RBC AUTO: 94.3 FL (ref 79–97)
MCV RBC AUTO: 97.8 FL (ref 79–97)
MCV RBC AUTO: 97.9 FL (ref 79–97)
MCV RBC AUTO: 98.7 FL (ref 79–97)
MCV RBC AUTO: 99.7 FL (ref 79–97)
METHYLMALONATE SERPL-SCNC: 90 NMOL/L (ref 0–378)
MONOCYTES # BLD AUTO: 0.01 10*3/MM3 (ref 0.1–0.9)
MONOCYTES # BLD AUTO: 0.02 10*3/MM3 (ref 0.1–0.9)
MONOCYTES # BLD AUTO: 0.03 10*3/MM3 (ref 0.1–0.9)
MONOCYTES # BLD AUTO: 0.03 10*3/MM3 (ref 0.1–0.9)
MONOCYTES # BLD AUTO: 0.04 10*3/MM3 (ref 0.1–0.9)
MONOCYTES # BLD AUTO: 0.06 10*3/MM3 (ref 0.1–0.9)
MONOCYTES # BLD AUTO: 0.11 10*3/MM3 (ref 0.1–0.9)
MONOCYTES # BLD AUTO: 0.12 10*3/MM3 (ref 0.1–0.9)
MONOCYTES NFR BLD AUTO: 10.5 % (ref 5–12)
MONOCYTES NFR BLD AUTO: 12.2 % (ref 5–12)
MONOCYTES NFR BLD AUTO: 14.5 % (ref 5–12)
MONOCYTES NFR BLD AUTO: 2.5 % (ref 5–12)
MONOCYTES NFR BLD AUTO: 4 % (ref 5–12)
MONOCYTES NFR BLD AUTO: 4 % (ref 5–12)
MONOCYTES NFR BLD AUTO: 4.3 % (ref 5–12)
MONOCYTES NFR BLD AUTO: 4.5 % (ref 5–12)
MONOCYTES NFR BLD AUTO: 5.6 % (ref 5–12)
MONOCYTES NFR BLD AUTO: 5.9 % (ref 5–12)
MONOCYTES NFR BLD AUTO: 6 % (ref 5–12)
MONOCYTES NFR BLD AUTO: 6.1 % (ref 5–12)
MONOCYTES NFR BLD AUTO: 6.3 % (ref 5–12)
MONOCYTES NFR BLD AUTO: 6.5 % (ref 5–12)
MONOCYTES NFR BLD AUTO: 6.7 % (ref 5–12)
MONOCYTES NFR BLD AUTO: 7.7 % (ref 5–12)
MONOCYTES NFR BLD AUTO: 9.4 % (ref 5–12)
MRSA DNA SPEC QL NAA+PROBE: NORMAL
NEUTROPHILS # BLD AUTO: 0.01 10*3/MM3 (ref 1.7–7)
NEUTROPHILS # BLD AUTO: 0.04 10*3/MM3 (ref 1.7–7)
NEUTROPHILS # BLD AUTO: 0.04 10*3/MM3 (ref 1.7–7)
NEUTROPHILS # BLD AUTO: 0.06 10*3/MM3 (ref 1.7–7)
NEUTROPHILS # BLD AUTO: 0.07 10*3/MM3 (ref 1.7–7)
NEUTROPHILS # BLD AUTO: 0.08 10*3/MM3 (ref 1.7–7)
NEUTROPHILS # BLD AUTO: 0.1 10*3/MM3 (ref 1.7–7)
NEUTROPHILS # BLD AUTO: 0.11 10*3/MM3 (ref 1.7–7)
NEUTROPHILS # BLD AUTO: 0.15 10*3/MM3 (ref 1.7–7)
NEUTROPHILS # BLD AUTO: 0.19 10*3/MM3 (ref 1.7–7)
NEUTROPHILS # BLD AUTO: 0.2 10*3/MM3 (ref 1.7–7)
NEUTROPHILS # BLD AUTO: 0.25 10*3/MM3 (ref 1.7–7)
NEUTROPHILS # BLD AUTO: 0.28 10*3/MM3 (ref 1.7–7)
NEUTROPHILS # BLD AUTO: 0.42 10*3/MM3 (ref 1.7–7)
NEUTROPHILS # BLD AUTO: NORMAL 10*3/UL
NEUTROPHILS NFR BLD AUTO: 15.7 % (ref 42.7–76)
NEUTROPHILS NFR BLD AUTO: 19 % (ref 42.7–76)
NEUTROPHILS NFR BLD AUTO: 21.9 % (ref 42.7–76)
NEUTROPHILS NFR BLD AUTO: 22.2 % (ref 42.7–76)
NEUTROPHILS NFR BLD AUTO: 24.9 % (ref 42.7–76)
NEUTROPHILS NFR BLD AUTO: 30.3 % (ref 42.7–76)
NEUTROPHILS NFR BLD AUTO: 30.7 % (ref 42.7–76)
NEUTROPHILS NFR BLD AUTO: 32 % (ref 42.7–76)
NEUTROPHILS NFR BLD AUTO: 34.3 % (ref 42.7–76)
NEUTROPHILS NFR BLD AUTO: 34.9 % (ref 42.7–76)
NEUTROPHILS NFR BLD AUTO: 36.4 % (ref 42.7–76)
NEUTROPHILS NFR BLD AUTO: 36.8 % (ref 42.7–76)
NEUTROPHILS NFR BLD AUTO: 38 % (ref 42.7–76)
NEUTROPHILS NFR BLD AUTO: 41.1 % (ref 42.7–76)
NEUTROPHILS NFR BLD AUTO: 48.3 % (ref 42.7–76)
NEUTROPHILS NFR BLD AUTO: 51.1 % (ref 42.7–76)
NEUTROPHILS NFR BLD AUTO: 6.6 % (ref 42.7–76)
NEUTROPHILS NFR BLD MANUAL: 47 % (ref 42.7–76)
NEUTROPHILS NFR BLD MANUAL: NORMAL %
NITRITE UR QL STRIP: NEGATIVE
NITRITE UR QL STRIP: NEGATIVE
NRBC BLD AUTO-RTO: 0 /100 WBC (ref 0–0.2)
NRBC BLD AUTO-RTO: 1.6 /100 WBC (ref 0–0.2)
NRBC BLD AUTO-RTO: 11.1 /100 WBC (ref 0–0.2)
NRBC BLD AUTO-RTO: 13.3 /100 WBC (ref 0–0.2)
NRBC BLD AUTO-RTO: 2.6 /100 WBC (ref 0–0.2)
NRBC BLD AUTO-RTO: 3.2 /100 WBC (ref 0–0.2)
NRBC BLD AUTO-RTO: 4.5 /100 WBC (ref 0–0.2)
NRBC BLD AUTO-RTO: 5.3 /100 WBC (ref 0–0.2)
NRBC BLD AUTO-RTO: 6.1 /100 WBC (ref 0–0.2)
NT-PROBNP SERPL-MCNC: 1010 PG/ML (ref 5–900)
NT-PROBNP SERPL-MCNC: 2456 PG/ML (ref 5–900)
NT-PROBNP SERPL-MCNC: 279 PG/ML (ref 5–900)
NT-PROBNP SERPL-MCNC: 311 PG/ML (ref 5–900)
NT-PROBNP SERPL-MCNC: 52.5 PG/ML (ref 5–900)
PATH REPORT.FINAL DX SPEC: NORMAL
PATH REPORT.GROSS SPEC: NORMAL
PH UR STRIP.AUTO: 5.5 [PH] (ref 5–8)
PH UR STRIP.AUTO: 7.5 [PH] (ref 5–8)
PHOSPHATE SERPL-MCNC: 2.7 MG/DL (ref 2.5–4.5)
PHOSPHATE SERPL-MCNC: 2.8 MG/DL (ref 2.5–4.5)
PHOSPHATE SERPL-MCNC: 2.8 MG/DL (ref 2.5–4.5)
PLAT MORPH BLD: NORMAL
PLATELET # BLD AUTO: 105 10*3/MM3 (ref 140–450)
PLATELET # BLD AUTO: 107 10*3/MM3 (ref 140–450)
PLATELET # BLD AUTO: 109 10*3/MM3 (ref 140–450)
PLATELET # BLD AUTO: 111 10*3/MM3 (ref 140–450)
PLATELET # BLD AUTO: 123 10*3/MM3 (ref 140–450)
PLATELET # BLD AUTO: 123 10*3/MM3 (ref 140–450)
PLATELET # BLD AUTO: 125 10*3/MM3 (ref 140–450)
PLATELET # BLD AUTO: 125 10*3/MM3 (ref 140–450)
PLATELET # BLD AUTO: 133 10*3/MM3 (ref 140–450)
PLATELET # BLD AUTO: 168 10*3/MM3 (ref 140–450)
PLATELET # BLD AUTO: 170 10*3/MM3 (ref 140–450)
PLATELET # BLD AUTO: 194 10*3/MM3 (ref 140–450)
PLATELET # BLD AUTO: 26 10*3/MM3 (ref 140–450)
PLATELET # BLD AUTO: 26 10*3/MM3 (ref 140–450)
PLATELET # BLD AUTO: 27 10*3/MM3 (ref 140–450)
PLATELET # BLD AUTO: 35 10*3/MM3 (ref 140–450)
PLATELET # BLD AUTO: 40 10*3/MM3 (ref 140–450)
PLATELET # BLD AUTO: 41 10*3/MM3 (ref 140–450)
PLATELET # BLD AUTO: 46 10*3/MM3 (ref 140–450)
PLATELET # BLD AUTO: 59 10*3/MM3 (ref 140–450)
PLATELET # BLD AUTO: 64 10*3/MM3 (ref 140–450)
PLATELET # BLD AUTO: 67 10*3/MM3 (ref 140–450)
PLATELET # BLD AUTO: 67 10*3/MM3 (ref 140–450)
PLATELET # BLD AUTO: 74 10*3/MM3 (ref 140–450)
PLATELET # BLD AUTO: 75 10*3/MM3 (ref 140–450)
PLATELET # BLD AUTO: 79 10*3/MM3 (ref 140–450)
PLATELET # BLD AUTO: 83 10*3/MM3 (ref 140–450)
PLATELET # BLD AUTO: 88 10*3/MM3 (ref 140–450)
PMV BLD AUTO: 10 FL (ref 6–12)
PMV BLD AUTO: 10.4 FL (ref 6–12)
PMV BLD AUTO: 8.1 FL (ref 6–12)
PMV BLD AUTO: 8.2 FL (ref 6–12)
PMV BLD AUTO: 8.3 FL (ref 6–12)
PMV BLD AUTO: 8.4 FL (ref 6–12)
PMV BLD AUTO: 8.5 FL (ref 6–12)
PMV BLD AUTO: 8.8 FL (ref 6–12)
PMV BLD AUTO: 8.9 FL (ref 6–12)
PMV BLD AUTO: 9 FL (ref 6–12)
PMV BLD AUTO: 9.1 FL (ref 6–12)
PMV BLD AUTO: 9.2 FL (ref 6–12)
PMV BLD AUTO: 9.4 FL (ref 6–12)
PMV BLD AUTO: 9.5 FL (ref 6–12)
PMV BLD AUTO: 9.5 FL (ref 6–12)
PMV BLD AUTO: 9.6 FL (ref 6–12)
PMV BLD AUTO: 9.7 FL (ref 6–12)
PMV BLD AUTO: 9.7 FL (ref 6–12)
PMV BLD AUTO: ABNORMAL FL
PMV BLD AUTO: ABNORMAL FL (ref 6–12)
POTASSIUM BLD-SCNC: 3 MMOL/L (ref 3.5–5.2)
POTASSIUM BLD-SCNC: 3.4 MMOL/L (ref 3.5–5.2)
POTASSIUM BLD-SCNC: 3.6 MMOL/L (ref 3.5–5.2)
POTASSIUM BLD-SCNC: 3.7 MMOL/L (ref 3.5–4.7)
POTASSIUM BLD-SCNC: 3.7 MMOL/L (ref 3.5–5.2)
POTASSIUM BLD-SCNC: 3.8 MMOL/L (ref 3.5–5.2)
POTASSIUM BLD-SCNC: 3.9 MMOL/L (ref 3.5–5.2)
POTASSIUM BLD-SCNC: 3.9 MMOL/L (ref 3.5–5.2)
POTASSIUM BLD-SCNC: 4 MMOL/L (ref 3.5–4.7)
POTASSIUM BLD-SCNC: 4 MMOL/L (ref 3.5–4.7)
POTASSIUM BLD-SCNC: 4.1 MMOL/L (ref 3.5–5.2)
POTASSIUM BLD-SCNC: 4.1 MMOL/L (ref 3.5–5.2)
POTASSIUM BLD-SCNC: 4.2 MMOL/L (ref 3.5–4.7)
POTASSIUM BLD-SCNC: 4.2 MMOL/L (ref 3.5–5.2)
POTASSIUM BLD-SCNC: 4.4 MMOL/L (ref 3.5–5.2)
POTASSIUM BLD-SCNC: 4.7 MMOL/L (ref 3.5–5.2)
PROCALCITONIN SERPL-MCNC: 0.09 NG/ML (ref 0.1–0.25)
PROCALCITONIN SERPL-MCNC: 0.43 NG/ML (ref 0.1–0.25)
PROCALCITONIN SERPL-MCNC: 1.36 NG/ML (ref 0.1–0.25)
PROCALCITONIN SERPL-MCNC: 2.6 NG/ML (ref 0.1–0.25)
PROT SERPL-MCNC: 4.6 G/DL (ref 6–8.5)
PROT SERPL-MCNC: 4.6 G/DL (ref 6–8.5)
PROT SERPL-MCNC: 5.2 G/DL (ref 6–8.5)
PROT SERPL-MCNC: 5.7 G/DL (ref 6–8.5)
PROT SERPL-MCNC: 5.7 G/DL (ref 6–8.5)
PROT SERPL-MCNC: 5.9 G/DL (ref 6–8.5)
PROT SERPL-MCNC: 6.1 G/DL (ref 6.3–8)
PROT SERPL-MCNC: 6.2 G/DL (ref 6.3–8)
PROT SERPL-MCNC: 6.2 G/DL (ref 6.3–8)
PROT SERPL-MCNC: 6.2 G/DL (ref 6–8.5)
PROT SERPL-MCNC: 6.5 G/DL (ref 6.3–8)
PROT SERPL-MCNC: 6.9 G/DL (ref 6–8.5)
PROT SERPL-MCNC: 7 G/DL (ref 6–8.5)
PROT UR QL STRIP: ABNORMAL
PROT UR QL STRIP: NEGATIVE
PROTHROMBIN TIME: 13 SECONDS (ref 11.7–14.2)
PROTHROMBIN TIME: 13.2 SECONDS (ref 11.7–14.2)
PROTHROMBIN TIME: 15.3 SECONDS (ref 11.7–14.2)
PSA SERPL-MCNC: 0.15 NG/ML (ref 0–4)
RBC # BLD AUTO: 2.24 10*6/MM3 (ref 4.14–5.8)
RBC # BLD AUTO: 2.25 10*6/MM3 (ref 4.14–5.8)
RBC # BLD AUTO: 2.31 10*6/MM3 (ref 4.14–5.8)
RBC # BLD AUTO: 2.33 10*6/MM3 (ref 4.14–5.8)
RBC # BLD AUTO: 2.43 10*6/MM3 (ref 4.14–5.8)
RBC # BLD AUTO: 2.5 10*6/MM3 (ref 4.14–5.8)
RBC # BLD AUTO: 2.54 10*6/MM3 (ref 4.14–5.8)
RBC # BLD AUTO: 2.59 10*6/MM3 (ref 4.14–5.8)
RBC # BLD AUTO: 2.62 10*6/MM3 (ref 4.14–5.8)
RBC # BLD AUTO: 2.72 10*6/MM3 (ref 4.14–5.8)
RBC # BLD AUTO: 2.72 10*6/MM3 (ref 4.14–5.8)
RBC # BLD AUTO: 2.73 10*6/MM3 (ref 4.14–5.8)
RBC # BLD AUTO: 2.74 10*6/MM3 (ref 4.14–5.8)
RBC # BLD AUTO: 2.78 10*6/MM3 (ref 4.14–5.8)
RBC # BLD AUTO: 2.79 10*6/MM3 (ref 4.14–5.8)
RBC # BLD AUTO: 2.81 10*6/MM3 (ref 4.14–5.8)
RBC # BLD AUTO: 2.86 10*6/MM3 (ref 4.14–5.8)
RBC # BLD AUTO: 2.88 10*6/MM3 (ref 4.14–5.8)
RBC # BLD AUTO: 2.9 10*6/MM3 (ref 4.14–5.8)
RBC # BLD AUTO: 2.92 10*6/MM3 (ref 4.14–5.8)
RBC # BLD AUTO: 2.93 10*6/MM3 (ref 4.14–5.8)
RBC # BLD AUTO: 2.94 10*6/MM3 (ref 4.14–5.8)
RBC # BLD AUTO: 2.94 10*6/MM3 (ref 4.14–5.8)
RBC # BLD AUTO: 2.97 10*6/MM3 (ref 4.14–5.8)
RBC # BLD AUTO: 2.98 10*6/MM3 (ref 4.14–5.8)
RBC # BLD AUTO: 2.99 10*6/MM3 (ref 4.14–5.8)
RBC # BLD AUTO: 3.07 10*6/MM3 (ref 4.14–5.8)
RBC # BLD AUTO: 3.12 10*6/MM3 (ref 4.14–5.8)
RBC # BLD AUTO: 3.4 10*6/MM3 (ref 4.14–5.8)
RBC # UR: NORMAL /HPF
RBC MORPH BLD: NORMAL
REF LAB TEST METHOD: NORMAL
RESULT: <31 PG/ML
RETICS # AUTO: 0.04 10*6/MM3 (ref 0.02–0.13)
RETICS # AUTO: 0.04 10*6/MM3 (ref 0.02–0.13)
RETICS/RBC NFR AUTO: 1.53 % (ref 0.7–1.9)
RETICS/RBC NFR AUTO: 1.98 % (ref 0.7–1.9)
RH BLD: POSITIVE
RHINOVIRUS RNA SPEC NAA+PROBE: NOT DETECTED
RHINOVIRUS RNA SPEC NAA+PROBE: NOT DETECTED
RSV RNA NPH QL NAA+NON-PROBE: NOT DETECTED
RSV RNA NPH QL NAA+NON-PROBE: NOT DETECTED
SODIUM BLD-SCNC: 134 MMOL/L (ref 136–145)
SODIUM BLD-SCNC: 136 MMOL/L (ref 136–145)
SODIUM BLD-SCNC: 136 MMOL/L (ref 136–145)
SODIUM BLD-SCNC: 137 MMOL/L (ref 134–145)
SODIUM BLD-SCNC: 137 MMOL/L (ref 134–145)
SODIUM BLD-SCNC: 137 MMOL/L (ref 136–145)
SODIUM BLD-SCNC: 138 MMOL/L (ref 134–145)
SODIUM BLD-SCNC: 138 MMOL/L (ref 136–145)
SODIUM BLD-SCNC: 139 MMOL/L (ref 136–145)
SODIUM BLD-SCNC: 140 MMOL/L (ref 134–145)
SODIUM BLD-SCNC: 140 MMOL/L (ref 136–145)
SODIUM BLD-SCNC: 141 MMOL/L (ref 136–145)
SP GR UR STRIP: 1.03 (ref 1–1.03)
SP GR UR STRIP: <=1.005 (ref 1–1.03)
SQUAMOUS #/AREA URNS HPF: NORMAL /HPF
STRESS TARGET HR: 127 BPM
T&S EXPIRATION DATE: NORMAL
TRIGL SERPL-MCNC: 35 MG/DL (ref 0–150)
TRIGL SERPL-MCNC: 37 MG/DL (ref 0–150)
TROPONIN T SERPL-MCNC: 0.01 NG/ML (ref 0–0.03)
TROPONIN T SERPL-MCNC: 0.03 NG/ML (ref 0–0.03)
TROPONIN T SERPL-MCNC: <0.01 NG/ML (ref 0–0.03)
TSH SERPL DL<=0.05 MIU/L-ACNC: 2.36 UIU/ML (ref 0.27–4.2)
TSH SERPL DL<=0.05 MIU/L-ACNC: 2.65 MIU/ML (ref 0.27–4.2)
TSPOT INTERPRETATION: NEGATIVE
TSPOT NIL CONTROL INTERPRETATION: NORMAL
TSPOT PANEL A: 0
TSPOT PANEL B: 0
TSPOT POS CONTROL INTERPRETATION: NORMAL
UNIT  ABO: NORMAL
UNIT  RH: NORMAL
URATE SERPL-MCNC: 3.3 MG/DL (ref 2.8–7.4)
URATE SERPL-MCNC: 3.6 MG/DL (ref 2.8–7.4)
URATE SERPL-MCNC: 4.4 MG/DL (ref 2.8–7.4)
UROBILINOGEN UR QL STRIP: ABNORMAL
UROBILINOGEN UR QL STRIP: NORMAL
VIT B12 BLD-MCNC: >2000 PG/ML (ref 211–946)
VLDLC SERPL-MCNC: 7 MG/DL (ref 5–40)
VLDLC SERPL-MCNC: 7.4 MG/DL (ref 5–40)
WBC MORPH BLD: NORMAL
WBC NRBC COR # BLD: 0.06 10*3/MM3 (ref 3.4–10.8)
WBC NRBC COR # BLD: 0.07 10*3/MM3 (ref 3.4–10.8)
WBC NRBC COR # BLD: 0.09 10*3/MM3 (ref 3.4–10.8)
WBC NRBC COR # BLD: 0.1 10*3/MM3 (ref 3.4–10.8)
WBC NRBC COR # BLD: 0.13 10*3/MM3 (ref 3.4–10.8)
WBC NRBC COR # BLD: 0.15 10*3/MM3 (ref 3.4–10.8)
WBC NRBC COR # BLD: 0.15 10*3/MM3 (ref 3.4–10.8)
WBC NRBC COR # BLD: 0.17 10*3/MM3 (ref 3.4–10.8)
WBC NRBC COR # BLD: 0.18 10*3/MM3 (ref 3.4–10.8)
WBC NRBC COR # BLD: 0.19 10*3/MM3 (ref 3.4–10.8)
WBC NRBC COR # BLD: 0.21 10*3/MM3 (ref 3.4–10.8)
WBC NRBC COR # BLD: 0.22 10*3/MM3 (ref 3.4–10.8)
WBC NRBC COR # BLD: 0.23 10*3/MM3 (ref 3.4–10.8)
WBC NRBC COR # BLD: 0.23 10*3/MM3 (ref 3.4–10.8)
WBC NRBC COR # BLD: 0.25 10*3/MM3 (ref 3.4–10.8)
WBC NRBC COR # BLD: 0.29 10*3/MM3 (ref 3.4–10.8)
WBC NRBC COR # BLD: 0.3 10*3/MM3 (ref 3.4–10.8)
WBC NRBC COR # BLD: 0.31 10*3/MM3 (ref 3.4–10.8)
WBC NRBC COR # BLD: 0.32 10*3/MM3 (ref 3.4–10.8)
WBC NRBC COR # BLD: 0.32 10*3/MM3 (ref 3.4–10.8)
WBC NRBC COR # BLD: 0.33 10*3/MM3 (ref 3.4–10.8)
WBC NRBC COR # BLD: 0.37 10*3/MM3 (ref 3.4–10.8)
WBC NRBC COR # BLD: 0.38 10*3/MM3 (ref 3.4–10.8)
WBC NRBC COR # BLD: 0.49 10*3/MM3 (ref 3.4–10.8)
WBC NRBC COR # BLD: 0.5 10*3/MM3 (ref 3.4–10.8)
WBC NRBC COR # BLD: 0.62 10*3/MM3 (ref 3.4–10.8)
WBC NRBC COR # BLD: 0.63 10*3/MM3 (ref 3.4–10.8)
WBC NRBC COR # BLD: 0.64 10*3/MM3 (ref 3.4–10.8)
WBC NRBC COR # BLD: 0.76 10*3/MM3 (ref 3.4–10.8)
WBC NRBC COR # BLD: 0.8 10*3/MM3 (ref 3.4–10.8)
WBC NRBC COR # BLD: 0.89 10*3/MM3 (ref 3.4–10.8)
WBC UR QL AUTO: NORMAL /HPF
WHOLE BLOOD HOLD SPECIMEN: NORMAL

## 2019-01-01 PROCEDURE — 99284 EMERGENCY DEPT VISIT MOD MDM: CPT

## 2019-01-01 PROCEDURE — 99232 SBSQ HOSP IP/OBS MODERATE 35: CPT | Performed by: INTERNAL MEDICINE

## 2019-01-01 PROCEDURE — 86901 BLOOD TYPING SEROLOGIC RH(D): CPT | Performed by: INTERNAL MEDICINE

## 2019-01-01 PROCEDURE — 63710000001 PROCHLORPERAZINE MALEATE PER 5 MG: Performed by: NURSE PRACTITIONER

## 2019-01-01 PROCEDURE — 71046 X-RAY EXAM CHEST 2 VIEWS: CPT

## 2019-01-01 PROCEDURE — 87340 HEPATITIS B SURFACE AG IA: CPT | Performed by: COLON & RECTAL SURGERY

## 2019-01-01 PROCEDURE — 90686 IIV4 VACC NO PRSV 0.5 ML IM: CPT | Performed by: INTERNAL MEDICINE

## 2019-01-01 PROCEDURE — 25010000002 PALONOSETRON PER 25 MCG: Performed by: INTERNAL MEDICINE

## 2019-01-01 PROCEDURE — 99233 SBSQ HOSP IP/OBS HIGH 50: CPT | Performed by: INTERNAL MEDICINE

## 2019-01-01 PROCEDURE — 80048 BASIC METABOLIC PNL TOTAL CA: CPT | Performed by: HOSPITALIST

## 2019-01-01 PROCEDURE — C1788 PORT, INDWELLING, IMP: HCPCS | Performed by: SURGERY

## 2019-01-01 PROCEDURE — 36415 COLL VENOUS BLD VENIPUNCTURE: CPT | Performed by: NURSE PRACTITIONER

## 2019-01-01 PROCEDURE — 84550 ASSAY OF BLOOD/URIC ACID: CPT | Performed by: INTERNAL MEDICINE

## 2019-01-01 PROCEDURE — P9016 RBC LEUKOCYTES REDUCED: HCPCS

## 2019-01-01 PROCEDURE — 25010000002 CEFEPIME PER 500 MG: Performed by: HOSPITALIST

## 2019-01-01 PROCEDURE — 36416 COLLJ CAPILLARY BLOOD SPEC: CPT

## 2019-01-01 PROCEDURE — 83735 ASSAY OF MAGNESIUM: CPT | Performed by: EMERGENCY MEDICINE

## 2019-01-01 PROCEDURE — 87385 HISTOPLASMA CAPSUL AG IA: CPT | Performed by: INTERNAL MEDICINE

## 2019-01-01 PROCEDURE — 80053 COMPREHEN METABOLIC PANEL: CPT | Performed by: NURSE PRACTITIONER

## 2019-01-01 PROCEDURE — 25810000003 SODIUM CHLORIDE 0.9 % WITH KCL 20 MEQ 20-0.9 MEQ/L-% SOLUTION: Performed by: HOSPITALIST

## 2019-01-01 PROCEDURE — 85610 PROTHROMBIN TIME: CPT | Performed by: EMERGENCY MEDICINE

## 2019-01-01 PROCEDURE — 94799 UNLISTED PULMONARY SVC/PX: CPT

## 2019-01-01 PROCEDURE — 99285 EMERGENCY DEPT VISIT HI MDM: CPT

## 2019-01-01 PROCEDURE — 36430 TRANSFUSION BLD/BLD COMPNT: CPT

## 2019-01-01 PROCEDURE — 88313 SPECIAL STAINS GROUP 2: CPT | Performed by: INTERNAL MEDICINE

## 2019-01-01 PROCEDURE — 87040 BLOOD CULTURE FOR BACTERIA: CPT | Performed by: EMERGENCY MEDICINE

## 2019-01-01 PROCEDURE — 80053 COMPREHEN METABOLIC PANEL: CPT | Performed by: EMERGENCY MEDICINE

## 2019-01-01 PROCEDURE — 96413 CHEMO IV INFUSION 1 HR: CPT | Performed by: NURSE PRACTITIONER

## 2019-01-01 PROCEDURE — 63710000001 PROCHLORPERAZINE MALEATE PER 5 MG: Performed by: INTERNAL MEDICINE

## 2019-01-01 PROCEDURE — 83921 ORGANIC ACID SINGLE QUANT: CPT | Performed by: INTERNAL MEDICINE

## 2019-01-01 PROCEDURE — 86850 RBC ANTIBODY SCREEN: CPT | Performed by: NURSE PRACTITIONER

## 2019-01-01 PROCEDURE — 88184 FLOWCYTOMETRY/ TC 1 MARKER: CPT

## 2019-01-01 PROCEDURE — 84145 PROCALCITONIN (PCT): CPT | Performed by: HOSPITALIST

## 2019-01-01 PROCEDURE — 93005 ELECTROCARDIOGRAM TRACING: CPT

## 2019-01-01 PROCEDURE — 93005 ELECTROCARDIOGRAM TRACING: CPT | Performed by: HOSPITALIST

## 2019-01-01 PROCEDURE — 85610 PROTHROMBIN TIME: CPT | Performed by: INTERNAL MEDICINE

## 2019-01-01 PROCEDURE — 86900 BLOOD TYPING SEROLOGIC ABO: CPT | Performed by: INTERNAL MEDICINE

## 2019-01-01 PROCEDURE — 99223 1ST HOSP IP/OBS HIGH 75: CPT | Performed by: COLON & RECTAL SURGERY

## 2019-01-01 PROCEDURE — 84484 ASSAY OF TROPONIN QUANT: CPT | Performed by: EMERGENCY MEDICINE

## 2019-01-01 PROCEDURE — P9035 PLATELET PHERES LEUKOREDUCED: HCPCS

## 2019-01-01 PROCEDURE — 87521 HEPATITIS C PROBE&RVRS TRNSC: CPT | Performed by: COLON & RECTAL SURGERY

## 2019-01-01 PROCEDURE — 83605 ASSAY OF LACTIC ACID: CPT | Performed by: HOSPITALIST

## 2019-01-01 PROCEDURE — 85025 COMPLETE CBC W/AUTO DIFF WBC: CPT | Performed by: NURSE PRACTITIONER

## 2019-01-01 PROCEDURE — 86900 BLOOD TYPING SEROLOGIC ABO: CPT

## 2019-01-01 PROCEDURE — 76000 FLUOROSCOPY <1 HR PHYS/QHP: CPT

## 2019-01-01 PROCEDURE — 83615 LACTATE (LD) (LDH) ENZYME: CPT | Performed by: NURSE PRACTITIONER

## 2019-01-01 PROCEDURE — 88185 FLOWCYTOMETRY/TC ADD-ON: CPT | Performed by: INTERNAL MEDICINE

## 2019-01-01 PROCEDURE — 63710000001 DIPHENHYDRAMINE PER 50 MG: Performed by: INTERNAL MEDICINE

## 2019-01-01 PROCEDURE — 63710000001 DIPHENHYDRAMINE PER 50 MG: Performed by: NURSE PRACTITIONER

## 2019-01-01 PROCEDURE — 87040 BLOOD CULTURE FOR BACTERIA: CPT | Performed by: INTERNAL MEDICINE

## 2019-01-01 PROCEDURE — 86850 RBC ANTIBODY SCREEN: CPT | Performed by: INTERNAL MEDICINE

## 2019-01-01 PROCEDURE — 86900 BLOOD TYPING SEROLOGIC ABO: CPT | Performed by: NURSE PRACTITIONER

## 2019-01-01 PROCEDURE — 25010000002 DECITABINE PER 1 MG: Performed by: NURSE PRACTITIONER

## 2019-01-01 PROCEDURE — 99255 IP/OBS CONSLTJ NEW/EST HI 80: CPT | Performed by: INTERNAL MEDICINE

## 2019-01-01 PROCEDURE — 86923 COMPATIBILITY TEST ELECTRIC: CPT

## 2019-01-01 PROCEDURE — 88237 TISSUE CULTURE BONE MARROW: CPT

## 2019-01-01 PROCEDURE — 96365 THER/PROPH/DIAG IV INF INIT: CPT | Performed by: NURSE PRACTITIONER

## 2019-01-01 PROCEDURE — 25010000002 FENTANYL CITRATE (PF) 100 MCG/2ML SOLUTION: Performed by: ANESTHESIOLOGY

## 2019-01-01 PROCEDURE — 25010000002 HYDROMORPHONE PER 4 MG: Performed by: EMERGENCY MEDICINE

## 2019-01-01 PROCEDURE — 85007 BL SMEAR W/DIFF WBC COUNT: CPT | Performed by: EMERGENCY MEDICINE

## 2019-01-01 PROCEDURE — 25010000002 MORPHINE PER 10 MG: Performed by: HOSPITALIST

## 2019-01-01 PROCEDURE — 85025 COMPLETE CBC W/AUTO DIFF WBC: CPT | Performed by: HOSPITALIST

## 2019-01-01 PROCEDURE — 36416 COLLJ CAPILLARY BLOOD SPEC: CPT | Performed by: NURSE PRACTITIONER

## 2019-01-01 PROCEDURE — 25010000002 HALOPERIDOL LACTATE PER 5 MG: Performed by: HOSPITALIST

## 2019-01-01 PROCEDURE — 87305 ASPERGILLUS AG IA: CPT | Performed by: INTERNAL MEDICINE

## 2019-01-01 PROCEDURE — 84100 ASSAY OF PHOSPHORUS: CPT | Performed by: NURSE PRACTITIONER

## 2019-01-01 PROCEDURE — 99215 OFFICE O/P EST HI 40 MIN: CPT | Performed by: INTERNAL MEDICINE

## 2019-01-01 PROCEDURE — 63710000001 DRONABINOL PER 2.5 MG: Performed by: HOSPITALIST

## 2019-01-01 PROCEDURE — 86901 BLOOD TYPING SEROLOGIC RH(D): CPT | Performed by: NURSE PRACTITIONER

## 2019-01-01 PROCEDURE — 25010000002 DECITABINE PER 1 MG: Performed by: INTERNAL MEDICINE

## 2019-01-01 PROCEDURE — 80061 LIPID PANEL: CPT | Performed by: HOSPITALIST

## 2019-01-01 PROCEDURE — 93010 ELECTROCARDIOGRAM REPORT: CPT | Performed by: INTERNAL MEDICINE

## 2019-01-01 PROCEDURE — 87449 NOS EACH ORGANISM AG IA: CPT | Performed by: INTERNAL MEDICINE

## 2019-01-01 PROCEDURE — 88342 IMHCHEM/IMCYTCHM 1ST ANTB: CPT

## 2019-01-01 PROCEDURE — 83735 ASSAY OF MAGNESIUM: CPT | Performed by: NURSE PRACTITIONER

## 2019-01-01 PROCEDURE — 25010000002 CEFEPIME PER 500 MG: Performed by: EMERGENCY MEDICINE

## 2019-01-01 PROCEDURE — 84145 PROCALCITONIN (PCT): CPT | Performed by: INTERNAL MEDICINE

## 2019-01-01 PROCEDURE — 25010000002 PIPERACILLIN SOD-TAZOBACTAM PER 1 G: Performed by: INTERNAL MEDICINE

## 2019-01-01 PROCEDURE — 99214 OFFICE O/P EST MOD 30 MIN: CPT | Performed by: NURSE PRACTITIONER

## 2019-01-01 PROCEDURE — 85730 THROMBOPLASTIN TIME PARTIAL: CPT | Performed by: INTERNAL MEDICINE

## 2019-01-01 PROCEDURE — 71275 CT ANGIOGRAPHY CHEST: CPT

## 2019-01-01 PROCEDURE — 93306 TTE W/DOPPLER COMPLETE: CPT | Performed by: INTERNAL MEDICINE

## 2019-01-01 PROCEDURE — 80053 COMPREHEN METABOLIC PANEL: CPT | Performed by: HOSPITALIST

## 2019-01-01 PROCEDURE — 84550 ASSAY OF BLOOD/URIC ACID: CPT | Performed by: NURSE PRACTITIONER

## 2019-01-01 PROCEDURE — 85025 COMPLETE CBC W/AUTO DIFF WBC: CPT | Performed by: INTERNAL MEDICINE

## 2019-01-01 PROCEDURE — 36415 COLL VENOUS BLD VENIPUNCTURE: CPT | Performed by: INTERNAL MEDICINE

## 2019-01-01 PROCEDURE — 85045 AUTOMATED RETICULOCYTE COUNT: CPT | Performed by: INTERNAL MEDICINE

## 2019-01-01 PROCEDURE — G0432 EIA HIV-1/HIV-2 SCREEN: HCPCS | Performed by: INTERNAL MEDICINE

## 2019-01-01 PROCEDURE — 07DR3ZX EXTRACTION OF ILIAC BONE MARROW, PERCUTANEOUS APPROACH, DIAGNOSTIC: ICD-10-PCS | Performed by: RADIOLOGY

## 2019-01-01 PROCEDURE — 99203 OFFICE O/P NEW LOW 30 MIN: CPT | Performed by: INTERNAL MEDICINE

## 2019-01-01 PROCEDURE — 46040 I&D ISCHIORCT&/PERIRCT ABSC: CPT | Performed by: COLON & RECTAL SURGERY

## 2019-01-01 PROCEDURE — 93005 ELECTROCARDIOGRAM TRACING: CPT | Performed by: EMERGENCY MEDICINE

## 2019-01-01 PROCEDURE — 88185 FLOWCYTOMETRY/TC ADD-ON: CPT

## 2019-01-01 PROCEDURE — 36415 COLL VENOUS BLD VENIPUNCTURE: CPT | Performed by: HOSPITALIST

## 2019-01-01 PROCEDURE — 94640 AIRWAY INHALATION TREATMENT: CPT

## 2019-01-01 PROCEDURE — 99231 SBSQ HOSP IP/OBS SF/LOW 25: CPT | Performed by: INTERNAL MEDICINE

## 2019-01-01 PROCEDURE — 25010000002 ONDANSETRON PER 1 MG: Performed by: NURSE ANESTHETIST, CERTIFIED REGISTERED

## 2019-01-01 PROCEDURE — 74177 CT ABD & PELVIS W/CONTRAST: CPT

## 2019-01-01 PROCEDURE — 99024 POSTOP FOLLOW-UP VISIT: CPT | Performed by: COLON & RECTAL SURGERY

## 2019-01-01 PROCEDURE — 96365 THER/PROPH/DIAG IV INF INIT: CPT | Performed by: INTERNAL MEDICINE

## 2019-01-01 PROCEDURE — G0432 EIA HIV-1/HIV-2 SCREEN: HCPCS | Performed by: COLON & RECTAL SURGERY

## 2019-01-01 PROCEDURE — 88304 TISSUE EXAM BY PATHOLOGIST: CPT | Performed by: COLON & RECTAL SURGERY

## 2019-01-01 PROCEDURE — 83880 ASSAY OF NATRIURETIC PEPTIDE: CPT | Performed by: EMERGENCY MEDICINE

## 2019-01-01 PROCEDURE — 25010000002 PALONOSETRON PER 25 MCG: Performed by: NURSE PRACTITIONER

## 2019-01-01 PROCEDURE — 88305 TISSUE EXAM BY PATHOLOGIST: CPT | Performed by: INTERNAL MEDICINE

## 2019-01-01 PROCEDURE — 88341 IMHCHEM/IMCYTCHM EA ADD ANTB: CPT | Performed by: INTERNAL MEDICINE

## 2019-01-01 PROCEDURE — 71045 X-RAY EXAM CHEST 1 VIEW: CPT

## 2019-01-01 PROCEDURE — 87205 SMEAR GRAM STAIN: CPT | Performed by: INTERNAL MEDICINE

## 2019-01-01 PROCEDURE — 86481 TB AG RESPONSE T-CELL SUSP: CPT

## 2019-01-01 PROCEDURE — 86803 HEPATITIS C AB TEST: CPT | Performed by: COLON & RECTAL SURGERY

## 2019-01-01 PROCEDURE — 25010000002 IOPAMIDOL 61 % SOLUTION: Performed by: EMERGENCY MEDICINE

## 2019-01-01 PROCEDURE — 36591 DRAW BLOOD OFF VENOUS DEVICE: CPT

## 2019-01-01 PROCEDURE — 99024 POSTOP FOLLOW-UP VISIT: CPT | Performed by: PHYSICIAN ASSISTANT

## 2019-01-01 PROCEDURE — 36416 COLLJ CAPILLARY BLOOD SPEC: CPT | Performed by: INTERNAL MEDICINE

## 2019-01-01 PROCEDURE — 87103 BLOOD FUNGUS CULTURE: CPT | Performed by: INTERNAL MEDICINE

## 2019-01-01 PROCEDURE — 83880 ASSAY OF NATRIURETIC PEPTIDE: CPT | Performed by: HOSPITALIST

## 2019-01-01 PROCEDURE — 80053 COMPREHEN METABOLIC PANEL: CPT | Performed by: INTERNAL MEDICINE

## 2019-01-01 PROCEDURE — 96375 TX/PRO/DX INJ NEW DRUG ADDON: CPT | Performed by: NURSE PRACTITIONER

## 2019-01-01 PROCEDURE — 88341 IMHCHEM/IMCYTCHM EA ADD ANTB: CPT

## 2019-01-01 PROCEDURE — 87070 CULTURE OTHR SPECIMN AEROBIC: CPT | Performed by: INTERNAL MEDICINE

## 2019-01-01 PROCEDURE — 93970 EXTREMITY STUDY: CPT

## 2019-01-01 PROCEDURE — 88184 FLOWCYTOMETRY/ TC 1 MARKER: CPT | Performed by: INTERNAL MEDICINE

## 2019-01-01 PROCEDURE — 83735 ASSAY OF MAGNESIUM: CPT | Performed by: INTERNAL MEDICINE

## 2019-01-01 PROCEDURE — 36415 COLL VENOUS BLD VENIPUNCTURE: CPT

## 2019-01-01 PROCEDURE — 84443 ASSAY THYROID STIM HORMONE: CPT | Performed by: EMERGENCY MEDICINE

## 2019-01-01 PROCEDURE — 85379 FIBRIN DEGRADATION QUANT: CPT | Performed by: EMERGENCY MEDICINE

## 2019-01-01 PROCEDURE — 86901 BLOOD TYPING SEROLOGIC RH(D): CPT

## 2019-01-01 PROCEDURE — 99223 1ST HOSP IP/OBS HIGH 75: CPT | Performed by: INTERNAL MEDICINE

## 2019-01-01 PROCEDURE — 83615 LACTATE (LD) (LDH) ENZYME: CPT | Performed by: INTERNAL MEDICINE

## 2019-01-01 PROCEDURE — 25010000002 MORPHINE (PF) 10 MG/ML SOLUTION: Performed by: HOSPITALIST

## 2019-01-01 PROCEDURE — 96375 TX/PRO/DX INJ NEW DRUG ADDON: CPT | Performed by: INTERNAL MEDICINE

## 2019-01-01 PROCEDURE — 25010000003 POTASSIUM CHLORIDE 10 MEQ/100ML SOLUTION: Performed by: HOSPITALIST

## 2019-01-01 PROCEDURE — 0100U HC BIOFIRE FILMARRAY RESP PANEL 2: CPT | Performed by: EMERGENCY MEDICINE

## 2019-01-01 PROCEDURE — 25010000002 PROPOFOL 10 MG/ML EMULSION: Performed by: NURSE ANESTHETIST, CERTIFIED REGISTERED

## 2019-01-01 PROCEDURE — 99213 OFFICE O/P EST LOW 20 MIN: CPT | Performed by: PSYCHIATRY & NEUROLOGY

## 2019-01-01 PROCEDURE — 82607 VITAMIN B-12: CPT | Performed by: INTERNAL MEDICINE

## 2019-01-01 PROCEDURE — 25010000002 PROMETHAZINE PER 50 MG: Performed by: HOSPITALIST

## 2019-01-01 PROCEDURE — 71260 CT THORAX DX C+: CPT

## 2019-01-01 PROCEDURE — 93005 ELECTROCARDIOGRAM TRACING: CPT | Performed by: NURSE PRACTITIONER

## 2019-01-01 PROCEDURE — 88182 CELL MARKER STUDY: CPT

## 2019-01-01 PROCEDURE — 99215 OFFICE O/P EST HI 40 MIN: CPT | Performed by: NURSE PRACTITIONER

## 2019-01-01 PROCEDURE — 25010000002 HYDROMORPHONE PER 4 MG: Performed by: HOSPITALIST

## 2019-01-01 PROCEDURE — G0463 HOSPITAL OUTPT CLINIC VISIT: HCPCS | Performed by: NURSE PRACTITIONER

## 2019-01-01 PROCEDURE — 82746 ASSAY OF FOLIC ACID SERUM: CPT | Performed by: INTERNAL MEDICINE

## 2019-01-01 PROCEDURE — 88323 CONSLTJ&REPRT MATRL PREP SLD: CPT

## 2019-01-01 PROCEDURE — 25010000002 MAGNESIUM SULFATE IN D5W 1G/100ML (PREMIX) 1-5 GM/100ML-% SOLUTION: Performed by: NURSE PRACTITIONER

## 2019-01-01 PROCEDURE — 84443 ASSAY THYROID STIM HORMONE: CPT | Performed by: HOSPITALIST

## 2019-01-01 PROCEDURE — 85025 COMPLETE CBC W/AUTO DIFF WBC: CPT | Performed by: EMERGENCY MEDICINE

## 2019-01-01 PROCEDURE — 25010000002 ONDANSETRON PER 1 MG: Performed by: EMERGENCY MEDICINE

## 2019-01-01 PROCEDURE — 25010000003 LIDOCAINE 1 % SOLUTION 20 ML VIAL: Performed by: SURGERY

## 2019-01-01 PROCEDURE — 25010000002 VANCOMYCIN 10 G RECONSTITUTED SOLUTION: Performed by: EMERGENCY MEDICINE

## 2019-01-01 PROCEDURE — 84100 ASSAY OF PHOSPHORUS: CPT | Performed by: INTERNAL MEDICINE

## 2019-01-01 PROCEDURE — 25010000002 FUROSEMIDE PER 20 MG: Performed by: INTERNAL MEDICINE

## 2019-01-01 PROCEDURE — 25010000002 FENTANYL CITRATE (PF) 100 MCG/2ML SOLUTION: Performed by: NURSE ANESTHETIST, CERTIFIED REGISTERED

## 2019-01-01 PROCEDURE — 83605 ASSAY OF LACTIC ACID: CPT | Performed by: EMERGENCY MEDICINE

## 2019-01-01 PROCEDURE — 25010000002 VANCOMYCIN 10 G RECONSTITUTED SOLUTION: Performed by: HOSPITALIST

## 2019-01-01 PROCEDURE — 87075 CULTR BACTERIA EXCEPT BLOOD: CPT | Performed by: COLON & RECTAL SURGERY

## 2019-01-01 PROCEDURE — 85027 COMPLETE CBC AUTOMATED: CPT | Performed by: INTERNAL MEDICINE

## 2019-01-01 PROCEDURE — 93000 ELECTROCARDIOGRAM COMPLETE: CPT | Performed by: INTERNAL MEDICINE

## 2019-01-01 PROCEDURE — 84153 ASSAY OF PSA TOTAL: CPT | Performed by: INTERNAL MEDICINE

## 2019-01-01 PROCEDURE — 87641 MR-STAPH DNA AMP PROBE: CPT | Performed by: HOSPITALIST

## 2019-01-01 PROCEDURE — 87205 SMEAR GRAM STAIN: CPT | Performed by: COLON & RECTAL SURGERY

## 2019-01-01 PROCEDURE — 83036 HEMOGLOBIN GLYCOSYLATED A1C: CPT | Performed by: HOSPITALIST

## 2019-01-01 PROCEDURE — 99254 IP/OBS CNSLTJ NEW/EST MOD 60: CPT | Performed by: NURSE PRACTITIONER

## 2019-01-01 PROCEDURE — 84484 ASSAY OF TROPONIN QUANT: CPT | Performed by: HOSPITALIST

## 2019-01-01 PROCEDURE — G0008 ADMIN INFLUENZA VIRUS VAC: HCPCS | Performed by: INTERNAL MEDICINE

## 2019-01-01 PROCEDURE — 25010000002 HEPARIN (PORCINE) PER 1000 UNITS: Performed by: SURGERY

## 2019-01-01 PROCEDURE — 96523 IRRIG DRUG DELIVERY DEVICE: CPT | Performed by: NURSE PRACTITIONER

## 2019-01-01 PROCEDURE — 85025 COMPLETE CBC W/AUTO DIFF WBC: CPT

## 2019-01-01 PROCEDURE — 87070 CULTURE OTHR SPECIMN AEROBIC: CPT | Performed by: COLON & RECTAL SURGERY

## 2019-01-01 PROCEDURE — 88311 DECALCIFY TISSUE: CPT | Performed by: INTERNAL MEDICINE

## 2019-01-01 PROCEDURE — 25010000002 MORPHINE PER 10 MG: Performed by: INTERNAL MEDICINE

## 2019-01-01 PROCEDURE — 84145 PROCALCITONIN (PCT): CPT | Performed by: EMERGENCY MEDICINE

## 2019-01-01 PROCEDURE — 88264 CHROMOSOME ANALYSIS 20-25: CPT

## 2019-01-01 PROCEDURE — 81003 URINALYSIS AUTO W/O SCOPE: CPT | Performed by: EMERGENCY MEDICINE

## 2019-01-01 PROCEDURE — 87899 AGENT NOS ASSAY W/OPTIC: CPT | Performed by: INTERNAL MEDICINE

## 2019-01-01 PROCEDURE — 88280 CHROMOSOME KARYOTYPE STUDY: CPT

## 2019-01-01 PROCEDURE — 77012 CT SCAN FOR NEEDLE BIOPSY: CPT

## 2019-01-01 PROCEDURE — 25010000003 LIDOCAINE 1 % SOLUTION: Performed by: HOSPITALIST

## 2019-01-01 PROCEDURE — 25010000002 INFLUENZA VAC SPLIT QUAD 0.5 ML SUSPENSION PREFILLED SYRINGE: Performed by: INTERNAL MEDICINE

## 2019-01-01 PROCEDURE — 71250 CT THORAX DX C-: CPT

## 2019-01-01 PROCEDURE — 88189 FLOWCYTOMETRY/READ 16 & >: CPT | Performed by: INTERNAL MEDICINE

## 2019-01-01 PROCEDURE — 25010000003 CEFAZOLIN IN DEXTROSE 2-4 GM/100ML-% SOLUTION: Performed by: SURGERY

## 2019-01-01 PROCEDURE — 0D9P0ZZ DRAINAGE OF RECTUM, OPEN APPROACH: ICD-10-PCS | Performed by: COLON & RECTAL SURGERY

## 2019-01-01 PROCEDURE — 83880 ASSAY OF NATRIURETIC PEPTIDE: CPT | Performed by: INTERNAL MEDICINE

## 2019-01-01 PROCEDURE — 80048 BASIC METABOLIC PNL TOTAL CA: CPT | Performed by: EMERGENCY MEDICINE

## 2019-01-01 PROCEDURE — 96374 THER/PROPH/DIAG INJ IV PUSH: CPT

## 2019-01-01 PROCEDURE — 93306 TTE W/DOPPLER COMPLETE: CPT

## 2019-01-01 PROCEDURE — 81001 URINALYSIS AUTO W/SCOPE: CPT | Performed by: EMERGENCY MEDICINE

## 2019-01-01 PROCEDURE — 99232 SBSQ HOSP IP/OBS MODERATE 35: CPT | Performed by: NURSE PRACTITIONER

## 2019-01-01 PROCEDURE — 25010000002 DEXAMETHASONE PER 1 MG: Performed by: NURSE ANESTHETIST, CERTIFIED REGISTERED

## 2019-01-01 PROCEDURE — 0 IOPAMIDOL PER 1 ML: Performed by: EMERGENCY MEDICINE

## 2019-01-01 PROCEDURE — 96365 THER/PROPH/DIAG IV INF INIT: CPT

## 2019-01-01 PROCEDURE — 25010000002 SUCCINYLCHOLINE PER 20 MG: Performed by: NURSE ANESTHETIST, CERTIFIED REGISTERED

## 2019-01-01 PROCEDURE — 25010000002 NEOSTIGMINE PER 0.5 MG: Performed by: NURSE ANESTHETIST, CERTIFIED REGISTERED

## 2019-01-01 DEVICE — POWERPORT ISP M.R.I. IMPLANTABLE PORT WITH ATTACHABLE 8F CHRONOFLEX OPEN-ENDED SINGLE-LUMEN VENOUS CATHETER INTERMEDIATE KIT (WITH SUTURE PLUGS)
Type: IMPLANTABLE DEVICE | Status: FUNCTIONAL
Brand: POWERPORT M.R.I., CHRONOFLEX

## 2019-01-01 RX ORDER — MORPHINE SULFATE IN 0.9 % NACL 50 MG/50ML
PATIENT CONTROLLED ANALGESIA SYRINGE INTRAVENOUS CONTINUOUS
Status: DISCONTINUED | OUTPATIENT
Start: 2019-01-01 | End: 2019-01-01

## 2019-01-01 RX ORDER — PROMETHAZINE HYDROCHLORIDE 25 MG/ML
6.25 INJECTION, SOLUTION INTRAMUSCULAR; INTRAVENOUS
Status: DISCONTINUED | OUTPATIENT
Start: 2019-01-01 | End: 2019-01-01 | Stop reason: HOSPADM

## 2019-01-01 RX ORDER — DIPHENHYDRAMINE HCL 25 MG
25 CAPSULE ORAL ONCE
Status: COMPLETED | OUTPATIENT
Start: 2019-01-01 | End: 2019-01-01

## 2019-01-01 RX ORDER — FAMOTIDINE 10 MG/ML
20 INJECTION, SOLUTION INTRAVENOUS ONCE
Status: COMPLETED | OUTPATIENT
Start: 2019-01-01 | End: 2019-01-01

## 2019-01-01 RX ORDER — LIDOCAINE HYDROCHLORIDE 20 MG/ML
JELLY TOPICAL EVERY 4 HOURS PRN
Status: DISCONTINUED | OUTPATIENT
Start: 2019-01-01 | End: 2019-01-01

## 2019-01-01 RX ORDER — SODIUM CHLORIDE 9 MG/ML
250 INJECTION, SOLUTION INTRAVENOUS ONCE
Status: COMPLETED | OUTPATIENT
Start: 2019-01-01 | End: 2019-01-01

## 2019-01-01 RX ORDER — NALOXONE HCL 0.4 MG/ML
0.2 VIAL (ML) INJECTION AS NEEDED
Status: DISCONTINUED | OUTPATIENT
Start: 2019-01-01 | End: 2019-01-01 | Stop reason: HOSPADM

## 2019-01-01 RX ORDER — ACETAMINOPHEN 500 MG
1000 TABLET ORAL ONCE
Status: COMPLETED | OUTPATIENT
Start: 2019-01-01 | End: 2019-01-01

## 2019-01-01 RX ORDER — ACYCLOVIR 400 MG/1
400 TABLET ORAL 2 TIMES DAILY
Qty: 60 TABLET | Refills: 4 | Status: ON HOLD | OUTPATIENT
Start: 2019-01-01 | End: 2019-01-01 | Stop reason: SDUPTHER

## 2019-01-01 RX ORDER — POTASSIUM CHLORIDE 7.45 MG/ML
10 INJECTION INTRAVENOUS
Status: DISCONTINUED | OUTPATIENT
Start: 2019-01-01 | End: 2019-01-01

## 2019-01-01 RX ORDER — PROMETHAZINE HYDROCHLORIDE 25 MG/1
25 SUPPOSITORY RECTAL ONCE AS NEEDED
Status: DISCONTINUED | OUTPATIENT
Start: 2019-01-01 | End: 2019-01-01 | Stop reason: HOSPADM

## 2019-01-01 RX ORDER — EPHEDRINE SULFATE 50 MG/ML
5 INJECTION, SOLUTION INTRAVENOUS ONCE AS NEEDED
Status: CANCELLED | OUTPATIENT
Start: 2019-01-01

## 2019-01-01 RX ORDER — ALLOPURINOL 300 MG/1
300 TABLET ORAL DAILY
Qty: 30 TABLET | Refills: 2 | Status: SHIPPED | OUTPATIENT
Start: 2019-01-01 | End: 2019-01-01

## 2019-01-01 RX ORDER — SODIUM CHLORIDE 0.9 % (FLUSH) 0.9 %
10 SYRINGE (ML) INJECTION AS NEEDED
Status: DISCONTINUED | OUTPATIENT
Start: 2019-01-01 | End: 2019-01-01 | Stop reason: HOSPADM

## 2019-01-01 RX ORDER — VALACYCLOVIR HYDROCHLORIDE 500 MG/1
1000 TABLET, FILM COATED ORAL EVERY 8 HOURS SCHEDULED
Status: DISCONTINUED | OUTPATIENT
Start: 2019-01-01 | End: 2019-01-01 | Stop reason: HOSPADM

## 2019-01-01 RX ORDER — HYDROMORPHONE HYDROCHLORIDE 1 MG/ML
0.5 INJECTION, SOLUTION INTRAMUSCULAR; INTRAVENOUS; SUBCUTANEOUS
Status: DISCONTINUED | OUTPATIENT
Start: 2019-01-01 | End: 2019-01-01 | Stop reason: HOSPADM

## 2019-01-01 RX ORDER — HYDRALAZINE HYDROCHLORIDE 20 MG/ML
5 INJECTION INTRAMUSCULAR; INTRAVENOUS
Status: DISCONTINUED | OUTPATIENT
Start: 2019-01-01 | End: 2019-01-01 | Stop reason: HOSPADM

## 2019-01-01 RX ORDER — SODIUM CHLORIDE 0.9 % (FLUSH) 0.9 %
3-10 SYRINGE (ML) INJECTION AS NEEDED
Status: DISCONTINUED | OUTPATIENT
Start: 2019-01-01 | End: 2019-01-01 | Stop reason: HOSPADM

## 2019-01-01 RX ORDER — PROCHLORPERAZINE MALEATE 5 MG/1
10 TABLET ORAL ONCE
Status: COMPLETED | OUTPATIENT
Start: 2019-01-01 | End: 2019-01-01

## 2019-01-01 RX ORDER — SUCRALFATE ORAL 1 G/10ML
1 SUSPENSION ORAL 4 TIMES DAILY
Status: DISCONTINUED | OUTPATIENT
Start: 2019-01-01 | End: 2019-01-01

## 2019-01-01 RX ORDER — SODIUM CHLORIDE 9 MG/ML
250 INJECTION, SOLUTION INTRAVENOUS ONCE
Status: CANCELLED | OUTPATIENT
Start: 2019-01-01

## 2019-01-01 RX ORDER — OXYCODONE AND ACETAMINOPHEN 7.5; 325 MG/1; MG/1
1 TABLET ORAL ONCE AS NEEDED
Status: DISCONTINUED | OUTPATIENT
Start: 2019-01-01 | End: 2019-01-01 | Stop reason: HOSPADM

## 2019-01-01 RX ORDER — ONDANSETRON 4 MG/1
8 TABLET, FILM COATED ORAL 3 TIMES DAILY PRN
Status: DISCONTINUED | OUTPATIENT
Start: 2019-01-01 | End: 2019-01-01

## 2019-01-01 RX ORDER — ZOLPIDEM TARTRATE 5 MG/1
5 TABLET ORAL NIGHTLY PRN
Status: DISCONTINUED | OUTPATIENT
Start: 2019-01-01 | End: 2019-01-01

## 2019-01-01 RX ORDER — ONDANSETRON 2 MG/ML
4 INJECTION INTRAMUSCULAR; INTRAVENOUS ONCE AS NEEDED
Status: DISCONTINUED | OUTPATIENT
Start: 2019-01-01 | End: 2019-01-01 | Stop reason: HOSPADM

## 2019-01-01 RX ORDER — ZOLPIDEM TARTRATE 5 MG/1
5 TABLET ORAL NIGHTLY PRN
Qty: 7 TABLET | Refills: 0 | Status: SHIPPED | OUTPATIENT
Start: 2019-01-01 | End: 2019-01-01

## 2019-01-01 RX ORDER — DIGOXIN 0.25 MG/ML
250 INJECTION INTRAMUSCULAR; INTRAVENOUS ONCE
Status: DISCONTINUED | OUTPATIENT
Start: 2019-01-01 | End: 2019-01-01

## 2019-01-01 RX ORDER — HYDROMORPHONE HYDROCHLORIDE 1 MG/ML
0.5 INJECTION, SOLUTION INTRAMUSCULAR; INTRAVENOUS; SUBCUTANEOUS ONCE
Status: COMPLETED | OUTPATIENT
Start: 2019-01-01 | End: 2019-01-01

## 2019-01-01 RX ORDER — CEFAZOLIN SODIUM 2 G/100ML
2 INJECTION, SOLUTION INTRAVENOUS ONCE
Status: COMPLETED | OUTPATIENT
Start: 2019-01-01 | End: 2019-01-01

## 2019-01-01 RX ORDER — HYDROCODONE BITARTRATE AND ACETAMINOPHEN 5; 325 MG/1; MG/1
1 TABLET ORAL EVERY 6 HOURS PRN
Status: DISCONTINUED | OUTPATIENT
Start: 2019-01-01 | End: 2019-01-01

## 2019-01-01 RX ORDER — LIDOCAINE 50 MG/G
OINTMENT TOPICAL EVERY 4 HOURS PRN
Qty: 1 TUBE | Refills: 5 | Status: SHIPPED | OUTPATIENT
Start: 2019-01-01 | End: 2019-01-01

## 2019-01-01 RX ORDER — SODIUM CHLORIDE 9 MG/ML
250 INJECTION, SOLUTION INTRAVENOUS AS NEEDED
Status: DISCONTINUED | OUTPATIENT
Start: 2019-01-01 | End: 2019-01-01 | Stop reason: HOSPADM

## 2019-01-01 RX ORDER — MORPHINE SULFATE 10 MG/ML
6 INJECTION INTRAMUSCULAR; INTRAVENOUS; SUBCUTANEOUS
Status: DISCONTINUED | OUTPATIENT
Start: 2019-01-01 | End: 2019-10-04 | Stop reason: HOSPADM

## 2019-01-01 RX ORDER — CEFDINIR 300 MG/1
300 CAPSULE ORAL EVERY 12 HOURS SCHEDULED
Qty: 8 CAPSULE | Refills: 0 | Status: SHIPPED | OUTPATIENT
Start: 2019-01-01 | End: 2019-01-01

## 2019-01-01 RX ORDER — SODIUM CHLORIDE 0.9 % (FLUSH) 0.9 %
10 SYRINGE (ML) INJECTION AS NEEDED
Status: CANCELLED | OUTPATIENT
Start: 2019-01-01

## 2019-01-01 RX ORDER — HALOPERIDOL 1 MG/1
2 TABLET ORAL EVERY 4 HOURS PRN
Status: DISCONTINUED | OUTPATIENT
Start: 2019-01-01 | End: 2019-10-04 | Stop reason: HOSPADM

## 2019-01-01 RX ORDER — NALOXONE HCL 0.4 MG/ML
0.1 VIAL (ML) INJECTION
Status: DISCONTINUED | OUTPATIENT
Start: 2019-01-01 | End: 2019-10-04 | Stop reason: HOSPADM

## 2019-01-01 RX ORDER — LIDOCAINE HYDROCHLORIDE 10 MG/ML
0.5 INJECTION, SOLUTION EPIDURAL; INFILTRATION; INTRACAUDAL; PERINEURAL ONCE AS NEEDED
Status: DISCONTINUED | OUTPATIENT
Start: 2019-01-01 | End: 2019-01-01 | Stop reason: HOSPADM

## 2019-01-01 RX ORDER — SODIUM CHLORIDE 9 MG/ML
250 INJECTION, SOLUTION INTRAVENOUS AS NEEDED
Status: CANCELLED | OUTPATIENT
Start: 2019-01-01

## 2019-01-01 RX ORDER — PREDNISONE 1 MG/1
5 TABLET ORAL DAILY PRN
Refills: 1 | COMMUNITY
Start: 2019-01-01 | End: 2019-01-01 | Stop reason: HOSPADM

## 2019-01-01 RX ORDER — METOPROLOL TARTRATE 5 MG/5ML
5 INJECTION INTRAVENOUS ONCE
Status: COMPLETED | OUTPATIENT
Start: 2019-01-01 | End: 2019-01-01

## 2019-01-01 RX ORDER — HYDROCODONE BITARTRATE AND ACETAMINOPHEN 7.5; 325 MG/1; MG/1
1 TABLET ORAL ONCE AS NEEDED
Status: DISCONTINUED | OUTPATIENT
Start: 2019-01-01 | End: 2019-01-01 | Stop reason: HOSPADM

## 2019-01-01 RX ORDER — GLYCOPYRROLATE 0.2 MG/ML
0.4 INJECTION INTRAMUSCULAR; INTRAVENOUS
Status: DISCONTINUED | OUTPATIENT
Start: 2019-01-01 | End: 2019-10-04 | Stop reason: HOSPADM

## 2019-01-01 RX ORDER — ACETAMINOPHEN 500 MG
1000 TABLET ORAL ONCE
Status: DISCONTINUED | OUTPATIENT
Start: 2019-01-01 | End: 2019-01-01

## 2019-01-01 RX ORDER — DIGOXIN 0.25 MG/ML
125 INJECTION INTRAMUSCULAR; INTRAVENOUS ONCE
Status: DISCONTINUED | OUTPATIENT
Start: 2019-01-01 | End: 2019-01-01

## 2019-01-01 RX ORDER — MIDAZOLAM HYDROCHLORIDE 1 MG/ML
2 INJECTION INTRAMUSCULAR; INTRAVENOUS
Status: DISCONTINUED | OUTPATIENT
Start: 2019-01-01 | End: 2019-01-01 | Stop reason: HOSPADM

## 2019-01-01 RX ORDER — HYDRALAZINE HYDROCHLORIDE 20 MG/ML
5 INJECTION INTRAMUSCULAR; INTRAVENOUS
Status: CANCELLED | OUTPATIENT
Start: 2019-01-01

## 2019-01-01 RX ORDER — HYDROCODONE BITARTRATE AND ACETAMINOPHEN 5; 325 MG/1; MG/1
1 TABLET ORAL EVERY 6 HOURS PRN
Qty: 120 TABLET | Refills: 0 | Status: CANCELLED | OUTPATIENT
Start: 2019-01-01

## 2019-01-01 RX ORDER — DIPHENHYDRAMINE HCL 25 MG
25 CAPSULE ORAL
Status: DISCONTINUED | OUTPATIENT
Start: 2019-01-01 | End: 2019-01-01 | Stop reason: HOSPADM

## 2019-01-01 RX ORDER — SODIUM CHLORIDE, SODIUM LACTATE, POTASSIUM CHLORIDE, CALCIUM CHLORIDE 600; 310; 30; 20 MG/100ML; MG/100ML; MG/100ML; MG/100ML
9 INJECTION, SOLUTION INTRAVENOUS CONTINUOUS
Status: DISCONTINUED | OUTPATIENT
Start: 2019-01-01 | End: 2019-01-01 | Stop reason: HOSPADM

## 2019-01-01 RX ORDER — IPRATROPIUM BROMIDE AND ALBUTEROL SULFATE 2.5; .5 MG/3ML; MG/3ML
3 SOLUTION RESPIRATORY (INHALATION) EVERY 4 HOURS PRN
Status: DISCONTINUED | OUTPATIENT
Start: 2019-01-01 | End: 2019-01-01

## 2019-01-01 RX ORDER — PANTOPRAZOLE SODIUM 40 MG/1
40 TABLET, DELAYED RELEASE ORAL
Status: DISCONTINUED | OUTPATIENT
Start: 2019-01-01 | End: 2019-01-01 | Stop reason: HOSPADM

## 2019-01-01 RX ORDER — SODIUM CHLORIDE AND POTASSIUM CHLORIDE 150; 900 MG/100ML; MG/100ML
75 INJECTION, SOLUTION INTRAVENOUS CONTINUOUS
Status: DISCONTINUED | OUTPATIENT
Start: 2019-01-01 | End: 2019-01-01

## 2019-01-01 RX ORDER — HALOPERIDOL 5 MG/ML
2 INJECTION INTRAMUSCULAR EVERY 4 HOURS PRN
Status: DISCONTINUED | OUTPATIENT
Start: 2019-01-01 | End: 2019-01-01 | Stop reason: SDUPTHER

## 2019-01-01 RX ORDER — ACYCLOVIR 400 MG/1
400 TABLET ORAL 2 TIMES DAILY
Status: DISCONTINUED | OUTPATIENT
Start: 2019-01-01 | End: 2019-01-01

## 2019-01-01 RX ORDER — ALLOPURINOL 300 MG/1
300 TABLET ORAL DAILY
Qty: 30 TABLET | Refills: 0 | Status: SHIPPED | OUTPATIENT
Start: 2019-01-01 | End: 2019-01-01 | Stop reason: SDUPTHER

## 2019-01-01 RX ORDER — ACETAMINOPHEN 325 MG/1
650 TABLET ORAL ONCE AS NEEDED
Status: CANCELLED | OUTPATIENT
Start: 2019-01-01

## 2019-01-01 RX ORDER — ZOLPIDEM TARTRATE 5 MG/1
5 TABLET ORAL NIGHTLY PRN
Qty: 30 TABLET | Refills: 0 | Status: SHIPPED | OUTPATIENT
Start: 2019-01-01

## 2019-01-01 RX ORDER — DIPHENHYDRAMINE HCL 25 MG
25 CAPSULE ORAL ONCE
Status: CANCELLED | OUTPATIENT
Start: 2019-01-01 | End: 2019-01-01

## 2019-01-01 RX ORDER — ACETAMINOPHEN 650 MG/1
650 SUPPOSITORY RECTAL EVERY 4 HOURS PRN
Status: DISCONTINUED | OUTPATIENT
Start: 2019-01-01 | End: 2019-10-04 | Stop reason: HOSPADM

## 2019-01-01 RX ORDER — LORAZEPAM 2 MG/ML
2 INJECTION INTRAMUSCULAR
Status: DISCONTINUED | OUTPATIENT
Start: 2019-01-01 | End: 2019-01-01

## 2019-01-01 RX ORDER — MORPHINE SULFATE 2 MG/ML
2 INJECTION, SOLUTION INTRAMUSCULAR; INTRAVENOUS
Status: DISCONTINUED | OUTPATIENT
Start: 2019-01-01 | End: 2019-10-04 | Stop reason: HOSPADM

## 2019-01-01 RX ORDER — HYDROCODONE BITARTRATE AND ACETAMINOPHEN 5; 325 MG/1; MG/1
1 TABLET ORAL EVERY 6 HOURS PRN
Qty: 30 TABLET | Refills: 0 | Status: SHIPPED | OUTPATIENT
Start: 2019-01-01

## 2019-01-01 RX ORDER — ZOLPIDEM TARTRATE 10 MG/1
5 TABLET ORAL NIGHTLY
Status: ON HOLD | COMMUNITY
End: 2019-01-01 | Stop reason: SDUPTHER

## 2019-01-01 RX ORDER — HYDROMORPHONE HYDROCHLORIDE 1 MG/ML
1 INJECTION, SOLUTION INTRAMUSCULAR; INTRAVENOUS; SUBCUTANEOUS
Status: DISCONTINUED | OUTPATIENT
Start: 2019-01-01 | End: 2019-10-04 | Stop reason: HOSPADM

## 2019-01-01 RX ORDER — MIDAZOLAM HYDROCHLORIDE 1 MG/ML
1 INJECTION INTRAMUSCULAR; INTRAVENOUS
Status: DISCONTINUED | OUTPATIENT
Start: 2019-01-01 | End: 2019-01-01 | Stop reason: HOSPADM

## 2019-01-01 RX ORDER — HYDROCODONE BITARTRATE AND ACETAMINOPHEN 7.5; 325 MG/1; MG/1
1 TABLET ORAL ONCE AS NEEDED
Status: CANCELLED | OUTPATIENT
Start: 2019-01-01

## 2019-01-01 RX ORDER — HALOPERIDOL 5 MG/ML
2 INJECTION INTRAMUSCULAR EVERY 4 HOURS PRN
Status: DISCONTINUED | OUTPATIENT
Start: 2019-01-01 | End: 2019-10-04 | Stop reason: HOSPADM

## 2019-01-01 RX ORDER — ZOLPIDEM TARTRATE 5 MG/1
5 TABLET ORAL NIGHTLY PRN
Status: DISCONTINUED | OUTPATIENT
Start: 2019-01-01 | End: 2019-10-04 | Stop reason: HOSPADM

## 2019-01-01 RX ORDER — PROPOFOL 10 MG/ML
VIAL (ML) INTRAVENOUS AS NEEDED
Status: DISCONTINUED | OUTPATIENT
Start: 2019-01-01 | End: 2019-01-01 | Stop reason: SURG

## 2019-01-01 RX ORDER — SODIUM CHLORIDE 0.9 % (FLUSH) 0.9 %
3 SYRINGE (ML) INJECTION EVERY 12 HOURS SCHEDULED
Status: DISCONTINUED | OUTPATIENT
Start: 2019-01-01 | End: 2019-01-01

## 2019-01-01 RX ORDER — DIPHENHYDRAMINE HCL 25 MG
25 CAPSULE ORAL
Status: CANCELLED | OUTPATIENT
Start: 2019-01-01

## 2019-01-01 RX ORDER — PROCHLORPERAZINE MALEATE 10 MG
10 TABLET ORAL ONCE
Status: CANCELLED
Start: 2019-01-01 | End: 2019-01-01

## 2019-01-01 RX ORDER — ONDANSETRON HYDROCHLORIDE 8 MG/1
8 TABLET, FILM COATED ORAL 3 TIMES DAILY PRN
Qty: 30 TABLET | Refills: 5 | Status: SHIPPED | OUTPATIENT
Start: 2019-01-01

## 2019-01-01 RX ORDER — DIPHENHYDRAMINE HYDROCHLORIDE 50 MG/ML
25 INJECTION INTRAMUSCULAR; INTRAVENOUS EVERY 6 HOURS PRN
Status: DISCONTINUED | OUTPATIENT
Start: 2019-01-01 | End: 2019-10-04 | Stop reason: HOSPADM

## 2019-01-01 RX ORDER — DIPHENHYDRAMINE HCL 25 MG
25 CAPSULE ORAL ONCE
Status: DISCONTINUED | OUTPATIENT
Start: 2019-01-01 | End: 2019-01-01

## 2019-01-01 RX ORDER — DIPHENHYDRAMINE HCL 25 MG
25 CAPSULE ORAL EVERY 6 HOURS PRN
Status: DISCONTINUED | OUTPATIENT
Start: 2019-01-01 | End: 2019-10-04 | Stop reason: HOSPADM

## 2019-01-01 RX ORDER — ASCORBIC ACID 500 MG
1000 TABLET ORAL DAILY
Status: DISCONTINUED | OUTPATIENT
Start: 2019-01-01 | End: 2019-01-01 | Stop reason: HOSPADM

## 2019-01-01 RX ORDER — PALONOSETRON 0.05 MG/ML
0.25 INJECTION, SOLUTION INTRAVENOUS ONCE
Status: CANCELLED
Start: 2019-01-01 | End: 2019-01-01

## 2019-01-01 RX ORDER — LIDOCAINE HYDROCHLORIDE 20 MG/ML
INJECTION, SOLUTION INFILTRATION; PERINEURAL AS NEEDED
Status: DISCONTINUED | OUTPATIENT
Start: 2019-01-01 | End: 2019-01-01 | Stop reason: SURG

## 2019-01-01 RX ORDER — ACETAMINOPHEN 325 MG/1
650 TABLET ORAL ONCE AS NEEDED
Status: DISCONTINUED | OUTPATIENT
Start: 2019-01-01 | End: 2019-01-01 | Stop reason: HOSPADM

## 2019-01-01 RX ORDER — ACETAMINOPHEN 325 MG/1
650 TABLET ORAL ONCE
Status: CANCELLED | OUTPATIENT
Start: 2019-01-01 | End: 2019-01-01

## 2019-01-01 RX ORDER — POSACONAZOLE 100 MG/1
300 TABLET, DELAYED RELEASE ORAL
Status: DISCONTINUED | OUTPATIENT
Start: 2019-01-01 | End: 2019-01-01

## 2019-01-01 RX ORDER — ZOLPIDEM TARTRATE 5 MG/1
5 TABLET ORAL NIGHTLY
Status: DISCONTINUED | OUTPATIENT
Start: 2019-01-01 | End: 2019-01-01

## 2019-01-01 RX ORDER — FLUTICASONE PROPIONATE 50 MCG
2 SPRAY, SUSPENSION (ML) NASAL DAILY PRN
Status: DISCONTINUED | OUTPATIENT
Start: 2019-01-01 | End: 2019-01-01

## 2019-01-01 RX ORDER — LEVOFLOXACIN 500 MG/1
500 TABLET, FILM COATED ORAL EVERY 24 HOURS
Qty: 6 TABLET | Refills: 0 | Status: SHIPPED | OUTPATIENT
Start: 2019-01-01 | End: 2019-01-01 | Stop reason: SDUPTHER

## 2019-01-01 RX ORDER — ACETAMINOPHEN 325 MG/1
650 TABLET ORAL ONCE
Status: DISCONTINUED | OUTPATIENT
Start: 2019-01-01 | End: 2019-01-01

## 2019-01-01 RX ORDER — PROMETHAZINE HYDROCHLORIDE 25 MG/ML
12.5 INJECTION, SOLUTION INTRAMUSCULAR; INTRAVENOUS ONCE AS NEEDED
Status: CANCELLED | OUTPATIENT
Start: 2019-01-01

## 2019-01-01 RX ORDER — FENTANYL CITRATE 50 UG/ML
INJECTION, SOLUTION INTRAMUSCULAR; INTRAVENOUS AS NEEDED
Status: DISCONTINUED | OUTPATIENT
Start: 2019-01-01 | End: 2019-01-01 | Stop reason: SURG

## 2019-01-01 RX ORDER — ONDANSETRON 2 MG/ML
4 INJECTION INTRAMUSCULAR; INTRAVENOUS EVERY 6 HOURS PRN
Status: DISCONTINUED | OUTPATIENT
Start: 2019-01-01 | End: 2019-01-01

## 2019-01-01 RX ORDER — LORAZEPAM 2 MG/ML
1 INJECTION INTRAMUSCULAR
Status: DISCONTINUED | OUTPATIENT
Start: 2019-01-01 | End: 2019-01-01

## 2019-01-01 RX ORDER — MORPHINE SULFATE 20 MG/ML
5 SOLUTION ORAL
Status: DISCONTINUED | OUTPATIENT
Start: 2019-01-01 | End: 2019-10-04 | Stop reason: HOSPADM

## 2019-01-01 RX ORDER — PROMETHAZINE HYDROCHLORIDE 25 MG/1
25 TABLET ORAL ONCE AS NEEDED
Status: CANCELLED | OUTPATIENT
Start: 2019-01-01

## 2019-01-01 RX ORDER — CHOLECALCIFEROL (VITAMIN D3) 125 MCG
1000 CAPSULE ORAL DAILY
Status: DISCONTINUED | OUTPATIENT
Start: 2019-01-01 | End: 2019-01-01 | Stop reason: HOSPADM

## 2019-01-01 RX ORDER — DIPHENOXYLATE HYDROCHLORIDE AND ATROPINE SULFATE 2.5; .025 MG/1; MG/1
1 TABLET ORAL
Status: DISCONTINUED | OUTPATIENT
Start: 2019-01-01 | End: 2019-10-04 | Stop reason: HOSPADM

## 2019-01-01 RX ORDER — AMPICILLIN TRIHYDRATE 250 MG
1 CAPSULE ORAL DAILY
COMMUNITY
End: 2019-01-01 | Stop reason: HOSPADM

## 2019-01-01 RX ORDER — ACETAMINOPHEN 325 MG/1
650 TABLET ORAL EVERY 4 HOURS PRN
Status: DISCONTINUED | OUTPATIENT
Start: 2019-01-01 | End: 2019-01-01

## 2019-01-01 RX ORDER — HALOPERIDOL 2 MG/ML
2 SOLUTION ORAL EVERY 4 HOURS PRN
Status: DISCONTINUED | OUTPATIENT
Start: 2019-01-01 | End: 2019-10-04 | Stop reason: HOSPADM

## 2019-01-01 RX ORDER — LORAZEPAM 2 MG/ML
1 CONCENTRATE ORAL
Status: DISCONTINUED | OUTPATIENT
Start: 2019-01-01 | End: 2019-01-01

## 2019-01-01 RX ORDER — PALONOSETRON 0.05 MG/ML
0.25 INJECTION, SOLUTION INTRAVENOUS ONCE
Status: COMPLETED | OUTPATIENT
Start: 2019-01-01 | End: 2019-01-01

## 2019-01-01 RX ORDER — PROMETHAZINE HYDROCHLORIDE 25 MG/1
12.5 TABLET ORAL EVERY 4 HOURS PRN
Status: DISCONTINUED | OUTPATIENT
Start: 2019-01-01 | End: 2019-10-04 | Stop reason: HOSPADM

## 2019-01-01 RX ORDER — ZOLPIDEM TARTRATE 5 MG/1
5 TABLET ORAL NIGHTLY PRN
Qty: 30 TABLET | Refills: 0 | Status: SHIPPED | OUTPATIENT
Start: 2019-01-01 | End: 2019-01-01 | Stop reason: SDUPTHER

## 2019-01-01 RX ORDER — NALOXONE HCL 0.4 MG/ML
0.2 VIAL (ML) INJECTION AS NEEDED
Status: CANCELLED | OUTPATIENT
Start: 2019-01-01

## 2019-01-01 RX ORDER — OMEPRAZOLE 20 MG/1
CAPSULE, DELAYED RELEASE ORAL
Qty: 60 CAPSULE | Refills: 1 | Status: CANCELLED | OUTPATIENT
Start: 2019-01-01

## 2019-01-01 RX ORDER — OMEPRAZOLE 20 MG/1
CAPSULE, DELAYED RELEASE ORAL
Qty: 60 CAPSULE | Refills: 1 | Status: SHIPPED | OUTPATIENT
Start: 2019-01-01 | End: 2019-01-01 | Stop reason: SDUPTHER

## 2019-01-01 RX ORDER — HALOPERIDOL 5 MG/ML
5 INJECTION INTRAMUSCULAR EVERY 4 HOURS PRN
Status: DISCONTINUED | OUTPATIENT
Start: 2019-01-01 | End: 2019-10-04 | Stop reason: HOSPADM

## 2019-01-01 RX ORDER — LORAZEPAM 2 MG/ML
0.5 INJECTION INTRAMUSCULAR
Status: DISCONTINUED | OUTPATIENT
Start: 2019-01-01 | End: 2019-01-01

## 2019-01-01 RX ORDER — FENTANYL CITRATE 50 UG/ML
50 INJECTION, SOLUTION INTRAMUSCULAR; INTRAVENOUS
Status: DISCONTINUED | OUTPATIENT
Start: 2019-01-01 | End: 2019-01-01 | Stop reason: HOSPADM

## 2019-01-01 RX ORDER — HALOPERIDOL 2 MG/ML
1 SOLUTION ORAL EVERY 4 HOURS PRN
Status: DISCONTINUED | OUTPATIENT
Start: 2019-01-01 | End: 2019-10-04 | Stop reason: HOSPADM

## 2019-01-01 RX ORDER — LEVOFLOXACIN 500 MG/1
500 TABLET, FILM COATED ORAL DAILY
Qty: 30 TABLET | Refills: 1 | Status: ON HOLD | OUTPATIENT
Start: 2019-01-01 | End: 2019-01-01 | Stop reason: SDUPTHER

## 2019-01-01 RX ORDER — SODIUM CHLORIDE 0.9 % (FLUSH) 0.9 %
1-10 SYRINGE (ML) INJECTION AS NEEDED
Status: DISCONTINUED | OUTPATIENT
Start: 2019-01-01 | End: 2019-01-01 | Stop reason: HOSPADM

## 2019-01-01 RX ORDER — LABETALOL HYDROCHLORIDE 5 MG/ML
5 INJECTION, SOLUTION INTRAVENOUS
Status: DISCONTINUED | OUTPATIENT
Start: 2019-01-01 | End: 2019-01-01 | Stop reason: HOSPADM

## 2019-01-01 RX ORDER — POTASSIUM CHLORIDE 1.5 G/1.77G
20 POWDER, FOR SOLUTION ORAL 2 TIMES DAILY
Status: DISCONTINUED | OUTPATIENT
Start: 2019-01-01 | End: 2019-01-01

## 2019-01-01 RX ORDER — POTASSIUM CHLORIDE 1.5 G/1.77G
40 POWDER, FOR SOLUTION ORAL AS NEEDED
Status: DISCONTINUED | OUTPATIENT
Start: 2019-01-01 | End: 2019-01-01

## 2019-01-01 RX ORDER — HYDROCODONE BITARTRATE AND ACETAMINOPHEN 5; 325 MG/1; MG/1
1 TABLET ORAL EVERY 6 HOURS PRN
Qty: 120 TABLET | Refills: 0 | Status: SHIPPED | OUTPATIENT
Start: 2019-01-01 | End: 2019-01-01

## 2019-01-01 RX ORDER — LEVOFLOXACIN 500 MG/1
500 TABLET, FILM COATED ORAL DAILY
Qty: 30 TABLET | Refills: 0 | Status: SHIPPED | OUTPATIENT
Start: 2019-01-01 | End: 2019-01-01 | Stop reason: SDUPTHER

## 2019-01-01 RX ORDER — EPHEDRINE SULFATE 50 MG/ML
5 INJECTION, SOLUTION INTRAVENOUS ONCE AS NEEDED
Status: DISCONTINUED | OUTPATIENT
Start: 2019-01-01 | End: 2019-01-01 | Stop reason: HOSPADM

## 2019-01-01 RX ORDER — GUAIFENESIN/DEXTROMETHORPHAN 100-10MG/5
5 SYRUP ORAL 4 TIMES DAILY PRN
Status: DISCONTINUED | OUTPATIENT
Start: 2019-01-01 | End: 2019-10-04 | Stop reason: HOSPADM

## 2019-01-01 RX ORDER — LORAZEPAM 2 MG/ML
2 CONCENTRATE ORAL
Status: DISCONTINUED | OUTPATIENT
Start: 2019-01-01 | End: 2019-01-01

## 2019-01-01 RX ORDER — FENTANYL CITRATE 50 UG/ML
50 INJECTION, SOLUTION INTRAMUSCULAR; INTRAVENOUS
Status: COMPLETED | OUTPATIENT
Start: 2019-01-01 | End: 2019-01-01

## 2019-01-01 RX ORDER — LORAZEPAM 2 MG/ML
0.5 CONCENTRATE ORAL
Status: DISCONTINUED | OUTPATIENT
Start: 2019-01-01 | End: 2019-01-01

## 2019-01-01 RX ORDER — LEVOFLOXACIN 500 MG/1
500 TABLET, FILM COATED ORAL EVERY 24 HOURS
Status: DISCONTINUED | OUTPATIENT
Start: 2019-01-01 | End: 2019-01-01 | Stop reason: HOSPADM

## 2019-01-01 RX ORDER — OMEPRAZOLE 20 MG/1
20 CAPSULE, DELAYED RELEASE ORAL DAILY
Qty: 60 CAPSULE | Refills: 1 | Status: ON HOLD | OUTPATIENT
Start: 2019-01-01 | End: 2019-01-01 | Stop reason: SDUPTHER

## 2019-01-01 RX ORDER — GUAIFENESIN 600 MG/1
600 TABLET, EXTENDED RELEASE ORAL EVERY 12 HOURS SCHEDULED
Status: DISCONTINUED | OUTPATIENT
Start: 2019-01-01 | End: 2019-01-01 | Stop reason: HOSPADM

## 2019-01-01 RX ORDER — SENNA AND DOCUSATE SODIUM 50; 8.6 MG/1; MG/1
2 TABLET, FILM COATED ORAL 2 TIMES DAILY
Status: DISCONTINUED | OUTPATIENT
Start: 2019-01-01 | End: 2019-01-01

## 2019-01-01 RX ORDER — PROMETHAZINE HYDROCHLORIDE 25 MG/1
25 SUPPOSITORY RECTAL ONCE AS NEEDED
Status: CANCELLED | OUTPATIENT
Start: 2019-01-01

## 2019-01-01 RX ORDER — FENTANYL CITRATE 50 UG/ML
INJECTION, SOLUTION INTRAMUSCULAR; INTRAVENOUS
Status: COMPLETED
Start: 2019-01-01 | End: 2019-01-01

## 2019-01-01 RX ORDER — SODIUM CHLORIDE 9 MG/ML
125 INJECTION, SOLUTION INTRAVENOUS CONTINUOUS
Status: DISCONTINUED | OUTPATIENT
Start: 2019-01-01 | End: 2019-01-01

## 2019-01-01 RX ORDER — GLYCOPYRROLATE 0.2 MG/ML
INJECTION INTRAMUSCULAR; INTRAVENOUS AS NEEDED
Status: DISCONTINUED | OUTPATIENT
Start: 2019-01-01 | End: 2019-01-01 | Stop reason: SURG

## 2019-01-01 RX ORDER — ALLOPURINOL 300 MG/1
300 TABLET ORAL DAILY
Status: DISCONTINUED | OUTPATIENT
Start: 2019-01-01 | End: 2019-01-01 | Stop reason: HOSPADM

## 2019-01-01 RX ORDER — ACETAMINOPHEN 325 MG/1
650 TABLET ORAL ONCE
Status: DISCONTINUED | OUTPATIENT
Start: 2019-01-01 | End: 2019-01-01 | Stop reason: HOSPADM

## 2019-01-01 RX ORDER — ACETAMINOPHEN 325 MG/1
650 TABLET ORAL ONCE
Status: COMPLETED | OUTPATIENT
Start: 2019-01-01 | End: 2019-01-01

## 2019-01-01 RX ORDER — HALOPERIDOL 5 MG/ML
1 INJECTION INTRAMUSCULAR EVERY 4 HOURS PRN
Status: DISCONTINUED | OUTPATIENT
Start: 2019-01-01 | End: 2019-10-04 | Stop reason: HOSPADM

## 2019-01-01 RX ORDER — LIDOCAINE HYDROCHLORIDE 10 MG/ML
20 INJECTION, SOLUTION INFILTRATION; PERINEURAL ONCE
Status: COMPLETED | OUTPATIENT
Start: 2019-01-01 | End: 2019-01-01

## 2019-01-01 RX ORDER — OXYCODONE HYDROCHLORIDE AND ACETAMINOPHEN 5; 325 MG/1; MG/1
2 TABLET ORAL EVERY 4 HOURS PRN
Status: DISCONTINUED | OUTPATIENT
Start: 2019-01-01 | End: 2019-01-01

## 2019-01-01 RX ORDER — UBIDECARENONE 50 MG
50 CAPSULE ORAL DAILY
COMMUNITY
End: 2019-01-01 | Stop reason: HOSPADM

## 2019-01-01 RX ORDER — GLYCOPYRROLATE 0.2 MG/ML
0.2 INJECTION INTRAMUSCULAR; INTRAVENOUS
Status: DISCONTINUED | OUTPATIENT
Start: 2019-01-01 | End: 2019-10-04 | Stop reason: HOSPADM

## 2019-01-01 RX ORDER — CEFDINIR 300 MG/1
300 CAPSULE ORAL EVERY 12 HOURS SCHEDULED
Status: DISCONTINUED | OUTPATIENT
Start: 2019-01-01 | End: 2019-01-01 | Stop reason: HOSPADM

## 2019-01-01 RX ORDER — SODIUM CHLORIDE 0.9 % (FLUSH) 0.9 %
3 SYRINGE (ML) INJECTION EVERY 12 HOURS SCHEDULED
Status: CANCELLED | OUTPATIENT
Start: 2019-01-01

## 2019-01-01 RX ORDER — PROMETHAZINE HYDROCHLORIDE 25 MG/ML
12.5 INJECTION, SOLUTION INTRAMUSCULAR; INTRAVENOUS ONCE AS NEEDED
Status: DISCONTINUED | OUTPATIENT
Start: 2019-01-01 | End: 2019-01-01 | Stop reason: HOSPADM

## 2019-01-01 RX ORDER — MORPHINE SULFATE 5 MG/ML
INJECTION, SOLUTION INTRAVENOUS CONTINUOUS
Status: DISCONTINUED | OUTPATIENT
Start: 2019-01-01 | End: 2019-01-01

## 2019-01-01 RX ORDER — HYDROCODONE BITARTRATE AND ACETAMINOPHEN 5; 325 MG/1; MG/1
1 TABLET ORAL EVERY 6 HOURS PRN
Status: COMPLETED | OUTPATIENT
Start: 2019-01-01 | End: 2019-01-01

## 2019-01-01 RX ORDER — ACETAMINOPHEN 160 MG/5ML
650 SOLUTION ORAL EVERY 4 HOURS PRN
Status: DISCONTINUED | OUTPATIENT
Start: 2019-01-01 | End: 2019-10-04 | Stop reason: HOSPADM

## 2019-01-01 RX ORDER — SODIUM CHLORIDE, SODIUM LACTATE, POTASSIUM CHLORIDE, CALCIUM CHLORIDE 600; 310; 30; 20 MG/100ML; MG/100ML; MG/100ML; MG/100ML
9 INJECTION, SOLUTION INTRAVENOUS CONTINUOUS
Status: DISCONTINUED | OUTPATIENT
Start: 2019-01-01 | End: 2019-01-01

## 2019-01-01 RX ORDER — IPRATROPIUM BROMIDE AND ALBUTEROL SULFATE 2.5; .5 MG/3ML; MG/3ML
3 SOLUTION RESPIRATORY (INHALATION)
Status: DISCONTINUED | OUTPATIENT
Start: 2019-01-01 | End: 2019-01-01

## 2019-01-01 RX ORDER — SODIUM CHLORIDE 0.9 % (FLUSH) 0.9 %
10 SYRINGE (ML) INJECTION AS NEEDED
Status: DISCONTINUED | OUTPATIENT
Start: 2019-01-01 | End: 2019-01-01

## 2019-01-01 RX ORDER — SUCRALFATE ORAL 1 G/10ML
1 SUSPENSION ORAL 4 TIMES DAILY
Qty: 420 ML | Refills: 1 | Status: ON HOLD | OUTPATIENT
Start: 2019-01-01 | End: 2019-01-01

## 2019-01-01 RX ORDER — DIPHENHYDRAMINE HYDROCHLORIDE 50 MG/ML
12.5 INJECTION INTRAMUSCULAR; INTRAVENOUS
Status: DISCONTINUED | OUTPATIENT
Start: 2019-01-01 | End: 2019-01-01 | Stop reason: HOSPADM

## 2019-01-01 RX ORDER — MAGNESIUM SULFATE 1 G/100ML
1 INJECTION INTRAVENOUS ONCE
Status: DISCONTINUED | OUTPATIENT
Start: 2019-01-01 | End: 2019-01-01

## 2019-01-01 RX ORDER — DOCUSATE SODIUM 100 MG/1
100 CAPSULE, LIQUID FILLED ORAL 2 TIMES DAILY
Status: DISCONTINUED | OUTPATIENT
Start: 2019-01-01 | End: 2019-01-01

## 2019-01-01 RX ORDER — ROCURONIUM BROMIDE 10 MG/ML
INJECTION, SOLUTION INTRAVENOUS AS NEEDED
Status: DISCONTINUED | OUTPATIENT
Start: 2019-01-01 | End: 2019-01-01 | Stop reason: SURG

## 2019-01-01 RX ORDER — PROMETHAZINE HYDROCHLORIDE 25 MG/ML
12.5 INJECTION, SOLUTION INTRAMUSCULAR; INTRAVENOUS EVERY 4 HOURS PRN
Status: DISCONTINUED | OUTPATIENT
Start: 2019-01-01 | End: 2019-10-04 | Stop reason: HOSPADM

## 2019-01-01 RX ORDER — SUCCINYLCHOLINE CHLORIDE 20 MG/ML
INJECTION INTRAMUSCULAR; INTRAVENOUS AS NEEDED
Status: DISCONTINUED | OUTPATIENT
Start: 2019-01-01 | End: 2019-01-01 | Stop reason: SURG

## 2019-01-01 RX ORDER — MAGNESIUM SULFATE 1 G/100ML
1 INJECTION INTRAVENOUS ONCE
Status: COMPLETED | OUTPATIENT
Start: 2019-01-01 | End: 2019-01-01

## 2019-01-01 RX ORDER — BISACODYL 10 MG
10 SUPPOSITORY, RECTAL RECTAL DAILY PRN
Status: DISCONTINUED | OUTPATIENT
Start: 2019-01-01 | End: 2019-01-01

## 2019-01-01 RX ORDER — MORPHINE SULFATE IN 0.9 % NACL 50 MG/50ML
PATIENT CONTROLLED ANALGESIA SYRINGE INTRAVENOUS CONTINUOUS
Status: DISCONTINUED | OUTPATIENT
Start: 2019-01-01 | End: 2019-10-04 | Stop reason: HOSPADM

## 2019-01-01 RX ORDER — VALACYCLOVIR HYDROCHLORIDE 1 G/1
1000 TABLET, FILM COATED ORAL EVERY 8 HOURS SCHEDULED
Qty: 24 TABLET | Refills: 0 | Status: SHIPPED | OUTPATIENT
Start: 2019-01-01 | End: 2019-01-01

## 2019-01-01 RX ORDER — ONDANSETRON 2 MG/ML
4 INJECTION INTRAMUSCULAR; INTRAVENOUS ONCE
Status: COMPLETED | OUTPATIENT
Start: 2019-01-01 | End: 2019-01-01

## 2019-01-01 RX ORDER — ACYCLOVIR 400 MG/1
400 TABLET ORAL 2 TIMES DAILY
Qty: 60 TABLET | Refills: 4
Start: 2019-01-01 | End: 2019-01-01 | Stop reason: HOSPADM

## 2019-01-01 RX ORDER — MORPHINE SULFATE 2 MG/ML
2 INJECTION, SOLUTION INTRAMUSCULAR; INTRAVENOUS EVERY 4 HOURS PRN
Status: DISCONTINUED | OUTPATIENT
Start: 2019-01-01 | End: 2019-01-01

## 2019-01-01 RX ORDER — ACETAMINOPHEN 650 MG/1
650 SUPPOSITORY RECTAL ONCE
Status: DISCONTINUED | OUTPATIENT
Start: 2019-01-01 | End: 2019-01-01

## 2019-01-01 RX ORDER — POSACONAZOLE 100 MG/1
300 TABLET, DELAYED RELEASE ORAL 2 TIMES DAILY
Status: COMPLETED | OUTPATIENT
Start: 2019-01-01 | End: 2019-01-01

## 2019-01-01 RX ORDER — DRONABINOL 2.5 MG/1
2.5 CAPSULE ORAL
Qty: 60 CAPSULE | Refills: 0 | Status: SHIPPED | OUTPATIENT
Start: 2019-01-01

## 2019-01-01 RX ORDER — IPRATROPIUM BROMIDE AND ALBUTEROL SULFATE 2.5; .5 MG/3ML; MG/3ML
3 SOLUTION RESPIRATORY (INHALATION)
Status: DISCONTINUED | OUTPATIENT
Start: 2019-01-01 | End: 2019-01-01 | Stop reason: HOSPADM

## 2019-01-01 RX ORDER — DEXAMETHASONE SODIUM PHOSPHATE 10 MG/ML
INJECTION INTRAMUSCULAR; INTRAVENOUS AS NEEDED
Status: DISCONTINUED | OUTPATIENT
Start: 2019-01-01 | End: 2019-01-01 | Stop reason: SURG

## 2019-01-01 RX ORDER — DIGOXIN 250 MCG
250 TABLET ORAL
Status: DISCONTINUED | OUTPATIENT
Start: 2019-01-01 | End: 2019-01-01

## 2019-01-01 RX ORDER — FUROSEMIDE 10 MG/ML
40 INJECTION INTRAMUSCULAR; INTRAVENOUS ONCE
Status: DISCONTINUED | OUTPATIENT
Start: 2019-01-01 | End: 2019-01-01

## 2019-01-01 RX ORDER — OMEPRAZOLE 20 MG/1
20 CAPSULE, DELAYED RELEASE ORAL DAILY
Qty: 60 CAPSULE | Refills: 1 | Status: SHIPPED | OUTPATIENT
Start: 2019-01-01

## 2019-01-01 RX ORDER — SODIUM CHLORIDE 0.9 % (FLUSH) 0.9 %
1-10 SYRINGE (ML) INJECTION AS NEEDED
Status: CANCELLED | OUTPATIENT
Start: 2019-01-01

## 2019-01-01 RX ORDER — SODIUM CHLORIDE 0.9 % (FLUSH) 0.9 %
3-10 SYRINGE (ML) INJECTION AS NEEDED
Status: CANCELLED | OUTPATIENT
Start: 2019-01-01

## 2019-01-01 RX ORDER — LEVOFLOXACIN 500 MG/1
500 TABLET, FILM COATED ORAL DAILY
Qty: 7 TABLET | Refills: 0 | Status: SHIPPED | OUTPATIENT
Start: 2019-01-01 | End: 2019-01-01 | Stop reason: SDUPTHER

## 2019-01-01 RX ORDER — GUAIFENESIN 600 MG/1
600 TABLET, EXTENDED RELEASE ORAL EVERY 12 HOURS SCHEDULED
Qty: 60 TABLET | Refills: 0 | Status: SHIPPED | OUTPATIENT
Start: 2019-01-01 | End: 2019-01-01

## 2019-01-01 RX ORDER — GUAIFENESIN 600 MG/1
600 TABLET, EXTENDED RELEASE ORAL EVERY 12 HOURS SCHEDULED
Status: DISCONTINUED | OUTPATIENT
Start: 2019-01-01 | End: 2019-01-01

## 2019-01-01 RX ORDER — HYDROMORPHONE HYDROCHLORIDE 1 MG/ML
0.5 INJECTION, SOLUTION INTRAMUSCULAR; INTRAVENOUS; SUBCUTANEOUS ONCE
Status: DISCONTINUED | OUTPATIENT
Start: 2019-01-01 | End: 2019-01-01

## 2019-01-01 RX ORDER — METOPROLOL TARTRATE 50 MG/1
50 TABLET, FILM COATED ORAL EVERY 12 HOURS SCHEDULED
Status: CANCELLED | OUTPATIENT
Start: 2019-01-01

## 2019-01-01 RX ORDER — PROMETHAZINE HYDROCHLORIDE 6.25 MG/5ML
12.5 SYRUP ORAL EVERY 4 HOURS PRN
Status: DISCONTINUED | OUTPATIENT
Start: 2019-01-01 | End: 2019-10-04 | Stop reason: HOSPADM

## 2019-01-01 RX ORDER — MAGNESIUM HYDROXIDE 1200 MG/15ML
LIQUID ORAL AS NEEDED
Status: DISCONTINUED | OUTPATIENT
Start: 2019-01-01 | End: 2019-01-01 | Stop reason: HOSPADM

## 2019-01-01 RX ORDER — ASCORBIC ACID 500 MG
1000 TABLET ORAL DAILY
Status: DISCONTINUED | OUTPATIENT
Start: 2019-01-01 | End: 2019-01-01

## 2019-01-01 RX ORDER — HYDROCODONE BITARTRATE AND ACETAMINOPHEN 5; 325 MG/1; MG/1
1 TABLET ORAL EVERY 6 HOURS PRN
Qty: 30 TABLET | Refills: 0 | Status: SHIPPED | OUTPATIENT
Start: 2019-01-01 | End: 2019-01-01

## 2019-01-01 RX ORDER — CHOLECALCIFEROL (VITAMIN D3) 125 MCG
1000 CAPSULE ORAL DAILY
Status: DISCONTINUED | OUTPATIENT
Start: 2019-01-01 | End: 2019-01-01

## 2019-01-01 RX ORDER — PANTOPRAZOLE SODIUM 40 MG/1
40 TABLET, DELAYED RELEASE ORAL EVERY MORNING
Status: DISCONTINUED | OUTPATIENT
Start: 2019-01-01 | End: 2019-01-01

## 2019-01-01 RX ORDER — HALOPERIDOL 5 MG/ML
1 INJECTION INTRAMUSCULAR EVERY 4 HOURS PRN
Status: DISCONTINUED | OUTPATIENT
Start: 2019-01-01 | End: 2019-01-01 | Stop reason: SDUPTHER

## 2019-01-01 RX ORDER — PROMETHAZINE HYDROCHLORIDE 25 MG/1
12.5 SUPPOSITORY RECTAL EVERY 4 HOURS PRN
Status: DISCONTINUED | OUTPATIENT
Start: 2019-01-01 | End: 2019-10-04 | Stop reason: HOSPADM

## 2019-01-01 RX ORDER — PROPOFOL 10 MG/ML
VIAL (ML) INTRAVENOUS CONTINUOUS PRN
Status: DISCONTINUED | OUTPATIENT
Start: 2019-01-01 | End: 2019-01-01 | Stop reason: SURG

## 2019-01-01 RX ORDER — ACETAMINOPHEN 325 MG/1
650 TABLET ORAL EVERY 4 HOURS PRN
Status: DISCONTINUED | OUTPATIENT
Start: 2019-01-01 | End: 2019-10-04 | Stop reason: HOSPADM

## 2019-01-01 RX ORDER — POSACONAZOLE 100 MG/1
300 TABLET, DELAYED RELEASE ORAL EVERY 12 HOURS
Qty: 6 TABLET | Refills: 0 | Status: SHIPPED | OUTPATIENT
Start: 2019-01-01 | End: 2019-01-01

## 2019-01-01 RX ORDER — OXYCODONE AND ACETAMINOPHEN 7.5; 325 MG/1; MG/1
1 TABLET ORAL ONCE AS NEEDED
Status: CANCELLED | OUTPATIENT
Start: 2019-01-01

## 2019-01-01 RX ORDER — PANTOPRAZOLE SODIUM 40 MG/1
40 TABLET, DELAYED RELEASE ORAL EVERY MORNING
Status: DISCONTINUED | OUTPATIENT
Start: 2019-01-01 | End: 2019-01-01 | Stop reason: HOSPADM

## 2019-01-01 RX ORDER — PROMETHAZINE HYDROCHLORIDE 25 MG/1
25 TABLET ORAL ONCE AS NEEDED
Status: DISCONTINUED | OUTPATIENT
Start: 2019-01-01 | End: 2019-01-01 | Stop reason: HOSPADM

## 2019-01-01 RX ORDER — HYDROCODONE BITARTRATE AND ACETAMINOPHEN 5; 325 MG/1; MG/1
1 TABLET ORAL EVERY 6 HOURS PRN
Qty: 30 TABLET | Refills: 0 | Status: SHIPPED | OUTPATIENT
Start: 2019-01-01 | End: 2019-01-01 | Stop reason: SDUPTHER

## 2019-01-01 RX ORDER — DIPHENHYDRAMINE HYDROCHLORIDE 50 MG/ML
12.5 INJECTION INTRAMUSCULAR; INTRAVENOUS
Status: CANCELLED | OUTPATIENT
Start: 2019-01-01

## 2019-01-01 RX ORDER — PROMETHAZINE HYDROCHLORIDE 25 MG/ML
6.25 INJECTION, SOLUTION INTRAMUSCULAR; INTRAVENOUS
Status: CANCELLED | OUTPATIENT
Start: 2019-01-01

## 2019-01-01 RX ORDER — LEVOFLOXACIN 500 MG/1
500 TABLET, FILM COATED ORAL DAILY
Qty: 30 TABLET | Refills: 1
Start: 2019-01-01

## 2019-01-01 RX ORDER — ZOLPIDEM TARTRATE 5 MG/1
5 TABLET ORAL NIGHTLY PRN
Status: DISCONTINUED | OUTPATIENT
Start: 2019-01-01 | End: 2019-01-01 | Stop reason: HOSPADM

## 2019-01-01 RX ORDER — MORPHINE SULFATE 2 MG/ML
4 INJECTION, SOLUTION INTRAMUSCULAR; INTRAVENOUS
Status: DISCONTINUED | OUTPATIENT
Start: 2019-01-01 | End: 2019-10-04 | Stop reason: HOSPADM

## 2019-01-01 RX ORDER — HYDROMORPHONE HYDROCHLORIDE 1 MG/ML
0.5 INJECTION, SOLUTION INTRAMUSCULAR; INTRAVENOUS; SUBCUTANEOUS
Status: DISCONTINUED | OUTPATIENT
Start: 2019-01-01 | End: 2019-10-04 | Stop reason: HOSPADM

## 2019-01-01 RX ORDER — LABETALOL HYDROCHLORIDE 5 MG/ML
5 INJECTION, SOLUTION INTRAVENOUS
Status: CANCELLED | OUTPATIENT
Start: 2019-01-01

## 2019-01-01 RX ORDER — HYDROCODONE BITARTRATE AND ACETAMINOPHEN 5; 325 MG/1; MG/1
1 TABLET ORAL ONCE
Status: COMPLETED | OUTPATIENT
Start: 2019-01-01 | End: 2019-01-01

## 2019-01-01 RX ORDER — ALBUTEROL SULFATE 2.5 MG/3ML
2.5 SOLUTION RESPIRATORY (INHALATION) ONCE AS NEEDED
Status: DISCONTINUED | OUTPATIENT
Start: 2019-01-01 | End: 2019-01-01 | Stop reason: HOSPADM

## 2019-01-01 RX ORDER — HYDROMORPHONE HYDROCHLORIDE 1 MG/ML
0.5 INJECTION, SOLUTION INTRAMUSCULAR; INTRAVENOUS; SUBCUTANEOUS
Status: CANCELLED | OUTPATIENT
Start: 2019-01-01

## 2019-01-01 RX ORDER — GUAIFENESIN/DEXTROMETHORPHAN 100-10MG/5
5 SYRUP ORAL 4 TIMES DAILY PRN
Status: DISCONTINUED | OUTPATIENT
Start: 2019-01-01 | End: 2019-01-01

## 2019-01-01 RX ORDER — HYDROMORPHONE HCL 110MG/55ML
1.5 PATIENT CONTROLLED ANALGESIA SYRINGE INTRAVENOUS
Status: DISCONTINUED | OUTPATIENT
Start: 2019-01-01 | End: 2019-10-04 | Stop reason: HOSPADM

## 2019-01-01 RX ORDER — POTASSIUM CHLORIDE 750 MG/1
40 CAPSULE, EXTENDED RELEASE ORAL AS NEEDED
Status: DISCONTINUED | OUTPATIENT
Start: 2019-01-01 | End: 2019-01-01

## 2019-01-01 RX ORDER — HALOPERIDOL 1 MG/1
1 TABLET ORAL EVERY 4 HOURS PRN
Status: DISCONTINUED | OUTPATIENT
Start: 2019-01-01 | End: 2019-10-04 | Stop reason: HOSPADM

## 2019-01-01 RX ORDER — DRONABINOL 2.5 MG/1
2.5 CAPSULE ORAL
Status: DISCONTINUED | OUTPATIENT
Start: 2019-01-01 | End: 2019-01-01

## 2019-01-01 RX ORDER — OXYCODONE HYDROCHLORIDE AND ACETAMINOPHEN 5; 325 MG/1; MG/1
1 TABLET ORAL EVERY 4 HOURS PRN
Status: DISCONTINUED | OUTPATIENT
Start: 2019-01-01 | End: 2019-01-01

## 2019-01-01 RX ORDER — ONDANSETRON 2 MG/ML
INJECTION INTRAMUSCULAR; INTRAVENOUS AS NEEDED
Status: DISCONTINUED | OUTPATIENT
Start: 2019-01-01 | End: 2019-01-01 | Stop reason: SURG

## 2019-01-01 RX ORDER — ONDANSETRON HYDROCHLORIDE 8 MG/1
8 TABLET, FILM COATED ORAL EVERY 8 HOURS PRN
Qty: 30 TABLET | Refills: 2 | Status: SHIPPED | OUTPATIENT
Start: 2019-01-01 | End: 2019-01-01 | Stop reason: SDUPTHER

## 2019-01-01 RX ORDER — LEVOFLOXACIN 500 MG/1
500 TABLET, FILM COATED ORAL EVERY 24 HOURS
Qty: 6 TABLET | Refills: 0 | Status: SHIPPED | OUTPATIENT
Start: 2019-01-01 | End: 2019-01-01

## 2019-01-01 RX ORDER — IPRATROPIUM BROMIDE AND ALBUTEROL SULFATE 2.5; .5 MG/3ML; MG/3ML
3 SOLUTION RESPIRATORY (INHALATION) EVERY 4 HOURS PRN
Status: DISCONTINUED | OUTPATIENT
Start: 2019-01-01 | End: 2019-01-01 | Stop reason: SDUPTHER

## 2019-01-01 RX ORDER — ZOLPIDEM TARTRATE 10 MG/1
5 TABLET ORAL NIGHTLY PRN
Qty: 30 TABLET | Refills: 0 | Status: SHIPPED | OUTPATIENT
Start: 2019-01-01 | End: 2019-01-01 | Stop reason: SDUPTHER

## 2019-01-01 RX ORDER — FUROSEMIDE 10 MG/ML
40 INJECTION INTRAMUSCULAR; INTRAVENOUS ONCE
Status: COMPLETED | OUTPATIENT
Start: 2019-01-01 | End: 2019-01-01

## 2019-01-01 RX ORDER — FENTANYL CITRATE 50 UG/ML
50 INJECTION, SOLUTION INTRAMUSCULAR; INTRAVENOUS
Status: CANCELLED | OUTPATIENT
Start: 2019-01-01

## 2019-01-01 RX ORDER — MORPHINE SULFATE 20 MG/ML
20 SOLUTION ORAL
Status: DISCONTINUED | OUTPATIENT
Start: 2019-01-01 | End: 2019-10-04 | Stop reason: HOSPADM

## 2019-01-01 RX ORDER — ONDANSETRON 2 MG/ML
4 INJECTION INTRAMUSCULAR; INTRAVENOUS ONCE AS NEEDED
Status: CANCELLED | OUTPATIENT
Start: 2019-01-01

## 2019-01-01 RX ORDER — MORPHINE SULFATE 20 MG/ML
10 SOLUTION ORAL
Status: DISCONTINUED | OUTPATIENT
Start: 2019-01-01 | End: 2019-10-04 | Stop reason: HOSPADM

## 2019-01-01 RX ADMIN — PANTOPRAZOLE SODIUM 40 MG: 40 TABLET, DELAYED RELEASE ORAL at 06:39

## 2019-01-01 RX ADMIN — IPRATROPIUM BROMIDE AND ALBUTEROL SULFATE 3 ML: 2.5; .5 SOLUTION RESPIRATORY (INHALATION) at 16:51

## 2019-01-01 RX ADMIN — MORPHINE SULFATE 6 MG: 10 INJECTION, SOLUTION INTRAMUSCULAR; INTRAVENOUS at 06:53

## 2019-01-01 RX ADMIN — MORPHINE SULFATE 4 MG: 2 INJECTION, SOLUTION INTRAMUSCULAR; INTRAVENOUS at 21:57

## 2019-01-01 RX ADMIN — IPRATROPIUM BROMIDE AND ALBUTEROL SULFATE 3 ML: 2.5; .5 SOLUTION RESPIRATORY (INHALATION) at 15:43

## 2019-01-01 RX ADMIN — TAZOBACTAM SODIUM AND PIPERACILLIN SODIUM 3.38 G: 375; 3 INJECTION, SOLUTION INTRAVENOUS at 23:35

## 2019-01-01 RX ADMIN — HALOPERIDOL LACTATE 1 MG: 5 INJECTION, SOLUTION INTRAMUSCULAR at 08:59

## 2019-01-01 RX ADMIN — VALACYCLOVIR 1000 MG: 500 TABLET, FILM COATED ORAL at 14:08

## 2019-01-01 RX ADMIN — CEFEPIME HYDROCHLORIDE 2 G: 2 INJECTION, POWDER, FOR SOLUTION INTRAVENOUS at 00:46

## 2019-01-01 RX ADMIN — IPRATROPIUM BROMIDE AND ALBUTEROL SULFATE 3 ML: 2.5; .5 SOLUTION RESPIRATORY (INHALATION) at 19:25

## 2019-01-01 RX ADMIN — GLYCOPYRROLATE 0.4 MG: 0.2 INJECTION INTRAMUSCULAR; INTRAVENOUS at 14:50

## 2019-01-01 RX ADMIN — Medication 1000 MCG: at 09:02

## 2019-01-01 RX ADMIN — MORPHINE SULFATE: 10 INJECTION INTRAVENOUS at 18:25

## 2019-01-01 RX ADMIN — MORPHINE SULFATE 2 MG: 2 INJECTION, SOLUTION INTRAMUSCULAR; INTRAVENOUS at 21:59

## 2019-01-01 RX ADMIN — TAZOBACTAM SODIUM AND PIPERACILLIN SODIUM 3.38 G: 375; 3 INJECTION, SOLUTION INTRAVENOUS at 04:28

## 2019-01-01 RX ADMIN — ZOLPIDEM TARTRATE 5 MG: 5 TABLET ORAL at 21:47

## 2019-01-01 RX ADMIN — DRONABINOL 2.5 MG: 2.5 CAPSULE ORAL at 07:36

## 2019-01-01 RX ADMIN — Medication 500 UNITS: at 09:52

## 2019-01-01 RX ADMIN — HALOPERIDOL LACTATE 1 MG: 5 INJECTION, SOLUTION INTRAMUSCULAR at 18:54

## 2019-01-01 RX ADMIN — MORPHINE SULFATE 6 MG: 10 INJECTION, SOLUTION INTRAMUSCULAR; INTRAVENOUS at 12:29

## 2019-01-01 RX ADMIN — VALACYCLOVIR 1000 MG: 500 TABLET, FILM COATED ORAL at 05:54

## 2019-01-01 RX ADMIN — SODIUM CHLORIDE 125 ML/HR: 9 INJECTION, SOLUTION INTRAVENOUS at 16:14

## 2019-01-01 RX ADMIN — ZOLPIDEM TARTRATE 5 MG: 5 TABLET ORAL at 01:15

## 2019-01-01 RX ADMIN — HYDROCODONE BITARTRATE AND ACETAMINOPHEN 1 TABLET: 5; 325 TABLET ORAL at 07:09

## 2019-01-01 RX ADMIN — FAMOTIDINE 20 MG: 10 INJECTION INTRAVENOUS at 13:35

## 2019-01-01 RX ADMIN — MORPHINE SULFATE 4 MG: 2 INJECTION, SOLUTION INTRAMUSCULAR; INTRAVENOUS at 17:30

## 2019-01-01 RX ADMIN — PANTOPRAZOLE SODIUM 40 MG: 40 TABLET, DELAYED RELEASE ORAL at 05:54

## 2019-01-01 RX ADMIN — DRONABINOL 2.5 MG: 2.5 CAPSULE ORAL at 06:39

## 2019-01-01 RX ADMIN — METOPROLOL TARTRATE 25 MG: 25 TABLET ORAL at 21:35

## 2019-01-01 RX ADMIN — DIPHENHYDRAMINE HYDROCHLORIDE 25 MG: 25 CAPSULE ORAL at 16:57

## 2019-01-01 RX ADMIN — OXYCODONE HYDROCHLORIDE AND ACETAMINOPHEN 2 TABLET: 5; 325 TABLET ORAL at 11:04

## 2019-01-01 RX ADMIN — LEVOFLOXACIN 500 MG: 500 TABLET, FILM COATED ORAL at 11:27

## 2019-01-01 RX ADMIN — SODIUM CHLORIDE 250 ML: 9 INJECTION, SOLUTION INTRAVENOUS at 08:13

## 2019-01-01 RX ADMIN — ACYCLOVIR 400 MG: 400 TABLET ORAL at 21:35

## 2019-01-01 RX ADMIN — HYDROMORPHONE HYDROCHLORIDE 0.5 MG: 1 INJECTION, SOLUTION INTRAMUSCULAR; INTRAVENOUS; SUBCUTANEOUS at 15:00

## 2019-01-01 RX ADMIN — GUAIFENESIN 600 MG: 600 TABLET, EXTENDED RELEASE ORAL at 20:14

## 2019-01-01 RX ADMIN — HALOPERIDOL LACTATE 2 MG: 5 INJECTION, SOLUTION INTRAMUSCULAR at 07:55

## 2019-01-01 RX ADMIN — OXYCODONE HYDROCHLORIDE AND ACETAMINOPHEN 1000 MG: 500 TABLET ORAL at 10:21

## 2019-01-01 RX ADMIN — DECITABINE 42 MG: 50 INJECTION, POWDER, LYOPHILIZED, FOR SOLUTION INTRAVENOUS at 13:18

## 2019-01-01 RX ADMIN — POTASSIUM CHLORIDE 10 MEQ: 7.46 INJECTION, SOLUTION INTRAVENOUS at 19:54

## 2019-01-01 RX ADMIN — DRONABINOL 2.5 MG: 2.5 CAPSULE ORAL at 17:33

## 2019-01-01 RX ADMIN — TAZOBACTAM SODIUM AND PIPERACILLIN SODIUM 3.38 G: 375; 3 INJECTION, SOLUTION INTRAVENOUS at 20:47

## 2019-01-01 RX ADMIN — HYDROMORPHONE HYDROCHLORIDE 0.5 MG: 1 INJECTION, SOLUTION INTRAMUSCULAR; INTRAVENOUS; SUBCUTANEOUS at 01:15

## 2019-01-01 RX ADMIN — ACYCLOVIR 400 MG: 400 TABLET ORAL at 08:06

## 2019-01-01 RX ADMIN — GUAIFENESIN 600 MG: 600 TABLET, EXTENDED RELEASE ORAL at 21:37

## 2019-01-01 RX ADMIN — MAGNESIUM SULFATE HEPTAHYDRATE 1 G: 1 INJECTION, SOLUTION INTRAVENOUS at 14:41

## 2019-01-01 RX ADMIN — ACETAMINOPHEN 650 MG: 325 TABLET ORAL at 13:17

## 2019-01-01 RX ADMIN — Medication 500 UNITS: at 12:52

## 2019-01-01 RX ADMIN — GUAIFENESIN 600 MG: 600 TABLET, EXTENDED RELEASE ORAL at 08:06

## 2019-01-01 RX ADMIN — MORPHINE SULFATE 6 MG: 10 INJECTION, SOLUTION INTRAMUSCULAR; INTRAVENOUS at 19:36

## 2019-01-01 RX ADMIN — Medication 500 UNITS: at 15:11

## 2019-01-01 RX ADMIN — HYDROCODONE BITARTRATE AND ACETAMINOPHEN 1 TABLET: 5; 325 TABLET ORAL at 11:54

## 2019-01-01 RX ADMIN — MORPHINE SULFATE 6 MG: 10 INJECTION, SOLUTION INTRAMUSCULAR; INTRAVENOUS at 08:02

## 2019-01-01 RX ADMIN — DRONABINOL 2.5 MG: 2.5 CAPSULE ORAL at 16:40

## 2019-01-01 RX ADMIN — SUCCINYLCHOLINE CHLORIDE 180 MG: 20 INJECTION, SOLUTION INTRAMUSCULAR; INTRAVENOUS; PARENTERAL at 14:15

## 2019-01-01 RX ADMIN — MORPHINE SULFATE: 10 INJECTION, SOLUTION INTRAMUSCULAR; INTRAVENOUS at 10:32

## 2019-01-01 RX ADMIN — HALOPERIDOL LACTATE 1 MG: 5 INJECTION, SOLUTION INTRAMUSCULAR at 12:49

## 2019-01-01 RX ADMIN — POTASSIUM CHLORIDE AND SODIUM CHLORIDE 75 ML/HR: 900; 150 INJECTION, SOLUTION INTRAVENOUS at 04:42

## 2019-01-01 RX ADMIN — PANTOPRAZOLE SODIUM 40 MG: 40 TABLET, DELAYED RELEASE ORAL at 16:21

## 2019-01-01 RX ADMIN — MORPHINE SULFATE 6 MG: 10 INJECTION, SOLUTION INTRAMUSCULAR; INTRAVENOUS at 00:31

## 2019-01-01 RX ADMIN — IPRATROPIUM BROMIDE AND ALBUTEROL SULFATE 3 ML: 2.5; .5 SOLUTION RESPIRATORY (INHALATION) at 07:30

## 2019-01-01 RX ADMIN — CEFEPIME HYDROCHLORIDE 2 G: 2 INJECTION, POWDER, FOR SOLUTION INTRAVENOUS at 23:57

## 2019-01-01 RX ADMIN — LIDOCAINE HYDROCHLORIDE: 20 JELLY TOPICAL at 14:03

## 2019-01-01 RX ADMIN — CEFEPIME HYDROCHLORIDE 2 G: 2 INJECTION, POWDER, FOR SOLUTION INTRAVENOUS at 08:37

## 2019-01-01 RX ADMIN — ACETAMINOPHEN 650 MG: 325 TABLET ORAL at 12:44

## 2019-01-01 RX ADMIN — HYDROCODONE BITARTRATE AND ACETAMINOPHEN 1 TABLET: 5; 325 TABLET ORAL at 17:01

## 2019-01-01 RX ADMIN — GUAIFENESIN 600 MG: 600 TABLET, EXTENDED RELEASE ORAL at 08:40

## 2019-01-01 RX ADMIN — OXYCODONE HYDROCHLORIDE AND ACETAMINOPHEN 1000 MG: 500 TABLET ORAL at 08:37

## 2019-01-01 RX ADMIN — TAZOBACTAM SODIUM AND PIPERACILLIN SODIUM 3.38 G: 375; 3 INJECTION, SOLUTION INTRAVENOUS at 17:02

## 2019-01-01 RX ADMIN — MORPHINE SULFATE 6 MG: 10 INJECTION, SOLUTION INTRAMUSCULAR; INTRAVENOUS at 20:31

## 2019-01-01 RX ADMIN — POTASSIUM CHLORIDE AND SODIUM CHLORIDE 75 ML/HR: 900; 150 INJECTION, SOLUTION INTRAVENOUS at 04:50

## 2019-01-01 RX ADMIN — TAZOBACTAM SODIUM AND PIPERACILLIN SODIUM 3.38 G: 375; 3 INJECTION, SOLUTION INTRAVENOUS at 20:54

## 2019-01-01 RX ADMIN — PANTOPRAZOLE SODIUM 40 MG: 40 TABLET, DELAYED RELEASE ORAL at 07:36

## 2019-01-01 RX ADMIN — ACYCLOVIR 400 MG: 400 TABLET ORAL at 21:46

## 2019-01-01 RX ADMIN — SODIUM CHLORIDE 250 ML: 9 INJECTION, SOLUTION INTRAVENOUS at 14:39

## 2019-01-01 RX ADMIN — HALOPERIDOL LACTATE 5 MG: 5 INJECTION, SOLUTION INTRAMUSCULAR at 17:45

## 2019-01-01 RX ADMIN — TAZOBACTAM SODIUM AND PIPERACILLIN SODIUM 3.38 G: 375; 3 INJECTION, SOLUTION INTRAVENOUS at 12:21

## 2019-01-01 RX ADMIN — PROCHLORPERAZINE MALEATE 10 MG: 5 TABLET ORAL at 11:06

## 2019-01-01 RX ADMIN — SODIUM CHLORIDE 250 ML: 9 INJECTION, SOLUTION INTRAVENOUS at 11:06

## 2019-01-01 RX ADMIN — HYDROMORPHONE HYDROCHLORIDE 1 MG: 1 INJECTION, SOLUTION INTRAMUSCULAR; INTRAVENOUS; SUBCUTANEOUS at 16:34

## 2019-01-01 RX ADMIN — CEFEPIME HYDROCHLORIDE 2 G: 2 INJECTION, POWDER, FOR SOLUTION INTRAVENOUS at 23:28

## 2019-01-01 RX ADMIN — TAZOBACTAM SODIUM AND PIPERACILLIN SODIUM 3.38 G: 375; 3 INJECTION, SOLUTION INTRAVENOUS at 12:40

## 2019-01-01 RX ADMIN — PANTOPRAZOLE SODIUM 40 MG: 40 TABLET, DELAYED RELEASE ORAL at 06:18

## 2019-01-01 RX ADMIN — DRONABINOL 2.5 MG: 2.5 CAPSULE ORAL at 16:46

## 2019-01-01 RX ADMIN — PROMETHAZINE HYDROCHLORIDE 12.5 MG: 25 INJECTION INTRAMUSCULAR; INTRAVENOUS at 10:34

## 2019-01-01 RX ADMIN — GUAIFENESIN 600 MG: 600 TABLET, EXTENDED RELEASE ORAL at 10:09

## 2019-01-01 RX ADMIN — IPRATROPIUM BROMIDE AND ALBUTEROL SULFATE 3 ML: 2.5; .5 SOLUTION RESPIRATORY (INHALATION) at 11:53

## 2019-01-01 RX ADMIN — ACETAMINOPHEN 650 MG: 325 TABLET ORAL at 14:29

## 2019-01-01 RX ADMIN — ZOLPIDEM TARTRATE 5 MG: 5 TABLET ORAL at 20:32

## 2019-01-01 RX ADMIN — OXYCODONE HYDROCHLORIDE AND ACETAMINOPHEN 1000 MG: 500 TABLET ORAL at 16:22

## 2019-01-01 RX ADMIN — CEFEPIME HYDROCHLORIDE 2 G: 2 INJECTION, POWDER, FOR SOLUTION INTRAVENOUS at 06:51

## 2019-01-01 RX ADMIN — GUAIFENESIN AND DEXTROMETHORPHAN 5 ML: 100; 10 SYRUP ORAL at 23:33

## 2019-01-01 RX ADMIN — DIGOXIN 250 MCG: 250 TABLET ORAL at 21:46

## 2019-01-01 RX ADMIN — SODIUM CHLORIDE 250 ML: 9 INJECTION, SOLUTION INTRAVENOUS at 08:28

## 2019-01-01 RX ADMIN — HYDROCODONE BITARTRATE AND ACETAMINOPHEN 1 TABLET: 5; 325 TABLET ORAL at 21:36

## 2019-01-01 RX ADMIN — PANTOPRAZOLE SODIUM 40 MG: 40 TABLET, DELAYED RELEASE ORAL at 06:51

## 2019-01-01 RX ADMIN — MORPHINE SULFATE 6 MG: 10 INJECTION, SOLUTION INTRAMUSCULAR; INTRAVENOUS at 18:26

## 2019-01-01 RX ADMIN — GUAIFENESIN 600 MG: 600 TABLET, EXTENDED RELEASE ORAL at 08:21

## 2019-01-01 RX ADMIN — TAZOBACTAM SODIUM AND PIPERACILLIN SODIUM 3.38 G: 375; 3 INJECTION, SOLUTION INTRAVENOUS at 13:54

## 2019-01-01 RX ADMIN — TAZOBACTAM SODIUM AND PIPERACILLIN SODIUM 3.38 G: 375; 3 INJECTION, SOLUTION INTRAVENOUS at 06:31

## 2019-01-01 RX ADMIN — MORPHINE SULFATE 2 MG: 2 INJECTION, SOLUTION INTRAMUSCULAR; INTRAVENOUS at 15:06

## 2019-01-01 RX ADMIN — CEFEPIME HYDROCHLORIDE 2 G: 2 INJECTION, POWDER, FOR SOLUTION INTRAVENOUS at 23:04

## 2019-01-01 RX ADMIN — Medication 1000 MCG: at 08:06

## 2019-01-01 RX ADMIN — DECITABINE 42 MG: 50 INJECTION, POWDER, LYOPHILIZED, FOR SOLUTION INTRAVENOUS at 08:46

## 2019-01-01 RX ADMIN — ACETAMINOPHEN 650 MG: 325 TABLET ORAL at 21:47

## 2019-01-01 RX ADMIN — CEFEPIME HYDROCHLORIDE 2 G: 2 INJECTION, POWDER, FOR SOLUTION INTRAVENOUS at 16:01

## 2019-01-01 RX ADMIN — ACYCLOVIR 400 MG: 400 TABLET ORAL at 20:01

## 2019-01-01 RX ADMIN — MORPHINE SULFATE 6 MG: 10 INJECTION, SOLUTION INTRAMUSCULAR; INTRAVENOUS at 01:56

## 2019-01-01 RX ADMIN — DECITABINE 42 MG: 50 INJECTION, POWDER, LYOPHILIZED, FOR SOLUTION INTRAVENOUS at 14:44

## 2019-01-01 RX ADMIN — Medication 500 UNITS: at 09:45

## 2019-01-01 RX ADMIN — POTASSIUM CHLORIDE AND SODIUM CHLORIDE 125 ML/HR: 900; 150 INJECTION, SOLUTION INTRAVENOUS at 03:44

## 2019-01-01 RX ADMIN — SODIUM CHLORIDE 500 ML: 9 INJECTION, SOLUTION INTRAVENOUS at 00:16

## 2019-01-01 RX ADMIN — GUAIFENESIN 600 MG: 600 TABLET, EXTENDED RELEASE ORAL at 08:37

## 2019-01-01 RX ADMIN — MORPHINE SULFATE 6 MG: 10 INJECTION, SOLUTION INTRAMUSCULAR; INTRAVENOUS at 16:17

## 2019-01-01 RX ADMIN — OXYCODONE HYDROCHLORIDE AND ACETAMINOPHEN 1000 MG: 500 TABLET ORAL at 08:06

## 2019-01-01 RX ADMIN — MORPHINE SULFATE 6 MG: 10 INJECTION, SOLUTION INTRAMUSCULAR; INTRAVENOUS at 07:15

## 2019-01-01 RX ADMIN — PROCHLORPERAZINE MALEATE 10 MG: 5 TABLET ORAL at 14:13

## 2019-01-01 RX ADMIN — TAZOBACTAM SODIUM AND PIPERACILLIN SODIUM 3.38 G: 375; 3 INJECTION, SOLUTION INTRAVENOUS at 21:57

## 2019-01-01 RX ADMIN — OXYCODONE HYDROCHLORIDE AND ACETAMINOPHEN 1000 MG: 500 TABLET ORAL at 09:02

## 2019-01-01 RX ADMIN — PROCHLORPERAZINE MALEATE 10 MG: 5 TABLET ORAL at 14:39

## 2019-01-01 RX ADMIN — CEFEPIME HYDROCHLORIDE 2 G: 2 INJECTION, POWDER, FOR SOLUTION INTRAVENOUS at 04:22

## 2019-01-01 RX ADMIN — SODIUM CHLORIDE, PRESERVATIVE FREE 500 UNITS: 5 INJECTION INTRAVENOUS at 14:24

## 2019-01-01 RX ADMIN — ACETAMINOPHEN 650 MG: 325 TABLET ORAL at 01:49

## 2019-01-01 RX ADMIN — SODIUM CHLORIDE, PRESERVATIVE FREE 10 ML: 5 INJECTION INTRAVENOUS at 15:56

## 2019-01-01 RX ADMIN — GUAIFENESIN AND DEXTROMETHORPHAN 5 ML: 100; 10 SYRUP ORAL at 05:05

## 2019-01-01 RX ADMIN — HYDROCODONE BITARTRATE AND ACETAMINOPHEN 1 TABLET: 5; 325 TABLET ORAL at 20:03

## 2019-01-01 RX ADMIN — POLYETHYLENE GLYCOL 3350 17 G: 17 POWDER, FOR SOLUTION ORAL at 08:55

## 2019-01-01 RX ADMIN — Medication 1000 MCG: at 08:40

## 2019-01-01 RX ADMIN — CEFEPIME HYDROCHLORIDE 2 G: 2 INJECTION, POWDER, FOR SOLUTION INTRAVENOUS at 08:21

## 2019-01-01 RX ADMIN — DIPHENHYDRAMINE HYDROCHLORIDE 25 MG: 25 CAPSULE ORAL at 07:31

## 2019-01-01 RX ADMIN — MORPHINE SULFATE 6 MG: 10 INJECTION, SOLUTION INTRAMUSCULAR; INTRAVENOUS at 10:42

## 2019-01-01 RX ADMIN — CEFEPIME HYDROCHLORIDE 2 G: 2 INJECTION, POWDER, FOR SOLUTION INTRAVENOUS at 07:44

## 2019-01-01 RX ADMIN — VANCOMYCIN HYDROCHLORIDE 1500 MG: 10 INJECTION, POWDER, LYOPHILIZED, FOR SOLUTION INTRAVENOUS at 20:58

## 2019-01-01 RX ADMIN — OXYCODONE HYDROCHLORIDE AND ACETAMINOPHEN 1000 MG: 500 TABLET ORAL at 08:55

## 2019-01-01 RX ADMIN — MORPHINE SULFATE 2 MG: 2 INJECTION, SOLUTION INTRAMUSCULAR; INTRAVENOUS at 10:32

## 2019-01-01 RX ADMIN — FUROSEMIDE 40 MG: 10 INJECTION, SOLUTION INTRAMUSCULAR; INTRAVENOUS at 16:40

## 2019-01-01 RX ADMIN — CEFEPIME HYDROCHLORIDE 2 G: 2 INJECTION, POWDER, FOR SOLUTION INTRAVENOUS at 23:12

## 2019-01-01 RX ADMIN — SODIUM CHLORIDE 250 ML: 900 INJECTION, SOLUTION INTRAVENOUS at 13:06

## 2019-01-01 RX ADMIN — DIPHENHYDRAMINE HYDROCHLORIDE 25 MG: 25 CAPSULE ORAL at 11:54

## 2019-01-01 RX ADMIN — MORPHINE SULFATE 6 MG: 10 INJECTION, SOLUTION INTRAMUSCULAR; INTRAVENOUS at 14:27

## 2019-01-01 RX ADMIN — SODIUM CHLORIDE 1000 ML: 9 INJECTION, SOLUTION INTRAVENOUS at 22:19

## 2019-01-01 RX ADMIN — ACYCLOVIR 400 MG: 400 TABLET ORAL at 20:32

## 2019-01-01 RX ADMIN — FENTANYL CITRATE 50 MCG: 50 INJECTION INTRAMUSCULAR; INTRAVENOUS at 14:18

## 2019-01-01 RX ADMIN — HALOPERIDOL LACTATE 1 MG: 5 INJECTION, SOLUTION INTRAMUSCULAR at 21:59

## 2019-01-01 RX ADMIN — HALOPERIDOL LACTATE 2 MG: 5 INJECTION, SOLUTION INTRAMUSCULAR at 01:38

## 2019-01-01 RX ADMIN — PROMETHAZINE HYDROCHLORIDE 12.5 MG: 25 INJECTION INTRAMUSCULAR; INTRAVENOUS at 10:33

## 2019-01-01 RX ADMIN — MORPHINE SULFATE 6 MG: 10 INJECTION, SOLUTION INTRAMUSCULAR; INTRAVENOUS at 23:00

## 2019-01-01 RX ADMIN — ACETAMINOPHEN 650 MG: 325 TABLET, FILM COATED ORAL at 18:55

## 2019-01-01 RX ADMIN — SUCCINYLCHOLINE CHLORIDE 20 MG: 20 INJECTION, SOLUTION INTRAMUSCULAR; INTRAVENOUS; PARENTERAL at 14:28

## 2019-01-01 RX ADMIN — IPRATROPIUM BROMIDE AND ALBUTEROL SULFATE 3 ML: 2.5; .5 SOLUTION RESPIRATORY (INHALATION) at 19:14

## 2019-01-01 RX ADMIN — VALACYCLOVIR 1000 MG: 500 TABLET, FILM COATED ORAL at 14:03

## 2019-01-01 RX ADMIN — MORPHINE SULFATE 4 MG: 2 INJECTION, SOLUTION INTRAMUSCULAR; INTRAVENOUS at 12:35

## 2019-01-01 RX ADMIN — SODIUM CHLORIDE, POTASSIUM CHLORIDE, SODIUM LACTATE AND CALCIUM CHLORIDE 9 ML/HR: 600; 310; 30; 20 INJECTION, SOLUTION INTRAVENOUS at 10:28

## 2019-01-01 RX ADMIN — GUAIFENESIN AND DEXTROMETHORPHAN 5 ML: 100; 10 SYRUP ORAL at 11:01

## 2019-01-01 RX ADMIN — DIPHENHYDRAMINE HYDROCHLORIDE 25 MG: 25 CAPSULE ORAL at 07:48

## 2019-01-01 RX ADMIN — CEFAZOLIN SODIUM 2 G: 2 INJECTION, SOLUTION INTRAVENOUS at 10:46

## 2019-01-01 RX ADMIN — POTASSIUM CHLORIDE 10 MEQ: 7.46 INJECTION, SOLUTION INTRAVENOUS at 21:39

## 2019-01-01 RX ADMIN — MORPHINE SULFATE 6 MG: 10 INJECTION, SOLUTION INTRAMUSCULAR; INTRAVENOUS at 21:47

## 2019-01-01 RX ADMIN — ACETAMINOPHEN 650 MG: 325 TABLET, FILM COATED ORAL at 07:48

## 2019-01-01 RX ADMIN — ACETAMINOPHEN 650 MG: 325 TABLET, FILM COATED ORAL at 20:57

## 2019-01-01 RX ADMIN — POTASSIUM CHLORIDE 10 MEQ: 7.46 INJECTION, SOLUTION INTRAVENOUS at 23:44

## 2019-01-01 RX ADMIN — PANTOPRAZOLE SODIUM 40 MG: 40 TABLET, DELAYED RELEASE ORAL at 08:39

## 2019-01-01 RX ADMIN — LIDOCAINE HYDROCHLORIDE: 20 JELLY TOPICAL at 18:23

## 2019-01-01 RX ADMIN — FENTANYL CITRATE 50 MCG: 50 INJECTION INTRAMUSCULAR; INTRAVENOUS at 10:46

## 2019-01-01 RX ADMIN — CEFEPIME HYDROCHLORIDE 2 G: 2 INJECTION, POWDER, FOR SOLUTION INTRAVENOUS at 20:15

## 2019-01-01 RX ADMIN — OXYCODONE HYDROCHLORIDE AND ACETAMINOPHEN 1000 MG: 500 TABLET ORAL at 08:40

## 2019-01-01 RX ADMIN — MORPHINE SULFATE 2 MG: 2 INJECTION, SOLUTION INTRAMUSCULAR; INTRAVENOUS at 09:00

## 2019-01-01 RX ADMIN — ACETAMINOPHEN 650 MG: 325 TABLET ORAL at 17:27

## 2019-01-01 RX ADMIN — GUAIFENESIN 600 MG: 600 TABLET, EXTENDED RELEASE ORAL at 20:32

## 2019-01-01 RX ADMIN — MORPHINE SULFATE 4 MG: 2 INJECTION, SOLUTION INTRAMUSCULAR; INTRAVENOUS at 10:34

## 2019-01-01 RX ADMIN — ACETAMINOPHEN 650 MG: 325 TABLET ORAL at 03:54

## 2019-01-01 RX ADMIN — HYDROCODONE BITARTRATE AND ACETAMINOPHEN 1 TABLET: 5; 325 TABLET ORAL at 22:03

## 2019-01-01 RX ADMIN — SODIUM CHLORIDE 75 ML/HR: 9 INJECTION, SOLUTION INTRAVENOUS at 02:48

## 2019-01-01 RX ADMIN — IPRATROPIUM BROMIDE AND ALBUTEROL SULFATE 3 ML: 2.5; .5 SOLUTION RESPIRATORY (INHALATION) at 07:52

## 2019-01-01 RX ADMIN — DIPHENHYDRAMINE HYDROCHLORIDE 25 MG: 25 CAPSULE ORAL at 12:34

## 2019-01-01 RX ADMIN — MORPHINE SULFATE: 10 INJECTION, SOLUTION INTRAMUSCULAR; INTRAVENOUS at 21:55

## 2019-01-01 RX ADMIN — DECITABINE 42 MG: 50 INJECTION, POWDER, LYOPHILIZED, FOR SOLUTION INTRAVENOUS at 14:39

## 2019-01-01 RX ADMIN — Medication 1000 MCG: at 08:21

## 2019-01-01 RX ADMIN — SODIUM CHLORIDE 250 ML: 9 INJECTION, SOLUTION INTRAVENOUS at 14:29

## 2019-01-01 RX ADMIN — TAZOBACTAM SODIUM AND PIPERACILLIN SODIUM 3.38 G: 375; 3 INJECTION, SOLUTION INTRAVENOUS at 04:54

## 2019-01-01 RX ADMIN — PALONOSETRON HYDROCHLORIDE 0.25 MG: 0.25 INJECTION, SOLUTION INTRAVENOUS at 14:29

## 2019-01-01 RX ADMIN — Medication 1000 MCG: at 08:36

## 2019-01-01 RX ADMIN — METOPROLOL TARTRATE 25 MG: 25 TABLET ORAL at 20:32

## 2019-01-01 RX ADMIN — GUAIFENESIN AND DEXTROMETHORPHAN 5 ML: 100; 10 SYRUP ORAL at 04:00

## 2019-01-01 RX ADMIN — ACETAMINOPHEN 650 MG: 325 TABLET, FILM COATED ORAL at 05:01

## 2019-01-01 RX ADMIN — ACETAMINOPHEN 1000 MG: 500 TABLET, FILM COATED ORAL at 18:51

## 2019-01-01 RX ADMIN — SODIUM CHLORIDE, POTASSIUM CHLORIDE, SODIUM LACTATE AND CALCIUM CHLORIDE: 600; 310; 30; 20 INJECTION, SOLUTION INTRAVENOUS at 14:18

## 2019-01-01 RX ADMIN — SODIUM CHLORIDE 125 ML/HR: 9 INJECTION, SOLUTION INTRAVENOUS at 18:49

## 2019-01-01 RX ADMIN — MORPHINE SULFATE 4 MG: 2 INJECTION, SOLUTION INTRAMUSCULAR; INTRAVENOUS at 12:49

## 2019-01-01 RX ADMIN — GUAIFENESIN AND DEXTROMETHORPHAN 5 ML: 100; 10 SYRUP ORAL at 19:35

## 2019-01-01 RX ADMIN — HALOPERIDOL LACTATE 1 MG: 5 INJECTION, SOLUTION INTRAMUSCULAR at 16:35

## 2019-01-01 RX ADMIN — SODIUM CHLORIDE, PRESERVATIVE FREE 10 ML: 5 INJECTION INTRAVENOUS at 12:52

## 2019-01-01 RX ADMIN — ACETAMINOPHEN 650 MG: 325 TABLET ORAL at 16:46

## 2019-01-01 RX ADMIN — PANTOPRAZOLE SODIUM 40 MG: 40 TABLET, DELAYED RELEASE ORAL at 04:53

## 2019-01-01 RX ADMIN — Medication 500 UNITS: at 15:57

## 2019-01-01 RX ADMIN — ACETAMINOPHEN 650 MG: 325 TABLET, FILM COATED ORAL at 15:51

## 2019-01-01 RX ADMIN — DRONABINOL 2.5 MG: 2.5 CAPSULE ORAL at 17:27

## 2019-01-01 RX ADMIN — IPRATROPIUM BROMIDE AND ALBUTEROL SULFATE 3 ML: 2.5; .5 SOLUTION RESPIRATORY (INHALATION) at 19:57

## 2019-01-01 RX ADMIN — ACETAMINOPHEN 650 MG: 325 TABLET ORAL at 21:14

## 2019-01-01 RX ADMIN — Medication 500 UNITS: at 15:19

## 2019-01-01 RX ADMIN — MORPHINE SULFATE 4 MG: 2 INJECTION, SOLUTION INTRAMUSCULAR; INTRAVENOUS at 14:37

## 2019-01-01 RX ADMIN — MORPHINE SULFATE 6 MG: 10 INJECTION, SOLUTION INTRAMUSCULAR; INTRAVENOUS at 16:57

## 2019-01-01 RX ADMIN — DECITABINE 42 MG: 50 INJECTION, POWDER, LYOPHILIZED, FOR SOLUTION INTRAVENOUS at 13:44

## 2019-01-01 RX ADMIN — MORPHINE SULFATE 2 MG: 2 INJECTION, SOLUTION INTRAMUSCULAR; INTRAVENOUS at 17:31

## 2019-01-01 RX ADMIN — ACETAMINOPHEN 650 MG: 325 TABLET ORAL at 04:27

## 2019-01-01 RX ADMIN — POSACONAZOLE 300 MG: 100 TABLET, COATED ORAL at 08:40

## 2019-01-01 RX ADMIN — Medication 500 UNITS: at 12:32

## 2019-01-01 RX ADMIN — HYDROCODONE BITARTRATE AND ACETAMINOPHEN 1 TABLET: 5; 325 TABLET ORAL at 01:13

## 2019-01-01 RX ADMIN — VANCOMYCIN HYDROCHLORIDE 1250 MG: 10 INJECTION, POWDER, LYOPHILIZED, FOR SOLUTION INTRAVENOUS at 20:39

## 2019-01-01 RX ADMIN — ACETAMINOPHEN 650 MG: 325 TABLET, FILM COATED ORAL at 12:34

## 2019-01-01 RX ADMIN — PROMETHAZINE HYDROCHLORIDE 12.5 MG: 25 INJECTION INTRAMUSCULAR; INTRAVENOUS at 04:47

## 2019-01-01 RX ADMIN — DRONABINOL 2.5 MG: 2.5 CAPSULE ORAL at 08:39

## 2019-01-01 RX ADMIN — IPRATROPIUM BROMIDE AND ALBUTEROL SULFATE 3 ML: 2.5; .5 SOLUTION RESPIRATORY (INHALATION) at 01:46

## 2019-01-01 RX ADMIN — PROPOFOL 150 MG: 10 INJECTION, EMULSION INTRAVENOUS at 14:15

## 2019-01-01 RX ADMIN — Medication 500 UNITS: at 17:11

## 2019-01-01 RX ADMIN — ONDANSETRON 4 MG: 2 INJECTION INTRAMUSCULAR; INTRAVENOUS at 10:53

## 2019-01-01 RX ADMIN — LIDOCAINE HYDROCHLORIDE 1 ML: 20 JELLY TOPICAL at 06:13

## 2019-01-01 RX ADMIN — GUAIFENESIN AND DEXTROMETHORPHAN 5 ML: 100; 10 SYRUP ORAL at 11:04

## 2019-01-01 RX ADMIN — SODIUM CHLORIDE 250 ML: 9 INJECTION, SOLUTION INTRAVENOUS at 14:13

## 2019-01-01 RX ADMIN — LIDOCAINE HYDROCHLORIDE 20 ML: 10 INJECTION, SOLUTION INFILTRATION; PERINEURAL at 15:59

## 2019-01-01 RX ADMIN — ACETAMINOPHEN 650 MG: 325 TABLET ORAL at 06:18

## 2019-01-01 RX ADMIN — PROCHLORPERAZINE MALEATE 10 MG: 5 TABLET ORAL at 08:13

## 2019-01-01 RX ADMIN — PROMETHAZINE HYDROCHLORIDE 12.5 MG: 25 INJECTION INTRAMUSCULAR; INTRAVENOUS at 17:30

## 2019-01-01 RX ADMIN — ZOLPIDEM TARTRATE 5 MG: 5 TABLET ORAL at 03:02

## 2019-01-01 RX ADMIN — CALCIUM CARBONATE-VITAMIN D TAB 500 MG-200 UNIT 1000 MG: 500-200 TAB at 08:21

## 2019-01-01 RX ADMIN — SODIUM CHLORIDE 250 ML: 9 INJECTION, SOLUTION INTRAVENOUS at 08:17

## 2019-01-01 RX ADMIN — METOPROLOL TARTRATE 25 MG: 25 TABLET ORAL at 08:37

## 2019-01-01 RX ADMIN — MORPHINE SULFATE 6 MG: 10 INJECTION, SOLUTION INTRAMUSCULAR; INTRAVENOUS at 03:26

## 2019-01-01 RX ADMIN — ZOLPIDEM TARTRATE 5 MG: 5 TABLET ORAL at 20:58

## 2019-01-01 RX ADMIN — OXYCODONE HYDROCHLORIDE AND ACETAMINOPHEN 1000 MG: 500 TABLET ORAL at 08:21

## 2019-01-01 RX ADMIN — SODIUM CHLORIDE 125 ML/HR: 9 INJECTION, SOLUTION INTRAVENOUS at 03:49

## 2019-01-01 RX ADMIN — HYDROCODONE BITARTRATE AND ACETAMINOPHEN 1 TABLET: 5; 325 TABLET ORAL at 16:59

## 2019-01-01 RX ADMIN — GUAIFENESIN AND DEXTROMETHORPHAN 5 ML: 100; 10 SYRUP ORAL at 21:37

## 2019-01-01 RX ADMIN — ACETAMINOPHEN 650 MG: 325 TABLET ORAL at 01:40

## 2019-01-01 RX ADMIN — POLYETHYLENE GLYCOL 3350 17 G: 17 POWDER, FOR SOLUTION ORAL at 17:14

## 2019-01-01 RX ADMIN — TAZOBACTAM SODIUM AND PIPERACILLIN SODIUM 3.38 G: 375; 3 INJECTION, SOLUTION INTRAVENOUS at 07:08

## 2019-01-01 RX ADMIN — POTASSIUM CHLORIDE 10 MEQ: 7.46 INJECTION, SOLUTION INTRAVENOUS at 22:41

## 2019-01-01 RX ADMIN — PANTOPRAZOLE SODIUM 40 MG: 40 TABLET, DELAYED RELEASE ORAL at 08:40

## 2019-01-01 RX ADMIN — POTASSIUM CHLORIDE 40 MEQ: 1.5 POWDER, FOR SOLUTION ORAL at 16:40

## 2019-01-01 RX ADMIN — ACETAMINOPHEN 650 MG: 325 TABLET ORAL at 10:26

## 2019-01-01 RX ADMIN — IOPAMIDOL 85 ML: 612 INJECTION, SOLUTION INTRAVENOUS at 21:15

## 2019-01-01 RX ADMIN — DEXAMETHASONE SODIUM PHOSPHATE 8 MG: 10 INJECTION INTRAMUSCULAR; INTRAVENOUS at 14:30

## 2019-01-01 RX ADMIN — Medication 500 UNITS: at 13:19

## 2019-01-01 RX ADMIN — DECITABINE 42 MG: 50 INJECTION, POWDER, LYOPHILIZED, FOR SOLUTION INTRAVENOUS at 11:26

## 2019-01-01 RX ADMIN — IPRATROPIUM BROMIDE AND ALBUTEROL SULFATE 3 ML: 2.5; .5 SOLUTION RESPIRATORY (INHALATION) at 23:23

## 2019-01-01 RX ADMIN — CEFEPIME HYDROCHLORIDE 2 G: 2 INJECTION, POWDER, FOR SOLUTION INTRAVENOUS at 17:33

## 2019-01-01 RX ADMIN — GLYCOPYRROLATE 0.2 MG: 0.2 INJECTION INTRAMUSCULAR; INTRAVENOUS at 14:14

## 2019-01-01 RX ADMIN — MORPHINE SULFATE 2 MG: 2 INJECTION, SOLUTION INTRAMUSCULAR; INTRAVENOUS at 12:49

## 2019-01-01 RX ADMIN — MORPHINE SULFATE 6 MG: 10 INJECTION, SOLUTION INTRAMUSCULAR; INTRAVENOUS at 01:24

## 2019-01-01 RX ADMIN — ZOLPIDEM TARTRATE 5 MG: 5 TABLET ORAL at 21:09

## 2019-01-01 RX ADMIN — DECITABINE 42 MG: 50 INJECTION, POWDER, LYOPHILIZED, FOR SOLUTION INTRAVENOUS at 14:52

## 2019-01-01 RX ADMIN — GUAIFENESIN 600 MG: 600 TABLET, EXTENDED RELEASE ORAL at 21:46

## 2019-01-01 RX ADMIN — TAZOBACTAM SODIUM AND PIPERACILLIN SODIUM 3.38 G: 375; 3 INJECTION, SOLUTION INTRAVENOUS at 23:16

## 2019-01-01 RX ADMIN — ACETAMINOPHEN 650 MG: 325 TABLET ORAL at 19:01

## 2019-01-01 RX ADMIN — CEFEPIME HYDROCHLORIDE 2 G: 2 INJECTION, POWDER, FOR SOLUTION INTRAVENOUS at 07:45

## 2019-01-01 RX ADMIN — DRONABINOL 2.5 MG: 2.5 CAPSULE ORAL at 08:06

## 2019-01-01 RX ADMIN — SODIUM CHLORIDE, PRESERVATIVE FREE 10 ML: 5 INJECTION INTRAVENOUS at 15:55

## 2019-01-01 RX ADMIN — POTASSIUM CHLORIDE AND SODIUM CHLORIDE 75 ML/HR: 900; 150 INJECTION, SOLUTION INTRAVENOUS at 16:47

## 2019-01-01 RX ADMIN — OXYCODONE HYDROCHLORIDE AND ACETAMINOPHEN 1 TABLET: 5; 325 TABLET ORAL at 19:16

## 2019-01-01 RX ADMIN — GUAIFENESIN 600 MG: 600 TABLET, EXTENDED RELEASE ORAL at 20:01

## 2019-01-01 RX ADMIN — TAZOBACTAM SODIUM AND PIPERACILLIN SODIUM 3.38 G: 375; 3 INJECTION, SOLUTION INTRAVENOUS at 16:47

## 2019-01-01 RX ADMIN — ACYCLOVIR 400 MG: 400 TABLET ORAL at 08:37

## 2019-01-01 RX ADMIN — ACYCLOVIR 400 MG: 400 TABLET ORAL at 10:09

## 2019-01-01 RX ADMIN — SODIUM CHLORIDE 1000 ML: 9 INJECTION, SOLUTION INTRAVENOUS at 19:02

## 2019-01-01 RX ADMIN — Medication 500 UNITS: at 14:43

## 2019-01-01 RX ADMIN — ZOLPIDEM TARTRATE 5 MG: 5 TABLET ORAL at 23:00

## 2019-01-01 RX ADMIN — Medication 500 UNITS: at 15:10

## 2019-01-01 RX ADMIN — HALOPERIDOL LACTATE 5 MG: 5 INJECTION, SOLUTION INTRAMUSCULAR at 12:29

## 2019-01-01 RX ADMIN — POLYETHYLENE GLYCOL 3350 17 G: 17 POWDER, FOR SOLUTION ORAL at 16:21

## 2019-01-01 RX ADMIN — MORPHINE SULFATE 6 MG: 10 INJECTION, SOLUTION INTRAMUSCULAR; INTRAVENOUS at 04:38

## 2019-01-01 RX ADMIN — ZOLPIDEM TARTRATE 5 MG: 5 TABLET ORAL at 23:12

## 2019-01-01 RX ADMIN — ZOLPIDEM TARTRATE 5 MG: 5 TABLET ORAL at 21:39

## 2019-01-01 RX ADMIN — POSACONAZOLE 300 MG: 100 TABLET, COATED ORAL at 21:38

## 2019-01-01 RX ADMIN — MORPHINE SULFATE 6 MG: 10 INJECTION, SOLUTION INTRAMUSCULAR; INTRAVENOUS at 10:01

## 2019-01-01 RX ADMIN — METOPROLOL TARTRATE 25 MG: 25 TABLET ORAL at 14:06

## 2019-01-01 RX ADMIN — POSACONAZOLE 300 MG: 100 TABLET, COATED ORAL at 13:06

## 2019-01-01 RX ADMIN — ALLOPURINOL 300 MG: 300 TABLET ORAL at 09:02

## 2019-01-01 RX ADMIN — ACYCLOVIR 400 MG: 400 TABLET ORAL at 20:03

## 2019-01-01 RX ADMIN — FENTANYL CITRATE 50 MCG: 50 INJECTION INTRAMUSCULAR; INTRAVENOUS at 11:10

## 2019-01-01 RX ADMIN — MORPHINE SULFATE 4 MG: 2 INJECTION, SOLUTION INTRAMUSCULAR; INTRAVENOUS at 09:00

## 2019-01-01 RX ADMIN — Medication 500 UNITS: at 13:12

## 2019-01-01 RX ADMIN — METOPROLOL TARTRATE 25 MG: 25 TABLET ORAL at 05:48

## 2019-01-01 RX ADMIN — POTASSIUM CHLORIDE 40 MEQ: 1.5 POWDER, FOR SOLUTION ORAL at 13:02

## 2019-01-01 RX ADMIN — METOPROLOL TARTRATE 5 MG: 5 INJECTION, SOLUTION INTRAVENOUS at 12:06

## 2019-01-01 RX ADMIN — HYDROCODONE BITARTRATE AND ACETAMINOPHEN 1 TABLET: 5; 325 TABLET ORAL at 11:38

## 2019-01-01 RX ADMIN — ACETAMINOPHEN 650 MG: 325 TABLET ORAL at 08:36

## 2019-01-01 RX ADMIN — ZOLPIDEM TARTRATE 5 MG: 5 TABLET ORAL at 21:57

## 2019-01-01 RX ADMIN — IPRATROPIUM BROMIDE AND ALBUTEROL SULFATE 3 ML: 2.5; .5 SOLUTION RESPIRATORY (INHALATION) at 07:51

## 2019-01-01 RX ADMIN — CALCIUM CARBONATE-VITAMIN D TAB 500 MG-200 UNIT 1000 MG: 500-200 TAB at 08:40

## 2019-01-01 RX ADMIN — ACETAMINOPHEN 650 MG: 325 TABLET ORAL at 21:39

## 2019-01-01 RX ADMIN — ACYCLOVIR 400 MG: 400 TABLET ORAL at 21:38

## 2019-01-01 RX ADMIN — HYDROCODONE BITARTRATE AND ACETAMINOPHEN 1 TABLET: 5; 325 TABLET ORAL at 20:29

## 2019-01-01 RX ADMIN — Medication 1000 MCG: at 16:21

## 2019-01-01 RX ADMIN — OXYCODONE HYDROCHLORIDE AND ACETAMINOPHEN 2 TABLET: 5; 325 TABLET ORAL at 23:32

## 2019-01-01 RX ADMIN — DECITABINE 42 MG: 50 INJECTION, POWDER, LYOPHILIZED, FOR SOLUTION INTRAVENOUS at 09:52

## 2019-01-01 RX ADMIN — Medication 500 UNITS: at 14:05

## 2019-01-01 RX ADMIN — ACETAMINOPHEN 650 MG: 325 TABLET, FILM COATED ORAL at 07:32

## 2019-01-01 RX ADMIN — LIDOCAINE HYDROCHLORIDE 100 MG: 20 INJECTION, SOLUTION INFILTRATION; PERINEURAL at 14:15

## 2019-01-01 RX ADMIN — VALACYCLOVIR 1000 MG: 500 TABLET, FILM COATED ORAL at 20:54

## 2019-01-01 RX ADMIN — VANCOMYCIN HYDROCHLORIDE 1250 MG: 10 INJECTION, POWDER, LYOPHILIZED, FOR SOLUTION INTRAVENOUS at 09:25

## 2019-01-01 RX ADMIN — HALOPERIDOL LACTATE 1 MG: 5 INJECTION, SOLUTION INTRAMUSCULAR at 07:15

## 2019-01-01 RX ADMIN — Medication 1000 MCG: at 10:09

## 2019-01-01 RX ADMIN — ACYCLOVIR 400 MG: 400 TABLET ORAL at 08:40

## 2019-01-01 RX ADMIN — INFLUENZA A VIRUS A/BRISBANE/02/2018 IVR-190 (H1N1) ANTIGEN (PROPIOLACTONE INACTIVATED), INFLUENZA A VIRUS A/KANSAS/14/2017 X-327 (H3N2) ANTIGEN (PROPIOLACTONE INACTIVATED), INFLUENZA B VIRUS B/MARYLAND/15/2016 ANTIGEN (PROPIOLACTONE INACTIVATED), INFLUENZA B VIRUS B/PHUKET/3073/2013 BVR-1B ANTIGEN (PROPIOLACTONE INACTIVATED) 0.5 ML: 15; 15; 15; 15 INJECTION, SUSPENSION INTRAMUSCULAR at 20:01

## 2019-01-01 RX ADMIN — GUAIFENESIN 600 MG: 600 TABLET, EXTENDED RELEASE ORAL at 21:35

## 2019-01-01 RX ADMIN — NEOSTIGMINE METHYLSULFATE 2 MG: 1 INJECTION INTRAMUSCULAR; INTRAVENOUS; SUBCUTANEOUS at 14:52

## 2019-01-01 RX ADMIN — METOPROLOL TARTRATE 25 MG: 25 TABLET ORAL at 20:01

## 2019-01-01 RX ADMIN — ALLOPURINOL 300 MG: 300 TABLET ORAL at 08:06

## 2019-01-01 RX ADMIN — VALACYCLOVIR 1000 MG: 500 TABLET, FILM COATED ORAL at 15:21

## 2019-01-01 RX ADMIN — DECITABINE 42 MG: 50 INJECTION, POWDER, LYOPHILIZED, FOR SOLUTION INTRAVENOUS at 08:41

## 2019-01-01 RX ADMIN — TAZOBACTAM SODIUM AND PIPERACILLIN SODIUM 3.38 G: 375; 3 INJECTION, SOLUTION INTRAVENOUS at 04:56

## 2019-01-01 RX ADMIN — DRONABINOL 2.5 MG: 2.5 CAPSULE ORAL at 16:28

## 2019-01-01 RX ADMIN — GUAIFENESIN 600 MG: 600 TABLET, EXTENDED RELEASE ORAL at 21:10

## 2019-01-01 RX ADMIN — METOPROLOL TARTRATE 25 MG: 25 TABLET ORAL at 21:37

## 2019-01-01 RX ADMIN — IOPAMIDOL 95 ML: 755 INJECTION, SOLUTION INTRAVENOUS at 14:23

## 2019-01-01 RX ADMIN — CEFEPIME HYDROCHLORIDE 2 G: 2 INJECTION, POWDER, FOR SOLUTION INTRAVENOUS at 16:17

## 2019-01-01 RX ADMIN — VALACYCLOVIR 1000 MG: 500 TABLET, FILM COATED ORAL at 06:13

## 2019-01-01 RX ADMIN — ALLOPURINOL 300 MG: 300 TABLET ORAL at 08:55

## 2019-01-01 RX ADMIN — SODIUM CHLORIDE 125 ML/HR: 9 INJECTION, SOLUTION INTRAVENOUS at 13:06

## 2019-01-01 RX ADMIN — IOPAMIDOL 85 ML: 612 INJECTION, SOLUTION INTRAVENOUS at 22:12

## 2019-01-01 RX ADMIN — SODIUM CHLORIDE 125 ML/HR: 9 INJECTION, SOLUTION INTRAVENOUS at 10:20

## 2019-01-01 RX ADMIN — PROCHLORPERAZINE MALEATE 10 MG: 5 TABLET, FILM COATED ORAL at 08:23

## 2019-01-01 RX ADMIN — PROCHLORPERAZINE MALEATE 10 MG: 5 TABLET ORAL at 09:26

## 2019-01-01 RX ADMIN — LIDOCAINE HYDROCHLORIDE: 20 JELLY TOPICAL at 10:28

## 2019-01-01 RX ADMIN — ZOLPIDEM TARTRATE 5 MG: 5 TABLET ORAL at 22:30

## 2019-01-01 RX ADMIN — ZOLPIDEM TARTRATE 5 MG: 5 TABLET ORAL at 20:03

## 2019-01-01 RX ADMIN — POTASSIUM CHLORIDE 20 MEQ: 1.5 POWDER, FOR SOLUTION ORAL at 21:46

## 2019-01-01 RX ADMIN — PALONOSETRON HYDROCHLORIDE 0.25 MG: 0.25 INJECTION, SOLUTION INTRAVENOUS at 13:25

## 2019-01-01 RX ADMIN — SODIUM CHLORIDE 250 ML: 9 INJECTION, SOLUTION INTRAVENOUS at 09:26

## 2019-01-01 RX ADMIN — FENTANYL CITRATE 50 MCG: 50 INJECTION INTRAMUSCULAR; INTRAVENOUS at 14:25

## 2019-01-01 RX ADMIN — Medication 500 UNITS: at 15:55

## 2019-01-01 RX ADMIN — MORPHINE SULFATE 4 MG: 2 INJECTION, SOLUTION INTRAMUSCULAR; INTRAVENOUS at 19:45

## 2019-01-01 RX ADMIN — OXYCODONE HYDROCHLORIDE AND ACETAMINOPHEN 1000 MG: 500 TABLET ORAL at 08:05

## 2019-01-01 RX ADMIN — VALACYCLOVIR 1000 MG: 500 TABLET, FILM COATED ORAL at 21:57

## 2019-01-01 RX ADMIN — PANTOPRAZOLE SODIUM 40 MG: 40 TABLET, DELAYED RELEASE ORAL at 04:56

## 2019-01-01 RX ADMIN — SENNOSIDES AND DOCUSATE SODIUM 2 TABLET: 8.6; 5 TABLET ORAL at 17:18

## 2019-01-01 RX ADMIN — TAZOBACTAM SODIUM AND PIPERACILLIN SODIUM 3.38 G: 375; 3 INJECTION, SOLUTION INTRAVENOUS at 16:40

## 2019-01-01 RX ADMIN — FAMOTIDINE 20 MG: 10 INJECTION INTRAVENOUS at 10:29

## 2019-01-01 RX ADMIN — MORPHINE SULFATE 2 MG: 2 INJECTION, SOLUTION INTRAMUSCULAR; INTRAVENOUS at 19:46

## 2019-01-01 RX ADMIN — PANTOPRAZOLE SODIUM 40 MG: 40 TABLET, DELAYED RELEASE ORAL at 06:10

## 2019-01-01 RX ADMIN — IPRATROPIUM BROMIDE AND ALBUTEROL SULFATE 3 ML: 2.5; .5 SOLUTION RESPIRATORY (INHALATION) at 11:25

## 2019-01-01 RX ADMIN — IPRATROPIUM BROMIDE AND ALBUTEROL SULFATE 3 ML: 2.5; .5 SOLUTION RESPIRATORY (INHALATION) at 06:56

## 2019-01-01 RX ADMIN — SODIUM CHLORIDE, PRESERVATIVE FREE 10 ML: 5 INJECTION INTRAVENOUS at 09:44

## 2019-01-01 RX ADMIN — ROCURONIUM BROMIDE 30 MG: 10 INJECTION INTRAVENOUS at 14:18

## 2019-01-01 RX ADMIN — PROCHLORPERAZINE MALEATE 10 MG: 5 TABLET ORAL at 13:00

## 2019-01-01 RX ADMIN — GUAIFENESIN AND DEXTROMETHORPHAN 5 ML: 100; 10 SYRUP ORAL at 17:02

## 2019-01-01 RX ADMIN — POTASSIUM CHLORIDE AND SODIUM CHLORIDE 75 ML/HR: 900; 150 INJECTION, SOLUTION INTRAVENOUS at 23:05

## 2019-01-01 RX ADMIN — SODIUM CHLORIDE, PRESERVATIVE FREE 10 ML: 5 INJECTION INTRAVENOUS at 10:29

## 2019-01-01 RX ADMIN — HYDROMORPHONE HYDROCHLORIDE 0.5 MG: 1 INJECTION, SOLUTION INTRAMUSCULAR; INTRAVENOUS; SUBCUTANEOUS at 19:32

## 2019-01-01 RX ADMIN — POTASSIUM CHLORIDE AND SODIUM CHLORIDE 75 ML/HR: 900; 150 INJECTION, SOLUTION INTRAVENOUS at 05:54

## 2019-01-01 RX ADMIN — PROMETHAZINE HYDROCHLORIDE 12.5 MG: 25 INJECTION INTRAMUSCULAR; INTRAVENOUS at 23:08

## 2019-01-01 RX ADMIN — PROPOFOL 120 MCG/KG/MIN: 10 INJECTION, EMULSION INTRAVENOUS at 10:46

## 2019-01-01 RX ADMIN — Medication 1000 MCG: at 08:55

## 2019-01-01 RX ADMIN — ACETAMINOPHEN 650 MG: 325 TABLET, FILM COATED ORAL at 16:57

## 2019-01-01 RX ADMIN — ONDANSETRON 4 MG: 2 INJECTION INTRAMUSCULAR; INTRAVENOUS at 19:32

## 2019-01-01 RX ADMIN — PROPOFOL 50 MG: 10 INJECTION, EMULSION INTRAVENOUS at 10:46

## 2019-01-01 RX ADMIN — SODIUM CHLORIDE, POTASSIUM CHLORIDE, SODIUM LACTATE AND CALCIUM CHLORIDE 1000 ML: 600; 310; 30; 20 INJECTION, SOLUTION INTRAVENOUS at 19:32

## 2019-01-09 NOTE — TELEPHONE ENCOUNTER
I spoke with Dr. Barrera.  The patient has rheumatoid arthritis and peripheral neuropathy associated with MGUS, IgM With elevated protein on CSF evaluation.  Dr. Shankar from [facility] feels that this is CIDP.    There was question regarding any medication which may treat both disorders.  Rituximab may be used for both.  The [facility] article monoclonal gammopathy associated peripheral neuropathy: Diagnosis and management from May 2017 collects different articles and suggests therapy.  The rituximab treatment used weekly infusions of 375 mg/m² over 4 weeks for some of them.    He indicated their office would talk to the patient regarding potential use of rituximab as opposed to a combination Orencia and IVIG    Gns

## 2019-03-14 NOTE — PROGRESS NOTES
CC: CIDP    HPI:  Baldemar Wellington is a  73 y.o. 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  He has an MGUS IgM kappa with concentration 0.3 g/dL.  His motor and sensory neuropathy panel was negative for antibodies directed at nerve components.  CSF protein was 86 but IgG index was 0.5 which is normal but IgG synthesis rate was mildly elevated at 3.5.  There were 2200 red cells.    I saw him last 12/11/18.  He is complex in that he has had prostate cancer and leiomyosarcoma and has had radiation therapy and has persistent pancytopenia.  He was on Orencia for his rheumatoid arthritis through Dr. Barrera but this is not thought to be a source for it.  He was taken off of this in favor of rituximab as a treatment both for CIDP and rheumatoid arthritis.  He has received 2 doses.  Indicates perhaps the sensory symptoms in his feet are a little better than they were.    Further complications included osteomyelitis as a complication of his lumbar puncture.  Since I saw him last he has had influenza.  He tells me that he has had his pneumonia vaccines and has had a flu shot.  Reviewing his white counts generally they run between 1.5 and 2.5.  His lymphocytes and granulocytes are low.  Blood counts are obtained every couple of weeks through Dr. Barrera.  He was seen by Dr. Joyce while he was hospitalized for his osteomyelitis in October.  He had suggested a bone marrow but the patient was disinclined.  Neupogen was not felt to be necessary at the time.        Past Medical History:   Diagnosis Date   • Arthritis     Reumatoid   • GERD (gastroesophageal reflux disease)    • Leiomyosarcoma (CMS/HCC)    • Prostate cancer (CMS/HCC)          History reviewed. No pertinent surgical history.        Current Outpatient Medications:   •  aspirin 81 MG EC tablet, Take 81 mg by mouth Daily., Disp: , Rfl:   •  Calcium-Vitamin D-Vitamin K 500-100-40 MG-UNT-MCG chewable tablet, Chew., Disp: , Rfl:   •  fluticasone (FLONASE) 50  MCG/ACT nasal spray, 2 sprays by Each Nare route Daily., Disp: , Rfl: 3  •  loratadine-pseudoephedrine (CLARITIN-D 24-hour)  MG per 24 hr tablet, Take 1 tablet by mouth., Disp: , Rfl:   •  Omega-3 Fatty Acids (FISH OIL) 1000 MG capsule capsule, Take  by mouth Daily With Breakfast., Disp: , Rfl:   •  omeprazole (priLOSEC) 20 MG capsule, Take 20 mg by mouth Daily., Disp: , Rfl: 1  •  predniSONE (DELTASONE) 5 MG tablet, Take 5 mg by mouth Daily As Needed., Disp: , Rfl: 1  •  RiTUXimab (RITUXAN IV), Infuse  into a venous catheter., Disp: , Rfl:   •  sildenafil (REVATIO) 20 MG tablet, take 1-5 pills 30-60 mins before sexual activity. start with a lower dose & increase as needed, Disp: , Rfl: 11  •  vitamin B-12 (CYANOCOBALAMIN) 2500 MCG sublingual tablet tablet, Place  under the tongue Daily., Disp: , Rfl:   •  vitamin C (ASCORBIC ACID) 500 MG tablet, Take 500 mg by mouth Daily., Disp: , Rfl:       Family History   Problem Relation Age of Onset   • Breast cancer Mother    • Prostate cancer Father    • Breast cancer Sister    • Leukemia Brother          Social History     Socioeconomic History   • Marital status:      Spouse name: Not on file   • Number of children: Not on file   • Years of education: Not on file   • Highest education level: Not on file   Social Needs   • Financial resource strain: Not on file   • Food insecurity - worry: Not on file   • Food insecurity - inability: Not on file   • Transportation needs - medical: Not on file   • Transportation needs - non-medical: Not on file   Occupational History   • Not on file   Tobacco Use   • Smoking status: Former Smoker   • Smokeless tobacco: Never Used   • Tobacco comment: quit 50 years ago   Substance and Sexual Activity   • Alcohol use: Yes     Comment: social   • Drug use: Not on file   • Sexual activity: Not on file   Other Topics Concern   • Not on file   Social History Narrative   • Not on file         Allergies   Allergen Reactions   •  "Lorazepam Hives         Pain Scale: 0/10        ROS:  Review of Systems   Constitutional: Negative for activity change, appetite change and fatigue.   HENT: Negative for ear pain, facial swelling and hearing loss.    Eyes: Negative for pain, redness and itching.   Respiratory: Negative for cough, choking and shortness of breath.    Cardiovascular: Negative for chest pain and leg swelling.   Gastrointestinal: Negative for abdominal pain, nausea and vomiting.   Skin: Negative for color change, pallor, rash and wound.   Allergic/Immunologic: Negative for environmental allergies and food allergies.   Neurological: Positive for weakness and numbness. Negative for dizziness, tremors, syncope, facial asymmetry, light-headedness and headaches.   Hematological: Does not bruise/bleed easily.   Psychiatric/Behavioral: Negative for agitation, behavioral problems, confusion, decreased concentration, dysphoric mood, hallucinations and sleep disturbance. The patient is not nervous/anxious and is not hyperactive.            Physical Exam:  Vitals:    03/14/19 1007   BP: 140/80   Pulse: 94   SpO2: 99%   Weight: 89.2 kg (196 lb 9.6 oz)   Height: 182.9 cm (72.01\")     Body mass index is 26.66 kg/m².    Physical Exam  General: Well-developed white male no acute distress  HEENT: Normocephalic no evidence of trauma.  Neck: Supple.  Heart: Regular rate and rhythm.  No pedal edema      Neurological Exam:   Mental status: Awake alert oriented and conversant without evidence of an affective disorder thought disorder delusions or hallucinations.  HCF: No aphasia apraxia or dysarthria.  Recent and remote memory intact.  Knowledge of recent events intact.  Cranial nerves: 2-12 intact  Motor: Normal tone and bulk.  There is good strength in all muscles tested in the arms.  In the legs good strength is seen except for toe extension which is mildly weak.  Reflexes: Upper extremities trace to +1 and lower extremities absent.  Downgoing toe " signs  Sensory: Minor reduction light touch on the fingertips otherwise normal light touch and pinprick in the uppers.  In the lower extremity reduced light touch and pinprick on the feet distally.  Vibration sense reduced at the ankles and position sense intact in the great toes.  Cerebellar: Finger to nose rapid movements heel-to-shin normal  Gait and Station: Casual walk is mildly broad-based.  He can toe and heel walk okay.  Tandem walk is reasonable.  No Romberg no drift but he titubates some        Results:      Lab Results   Component Value Date    GLUCOSE 108 (H) 10/22/2018    BUN 8 10/22/2018    CREATININE 0.78 11/26/2018    EGFRIFNONA 98 11/26/2018    BCR 11.0 10/22/2018    CO2 26.0 10/22/2018    CALCIUM 9.0 10/22/2018    PROTENTOTREF 6.6 07/17/2018    ALBUMIN 3.40 (L) 10/15/2018    LABIL2 1.4 07/17/2018    AST 10 10/12/2018    ALT 11 10/12/2018       Lab Results   Component Value Date    WBC 1.55 (C) 11/26/2018    HGB 10.3 (L) 11/26/2018    HCT 32.4 (L) 11/26/2018    MCV 93.1 11/26/2018     (L) 11/26/2018         .  Lab Results   Component Value Date    RPR Non-Reactive 07/17/2018         Lab Results   Component Value Date    TSH 2.640 07/17/2018         Lab Results   Component Value Date    QSKUUAIA34 >2,000 (H) 10/15/2018         Lab Results   Component Value Date    FOLATE 12.50 10/15/2018         Lab Results   Component Value Date    HGBA1C 4.78 (L) 07/17/2018               Assessment:   1.  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  Rheumatoid arthritis  3.  Pancytopenia          Plan:  1.  I had a lengthy discussion with patient and his wife Yris.  He seems to have a minor improvement.  I explained to them it may take months for maximum improvement.  We will see him back in 6 months.    2.  He is receiving every 2-week CBCs through Dr. Barrera.  If there is a significant drop I would recommend he be seen back by Dr. Joyce.  I offered to send him back at this  time if they desired but they opted to wait.  3.  He indicates he has had the pneumonia shot and had a flu shot.  Keeping up on immunizations was recommended.            ADD  Received free kappa and lambda light chains and ratio from [facility] dated 12/4/18 showing free kappa 16.9 mg/L and free lambda 13.5 mg/L.  These are within normal range with a normal ratio of 1.25.  No results for 24-hour urine electrophoresis.    GNS            Dictated utilizing Dragon dictation.

## 2019-07-17 PROBLEM — D70.9 NEUTROPENIA (HCC): Status: ACTIVE | Noted: 2019-01-01

## 2019-07-17 NOTE — PROGRESS NOTES
Dr. Mata in emergency room called today and stated that patient's white count had dropped down to 0.89 but did not have any fever.  Patient has rheumatoid arthritis and is on Rituxan.  He has seen Dr. code in the past and ER wants to see if he can be followed up with Dr. santo.  He was also short of breath and they are going to obtain a CT angiogram of the chest in the emergency room, if CT angiogram is negative and his oxygen saturations are normal then he will be discharged with prophylactic antibiotics.  I told him that he should get a follow-up appointment in 1 week with nurse practitioner in our office for blood count check and see Dr. Hong in 2 weeks.  Mel Levy MD

## 2019-07-17 NOTE — PLAN OF CARE
Problem: Patient Care Overview  Goal: Plan of Care Review  Outcome: Ongoing (interventions implemented as appropriate)   07/17/19 5277   Coping/Psychosocial   Plan of Care Reviewed With spouse;patient   Plan of Care Review   Progress no change   OTHER   Outcome Summary pt arrived from the ER admitted with neutropenia. pt is up ad riaz, no compliants of n/v or pain at this time. reg diet. VSS will continue to monitor.      Goal: Individualization and Mutuality  Outcome: Ongoing (interventions implemented as appropriate)    Goal: Discharge Needs Assessment  Outcome: Ongoing (interventions implemented as appropriate)    Goal: Interprofessional Rounds/Family Conf  Outcome: Ongoing (interventions implemented as appropriate)      Problem: Neutropenia (Adult)  Goal: Signs and Symptoms of Listed Potential Problems Will be Absent, Minimized or Managed (Neutropenia)  Outcome: Ongoing (interventions implemented as appropriate)

## 2019-07-17 NOTE — PROGRESS NOTES
Clinical Pharmacy Services: Medication History    Baldemar Wellington is a 73 y.o. male presenting to [facility] for   Chief Complaint   Patient presents with   • Shortness of Breath     pt reports shortness of breath and bilateral hand tingling starting today.    • Tingling     bilateral hands.        He  has a past medical history of Arthritis, GERD (gastroesophageal reflux disease), Leiomyosarcoma (CMS/HCC), and Prostate cancer (CMS/HCC).    Allergies as of 07/17/2019 - Reviewed 07/17/2019   Allergen Reaction Noted   • Lorazepam Hives 06/14/2017       Medication information was obtained from: Patient list of medications  Pharmacy and Phone Number: [facility] [phone]    Prior to Admission Medications     Prescriptions Last Dose Informant Patient Reported? Taking?    Calcium-Vitamin D-Vitamin K (VIACTIV CALCIUM PLUS D) 650-12.5-40 MG-MCG-MCG chewable tablet  Other Yes Yes    Chew 1 tablet Daily.    coenzyme Q10 50 MG capsule capsule  Other Yes Yes    Take 50 mg by mouth Daily.    fluticasone (FLONASE) 50 MCG/ACT nasal spray  Other Yes Yes    2 sprays by Each Nare route Daily As Needed.    loratadine-pseudoephedrine (CLARITIN-D 24-hour)  MG per 24 hr tablet  Other Yes Yes    Take 1 tablet by mouth Daily.    Omega-3 Fatty Acids (FISH OIL) 1000 MG capsule capsule  Other Yes Yes    Take 1,000 mg by mouth Daily.    omeprazole (priLOSEC) 20 MG capsule  Other Yes Yes    Take 20 mg by mouth Daily.    predniSONE (DELTASONE) 5 MG tablet  Other Yes Yes    Take 5 mg by mouth Daily As Needed.    Red Yeast Rice 600 MG capsule  Other Yes Yes    Take 1 capsule by mouth Daily.    RiTUXimab (RITUXAN IV)  Other Yes Yes    Infuse  into a venous catheter Every 6 (Six) Months.    sildenafil (REVATIO) 20 MG tablet  Other Yes Yes    take 1-5 pills 30-60 mins before sexual activity. start with a lower dose & increase as needed    vitamin B-12 (CYANOCOBALAMIN) 2500 MCG sublingual tablet tablet  Other Yes Yes    Place 5,000 mcg  under the tongue Daily.    vitamin C (ASCORBIC ACID) 500 MG tablet  Other Yes Yes    Take 1,000 mg by mouth Daily.            Medication notes: Co-Q 10 added per patient medication list    This medication list is complete to the best of my knowledge as of 7/17/2019    Please call if questions.    Reyna Greene, Medication History Technician  7/17/2019 4:06 PM

## 2019-07-17 NOTE — ED PROVIDER NOTES
EMERGENCY DEPARTMENT ENCOUNTER    Room Number:  P388/1  Date seen:  7/17/2019  Time seen: 12:55 PM  PCP: Humphrey Proctor MD (Tony)  Historian: Pt      HPI:  Chief Complaint: SOA  A complete HPI/ROS/PMH/PSH/SH/FH are unobtainable due to: none  Context: Baldemar Wellington is a 73 y.o. male who presents to the ED c/o MERCER that began last night. He states when he first gets up or goes up or down steps he has to rest and catch his breath. However, he also states that if he walks his SOA will resolve. Pt denies CP, cough, fever, or chills. He states he called his doctor this morning who recommended him to go to the ER. The pt reports a Hx of prostate CA with last radiation two years ago. Hx of RA. He states he has had two infusions of Rituxan. He states he has had one earlier this year in January and one two days ago. He states he is concerned his Sx are caused from his Rituxan. He denies having any issues with his first infusion. He denies Hx of heart disease.     Location: respiratory  Radiation: none  Quality: SOA  Intensity/Severity: moderate  Duration: since last night  Onset quality: gradual  Timing: intermittent  Progression: unchanged  Aggravating Factors: walking up or down stairs  Alleviating Factors: rest  Previous Episodes: none  Treatment before arrival: Pt states he had a Rituxan infusion two days ago.  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 No pertinent surgical history.      FAMILY HISTORY  Family History   Problem Relation Age of Onset   • Breast cancer Mother    • Prostate cancer Father    • Breast cancer Sister    • Leukemia Brother          SOCIAL HISTORY  Social History     Socioeconomic History   • Marital status:      Spouse name: Not on file   • Number of children: Not on file   • Years of education: Not on file   • Highest education level: Not on file   Tobacco Use   • Smoking status: Former Smoker   • Smokeless tobacco: Never Used   • Tobacco comment: quit 50 years ago   Substance and Sexual Activity   • Alcohol use: Yes     Comment: social         ALLERGIES  Lorazepam        REVIEW OF SYSTEMS  Review of Systems   Constitutional: Negative for activity change, appetite change and fever.   HENT: Negative for congestion and sore throat.    Eyes: Negative.    Respiratory: Positive for shortness of breath. Negative for cough.    Cardiovascular: Negative for chest pain and leg swelling.   Gastrointestinal: Negative for abdominal pain, diarrhea and vomiting.   Endocrine: Negative.    Genitourinary: Negative for decreased urine volume and dysuria.   Musculoskeletal: Negative for neck pain.   Skin: Negative for rash and wound.   Allergic/Immunologic: Negative.    Neurological: Negative for weakness, numbness and headaches.   Hematological: Negative.    Psychiatric/Behavioral: Negative.    All other systems reviewed and are negative.           PHYSICAL EXAM  ED Triage Vitals   Temp Heart Rate Resp BP SpO2   07/17/19 1119 07/17/19 1119 07/17/19 1119 07/17/19 1145 07/17/19 1119   98.5 °F (36.9 °C) 105 18 124/71 99 %      Temp src Heart Rate Source Patient Position BP Location FiO2 (%)   07/17/19 1119 07/17/19 1119 -- -- --   Tympanic Monitor            GENERAL: no acute distress  HENT: nares patent  EYES: no scleral icterus  CV:  regular rhythm, normal rate, no murmur  RESPIRATORY: normal effort, lungs CTAB  ABDOMEN: soft, non-tender  MUSCULOSKELETAL: no deformity, no BLE edema  NEURO: alert, moves all extremities, follows commands  SKIN: warm, dry    Vital signs and nursing notes reviewed.          LAB RESULTS  Recent Results (from the past 24 hour(s))   Light Blue Top    Collection Time: 07/17/19 11:51 AM   Result Value Ref Range    Extra Tube hold for add-on    Green Top (Gel)    Collection Time: 07/17/19 11:51 AM   Result Value Ref Range    Extra Tube Hold for add-ons.    Lavender Top    Collection Time: 07/17/19 11:51 AM   Result Value Ref Range    Extra Tube     Gold Top - SST    Collection Time: 07/17/19 11:51 AM   Result Value Ref Range    Extra Tube Hold for add-ons.    Basic Metabolic Panel    Collection Time: 07/17/19 11:51 AM   Result Value Ref Range    Glucose 120 (H) 65 - 99 mg/dL    BUN 15 8 - 23 mg/dL    Creatinine 0.86 0.76 - 1.27 mg/dL    Sodium 139 136 - 145 mmol/L    Potassium 3.9 3.5 - 5.2 mmol/L    Chloride 104 98 - 107 mmol/L    CO2 24.4 22.0 - 29.0 mmol/L    Calcium 8.7 8.6 - 10.5 mg/dL    eGFR Non African Amer 88 >60 mL/min/1.73    BUN/Creatinine Ratio 17.4 7.0 - 25.0    Anion Gap 10.6 5.0 - 15.0 mmol/L   BNP    Collection Time: 07/17/19 11:51 AM   Result Value Ref Range    proBNP 1,010.0 (H) 5.0 - 900.0 pg/mL   Troponin    Collection Time: 07/17/19 11:51 AM   Result Value Ref Range    Troponin T <0.010 0.000 - 0.030 ng/mL   CBC Auto Differential    Collection Time: 07/17/19 11:51 AM   Result Value Ref Range    WBC 0.89 (C) 3.40 - 10.80 10*3/mm3    RBC 2.54 (L) 4.14 - 5.80 10*6/mm3    Hemoglobin 8.7 (L) 13.0 - 17.7 g/dL    Hematocrit 26.6 (L) 37.5 - 51.0 %    .7 (H) 79.0 - 97.0 fL    MCH 34.3 (H) 26.6 - 33.0 pg    MCHC 32.7 31.5 - 35.7 g/dL    RDW 21.6 (H) 12.3 - 15.4 %    RDW-SD 71.1 (H) 37.0 - 54.0 fl    MPV 8.8 6.0 - 12.0 fL    Platelets 111 (L) 140 - 450 10*3/mm3   Manual Differential    Collection Time:  07/17/19 11:51 AM   Result Value Ref Range    Neutrophil % 47.0 42.7 - 76.0 %    Lymphocyte % 38.0 19.6 - 45.3 %    Monocyte % 13.0 (H) 5.0 - 12.0 %    Eosinophil % 2.0 0.3 - 6.2 %    Neutrophils Absolute 0.42 (L) 1.70 - 7.00 10*3/mm3    Lymphocytes Absolute 0.34 (L) 0.70 - 3.10 10*3/mm3    Monocytes Absolute 0.12 0.10 - 0.90 10*3/mm3    Eosinophils Absolute 0.02 0.00 - 0.40 10*3/mm3    RBC Morphology Normal Normal    WBC Morphology Normal Normal    Platelet Morphology Normal Normal   Respiratory Panel, PCR - Swab, Nasopharynx    Collection Time: 07/17/19  3:51 PM   Result Value Ref Range    ADENOVIRUS, PCR Not Detected Not Detected    Coronavirus 229E Not Detected Not Detected    Coronavirus HKU1 Not Detected Not Detected    Coronavirus NL63 Not Detected Not Detected    Coronavirus OC43 Not Detected Not Detected    Human Metapneumovirus Not Detected Not Detected    Human Rhinovirus/Enterovirus Not Detected Not Detected    Influenza B PCR Not Detected Not Detected    Parainfluenza Virus 1 Not Detected Not Detected    Parainfluenza Virus 2 Not Detected Not Detected    Parainfluenza Virus 3 Not Detected Not Detected    Parainfluenza Virus 4 Not Detected Not Detected    Bordetella pertussis pcr Not Detected Not Detected    Influenza A H1 2009 PCR Not Detected Not Detected    Chlamydophila pneumoniae PCR Not Detected Not Detected    Mycoplasma pneumo by PCR Not Detected Not Detected    Influenza A PCR Not Detected Not Detected    Influenza A H3 Not Detected Not Detected    Influenza A H1 Not Detected Not Detected    RSV, PCR Not Detected Not Detected    Bordetella parapertussis PCR Not Detected Not Detected       Ordered the above labs and reviewed the results.        RADIOLOGY  Xr Chest 2 View    Result Date: 7/17/2019  XR CHEST 2 VW-  HISTORY: Male who is 73 years-old,  short of breath  TECHNIQUE: Frontal and lateral views of the chest  COMPARISON: None available  FINDINGS: Heart, mediastinum and pulmonary vasculature  are unremarkable. No focal pulmonary consolidation, pleural effusion, or pneumothorax. Old granulomatous disease. No acute osseous process.      No evidence for acute pulmonary process. Follow-up as clinical indications persist.  This report was finalized on 7/17/2019 12:39 PM by Dr. Mauricio Barbosa M.D.      Ct Angiogram Chest With Contrast    Result Date: 7/17/2019  CT ANGIOGRAM CHEST WITH CONTRAST INCLUDING RECONSTRUCTION IMAGES 07/17/2019  HISTORY: Shortness of breath. Possible pneumonia. Possible pulmonary embolus.  TECHNIQUE: Following the intravenous contrast injection, CT angiography was performed through the chest. Sagittal, coronal and 3-D reconstruction images were reviewed.  FINDINGS: There are scattered mild patchy areas of increased density in the bilateral lower lungs, slightly greater on the left than on the right.  No lung masses are seen.  There is no evidence of pulmonary embolus. No pathologically enlarged hilar or mediastinal lymph nodes are seen. There appears to be a small left adrenal nodule, most likely an adrenal adenoma with a CT number of 6.      1. No evidence of pulmonary embolus. 2. Minimal patchy areas of increased density in the lungs may represent chronic parenchymal change versus some mild atypical pneumonia. Consider short-term follow-up CT of the chest in approximately 3 months.  Radiation dose reduction techniques were utilized, including automated exposure control and exposure modulation based on body size.         Ordered the above noted radiological studies. Reviewed by me in PACS.          PROCEDURES  Procedures        EKG:           EKG time: 1158  Rhythm/Rate: NSR, 96  P waves and IL: nml  QRS, axis: nml   ST and T waves: borderline ST elevation in lead ll only     Interpreted Contemporaneously by me, independently viewed  No prior        MEDICATIONS GIVEN IN ER  Medications   sodium chloride 0.9 % flush 10 mL (not administered)   sodium chloride 0.9 % flush 10 mL (not  administered)   calcium-vitamin D 500-200 MG-UNIT tablet 1,000 mg (1,000 mg Oral Not Given 7/17/19 2015)   fluticasone (FLONASE) 50 MCG/ACT nasal spray 2 spray (not administered)   pantoprazole (PROTONIX) EC tablet 40 mg (not administered)   vitamin B-12 (CYANOCOBALAMIN) tablet 1,000 mcg (1,000 mcg Oral Not Given 7/17/19 2016)   vitamin C (ASCORBIC ACID) tablet 1,000 mg (1,000 mg Oral Not Given 7/17/19 2016)   ipratropium-albuterol (DUO-NEB) nebulizer solution 3 mL (3 mL Nebulization Given 7/17/19 1957)   guaiFENesin (MUCINEX) 12 hr tablet 600 mg (600 mg Oral Given 7/17/19 2014)   cefepime (MAXIPIME) 2 g/100 mL 0.9% NS (mbp) (not administered)   sodium chloride 0.9 % with KCl 20 mEq/L infusion (not administered)   iopamidol (ISOVUE-370) 76 % injection 100 mL (95 mL Intravenous Given 7/17/19 1423)   cefepime (MAXIPIME) 2 g/100 mL 0.9% NS (mbp) (0 g Intravenous Stopped 7/17/19 1631)                   PROGRESS AND CONSULTS         1300  Rechecked pt. Pt is resting comfortably. Notified pt of test results. Discussed the plan to further discuss with Oncology. Pt understands and agrees with the plan, all questions answered.    1359  Discussed the pt's case and findings with Dr. Levy (Oncology) recommends CTA Chest to evaluate PE vs pneumonitis related to Rituxin. Pt can f/u in office if unremarkable.     1400  Rechecked pt. Pt is resting comfortably. Notified pt of discussion with Dr. Levy. Discussed the plan to further evaluate with a CTA chest. Pt understands and agrees with the plan, all questions answered.    1518  Ordered IV Cefepime and Zithromax.     1526  Discussed the pt's case with Dr. Cottrell ([facility]) who agrees to admit the pt.     1530  Rechecked pt. Pt is resting comfortably. Notified pt of CTA chest results that shows possible pneumonia. Informed pt that Rituxan can cause some lung toxicity and could possibly be a contributing factor. 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 Discussed the plan to admit the pt for IV abx and further monitoring, treatment, and evaluation. Pt understands and agrees with the plan, all questions answered      MEDICAL DECISION MAKING    73-year-old male with history of rheumatoid arthritis presents today with complaint of dyspnea on exertion.  He is afebrile denies cough or chest pain.  Labs today remarkable for leukopenia with neutropenia.  He has previously been leukopenic but not to this degree.  He also has slight worsening of his anemia and stable thrombocytopenia.  Review of his chart including consultation note from Dr. Joyce  in 2018 had previously attributed his pancytopenia to his rheumatoid arthritis and possibly due to history of radiation for prostate cancer with secondary bone marrow suppression.  I suspect the rituxan is contributing to his acute worsening leukopenia.  Although his chest x-ray appeared clear, CTA chest was performed to evaluate for possible PE, infection, or pneumonitis (common with rituxan).  There is very mild inflammation in the lung bases bilaterally which may be consistent with atypical pneumonia.  Therefore, in the setting of neutropenia, he will be admitted.  He was started on empiric cefepime and I have added azithromycin due to possible atypical pathogen.        MDM           DIAGNOSIS  Final diagnoses:   Neutropenia, unspecified type (CMS/HCC)   Pneumonia of both lower lobes due to infectious organism (CMS/HCC)         DISPOSITION  ADMISSION    Discussed treatment plan and reason for admission with pt/family and admitting physician.  Pt/family voiced understanding of the plan for admission for further testing/treatment as needed.                 Latest Documented Vital Signs:  As of 9:56 PM  BP- 125/63 HR- 89 Temp- 96.9 °F (36.1 °C) (Oral) O2 sat- 95%        --  Documentation assistance provided by deshaun Callaway for Dr. LARRY Peralta MD.  Information recorded by the deshaun was done  at my direction and has been verified and validated by me.      Please note that portions of this were completed with a voice recognition program.            Jose Callaway  07/17/19 1544       Jose Callaway  07/17/19 1546       Arpan Perlata MD  07/17/19 [id number]

## 2019-07-17 NOTE — TELEPHONE ENCOUNTER
----- Message from Mel Levy MD sent at 7/17/2019  1:59 PM EDT -----  Please schedule this patient in 1 week with nurse practitioner with CBC check and in 2 weeks with Dr. santo with labs CBC CMP.  Patient is in the emergency room and ER wanted the patient to be seen.  Patient has been seen by Dr. Hong in the past.  Mel Levy MD

## 2019-07-17 NOTE — H&P
History and physical    Primary care physician  Dr. Proctor    Chief complaint  Shortness of breath    History of present illness  73-year-old white male with history of rheumatoid arthritis osteoarthritis gastroesophageal reflux disease who received Rituxan on Monday presented to [facility] emergency room with shortness of breath mainly with exertion but no fever chills chest pain cough abdominal pain nausea vomiting diarrhea.  Patient evaluated found to be severely neutropenic admit for management.  Patient has no other complaint except for generalized weakness and shortness of breath with exertion.    PAST MEDICAL HISTORY  • Arthritis     • GERD (gastroesophageal reflux disease)     • Leiomyosarcoma (CMS/HCC)     • Prostate cancer (CMS/HCC)        PAST SURGICAL HISTORY  Surgical History   History reviewed. No pertinent surgical history.     FAMILY HISTORY        Family History   Problem Relation Age of Onset   • Breast cancer Mother     • Prostate cancer Father     • Breast cancer Sister     • Leukemia Brother        SOCIAL HISTORY  Social History   Social History            Socioeconomic History   • Marital status:        Spouse name: Not on file   • Number of children: Not on file   • Years of education: Not on file   • Highest education level: Not on file   Tobacco Use   • Smoking status: Former Smoker   • Smokeless tobacco: Never Used   • Tobacco comment: quit 50 years ago   Substance and Sexual Activity   • Alcohol use: Yes       Comment: social         ALLERGIES  Lorazepam  Home medications reviewed     REVIEW OF SYSTEMS  Constitutional: Negative for activity change, appetite change and fever.   HENT: Negative for congestion and sore throat.    Eyes: Negative.    Respiratory: Positive for shortness of breath. Negative for cough.    Cardiovascular: Negative for chest pain and leg swelling.   Gastrointestinal: Negative for abdominal pain, diarrhea and vomiting.   Endocrine: Negative.   "  Genitourinary: Negative for decreased urine volume and dysuria.   Musculoskeletal: Negative for neck pain.   Skin: Negative for rash and wound.   Allergic/Immunologic: Negative.    Neurological: Negative for weakness, numbness and headaches.   Hematological: Negative.    Psychiatric/Behavioral: Negative.    All other systems reviewed and are negative.     PHYSICAL EXAM  Blood pressure 141/80, pulse 85, temperature 98.5 °F (36.9 °C), temperature source Tympanic, resp. rate 18, height 182.9 cm (72\"), weight 86.2 kg (190 lb), SpO2 98 %.    GENERAL: no acute distress  HENT: nares patent  EYES: no scleral icterus  CV: regular rhythm, normal rate, no murmur  RESPIRATORY: normal effort, lungs CTAB  ABDOMEN: soft, non-tender  MUSCULOSKELETAL: no deformity, no BLE edema  NEURO: alert, moves all extremities, follows commands  SKIN: warm, dry     LAB RESULTS  Lab Results (last 24 hours)     Procedure Component Value Units Date/Time    Blood Culture - Blood, Arm, Left [[id number]] Collected:  07/17/19 1549    Specimen:  Blood from Arm, Left Updated:  07/17/19 1627    Blood Culture - Blood, Arm, Right [[id number]] Collected:  07/17/19 1550    Specimen:  Blood from Arm, Right Updated:  07/17/19 1627    Respiratory Panel, PCR - Swab, Nasopharynx [[id number]] Collected:  07/17/19 1551    Specimen:  Swab from Nasopharynx Updated:  07/17/19 1627    Troponin [[id number]]  (Normal) Collected:  07/17/19 1151    Specimen:  Blood from Arm, Right Updated:  07/17/19 1313     Troponin T <0.010 ng/mL     Narrative:       Troponin T Reference Range:  <= 0.03 ng/mL-   Negative for AMI  >0.03 ng/mL-     Abnormal for myocardial necrosis.  Clinicians would have to utilize clinical acumen, EKG, Troponin and serial changes to determine if it is an Acute Myocardial Infarction or myocardial injury due to an underlying chronic condition.     BNP [[id number]]  (Abnormal) Collected:  07/17/19 1151    Specimen:  Blood from Arm, Right Updated:  07/17/19 1312 "     proBNP 1,010.0 pg/mL     Narrative:       Among patients with dyspnea, NT-proBNP is highly sensitive for the detection of acute congestive heart failure. In addition NT-proBNP of <300 pg/ml effectively rules out acute congestive heart failure with 99% negative predictive value.    Basic Metabolic Panel [[id number]]  (Abnormal) Collected:  07/17/19 1151    Specimen:  Blood from Arm, Right Updated:  07/17/19 1311     Glucose 120 mg/dL      BUN 15 mg/dL      Creatinine 0.86 mg/dL      Sodium 139 mmol/L      Potassium 3.9 mmol/L      Chloride 104 mmol/L      CO2 24.4 mmol/L      Calcium 8.7 mg/dL      eGFR Non African Amer 88 mL/min/1.73      BUN/Creatinine Ratio 17.4     Anion Gap 10.6 mmol/L     Narrative:       GFR Normal >60  Chronic Kidney Disease <60  Kidney Failure <15    Center Draw [[id number]] Collected:  07/17/19 1151    Specimen:  Blood Updated:  07/17/19 1301    Narrative:       The following orders were created for panel order Center Draw.  Procedure                               Abnormality         Status                     ---------                               -----------         ------                     Light Blue Top[[id number]]                                   Final result               Green Top (Gel)[[id number]]                                  Final result               Lavender Top[[id number]]                                     Final result               Gold Top - SST[[id number]]                                   Final result                 Please view results for these tests on the individual orders.    Light Blue Top [[id number]] Collected:  07/17/19 1151    Specimen:  Blood from Arm, Right Updated:  07/17/19 1301     Extra Tube hold for add-on     Comment: Auto resulted       Green Top (Gel) [[id number]] Collected:  07/17/19 1151    Specimen:  Blood from Arm, Right Updated:  07/17/19 1301     Extra Tube Hold for add-ons.     Comment: Auto resulted.       Gold Top - SST [[id number]]  Collected:  07/17/19 1151    Specimen:  Blood from Arm, Right Updated:  07/17/19 1301     Extra Tube Hold for add-ons.     Comment: Auto resulted.       Lavnate Top [[id number]] Collected:  07/17/19 1151    Specimen:  Blood from Arm, Right Updated:  07/17/19 1249     Extra Tube --    CBC & Differential [[id number]] Collected:  07/17/19 1151    Specimen:  Blood Updated:  07/17/19 1249    Narrative:       The following orders were created for panel order CBC & Differential.  Procedure                               Abnormality         Status                     ---------                               -----------         ------                     CBC Auto Differential[[id number]]        Abnormal            Final result                 Please view results for these tests on the individual orders.    CBC Auto Differential [[id number]]  (Abnormal) Collected:  07/17/19 1151    Specimen:  Blood from Arm, Right Updated:  07/17/19 1249     WBC 0.89 10*3/mm3      RBC 2.54 10*6/mm3      Hemoglobin 8.7 g/dL      Hematocrit 26.6 %      .7 fL      MCH 34.3 pg      MCHC 32.7 g/dL      RDW 21.6 %      RDW-SD 71.1 fl      MPV 8.8 fL      Platelets 111 10*3/mm3     Manual Differential [[id number]]  (Abnormal) Collected:  07/17/19 1151    Specimen:  Blood from Arm, Right Updated:  07/17/19 1249     Neutrophil % 47.0 %      Lymphocyte % 38.0 %      Monocyte % 13.0 %      Eosinophil % 2.0 %      Neutrophils Absolute 0.42 10*3/mm3      Lymphocytes Absolute 0.34 10*3/mm3      Monocytes Absolute 0.12 10*3/mm3      Eosinophils Absolute 0.02 10*3/mm3      RBC Morphology Normal     WBC Morphology Normal     Platelet Morphology Normal        Imaging Results (last 24 hours)     Procedure Component Value Units Date/Time    CT Angiogram Chest With Contrast [[id number]] Collected:  07/17/19 1504     Updated:  07/17/19 1504    Narrative:       CT ANGIOGRAM CHEST WITH CONTRAST INCLUDING RECONSTRUCTION IMAGES  07/17/2019     HISTORY: Shortness of  breath. Possible pneumonia. Possible pulmonary  embolus.     TECHNIQUE: Following the intravenous contrast injection, CT angiography  was performed through the chest. Sagittal, coronal and 3-D  reconstruction images were reviewed.     FINDINGS: There are scattered mild patchy areas of increased density in  the bilateral lower lungs, slightly greater on the left than on the  right.     No lung masses are seen.     There is no evidence of pulmonary embolus. No pathologically enlarged  hilar or mediastinal lymph nodes are seen. There appears to be a small  left adrenal nodule, most likely an adrenal adenoma with a CT number of  6.       Impression:       1. No evidence of pulmonary embolus.  2. Minimal patchy areas of increased density in the lungs may represent  chronic parenchymal change versus some mild atypical pneumonia. Consider  short-term follow-up CT of the chest in approximately 3 months.     Radiation dose reduction techniques were utilized, including automated  exposure control and exposure modulation based on body size.          XR Chest 2 View [[id number]] Collected:  07/17/19 1238     Updated:  07/17/19 1242    Narrative:       XR CHEST 2 VW-     HISTORY: Male who is 73 years-old,  short of breath     TECHNIQUE: Frontal and lateral views of the chest     COMPARISON: None available     FINDINGS: Heart, mediastinum and pulmonary vasculature are unremarkable.  No focal pulmonary consolidation, pleural effusion, or pneumothorax. Old  granulomatous disease. No acute osseous process.       Impression:       No evidence for acute pulmonary process. Follow-up as  clinical indications persist.     This report was finalized on 7/17/2019 12:39 PM by Dr. Mauricio Barbosa M.D.           EKG:                             Rhythm/Rate: NSR, 96  P waves and PA: nml  QRS, axis: nml   ST and T waves: borderline ST elevation in lead ll only       Current Facility-Administered Medications:   •  calcium-vitamin D 500-200  MG-UNIT tablet 1,000 mg, 1,000 mg, Oral, Daily, Tariq Cottrell MD  •  cefepime (MAXIPIME) 2 g/100 mL 0.9% NS (mbp), 2 g, Intravenous, Q12H, Tariq Cottrell MD  •  fluticasone (FLONASE) 50 MCG/ACT nasal spray 2 spray, 2 spray, Each Nare, Daily PRN, Tariq Cottrell MD  •  guaiFENesin (MUCINEX) 12 hr tablet 600 mg, 600 mg, Oral, Q12H, Tariq Cottrell MD  •  ipratropium-albuterol (DUO-NEB) nebulizer solution 3 mL, 3 mL, Nebulization, Q4H - RT, Tariq Cottrell MD  •  [START ON 7/18/2019] pantoprazole (PROTONIX) EC tablet 40 mg, 40 mg, Oral, QAM, Tariq Cottrell MD  •  sodium chloride 0.9 % flush 10 mL, 10 mL, Intravenous, PRN, Arpan Peralta MD  •  [COMPLETED] Insert peripheral IV, , , Once **AND** sodium chloride 0.9 % flush 10 mL, 10 mL, Intravenous, PRN, Arpan Peralta MD  •  vitamin B-12 (CYANOCOBALAMIN) tablet 1,000 mcg, 1,000 mcg, Oral, Daily, Tariq Cottrell MD  •  vitamin C (ASCORBIC ACID) tablet 1,000 mg, 1,000 mg, Oral, Daily, Traiq Cottrell MD    Current Outpatient Medications:   •  Calcium-Vitamin D-Vitamin K (VIACTIV CALCIUM PLUS D) 650-12.5-40 MG-MCG-MCG chewable tablet, Chew 1 tablet Daily., Disp: , Rfl:   •  coenzyme Q10 50 MG capsule capsule, Take 50 mg by mouth Daily., Disp: , Rfl:   •  fluticasone (FLONASE) 50 MCG/ACT nasal spray, 2 sprays by Each Nare route Daily As Needed., Disp: , Rfl: 3  •  loratadine-pseudoephedrine (CLARITIN-D 24-hour)  MG per 24 hr tablet, Take 1 tablet by mouth Daily., Disp: , Rfl:   •  Omega-3 Fatty Acids (FISH OIL) 1000 MG capsule capsule, Take 1,000 mg by mouth Daily., Disp: , Rfl:   •  omeprazole (priLOSEC) 20 MG capsule, Take 20 mg by mouth Daily., Disp: , Rfl: 1  •  predniSONE (DELTASONE) 5 MG tablet, Take 5 mg by mouth Daily As Needed., Disp: , Rfl: 1  •  Red Yeast Rice 600 MG capsule, Take 1 capsule by mouth Daily., Disp: , Rfl:   •  RiTUXimab (RITUXAN IV), Infuse  into a venous catheter Every 6 (Six) Months., Disp: , Rfl:   •  sildenafil (REVATIO) 20 MG tablet,  take 1-5 pills 30-60 mins before sexual activity. start with a lower dose & increase as needed, Disp: , Rfl: 11  •  vitamin B-12 (CYANOCOBALAMIN) 2500 MCG sublingual tablet tablet, Place 5,000 mcg under the tongue Daily., Disp: , Rfl:   •  vitamin C (ASCORBIC ACID) 500 MG tablet, Take 1,000 mg by mouth Daily., Disp: , Rfl:      ASSESSMENT  Severe neutropenia  Probably early pneumonia  Rheumatoid arthritis  Osteoarthritis  Immunocompromised host  Gastroesophageal reflux disease    PLAN  Admit  IV antibiotics after obtaining the cultures  Continue home medication except Rituxan  Hematology and infectious disease to follow patient  Stress ulcer DVT prophylaxis  Supportive care  Discussed with family  Patient is full code  Follow closely further recommendation according to hospital course    SULAIMAN QUINN MD

## 2019-07-18 NOTE — PROGRESS NOTES
Discharge Planning Assessment  [facility]     Patient Name: Baldemar Wellington  MRN: [id number]  Today's Date: 7/18/2019    Admit Date: 7/17/2019    Discharge Needs Assessment     Row Name 07/18/19 1400       Living Environment    Lives With  spouse    Name(s) of Who Lives With Patient  Yris Wellington    Current Living Arrangements  home/apartment/condo    Primary Care Provided by  self    Provides Primary Care For  no one    Family Caregiver if Needed  spouse    Family Caregiver Names  wife, Yris Wellington    Quality of Family Relationships  helpful;involved;supportive    Able to Return to Prior Arrangements  yes       Resource/Environmental Concerns    Resource/Environmental Concerns  none    Transportation Concerns  car, none       Transition Planning    Patient/Family Anticipates Transition to  home with family    Patient/Family Anticipated Services at Transition  none    Transportation Anticipated  family or friend will provide;car, drives self       Discharge Needs Assessment    Readmission Within the Last 30 Days  no previous admission in last 30 days    Concerns to be Addressed  no discharge needs identified    Equipment Currently Used at Home  none        Discharge Plan     Row Name 07/18/19 1401       Plan    Plan  return home with wife- CCP will follow for needs    Patient/Family in Agreement with Plan  yes    Plan Comments  Spoke with patient at bedside.  Introduced self and explained role.  Facesheet, PCP, and pharmacy verified.  Patient lives with his wife, Yris Wellington ( [phone]), and is IADLS.  He does not use any DME and does not have a SNF history.  He has used [facility] in the past for IV abx, but he is not current with them.  He denies any needs and plans to return home.  CCP will follow. Gaby Pérez RN        Destination      No service coordination in this encounter.      Durable Medical Equipment      No service coordination in this encounter.      Dialysis/Infusion      No service  coordination in this encounter.      Home Medical Care      No service coordination in this encounter.      Therapy      No service coordination in this encounter.      Community Resources      No service coordination in this encounter.          Demographic Summary     Row Name 07/18/19 1355       General Information    Admission Type  inpatient    Arrived From  home    Required Notices Provided  Important Message from Medicare    Referral Source  admission list    Reason for Consult  discharge planning    Preferred Language  English        Functional Status     Row Name 07/18/19 1359       Functional Status    Usual Activity Tolerance  excellent    Current Activity Tolerance  good       Functional Status, IADL    Medications  independent    Meal Preparation  independent    Housekeeping  independent    Laundry  independent    Shopping  independent       Mental Status    General Appearance WDL  WDL       Mental Status Summary    Recent Changes in Mental Status/Cognitive Functioning  no changes       Employment/    Employment Status  retired        Psychosocial    No documentation.       Abuse/Neglect    No documentation.       Legal    No documentation.       Substance Abuse    No documentation.       Patient Forms    No documentation.           Gaby Pérez RN

## 2019-07-18 NOTE — NURSING NOTE
Spoke to  about bone marrow biopsy and infusing RBCs. He would like to have bone marrow done first before transfusion. Spoke to Mehreen in radiology to get a time, scheduled for 3:30 today. Will transfuse blood as soon as patient comes back from procedure.

## 2019-07-18 NOTE — PLAN OF CARE
Problem: Patient Care Overview  Goal: Plan of Care Review  Outcome: Ongoing (interventions implemented as appropriate)   07/18/19 9622   Coping/Psychosocial   Plan of Care Reviewed With patient   Plan of Care Review   Progress no change   OTHER   Outcome Summary pt had a bone marrow biopsy today. pt is recieving one unit of RBC and will recieve one more. reg diet. up ad riaz has walked the halls with mask on. VSS will continue to monitor.      Goal: Individualization and Mutuality  Outcome: Ongoing (interventions implemented as appropriate)    Goal: Discharge Needs Assessment  Outcome: Ongoing (interventions implemented as appropriate)    Goal: Interprofessional Rounds/Family Conf  Outcome: Ongoing (interventions implemented as appropriate)

## 2019-07-18 NOTE — CONSULTS
Delayed charting: Patient was seen on 7/17/2019.      Patient seen and chart reviewed.  It appears that patient was recently hospitalized in October for post lumbar puncture coag negative discitis/infected hematoma for which she was treated with IV antibiotics and follow-up with other infectious disease group.  Patient was admitted after evaluation for shortness of breath with exertion that started after receiving first dose of the second cycle of Rituxan for his rheumatoid arthritis and neuropathy with concerns for Rituxan reaction.  Work-up in the emergency room revealed neutropenia with no systemic symptoms of fever and chills.  His CT scan of the chest showed minimal patchy areas of increased density in the lungs bilaterally concerning for chronic parenchymal changes versus atypical pneumonia.  Patient is admitted for further care for possible pneumonia in the setting of leukopenia.  Patient is currently stable and denies any systemic signs and symptoms of sepsis and mainly interested in having something for sleep.  Since patient stable would defer further infectious disease work-up to the infectious disease group who has seen the patient in October and follow him in the outpatient setting for outpatient antibiotic therapy.  We will discuss with primary attending.

## 2019-07-18 NOTE — CONSULTS
[facility] CBC GROUP INITIAL INPATIENT CONSULTATION NOTE    REASON FOR CONSULTATION: Pancytopenia    HISTORY OF PRESENT ILLNESS:  Baldemar Wellington is a 73 y.o. male who we are asked to see today in consultation for history of pancytopenia.     We had seen the patient previously in October 2018 after admission October 12 for abnormal back MRI.  Studies revealed L3-L4 discitis and the patient's consent been reviewed and treated by infectious disease.  We had seen him for pancytopenia with diagnosis of rheumatoid arthritis around 2012 on methotrexate and Remicade through Dr. Valdivia until June 2017.  Treatment was stopped due to diagnosed prostate carcinoma in July 2017 his treatment resumed with a change to Orencia monthly.  At this point the patient had known about leukopenia for approximately 2 years.  Our assessment included a degree of leukopenia since May 2007 with white count between 2 and 4000 and not neutropenic, anemia hemoglobin between 10 and 13 g and thrombocytopenia between 117 160,000.  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  A bone marrow was offered but declined.    The patient is follow-up with rheumatology and did in January have vision disease activity to initiate therapy with rituximab.  Had 2 infusions thus far.  We were contacted concerning the patient in the last several days for further pancytopenia but before he could be seen back in office he had dyspnea on exertion was admitted.  Studies have included a CT of chest negative for pulmonary embolus, minimal patchy increased density lungs thought to be chronic parenchymal change.  His admitting CBC include H&H is 7.6 and 23.5 and .9, white count of 630 and platelet count of 79,000 and review of his previous studies including November 2018 include a white count  approximately 2000 range with a normal differential, hemoglobin between 9 and 11 g, platelet count at approximately 110,002 is much 150,000.  The patient has clearly had a deterioration hematologically.    Past Medical   Past Medical History:   Diagnosis Date   • Arthritis     Reumatoid   • GERD (gastroesophageal reflux disease)    • Leiomyosarcoma (CMS/HCC)    • Prostate cancer (CMS/HCC)      Social History   Social History     Socioeconomic History   • Marital status:      Spouse name: Not on file   • Number of children: Not on file   • Years of education: Not on file   • Highest education level: Not on file   Tobacco Use   • Smoking status: Former Smoker   • Smokeless tobacco: Never Used   • Tobacco comment: quit 50 years ago   Substance and Sexual Activity   • Alcohol use: Yes     Comment: social     Family History  Family History   Problem Relation Age of Onset   • Breast cancer Mother    • Prostate cancer Father    • Breast cancer Sister    • Leukemia Brother      Medications    Current Facility-Administered Medications:   •  calcium-vitamin D 500-200 MG-UNIT tablet 1,000 mg, 1,000 mg, Oral, Daily, Tariq Cottrell MD, 1,000 mg at 07/18/19 0821  •  cefepime (MAXIPIME) 2 g/100 mL 0.9% NS (mbp), 2 g, Intravenous, Q8H, Tariq Cottrell MD, Last Rate: 33.3 mL/hr at 07/18/19 0821, 2 g at 07/18/19 0821  •  fluticasone (FLONASE) 50 MCG/ACT nasal spray 2 spray, 2 spray, Each Nare, Daily PRN, Tariq Cottrell MD  •  guaiFENesin (MUCINEX) 12 hr tablet 600 mg, 600 mg, Oral, Q12H, Tariq Cottrell MD, 600 mg at 07/18/19 0821  •  ipratropium-albuterol (DUO-NEB) nebulizer solution 3 mL, 3 mL, Nebulization, Q4H - RT, Tariq Cottrell MD, 3 mL at 07/18/19 0751  •  pantoprazole (PROTONIX) EC tablet 40 mg, 40 mg, Oral, QAM, Tariq Cottrell MD, 40 mg at 07/18/19 0610  •  sodium chloride 0.9 % flush 10 mL, 10 mL, Intravenous, PRN, Arpan Peralta MD  •  [COMPLETED] Insert peripheral IV, , , Once **AND** sodium chloride 0.9 % flush  10 mL, 10 mL, Intravenous, PRN, Arpan Peralta MD  •  sodium chloride 0.9 % with KCl 20 mEq/L infusion, 75 mL/hr, Intravenous, Continuous, Tariq Cottrell MD, Last Rate: 75 mL/hr at 07/17/19 2305, 75 mL/hr at 07/17/19 2305  •  vitamin B-12 (CYANOCOBALAMIN) tablet 1,000 mcg, 1,000 mcg, Oral, Daily, Tariq Cottrell MD, 1,000 mcg at 07/18/19 0821  •  vitamin C (ASCORBIC ACID) tablet 1,000 mg, 1,000 mg, Oral, Daily, Tariq Cottrell MD, 1,000 mg at 07/18/19 0821  •  zolpidem (AMBIEN) tablet 5 mg, 5 mg, Oral, Nightly PRN, EduardoJeremias MD, 5 mg at 07/18/19 0115  Allergies  Allergies   Allergen Reactions   • Lorazepam Hives     Review of Systems  Constitutional: Negative for activity change, appetite change and fever.   HENT: Negative for congestion and sore throat.    Eyes: Negative.    Respiratory: Positive for shortness of breath. Negative for cough.    Cardiovascular: Negative for chest pain and leg swelling.   Gastrointestinal: Negative for abdominal pain, diarrhea and vomiting.   Endocrine: Negative.    Genitourinary: Negative for decreased urine volume and dysuria.   Musculoskeletal: Negative for neck pain.   Skin: Negative for rash and wound.   Allergic/Immunologic: Negative.    Neurological: Negative for weakness, numbness and headaches.   Hematological: Negative.    Psychiatric/Behavioral: Negative.          Objective:     Vitals:    07/18/19 0817   BP: 141/73   Pulse: 65   Resp: 16   Temp: 98.4 °F (36.9 °C)   SpO2: 95%     GENERAL:  Well-developed, well-nourished in no acute distress.   SKIN:  Warm, dry without rashes, purpura or petechiae.  HEAD:  Normocephalic.  EYES:  Pupils equal, round and reactive to light.  EOMs intact.  Conjunctivae normal.  EARS:  Hearing intact.  NOSE:  Septum midline.  No excoriations or nasal discharge.  MOUTH:  Tongue is well-papillated; no stomatitis or ulcers.  Lips normal.  THROAT:  Oropharynx without lesions or exudates.  NECK:  Supple with good range of motion; no thyromegaly  or masses, no JVD.  LYMPHATICS:  No cervical, supraclavicular, axillary or inguinal adenopathy.  CHEST:  Lungs clear to percussion and auscultation. Good airflow.  CARDIAC:  Regular rate and rhythm without murmurs, rubs or gallops. Normal S1,S2.  ABDOMEN:  Soft, nontender with no organomegaly or masses.  EXTREMITIES:  No clubbing, cyanosis or edema.  NEUROLOGICAL:  Cranial Nerves II-XII grossly intact.  No focal neurological deficits.  PSYCHIATRIC:  Normal affect and mood.        DIAGNOSTIC DATA:  Results from last 7 days   Lab Units 07/18/19  0606 07/17/19  1151   WBC 10*3/mm3 0.63* 0.89*   HEMOGLOBIN g/dL 7.6* 8.7*   HEMATOCRIT % 23.5* 26.6*   PLATELETS 10*3/mm3 79* 111*   MONOCYTES % %  --  13.0*     Results from last 7 days   Lab Units 07/18/19  0606 07/17/19  1151   SODIUM mmol/L 138 139   POTASSIUM mmol/L 3.8 3.9   CHLORIDE mmol/L 103 104   CO2 mmol/L 25.0 24.4   BUN mg/dL 10 15   CREATININE mg/dL 0.73* 0.86   CALCIUM mg/dL 8.1* 8.7   BILIRUBIN mg/dL 0.7  --    ALK PHOS U/L 58  --    ALT (SGPT) U/L 9  --    AST (SGOT) U/L 9  --    GLUCOSE mg/dL 97 120*       IMAGING: As above  Assessment/Plan   Assessment/Plan:   This is a 73 y.o. 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  He has a demyelinating polyneuropathy also recognized and is presumed this had been part of his assessment concerning this.  We had seen the patient for pancytopenia in the fall and could not delineate an indirect cause.  As result of marrow was requested but declined.  The patient's rheumatoid arthritis has progressed with a number of treatments including rituximab initiated in January and then more recently with a second group of rituximab treatments.  This  he has tolerated and apparently responded to per his rheumatoid.  Unfortunately has progressive pancytopenia that is felt to be multifactorial but which will almost certainly include an assessment per marrow aspirate and biopsy.  Discussed this in detail with the patient and his wife over approximately an additional 45 minutes and plan:      *Proceed with bone marrow aspirate and biopsy today  *Additional assessments per macrocytic anemia  *Discussion with infectious disease as to whether the patient could be discharged on prophylactic anti-infective agents as his marrow results return.       Dorian Leal MD

## 2019-07-18 NOTE — PAYOR COMM NOTE
"Compa Wellington (73 y.o. Male)            ATTENTION;   NURSE REVIEW, CASE REF #[id number], REPLY TO UR DEPT, JEN SCHULTZ N            OR UR  [phone]                  Date of Birth Social Security Number Address Home Phone MRN    1945  [address] [id number] [phone] [id number]    [employer] Marital Status          [facility]        Admission Date Admission Type Admitting Provider Attending Provider Department, Room/Bed    7/17/19 Emergency Tariq Cottrell MD Ahmed, Aftab, MD [address], [address]388/1    Discharge Date Discharge Disposition Discharge Destination                       Attending Provider:  Tariq Cottrell MD    Allergies:  Lorazepam    Isolation:  None   Infection:  None   Code Status:  CPR    Ht:  182.9 cm (72\")   Wt:  83.9 kg (184 lb 14.4 oz)    Admission Cmt:  None   Principal Problem:  None                Active Insurance as of 7/17/2019     Primary Coverage     Payor Plan Insurance Group Employer/Plan Group    ANTH BLUE CROSS ANTH BLUE CROSS BLUE [facility] PPO [id number]     Payor Plan Address Payor Plan Phone Number Payor Plan Fax Number Effective Dates    [address] [phone]  1/1/2014 - None Entered    [facility] [id number]       Subscriber Name Subscriber Birth Date Member ID       COMPA WELLINGTON 1945 [id number]           Secondary Coverage     Payor Plan Insurance Group Employer/Plan Group    MEDICARE MEDICARE A ONLY      Payor Plan Address Payor Plan Phone Number Payor Plan Fax Number Effective Dates    [address] [phone]  7/1/2013 - None Entered    [facility] [id number]       Subscriber Name Subscriber Birth Date Member ID       COMPA WELLINGTON 1945 [id number]                 Emergency Contacts      (Rel.) Home Phone Work Phone Mobile Phone    Yris Wellington (Spouse) [phone] -- --    RaeCynthia munroe (Daughter) [phone] -- --    AmishPaul (Son) [phone] -- --          LOC:Acute " Adult-Infection: Pneumonia by Gali Infante, RN        In Progress (Acute Met):  Reviewed on 7/17/2019 by Gali Infante RN        Created Using Review Status Review Entered   InterQual® In Primary 7/17/2019 15:49      Criteria Set Name - Subset   LOC:Acute Adult-Infection: Pneumonia      Criteria Review   REVIEW SUMMARY     Patient: COMPA CAMPOVERDE  Review Number: [id number]  Review Status: In Primary     Condition Specific: Yes        OUTCOMES  Outcome Type: Primary           REVIEW DETAILS     Product: LOC:Acute Adult  Subset: Infection: Pneumonia      (Symptom or finding within 24h)         (Excludes PO medications unless noted)          [X] Select Day, One:              [X] Episode Day 1, One:                  [X] ACUTE, All:                      [X] Pneumonia confirmed by imaging                      [X] Finding, >= One:                          [X] >= 2 lobes                      [X] Intervention, All:                          [X] Anti-infective (includes PO)                          [X] Oxygenation, One:                              [X] O2 sat >= 92%(0.92) or baseline                          [X] Oximetry or blood gas                          [X] DVT prophylaxis or patient ambulatory     Version: [employer] 2018.3  Pocket Social® and Pocket Social®  © 2018 [employer] and/or one of its subsidiaries.  All Rights Reserved.  CPT only © 2017 American Medical Association.  All Rights Reserved.          History & Physical      Tariq Cottrell MD at 7/17/2019  4:44 PM          History and physical    Primary care physician  Dr. Proctor    Chief complaint  Shortness of breath    History of present illness  73-year-old white male with history of rheumatoid arthritis osteoarthritis gastroesophageal reflux disease who received Rituxan on Monday presented to [facility] emergency room with shortness of breath mainly with exertion but no fever chills chest pain cough abdominal pain nausea vomiting  diarrhea.  Patient evaluated found to be severely neutropenic admit for management.  Patient has no other complaint except for generalized weakness and shortness of breath with exertion.    PAST MEDICAL HISTORY  • Arthritis     • GERD (gastroesophageal reflux disease)     • Leiomyosarcoma (CMS/HCC)     • Prostate cancer (CMS/HCC)        PAST SURGICAL HISTORY  Surgical History   History reviewed. No pertinent surgical history.     FAMILY HISTORY        Family History   Problem Relation Age of Onset   • Breast cancer Mother     • Prostate cancer Father     • Breast cancer Sister     • Leukemia Brother        SOCIAL HISTORY  Social History   Social History            Socioeconomic History   • Marital status:        Spouse name: Not on file   • Number of children: Not on file   • Years of education: Not on file   • Highest education level: Not on file   Tobacco Use   • Smoking status: Former Smoker   • Smokeless tobacco: Never Used   • Tobacco comment: quit 50 years ago   Substance and Sexual Activity   • Alcohol use: Yes       Comment: social         ALLERGIES  Lorazepam  Home medications reviewed     REVIEW OF SYSTEMS  Constitutional: Negative for activity change, appetite change and fever.   HENT: Negative for congestion and sore throat.    Eyes: Negative.    Respiratory: Positive for shortness of breath. Negative for cough.    Cardiovascular: Negative for chest pain and leg swelling.   Gastrointestinal: Negative for abdominal pain, diarrhea and vomiting.   Endocrine: Negative.    Genitourinary: Negative for decreased urine volume and dysuria.   Musculoskeletal: Negative for neck pain.   Skin: Negative for rash and wound.   Allergic/Immunologic: Negative.    Neurological: Negative for weakness, numbness and headaches.   Hematological: Negative.    Psychiatric/Behavioral: Negative.    All other systems reviewed and are negative.     PHYSICAL EXAM  Blood pressure 141/80, pulse 85, temperature 98.5 °F (36.9 °C),  "temperature source Tympanic, resp. rate 18, height 182.9 cm (72\"), weight 86.2 kg (190 lb), SpO2 98 %.    GENERAL: no acute distress  HENT: nares patent  EYES: no scleral icterus  CV: regular rhythm, normal rate, no murmur  RESPIRATORY: normal effort, lungs CTAB  ABDOMEN: soft, non-tender  MUSCULOSKELETAL: no deformity, no BLE edema  NEURO: alert, moves all extremities, follows commands  SKIN: warm, dry     LAB RESULTS  Lab Results (last 24 hours)     Procedure Component Value Units Date/Time    Blood Culture - Blood, Arm, Left [[id number]] Collected:  07/17/19 1549    Specimen:  Blood from Arm, Left Updated:  07/17/19 1627    Blood Culture - Blood, Arm, Right [[id number]] Collected:  07/17/19 1550    Specimen:  Blood from Arm, Right Updated:  07/17/19 1627    Respiratory Panel, PCR - Swab, Nasopharynx [[id number]] Collected:  07/17/19 1551    Specimen:  Swab from Nasopharynx Updated:  07/17/19 1627    Troponin [[id number]]  (Normal) Collected:  07/17/19 1151    Specimen:  Blood from Arm, Right Updated:  07/17/19 1313     Troponin T <0.010 ng/mL     Narrative:       Troponin T Reference Range:  <= 0.03 ng/mL-   Negative for AMI  >0.03 ng/mL-     Abnormal for myocardial necrosis.  Clinicians would have to utilize clinical acumen, EKG, Troponin and serial changes to determine if it is an Acute Myocardial Infarction or myocardial injury due to an underlying chronic condition.     BNP [[id number]]  (Abnormal) Collected:  07/17/19 1151    Specimen:  Blood from Arm, Right Updated:  07/17/19 1312     proBNP 1,010.0 pg/mL     Narrative:       Among patients with dyspnea, NT-proBNP is highly sensitive for the detection of acute congestive heart failure. In addition NT-proBNP of <300 pg/ml effectively rules out acute congestive heart failure with 99% negative predictive value.    Basic Metabolic Panel [[id number]]  (Abnormal) Collected:  07/17/19 1151    Specimen:  Blood from Arm, Right Updated:  07/17/19 1311     Glucose 120 mg/dL "      BUN 15 mg/dL      Creatinine 0.86 mg/dL      Sodium 139 mmol/L      Potassium 3.9 mmol/L      Chloride 104 mmol/L      CO2 24.4 mmol/L      Calcium 8.7 mg/dL      eGFR Non African Amer 88 mL/min/1.73      BUN/Creatinine Ratio 17.4     Anion Gap 10.6 mmol/L     Narrative:       GFR Normal >60  Chronic Kidney Disease <60  Kidney Failure <15    [place] Draw [[id number]] Collected:  07/17/19 1151    Specimen:  Blood Updated:  07/17/19 1301    Narrative:       The following orders were created for panel order [place] Draw.  Procedure                               Abnormality         Status                     ---------                               -----------         ------                     Light Blue Top[[id number]]                                   Final result               Green Top (Gel)[[id number]]                                  Final result               Lavender Top[[id number]]                                     Final result               Gold Top - SST[[id number]]                                   Final result                 Please view results for these tests on the individual orders.    Light Blue Top [[id number]] Collected:  07/17/19 1151    Specimen:  Blood from Arm, Right Updated:  07/17/19 1301     Extra Tube hold for add-on     Comment: Auto resulted       Green Top (Gel) [[id number]] Collected:  07/17/19 1151    Specimen:  Blood from Arm, Right Updated:  07/17/19 1301     Extra Tube Hold for add-ons.     Comment: Auto resulted.       Gold Top - SST [[id number]] Collected:  07/17/19 1151    Specimen:  Blood from Arm, Right Updated:  07/17/19 1301     Extra Tube Hold for add-ons.     Comment: Auto resulted.       Lavender Top [[id number]] Collected:  07/17/19 1151    Specimen:  Blood from Arm, Right Updated:  07/17/19 1249     Extra Tube --    CBC & Differential [[id number]] Collected:  07/17/19 1151    Specimen:  Blood Updated:  07/17/19 1249    Narrative:       The following orders were created for  panel order CBC & Differential.  Procedure                               Abnormality         Status                     ---------                               -----------         ------                     CBC Auto Differential[[id number]]        Abnormal            Final result                 Please view results for these tests on the individual orders.    CBC Auto Differential [[id number]]  (Abnormal) Collected:  07/17/19 1151    Specimen:  Blood from Arm, Right Updated:  07/17/19 1249     WBC 0.89 10*3/mm3      RBC 2.54 10*6/mm3      Hemoglobin 8.7 g/dL      Hematocrit 26.6 %      .7 fL      MCH 34.3 pg      MCHC 32.7 g/dL      RDW 21.6 %      RDW-SD 71.1 fl      MPV 8.8 fL      Platelets 111 10*3/mm3     Manual Differential [[id number]]  (Abnormal) Collected:  07/17/19 1151    Specimen:  Blood from Arm, Right Updated:  07/17/19 1249     Neutrophil % 47.0 %      Lymphocyte % 38.0 %      Monocyte % 13.0 %      Eosinophil % 2.0 %      Neutrophils Absolute 0.42 10*3/mm3      Lymphocytes Absolute 0.34 10*3/mm3      Monocytes Absolute 0.12 10*3/mm3      Eosinophils Absolute 0.02 10*3/mm3      RBC Morphology Normal     WBC Morphology Normal     Platelet Morphology Normal        Imaging Results (last 24 hours)     Procedure Component Value Units Date/Time    CT Angiogram Chest With Contrast [[id number]] Collected:  07/17/19 1504     Updated:  07/17/19 1504    Narrative:       CT ANGIOGRAM CHEST WITH CONTRAST INCLUDING RECONSTRUCTION IMAGES  07/17/2019     HISTORY: Shortness of breath. Possible pneumonia. Possible pulmonary  embolus.     TECHNIQUE: Following the intravenous contrast injection, CT angiography  was performed through the chest. Sagittal, coronal and 3-D  reconstruction images were reviewed.     FINDINGS: There are scattered mild patchy areas of increased density in  the bilateral lower lungs, slightly greater on the left than on the  right.     No lung masses are seen.     There is no evidence of  pulmonary embolus. No pathologically enlarged  hilar or mediastinal lymph nodes are seen. There appears to be a small  left adrenal nodule, most likely an adrenal adenoma with a CT number of  6.       Impression:       1. No evidence of pulmonary embolus.  2. Minimal patchy areas of increased density in the lungs may represent  chronic parenchymal change versus some mild atypical pneumonia. Consider  short-term follow-up CT of the chest in approximately 3 months.     Radiation dose reduction techniques were utilized, including automated  exposure control and exposure modulation based on body size.          XR Chest 2 View [[id number]] Collected:  07/17/19 1238     Updated:  07/17/19 1242    Narrative:       XR CHEST 2 VW-     HISTORY: Male who is 73 years-old,  short of breath     TECHNIQUE: Frontal and lateral views of the chest     COMPARISON: None available     FINDINGS: Heart, mediastinum and pulmonary vasculature are unremarkable.  No focal pulmonary consolidation, pleural effusion, or pneumothorax. Old  granulomatous disease. No acute osseous process.       Impression:       No evidence for acute pulmonary process. Follow-up as  clinical indications persist.     This report was finalized on 7/17/2019 12:39 PM by Dr. Mauricio Barbosa M.D.           EKG:                             Rhythm/Rate: NSR, 96  P waves and TN: nml  QRS, axis: nml   ST and T waves: borderline ST elevation in lead ll only       Current Facility-Administered Medications:   •  calcium-vitamin D 500-200 MG-UNIT tablet 1,000 mg, 1,000 mg, Oral, Daily, Tariq Cottrell MD  •  cefepime (MAXIPIME) 2 g/100 mL 0.9% NS (mbp), 2 g, Intravenous, Q12H, Tariq Cottrell MD  •  fluticasone (FLONASE) 50 MCG/ACT nasal spray 2 spray, 2 spray, Each Nare, Daily PRN, Tariq Cottrell MD  •  guaiFENesin (MUCINEX) 12 hr tablet 600 mg, 600 mg, Oral, Q12H, Tariq Cottrell MD  •  ipratropium-albuterol (DUO-NEB) nebulizer solution 3 mL, 3 mL, Nebulization, Q4H - RT, Ronit  MD Tariq  •  [START ON 7/18/2019] pantoprazole (PROTONIX) EC tablet 40 mg, 40 mg, Oral, QAM, Tariq Cottrell MD  •  sodium chloride 0.9 % flush 10 mL, 10 mL, Intravenous, PRN, Arpan Peralta MD  •  [COMPLETED] Insert peripheral IV, , , Once **AND** sodium chloride 0.9 % flush 10 mL, 10 mL, Intravenous, PRN, Arpan Peralta MD  •  vitamin B-12 (CYANOCOBALAMIN) tablet 1,000 mcg, 1,000 mcg, Oral, Daily, Tariq Cottrell MD  •  vitamin C (ASCORBIC ACID) tablet 1,000 mg, 1,000 mg, Oral, Daily, Tariq Cottrell MD    Current Outpatient Medications:   •  Calcium-Vitamin D-Vitamin K (VIACTIV CALCIUM PLUS D) 650-12.5-40 MG-MCG-MCG chewable tablet, Chew 1 tablet Daily., Disp: , Rfl:   •  coenzyme Q10 50 MG capsule capsule, Take 50 mg by mouth Daily., Disp: , Rfl:   •  fluticasone (FLONASE) 50 MCG/ACT nasal spray, 2 sprays by Each Nare route Daily As Needed., Disp: , Rfl: 3  •  loratadine-pseudoephedrine (CLARITIN-D 24-hour)  MG per 24 hr tablet, Take 1 tablet by mouth Daily., Disp: , Rfl:   •  Omega-3 Fatty Acids (FISH OIL) 1000 MG capsule capsule, Take 1,000 mg by mouth Daily., Disp: , Rfl:   •  omeprazole (priLOSEC) 20 MG capsule, Take 20 mg by mouth Daily., Disp: , Rfl: 1  •  predniSONE (DELTASONE) 5 MG tablet, Take 5 mg by mouth Daily As Needed., Disp: , Rfl: 1  •  Red Yeast Rice 600 MG capsule, Take 1 capsule by mouth Daily., Disp: , Rfl:   •  RiTUXimab (RITUXAN IV), Infuse  into a venous catheter Every 6 (Six) Months., Disp: , Rfl:   •  sildenafil (REVATIO) 20 MG tablet, take 1-5 pills 30-60 mins before sexual activity. start with a lower dose & increase as needed, Disp: , Rfl: 11  •  vitamin B-12 (CYANOCOBALAMIN) 2500 MCG sublingual tablet tablet, Place 5,000 mcg under the tongue Daily., Disp: , Rfl:   •  vitamin C (ASCORBIC ACID) 500 MG tablet, Take 1,000 mg by mouth Daily., Disp: , Rfl:      ASSESSMENT  Severe neutropenia  Probably early pneumonia  Rheumatoid arthritis  Osteoarthritis  Immunocompromised  host  Gastroesophageal reflux disease    PLAN  Admit  IV antibiotics after obtaining the cultures  Continue home medication except Rituxan  Hematology and infectious disease to follow patient  Stress ulcer DVT prophylaxis  Supportive care  Discussed with family  Patient is full code  Follow closely further recommendation according to hospital course    SULAIMAN COTTRELL MD              Electronically signed by Sulaiman Cottrell MD at 7/17/2019  4:51 PM          Emergency Department Notes      Arpan Peralta MD at 7/17/2019 12:55 PM           EMERGENCY DEPARTMENT ENCOUNTER    Room Number:  P388/1  Date seen:  7/17/2019  Time seen: 12:55 PM  PCP: Humphrey Proctor MD (Tony)  Historian: Pt      HPI:  Chief Complaint: SOA  A complete HPI/ROS/PMH/PSH/SH/FH are unobtainable due to: none  Context: Baldemar Wellington is a 73 y.o. male who presents to the ED c/o MERCER that began last night. He states when he first gets up or goes up or down steps he has to rest and catch his breath. However, he also states that if he walks his SOA will resolve. Pt denies CP, cough, fever, or chills. He states he called his doctor this morning who recommended him to go to the ER. The pt reports a Hx of prostate CA with last radiation two years ago. Hx of RA. He states he has had two infusions of Rituxan. He states he has had one earlier this year in January and one two days ago. He states he is concerned his Sx are caused from his Rituxan. He denies having any issues with his first infusion. He denies Hx of heart disease.     Location: respiratory  Radiation: none  Quality: SOA  Intensity/Severity: moderate  Duration: since last night  Onset quality: gradual  Timing: intermittent  Progression: unchanged  Aggravating Factors: walking up or down stairs  Alleviating Factors: rest  Previous Episodes: none  Treatment before arrival: Pt states he had a Rituxan infusion two days ago.  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 No pertinent surgical history.      FAMILY HISTORY  Family History   Problem Relation Age of Onset   • Breast cancer Mother    • Prostate cancer Father    • Breast cancer Sister    • Leukemia Brother          SOCIAL HISTORY  Social History     Socioeconomic History   • Marital status:      Spouse name: Not on file   • Number of children: Not on file   • Years of education: Not on file   • Highest education level: Not on file   Tobacco Use   • Smoking status: Former Smoker   • Smokeless tobacco: Never Used   • Tobacco comment: quit 50 years ago   Substance and Sexual Activity   • Alcohol use: Yes     Comment: social         ALLERGIES  Lorazepam        REVIEW OF SYSTEMS  Review of Systems   Constitutional: Negative for activity change, appetite change and fever.   HENT: Negative for congestion and sore throat.    Eyes: Negative.    Respiratory: Positive for shortness of breath. Negative for cough.    Cardiovascular: Negative for chest pain and leg swelling.   Gastrointestinal: Negative for abdominal pain, diarrhea and vomiting.   Endocrine: Negative.    Genitourinary: Negative for decreased urine volume and dysuria.   Musculoskeletal: Negative for neck pain.   Skin: Negative for rash and wound.   Allergic/Immunologic: Negative.    Neurological:  Negative for weakness, numbness and headaches.   Hematological: Negative.    Psychiatric/Behavioral: Negative.    All other systems reviewed and are negative.           PHYSICAL EXAM  ED Triage Vitals   Temp Heart Rate Resp BP SpO2   07/17/19 1119 07/17/19 1119 07/17/19 1119 07/17/19 1145 07/17/19 1119   98.5 °F (36.9 °C) 105 18 124/71 99 %      Temp src Heart Rate Source Patient Position BP Location FiO2 (%)   07/17/19 1119 07/17/19 1119 -- -- --   Tympanic Monitor            GENERAL: no acute distress  HENT: nares patent  EYES: no scleral icterus  CV: regular rhythm, normal rate, no murmur  RESPIRATORY: normal effort, lungs CTAB  ABDOMEN: soft, non-tender  MUSCULOSKELETAL: no deformity, no BLE edema  NEURO: alert, moves all extremities, follows commands  SKIN: warm, dry    Vital signs and nursing notes reviewed.          LAB RESULTS  Recent Results (from the past 24 hour(s))   Light Blue Top    Collection Time: 07/17/19 11:51 AM   Result Value Ref Range    Extra Tube hold for add-on    Green Top (Gel)    Collection Time: 07/17/19 11:51 AM   Result Value Ref Range    Extra Tube Hold for add-ons.    Lavender Top    Collection Time: 07/17/19 11:51 AM   Result Value Ref Range    Extra Tube     Gold Top - SST    Collection Time: 07/17/19 11:51 AM   Result Value Ref Range    Extra Tube Hold for add-ons.    Basic Metabolic Panel    Collection Time: 07/17/19 11:51 AM   Result Value Ref Range    Glucose 120 (H) 65 - 99 mg/dL    BUN 15 8 - 23 mg/dL    Creatinine 0.86 0.76 - 1.27 mg/dL    Sodium 139 136 - 145 mmol/L    Potassium 3.9 3.5 - 5.2 mmol/L    Chloride 104 98 - 107 mmol/L    CO2 24.4 22.0 - 29.0 mmol/L    Calcium 8.7 8.6 - 10.5 mg/dL    eGFR Non African Amer 88 >60 mL/min/1.73    BUN/Creatinine Ratio 17.4 7.0 - 25.0    Anion Gap 10.6 5.0 - 15.0 mmol/L   BNP    Collection Time: 07/17/19 11:51 AM   Result Value Ref Range    proBNP 1,010.0 (H) 5.0 - 900.0 pg/mL   Troponin    Collection Time: 07/17/19 11:51 AM   Result  Value Ref Range    Troponin T <0.010 0.000 - 0.030 ng/mL   CBC Auto Differential    Collection Time: 07/17/19 11:51 AM   Result Value Ref Range    WBC 0.89 (C) 3.40 - 10.80 10*3/mm3    RBC 2.54 (L) 4.14 - 5.80 10*6/mm3    Hemoglobin 8.7 (L) 13.0 - 17.7 g/dL    Hematocrit 26.6 (L) 37.5 - 51.0 %    .7 (H) 79.0 - 97.0 fL    MCH 34.3 (H) 26.6 - 33.0 pg    MCHC 32.7 31.5 - 35.7 g/dL    RDW 21.6 (H) 12.3 - 15.4 %    RDW-SD 71.1 (H) 37.0 - 54.0 fl    MPV 8.8 6.0 - 12.0 fL    Platelets 111 (L) 140 - 450 10*3/mm3   Manual Differential    Collection Time: 07/17/19 11:51 AM   Result Value Ref Range    Neutrophil % 47.0 42.7 - 76.0 %    Lymphocyte % 38.0 19.6 - 45.3 %    Monocyte % 13.0 (H) 5.0 - 12.0 %    Eosinophil % 2.0 0.3 - 6.2 %    Neutrophils Absolute 0.42 (L) 1.70 - 7.00 10*3/mm3    Lymphocytes Absolute 0.34 (L) 0.70 - 3.10 10*3/mm3    Monocytes Absolute 0.12 0.10 - 0.90 10*3/mm3    Eosinophils Absolute 0.02 0.00 - 0.40 10*3/mm3    RBC Morphology Normal Normal    WBC Morphology Normal Normal    Platelet Morphology Normal Normal   Respiratory Panel, PCR - Swab, Nasopharynx    Collection Time: 07/17/19  3:51 PM   Result Value Ref Range    ADENOVIRUS, PCR Not Detected Not Detected    Coronavirus 229E Not Detected Not Detected    Coronavirus HKU1 Not Detected Not Detected    Coronavirus NL63 Not Detected Not Detected    Coronavirus OC43 Not Detected Not Detected    Human Metapneumovirus Not Detected Not Detected    Human Rhinovirus/Enterovirus Not Detected Not Detected    Influenza B PCR Not Detected Not Detected    Parainfluenza Virus 1 Not Detected Not Detected    Parainfluenza Virus 2 Not Detected Not Detected    Parainfluenza Virus 3 Not Detected Not Detected    Parainfluenza Virus 4 Not Detected Not Detected    Nicktella pertussis pcr Not Detected Not Detected    Influenza A H1 2009 PCR Not Detected Not Detected    Chlamydophila pneumoniae PCR Not Detected Not Detected    Mycoplasma pneumo by PCR Not Detected  Not Detected    Influenza A PCR Not Detected Not Detected    Influenza A H3 Not Detected Not Detected    Influenza A H1 Not Detected Not Detected    RSV, PCR Not Detected Not Detected    Bordetella parapertussis PCR Not Detected Not Detected       Ordered the above labs and reviewed the results.        RADIOLOGY  Xr Chest 2 View    Result Date: 7/17/2019  XR CHEST 2 VW-  HISTORY: Male who is 73 years-old,  short of breath  TECHNIQUE: Frontal and lateral views of the chest  COMPARISON: None available  FINDINGS: Heart, mediastinum and pulmonary vasculature are unremarkable. No focal pulmonary consolidation, pleural effusion, or pneumothorax. Old granulomatous disease. No acute osseous process.      No evidence for acute pulmonary process. Follow-up as clinical indications persist.  This report was finalized on 7/17/2019 12:39 PM by Dr. Mauricio Barbosa M.D.      Ct Angiogram Chest With Contrast    Result Date: 7/17/2019  CT ANGIOGRAM CHEST WITH CONTRAST INCLUDING RECONSTRUCTION IMAGES 07/17/2019  HISTORY: Shortness of breath. Possible pneumonia. Possible pulmonary embolus.  TECHNIQUE: Following the intravenous contrast injection, CT angiography was performed through the chest. Sagittal, coronal and 3-D reconstruction images were reviewed.  FINDINGS: There are scattered mild patchy areas of increased density in the bilateral lower lungs, slightly greater on the left than on the right.  No lung masses are seen.  There is no evidence of pulmonary embolus. No pathologically enlarged hilar or mediastinal lymph nodes are seen. There appears to be a small left adrenal nodule, most likely an adrenal adenoma with a CT number of 6.      1. No evidence of pulmonary embolus. 2. Minimal patchy areas of increased density in the lungs may represent chronic parenchymal change versus some mild atypical pneumonia. Consider short-term follow-up CT of the chest in approximately 3 months.  Radiation dose reduction techniques were  utilized, including automated exposure control and exposure modulation based on body size.         Ordered the above noted radiological studies. Reviewed by me in PACS.          PROCEDURES  Procedures        EKG:           EKG time: 1158  Rhythm/Rate: NSR, 96  P waves and W[place]: nml  QRS, axis: nml   ST and T waves: borderline ST elevation in lead ll only     Interpreted Contemporaneously by me, independently viewed  No prior        MEDICATIONS GIVEN IN ER  Medications   sodium chloride 0.9 % flush 10 mL (not administered)   sodium chloride 0.9 % flush 10 mL (not administered)   calcium-vitamin D 500-200 MG-UNIT tablet 1,000 mg (1,000 mg Oral Not Given 7/17/19 2015)   fluticasone (FLONASE) 50 MCG/ACT nasal spray 2 spray (not administered)   pantoprazole (PROTONIX) EC tablet 40 mg (not administered)   vitamin B-12 (CYANOCOBALAMIN) tablet 1,000 mcg (1,000 mcg Oral Not Given 7/17/19 2016)   vitamin C (ASCORBIC ACID) tablet 1,000 mg (1,000 mg Oral Not Given 7/17/19 2016)   ipratropium-albuterol (DUO-NEB) nebulizer solution 3 mL (3 mL Nebulization Given 7/17/19 1957)   guaiFENesin (MUCINEX) 12 hr tablet 600 mg (600 mg Oral Given 7/17/19 2014)   cefepime (MAXIPIME) 2 g/100 mL 0.9% NS (mbp) (not administered)   sodium chloride 0.9 % with KCl 20 mEq/L infusion (not administered)   iopamidol (ISOVUE-370) 76 % injection 100 mL (95 mL Intravenous Given 7/17/19 1423)   cefepime (MAXIPIME) 2 g/100 mL 0.9% NS (mbp) (0 g Intravenous Stopped 7/17/19 1631)                   PROGRESS AND CONSULTS         1300  Rechecked pt. Pt is resting comfortably. Notified pt of test results. Discussed the plan to further discuss with Oncology. Pt understands and agrees with the plan, all questions answered.    1359  Discussed the pt's case and findings with Dr. Levy (Oncology) recommends CTA Chest to evaluate PE vs pneumonitis related to Rituxin. Pt can f/u in office if unremarkable.     1400  Rechecked pt. Pt is resting comfortably. Notified pt  of discussion with Dr. Levy. Discussed the plan to further evaluate with a CTA chest. Pt understands and agrees with the plan, all questions answered.    1518  Ordered IV Cefepime and Zithromax.     1526  Discussed the pt's case with Dr. Cottrell ([facility]) who agrees to admit the pt.     1530  Rechecked pt. Pt is resting comfortably. Notified pt of CTA chest results that shows possible pneumonia. Informed pt that Rituxan can cause some lung toxicity and could possibly be a contributing factor. Family states he was switched from Orencia to Rituxan to help treat CIPD and his RA together. Discussed the plan to admit the pt for IV abx and further monitoring, treatment, and evaluation. Pt understands and agrees with the plan, all questions answered      MEDICAL DECISION MAKING    73-year-old male with history of rheumatoid arthritis presents today with complaint of dyspnea on exertion.  He is afebrile denies cough or chest pain.  Labs today remarkable for leukopenia with neutropenia.  He has previously been leukopenic but not to this degree.  He also has slight worsening of his anemia and stable thrombocytopenia.  Review of his chart including consultation note from Dr. Jocye  in 2018 had previously attributed his pancytopenia to his rheumatoid arthritis and possibly due to history of radiation for prostate cancer with secondary bone marrow suppression.  I suspect the rituxan is contributing to his acute worsening leukopenia.  Although his chest x-ray appeared clear, CTA chest was performed to evaluate for possible PE, infection, or pneumonitis (common with rituxan).  There is very mild inflammation in the lung bases bilaterally which may be consistent with atypical pneumonia.  Therefore, in the setting of neutropenia, he will be admitted.  He was started on empiric cefepime and I have added azithromycin due to possible atypical pathogen.        MDM           DIAGNOSIS  Final diagnoses:   Neutropenia, unspecified type  (CMS/[facility])   Pneumonia of both lower lobes due to infectious organism (CMS/HCC)         DISPOSITION  ADMISSION    Discussed treatment plan and reason for admission with pt/family and admitting physician.  Pt/family voiced understanding of the plan for admission for further testing/treatment as needed.                 Latest Documented Vital Signs:  As of 9:56 PM  BP- 125/63 HR- 89 Temp- 96.9 °F (36.1 °C) (Oral) O2 sat- 95%        --  Documentation assistance provided by deshaun Callaway for Dr. LARRY Peralta MD.  Information recorded by the deshaun was done at my direction and has been verified and validated by me.      Please note that portions of this were completed with a voice recognition program.            Jose Callaway  07/17/19 1544       Jose Callaway  07/17/19 1546       Arpan Peralta MD  07/17/19 6566      Electronically signed by Arpan Peralta MD at 7/17/2019  9:59 PM       ICU Vital Signs     Row Name 07/18/19 1311 07/18/19 0818 07/18/19 0817 07/18/19 0751 07/18/19 0530       Vitals    Temp  99.2 °F (37.3 °C)  --  98.4 °F (36.9 °C)  --  97.9 °F (36.6 °C)    Temp src  Oral  --  Oral  --  Oral    Pulse  88  --  65  92  89    Heart Rate Source  Monitor  --  Monitor  --  Monitor    Resp  16  --  16  16  16    Resp Rate Source  Visual  --  Visual  --  Visual    BP  133/73  --  141/73  --  123/63    BP Location  Left arm  --  Right arm  --  Right arm    BP Method  Automatic  --  Automatic  --  Automatic    Patient Position  Sitting  --  Lying  --  Lying       Oxygen Therapy    SpO2  100 %  --  95 %  --  --    Pulse Oximetry Type  Intermittent  --  Intermittent  --  --    Device (Oxygen Therapy)  room air  room air  room air  room air  --    Row Name 07/17/19 2015 07/17/19 1957 07/17/19 1812 07/17/19 1811 07/17/19 1801       Height and Weight    Weight  --  --  --  --  83.9 kg (184 lb 14.4 oz)    Weight Method  --  --  --  --  Standing scale       Vitals    Temp  96.9 °F (36.1 °C)  --  " --  97.2 °F (36.2 °C)  --    Temp src  Oral  --  --  Oral  --    Pulse  89  85  --  83  --    Heart Rate Source  Monitor  --  --  Monitor  --    Resp  16  16  --  16  --    Resp Rate Source  Visual  --  --  Visual  --    BP  125/63  --  --  124/75  --    BP Location  Right arm  --  --  Right arm  --    BP Method  Automatic  --  --  Automatic  --    Patient Position  Lying  --  --  --  --       Oxygen Therapy    SpO2  95 %  96 %  --  100 %  --    Pulse Oximetry Type  --  Intermittent  --  --  --    Device (Oxygen Therapy)  room air  room air  room air  room air  --    Row Name 07/17/19 1651 07/17/19 15:51:34 07/17/19 1548 07/17/19 1533 07/17/19 1518       Vitals    Pulse  86  85  89  95  86    Heart Rate Source  Monitor  Monitor  --  --  --    Resp  --  18  --  --  --    Resp Rate Source  --  Visual  --  --  --    BP  --  141/80  141/80  139/80  136/69    Noninvasive MAP (mmHg)  --  --  106  99  96    BP Location  --  Right arm  --  --  --    BP Method  --  Automatic  --  --  --    Patient Position  --  Lying  --  --  --       Oxygen Therapy    SpO2  96 %  98 %  98 %  100 %  97 %    Pulse Oximetry Type  Continuous  Continuous  --  --  --    Device (Oxygen Therapy)  room air  room air  --  --  --    Row Name 07/17/19 1503 07/17/19 1448 07/17/19 1433 07/17/19 1403 07/17/19 1348       Vitals    Pulse  87  87  87  89  89    BP  135/67  124/74  131/71  135/79  122/75    Noninvasive MAP (mmHg)  93  96  80  95  95       Oxygen Therapy    SpO2  96 %  99 %  98 %  100 %  98 %    Row Name 07/17/19 1317 07/17/19 1303 07/17/19 1249 07/17/19 1217 07/17/19 1202       Vitals    Pulse  92  96  95  97  97    BP  130/76  146/79  125/73  135/87  122/70    Noninvasive MAP (mmHg)  95  101  91  110  92       Oxygen Therapy    SpO2  96 %  98 %  96 %  97 %  99 %    Row Name 07/17/19 1152 07/17/19 1148 07/17/19 1145 07/17/19 1119          Height and Weight    Height  --  --  --  182.9 cm (72\")     Height Method  --  --  --  Stated     Weight "  86.2 kg (190 lb)  --  --  --     Weight Method  Stated  --  --  --     Ideal Body Weight (IBW) (kg)  --  --  --  82.07     Weight in (lb) to have BMI = 25  --  --  --  183.9        Vitals    Temp  --  --  --  98.5 °F (36.9 °C)     Temp src  --  --  --  Tympanic     Pulse  --  98  --  105     Heart Rate Source  --  --  --  Monitor     Resp  --  --  --  18     Resp Rate Source  --  --  --  Visual     BP  --  125/73  124/71  --     Noninvasive MAP (mmHg)  --  97  91  --        Oxygen Therapy    SpO2  --  97 %  98 %  99 %     Pulse Oximetry Type  --  --  --  Continuous     Device (Oxygen Therapy)  --  --  --  room air         Lines, Drains & Airways    Active LDAs     Name:   Placement date:   Placement time:   Site:   Days:    Peripheral IV 07/17/19 1146 Right Antecubital   07/17/19    1146    Antecubital   1         Inactive LDAs     Name:   Placement date:   Placement time:   Removal date:   Removal time:   Site:   Days:    [REMOVED] PICC Single Lumen 10/16/18 Right Basilic   10/16/18    1502    07/17/19    1423    Basilic   273                [facility] Medications (all)       Dose Frequency Start End    calcium-vitamin D 500-200 MG-UNIT tablet 1,000 mg 1,000 mg Daily 7/17/2019     Sig - Route: Take 2 tablets by mouth Daily. - Oral    cefepime (MAXIPIME) 2 g/100 mL 0.9% NS (mbp) 2 g Once 7/17/2019 7/17/2019    Sig - Route: Infuse 100 mL into a venous catheter 1 (One) Time. - Intravenous    fluticasone (FLONASE) 50 MCG/ACT nasal spray 2 spray 2 spray Daily PRN 7/17/2019     Sig - Route: 2 sprays by Each Nare route Daily As Needed for Allergies. - Each Nare    guaiFENesin (MUCINEX) 12 hr tablet 600 mg 600 mg Every 12 Hours Scheduled 7/17/2019     Sig - Route: Take 1 tablet by mouth Every 12 (Twelve) Hours. - Oral    iopamidol (ISOVUE-370) 76 % injection 100 mL 100 mL Once in Imaging 7/17/2019 7/17/2019    Sig - Route: Infuse 100 mL into a venous catheter Once. - Intravenous    ipratropium-albuterol (DUO-NEB) nebulizer  "solution 3 mL 3 mL Every 4 Hours - RT 7/17/2019     Sig - Route: Take 3 mL by nebulization Every 4 (Four) Hours. - Nebulization    pantoprazole (PROTONIX) EC tablet 40 mg 40 mg Every Morning 7/18/2019     Sig - Route: Take 1 tablet by mouth Every Morning. - Oral    sodium chloride 0.9 % flush 10 mL 10 mL As Needed 7/17/2019     Sig - Route: Infuse 10 mL into a venous catheter As Needed for Line Care. - Intravenous    Cosign for Ordering: Accepted by Arpan Peralta MD on 7/17/2019 10:08 PM    sodium chloride 0.9 % flush 10 mL 10 mL As Needed 7/17/2019     Sig - Route: Infuse 10 mL into a venous catheter As Needed for Line Care. - Intravenous    Linked Group 1:  \"And\" Linked Group Details        sodium chloride 0.9 % with KCl 20 mEq/L infusion 75 mL/hr Continuous 7/17/2019     Sig - Route: Infuse 75 mL/hr into a venous catheter Continuous. - Intravenous    vitamin B-12 (CYANOCOBALAMIN) tablet 1,000 mcg 1,000 mcg Daily 7/17/2019     Sig - Route: Take 2 tablets by mouth Daily. - Oral    vitamin C (ASCORBIC ACID) tablet 1,000 mg 1,000 mg Daily 7/17/2019     Sig - Route: Take 2 tablets by mouth Daily. - Oral    zolpidem (AMBIEN) tablet 5 mg 5 mg Nightly PRN 7/17/2019 7/27/2019    Sig - Route: Take 1 tablet by mouth At Night As Needed for Sleep. - Oral    azithromycin (ZITHROMAX) 500 mg 0.9% NaCl (Add-vantage) 250 mL (Discontinued) 500 mg Once 7/17/2019 7/17/2019    Sig - Route: Infuse 250 mL into a venous catheter 1 (One) Time. - Intravenous    cefepime (MAXIPIME) 2 g/100 mL 0.9% NS (mbp) (Discontinued) 2 g Every 8 Hours 7/18/2019 7/17/2019    Sig - Route: Infuse 100 mL into a venous catheter Every 8 (Eight) Hours. - Intravenous    cefepime (MAXIPIME) 2 g/100 mL 0.9% NS (mbp) (Discontinued) 2 g Every 8 Hours 7/18/2019 7/18/2019    Sig - Route: Infuse 100 mL into a venous catheter Every 8 (Eight) Hours. - Intravenous    iopamidol (ISOVUE-370) 76 % injection 100 mL (Discontinued) 100 mL Once in Imaging 7/17/2019 " 7/17/2019    Sig - Route: Infuse 100 mL into a venous catheter Once. - Intravenous          Orders (last 24 hrs)     Start     Ordered    07/19/19 0600  HIV-1 & HIV-2 Antibodies  Morning Draw      07/18/19 1105    07/19/19 0600  CBC & Differential  Daily      07/18/19 1156    07/18/19 1345  aPTT  STAT      07/18/19 1347    07/18/19 1345  Protime-INR  STAT      07/18/19 1347    07/18/19 1157  Verify Informed Consent for Blood Product Administration  Once      07/18/19 1156    07/18/19 1157  Prepare RBC, 2 Units  Blood - Once      07/18/19 1156    07/18/19 1157  Type & Screen  Once      07/18/19 1156    07/18/19 1152  Lactate Dehydrogenase  Daily      07/18/19 1151    07/18/19 1152  Reticulocytes  Daily      07/18/19 1151    07/18/19 1152  Flow Cytometry, Blood  Once      07/18/19 1151    07/18/19 1151  Methylmalonic Acid, Serum  Once      07/18/19 1151    07/18/19 1151  Vitamin B12  Once      07/18/19 1151    07/18/19 1151  Folate  Once      07/18/19 1151    07/18/19 1149  CT Guided Biopsy Bone Marrow  1 Time Imaging      07/18/19 1150    07/18/19 0729  Inpatient Infectious Diseases Consult  Once     Specialty:  Infectious Diseases  Provider:  Evens Rayo MD    07/18/19 0729    07/18/19 0700  pantoprazole (PROTONIX) EC tablet 40 mg  Every Morning      07/17/19 1642    07/18/19 0645  Manual Differential  Once,   Status:  Canceled      07/18/19 0644    07/18/19 0600  CBC & Differential  Morning Draw      07/17/19 1643    07/18/19 0600  Comprehensive Metabolic Panel  Morning Draw      07/17/19 1643    07/18/19 0600  BNP  Morning Draw      07/17/19 1643    07/18/19 0600  TSH  Morning Draw      07/17/19 1643    07/18/19 0600  Lipid Panel  Morning Draw      07/17/19 1643    07/18/19 0600  Hemoglobin A1c  Morning Draw      07/17/19 1643    07/18/19 0600  Procalcitonin  Morning Draw      07/17/19 1644    07/18/19 0600  Lactic Acid, Plasma  Morning Draw      07/17/19 1644    07/18/19 0600  CBC Auto Differential   PROCEDURE ONCE      07/18/19 0003    07/18/19 0000  cefepime (MAXIPIME) 2 g/100 mL 0.9% NS (mbp)  Every 8 Hours,   Status:  Discontinued      07/17/19 1642    07/18/19 0000  cefepime (MAXIPIME) 2 g/100 mL 0.9% NS (mbp)  Every 8 Hours,   Status:  Discontinued      07/17/19 1651    07/17/19 2336  zolpidem (AMBIEN) tablet 5 mg  Nightly PRN      07/17/19 2336    07/17/19 2100  guaiFENesin (MUCINEX) 12 hr tablet 600 mg  Every 12 Hours Scheduled      07/17/19 1643    07/17/19 1653  sodium chloride 0.9 % with KCl 20 mEq/L infusion  Continuous      07/17/19 1651    07/17/19 1653  Activity As Tolerated  Until Discontinued      07/17/19 1652    07/17/19 1645  ipratropium-albuterol (DUO-NEB) nebulizer solution 3 mL  Every 4 Hours - RT      07/17/19 1643    07/17/19 1645  Diet Regular  Diet Effective Now      07/17/19 1644    07/17/19 1644  calcium-vitamin D 500-200 MG-UNIT tablet 1,000 mg  Daily      07/17/19 1642    07/17/19 1644  vitamin B-12 (CYANOCOBALAMIN) tablet 1,000 mcg  Daily      07/17/19 1642    07/17/19 1644  vitamin C (ASCORBIC ACID) tablet 1,000 mg  Daily      07/17/19 1642    07/17/19 1644  iopamidol (ISOVUE-370) 76 % injection 100 mL  Once in Imaging,   Status:  Discontinued      07/17/19 1642    07/17/19 1644  Place Sequential Compression Device  Daily      07/17/19 1643    07/17/19 1644  Code Status and Medical Interventions:  Continuous      07/17/19 1643    07/17/19 1644  Inpatient Infectious Diseases Consult  Once     Specialty:  Infectious Diseases  Provider:  Jeremias Clark MD    07/17/19 1643    07/17/19 1644  Hematology & Oncology Inpatient Consult  Once     Specialty:  Hematology and Oncology  Provider:  Dorian Leal MD    07/17/19 1643    07/17/19 1641  fluticasone (FLONASE) 50 MCG/ACT nasal spray 2 spray  Daily PRN      07/17/19 1642    07/17/19 1528  Protective / Neutropenic Isolation Status  Continuous      07/17/19 1527    07/17/19 1527  Inpatient Admission  Once      07/17/19 1526     07/17/19 1527  Respiratory Panel, PCR - Swab, Nasopharynx  Once      07/17/19 1527    07/17/19 1520  cefepime (MAXIPIME) 2 g/100 mL 0.9% NS (mbp)  Once      07/17/19 1518    07/17/19 1520  azithromycin (ZITHROMAX) 500 mg 0.9% NaCl (Add-vantage) 250 mL  Once,   Status:  Discontinued      07/17/19 1518    07/17/19 1520  Blood Culture - Blood,  Every 30 Minutes     Comments:  Collect 30 minutes apart or from a second site.      07/17/19 1519    07/17/19 1519  LIPPS (on-call MD unless specified)  Once     Specialty:  Internal Medicine  Provider:  (Not yet assigned)    07/17/19 1518    07/17/19 1423  iopamidol (ISOVUE-370) 76 % injection 100 mL  Once in Imaging      07/17/19 1421    07/17/19 1210  sodium chloride 0.9 % flush 10 mL  As Needed      07/17/19 1211    07/17/19 1149  sodium chloride 0.9 % flush 10 mL  As Needed      07/17/19 1149    Unscheduled  Transfuse RBC, 2 Units Infuse Each Unit Over: 3.5H  Transfusion      07/18/19 1156    --  Red Yeast Rice 600 MG capsule  Daily      07/17/19 1155    --  Calcium-Vitamin D-Vitamin K (VIACTIV CALCIUM PLUS D) 650-12.5-40 MG-MCG-MCG chewable tablet  Daily      07/17/19 1605    --  coenzyme Q10 50 MG capsule capsule  Daily      07/17/19 1605    Signed and Held  Obtain Informed Consent  Once      Signed and Held    Signed and Held  Tissue Pathology Exam  Once      Signed and Held    Signed and Held  Bone Marrow Exam  Once     Comments:  Request comprehensive profile      Signed and Held    Signed and Held  Bone marrow aspirate and biopsy-request comprehensive profile - Miscellaneous Test  Once      Signed and Held               Consult Notes (last 48 hours) (Notes from 7/16/2019  2:11 PM through 7/18/2019  2:11 PM)      Dorian Leal MD at 7/18/2019 10:58 AM      Consult Orders    1. Hematology & Oncology Inpatient Consult [[id number]] ordered by Tariq Cottrell MD at 07/17/19 1643                [facility] INITIAL INPATIENT CONSULTATION NOTE    REASON FOR  CONSULTATION: Pancytopenia    HISTORY OF PRESENT ILLNESS:  Baldemar Wellington is a 73 y.o. male who we are asked to see today in consultation for history of pancytopenia.     We had seen the patient previously in October 2018 after admission October 12 for abnormal back MRI.  Studies revealed L3-L4 discitis and the patient's consent been reviewed and treated by infectious disease.  We had seen him for pancytopenia with diagnosis of rheumatoid arthritis around 2012 on methotrexate and Remicade through Dr. Valdivia until June 2017.  Treatment was stopped due to diagnosed prostate carcinoma in July 2017 his treatment resumed with a change to Orencia monthly.  At this point the patient had known about leukopenia for approximately 2 years.  Our assessment included a degree of leukopenia since May 2007 with white count between 2 and 4000 and not neutropenic, anemia hemoglobin between 10 and 13 g and thrombocytopenia between 117 160,000.  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  A bone marrow was offered but declined.    The patient is follow-up with rheumatology and did in January have vision disease activity to initiate therapy with rituximab.  Had 2 infusions thus far.  We were contacted concerning the patient in the last several days for further pancytopenia but before he could be seen back in office he had dyspnea on exertion was admitted.  Studies have included a CT of chest negative for pulmonary embolus, minimal patchy increased density lungs thought to be chronic parenchymal change.  His admitting CBC include H&H is 7.6 and 23.5 and .9, white count of 630 and platelet count of 79,000 and review of his previous studies including November 2018 include a white count approximately 2000 range with a normal differential, hemoglobin between 9  and 11 g, platelet count at approximately 110,002 is much 150,000.  The patient has clearly had a deterioration hematologically.    Past Medical   Past Medical History:   Diagnosis Date   • Arthritis     Reumatoid   • GERD (gastroesophageal reflux disease)    • Leiomyosarcoma (CMS/HCC)    • Prostate cancer (CMS/HCC)      Social History   Social History     Socioeconomic History   • Marital status:      Spouse name: Not on file   • Number of children: Not on file   • Years of education: Not on file   • Highest education level: Not on file   Tobacco Use   • Smoking status: Former Smoker   • Smokeless tobacco: Never Used   • Tobacco comment: quit 50 years ago   Substance and Sexual Activity   • Alcohol use: Yes     Comment: social     Family History  Family History   Problem Relation Age of Onset   • Breast cancer Mother    • Prostate cancer Father    • Breast cancer Sister    • Leukemia Brother      Medications    Current Facility-Administered Medications:   •  calcium-vitamin D 500-200 MG-UNIT tablet 1,000 mg, 1,000 mg, Oral, Daily, Tariq Cottrell MD, 1,000 mg at 07/18/19 0821  •  cefepime (MAXIPIME) 2 g/100 mL 0.9% NS (mbp), 2 g, Intravenous, Q8H, Tariq Cottrell MD, Last Rate: 33.3 mL/hr at 07/18/19 0821, 2 g at 07/18/19 0821  •  fluticasone (FLONASE) 50 MCG/ACT nasal spray 2 spray, 2 spray, Each Nare, Daily PRN, Tariq Cottrell MD  •  guaiFENesin (MUCINEX) 12 hr tablet 600 mg, 600 mg, Oral, Q12H, Tariq Cottrell MD, 600 mg at 07/18/19 0821  •  ipratropium-albuterol (DUO-NEB) nebulizer solution 3 mL, 3 mL, Nebulization, Q4H - RT, Tariq Cottrell MD, 3 mL at 07/18/19 0751  •  pantoprazole (PROTONIX) EC tablet 40 mg, 40 mg, Oral, QAM, Tariq Cottrell MD, 40 mg at 07/18/19 0610  •  sodium chloride 0.9 % flush 10 mL, 10 mL, Intravenous, PRN, Arpan Peralta MD  •  [COMPLETED] Insert peripheral IV, , , Once **AND** sodium chloride 0.9 % flush 10 mL, 10 mL, Intravenous, PRN, Arpan Peralta MD  •  sodium  chloride 0.9 % with KCl 20 mEq/L infusion, 75 mL/hr, Intravenous, Continuous, Tariq Cottrell MD, Last Rate: 75 mL/hr at 07/17/19 2305, 75 mL/hr at 07/17/19 2305  •  vitamin B-12 (CYANOCOBALAMIN) tablet 1,000 mcg, 1,000 mcg, Oral, Daily, Tariq Cottrell MD, 1,000 mcg at 07/18/19 0821  •  vitamin C (ASCORBIC ACID) tablet 1,000 mg, 1,000 mg, Oral, Daily, Tariq Cottrell MD, 1,000 mg at 07/18/19 0821  •  zolpidem (AMBIEN) tablet 5 mg, 5 mg, Oral, Nightly PRN, EduardoJeremias MD, 5 mg at 07/18/19 0115  Allergies  Allergies   Allergen Reactions   • Lorazepam Hives     Review of Systems  Constitutional: Negative for activity change, appetite change and fever.   HENT: Negative for congestion and sore throat.    Eyes: Negative.    Respiratory: Positive for shortness of breath. Negative for cough.    Cardiovascular: Negative for chest pain and leg swelling.   Gastrointestinal: Negative for abdominal pain, diarrhea and vomiting.   Endocrine: Negative.    Genitourinary: Negative for decreased urine volume and dysuria.   Musculoskeletal: Negative for neck pain.   Skin: Negative for rash and wound.   Allergic/Immunologic: Negative.    Neurological: Negative for weakness, numbness and headaches.   Hematological: Negative.    Psychiatric/Behavioral: Negative.          Objective:     Vitals:    07/18/19 0817   BP: 141/73   Pulse: 65   Resp: 16   Temp: 98.4 °F (36.9 °C)   SpO2: 95%     GENERAL:  Well-developed, well-nourished in no acute distress.   SKIN:  Warm, dry without rashes, purpura or petechiae.  HEAD:  Normocephalic.  EYES:  Pupils equal, round and reactive to light.  EOMs intact.  Conjunctivae normal.  EARS:  Hearing intact.  NOSE:  Septum midline.  No excoriations or nasal discharge.  MOUTH:  Tongue is well-papillated; no stomatitis or ulcers.  Lips normal.  THROAT:  Oropharynx without lesions or exudates.  NECK:  Supple with good range of motion; no thyromegaly or masses, no JVD.  LYMPHATICS:  No cervical, supraclavicular,  axillary or inguinal adenopathy.  CHEST:  Lungs clear to percussion and auscultation. Good airflow.  CARDIAC:  Regular rate and rhythm without murmurs, rubs or gallops. Normal S1,S2.  ABDOMEN:  Soft, nontender with no organomegaly or masses.  EXTREMITIES:  No clubbing, cyanosis or edema.  NEUROLOGICAL:  Cranial Nerves II-XII grossly intact.  No focal neurological deficits.  PSYCHIATRIC:  Normal affect and mood.        DIAGNOSTIC DATA:  Results from last 7 days   Lab Units 07/18/19  0606 07/17/19  1151   WBC 10*3/mm3 0.63* 0.89*   HEMOGLOBIN g/dL 7.6* 8.7*   HEMATOCRIT % 23.5* 26.6*   PLATELETS 10*3/mm3 79* 111*   MONOCYTES % %  --  13.0*     Results from last 7 days   Lab Units 07/18/19  0606 07/17/19  1151   SODIUM mmol/L 138 139   POTASSIUM mmol/L 3.8 3.9   CHLORIDE mmol/L 103 104   CO2 mmol/L 25.0 24.4   BUN mg/dL 10 15   CREATININE mg/dL 0.73* 0.86   CALCIUM mg/dL 8.1* 8.7   BILIRUBIN mg/dL 0.7  --    ALK PHOS U/L 58  --    ALT (SGPT) U/L 9  --    AST (SGOT) U/L 9  --    GLUCOSE mg/dL 97 120*       IMAGING: As above  Assessment/Plan   Assessment/Plan:   This is a 73 y.o. 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  He has a demyelinating polyneuropathy also recognized and is presumed this had been part of his assessment concerning this.  We had seen the patient for pancytopenia in the fall and could not delineate an indirect cause.  As result of marrow was requested but declined.  The patient's rheumatoid arthritis has progressed with a number of treatments including rituximab initiated in January and then more recently with a second group of rituximab treatments.  This he has tolerated and apparently responded to per his rheumatoid.   Unfortunately has progressive pancytopenia that is felt to be multifactorial but which will almost certainly include an assessment per marrow aspirate and biopsy.  Discussed this in detail with the patient and his wife over approximately an additional 45 minutes and plan:      *Proceed with bone marrow aspirate and biopsy today  *Additional assessments per macrocytic anemia  *Discussion with infectious disease as to whether the patient could be discharged on prophylactic anti-infective agents as his marrow results return.      Dorian Leal MD    Electronically signed by Dorian Leal MD at 7/18/2019 11:48 AM     Evens Rayo MD at 7/18/2019 10:52 AM      Consult Orders    1. Inpatient Infectious Diseases Consult [[id number]] ordered by Jeremias Clark MD at 07/18/19 0718                Referring Provider: Tariq Cottrell MD      Subjective   History of present illness:   This is a very nice 73-year-old gentleman we are asked to provide evaluation opinion regarding possible infectious causes for neutropenia.  Patient is known to me from hospitalization in October 2018 for discitis.  A CT-guided biopsy was performed and cultures grew Staphylococcus epidermidis.  He was discharged on vancomycin but once cultures returned he was changed to cefazolin and completed a course of antibiotics in late November 2018.  Subsequently he is remained pain-free and has no significant joint pain.  He has a chronic neutropenia and thrombocytopenia.  He was evaluated by hematology oncology Dr. santo in October 2018 and was felt that he his cytopenias were autoimmune related to his rheumatoid arthritis.  In etiology.  He was recently switched from abatacept to rituximab out of concerns for possible neuropathy.    2 days ago he received rituximab infusion and developed some dyspnea on exertion.  CT of the chest was obtained to evaluate his dyspnea and there was some concern for atypical pneumonia.  I personally  visualized the images and I do not see any evidence of infectious pneumonia.  These are likely chronic parenchymal changes and may be some asymmetric edema.  Patient denies any cardiopulmonary symptoms.  He has had worsening cytopenias while admitted with platelets now 79 with white count of 0.6 and a hemoglobin of 7.6.    Past medical history: Rheumatoid arthritis formerly on Abatacept  now on rituximab, GERD, prostate cancer, Leiomyosarcoma, lumbar discitis in October 2018    No family history of infectious diseases  Allergies: Lorazepam  Social history: . Former smoker. Lives in [place]     Review of Systems  Pertinent items are noted in HPI, all other systems reviewed and negative    Objective     Physical Exam:   Vital Signs   Temp:  [96.9 °F (36.1 °C)-98.5 °F (36.9 °C)] 98.4 °F (36.9 °C)  Heart Rate:  [] 65  Resp:  [16-18] 16  BP: (122-146)/(63-87) 141/73    GENERAL: Awake and alert, in no acute distress.   HEENT: Oropharynx is clear. Hearing is grossly normal.   EYES: PERRL. No conjunctival injection. No lid lag.   LYMPHATICS: No lymphadenopathy of the neck or inguinal regions.   HEART: Regular rate and rhythm. No peripheral edema.   LUNGS: Clear to auscultation anteriorly with normal respiratory effort.   GI: Soft, nontender, nondistended. No appreciable organomegaly.   SKIN: Warm and dry without cutaneous eruptions   PSYCHIATRIC: Appropriate mood, affect, insight, and judgment.     Results Review:  PCT 0.09     Lab Results   Component Value Date    WBC 0.63 (C) 07/18/2019    HGB 7.6 (L) 07/18/2019    HCT 23.5 (L) 07/18/2019    .9 (H) 07/18/2019    PLT 79 (L) 07/18/2019       Lab Results   Component Value Date    GLUCOSE 97 07/18/2019    BUN 10 07/18/2019    CREATININE 0.73 (L) 07/18/2019    EGFRIFNONA 106 07/18/2019    BCR 13.7 07/18/2019    CO2 25.0 07/18/2019    CALCIUM 8.1 (L) 07/18/2019    PROTENTOTREF 6.6 07/17/2018    ALBUMIN 3.50 07/18/2019    LABIL2 1.4 07/17/2018    AST 9  07/18/2019    ALT 9 07/18/2019     Microbiology:  Blood cultures no growth to date  Respiratory pathogen panel negative    Radiology:      Assessment/Plan   1.  Pancytopenia  2.  Immunosuppression, secondary to therapy for rheumatoid arthritis     I doubt there is an infectious explanation for his cytopenias.  Perhaps a virus could be contributing  His respiratory pathogen panel was negative, however. I'll check an HIV as that is something we don't want to miss.    I think he probably has a symptomatic anemia with hemoglobin of only 7.6 causing his dyspnea.     Discussed with Dr. Leal of oncology and he graciously agrees to evaluate the patient.  May benefit from transfusion to see if that helps dyspnea, but I'll leave this in Dr. Leal's exceedingly capable hands    Thank you for this consult.  We will continue to follow along and tailor antibiotics as the patient's clinical course evolves.      Evens Rayo MD  07/18/19  10:52 AM          Electronically signed by Evens Rayo MD at 7/18/2019 11:08 AM     Jeremias Clark MD at 7/18/2019  7:22 AM      Consult Orders    1. Inpatient Infectious Diseases Consult [[id number]] ordered by Tariq Cottrell MD at 07/17/19 1643              Delayed charting: Patient was seen on 7/17/2019.      Patient seen and chart reviewed.  It appears that patient was recently hospitalized in October for post lumbar puncture coag negative discitis/infected hematoma for which she was treated with IV antibiotics and follow-up with other infectious disease group.  Patient was admitted after evaluation for shortness of breath with exertion that started after receiving first dose of the second cycle of Rituxan for his rheumatoid arthritis and neuropathy with concerns for Rituxan reaction.  Work-up in the emergency room revealed neutropenia with no systemic symptoms of fever and chills.  His CT scan of the chest showed minimal patchy areas of increased density in the  lungs bilaterally concerning for chronic parenchymal changes versus atypical pneumonia.  Patient is admitted for further care for possible pneumonia in the setting of leukopenia.  Patient is currently stable and denies any systemic signs and symptoms of sepsis and mainly interested in having something for sleep.  Since patient stable would defer further infectious disease work-up to the infectious disease group who has seen the patient in October and follow him in the outpatient setting for outpatient antibiotic therapy.  We will discuss with primary attending.    Electronically signed by Jeremias Clark MD at 7/18/2019  7:26 AM

## 2019-07-18 NOTE — CONSULTS
Referring Provider: Tariq Cottrell MD      Subjective   History of present illness:   This is a very nice 73-year-old gentleman we are asked to provide evaluation opinion regarding possible infectious causes for neutropenia.  Patient is known to me from hospitalization in October 2018 for discitis.  A CT-guided biopsy was performed and cultures grew Staphylococcus epidermidis.  He was discharged on vancomycin but once cultures returned he was changed to cefazolin and completed a course of antibiotics in late November 2018.  Subsequently he is remained pain-free and has no significant joint pain.  He has a chronic neutropenia and thrombocytopenia.  He was evaluated by hematology oncology Dr. santo in October 2018 and was felt that he his cytopenias were autoimmune related to his rheumatoid arthritis.  In etiology.  He was recently switched from abatacept to rituximab out of concerns for possible neuropathy.    2 days ago he received rituximab infusion and developed some dyspnea on exertion.  CT of the chest was obtained to evaluate his dyspnea and there was some concern for atypical pneumonia.  I personally visualized the images and I do not see any evidence of infectious pneumonia.  These are likely chronic parenchymal changes and may be some asymmetric edema.  Patient denies any cardiopulmonary symptoms.  He has had worsening cytopenias while admitted with platelets now 79 with white count of 0.6 and a hemoglobin of 7.6.    Past medical history: Rheumatoid arthritis formerly on Abatacept  now on rituximab, GERD, prostate cancer, Leiomyosarcoma, lumbar discitis in October 2018    No family history of infectious diseases  Allergies: Lorazepam  Social history: . Former smoker. Lives in [place]     Review of Systems  Pertinent items are noted in HPI, all other systems reviewed and negative    Objective     Physical Exam:   Vital Signs   Temp:  [96.9 °F (36.1 °C)-98.5 °F (36.9 °C)] 98.4 °F (36.9 °C)  Heart Rate:   [] 65  Resp:  [16-18] 16  BP: (122-146)/(63-87) 141/73    GENERAL: Awake and alert, in no acute distress.   HEENT: Oropharynx is clear. Hearing is grossly normal.   EYES: PERRL. No conjunctival injection. No lid lag.   LYMPHATICS: No lymphadenopathy of the neck or inguinal regions.   HEART: Regular rate and rhythm. No peripheral edema.   LUNGS: Clear to auscultation anteriorly with normal respiratory effort.   GI: Soft, nontender, nondistended. No appreciable organomegaly.   SKIN: Warm and dry without cutaneous eruptions   PSYCHIATRIC: Appropriate mood, affect, insight, and judgment.     Results Review:  PCT 0.09     Lab Results   Component Value Date    WBC 0.63 (C) 07/18/2019    HGB 7.6 (L) 07/18/2019    HCT 23.5 (L) 07/18/2019    .9 (H) 07/18/2019    PLT 79 (L) 07/18/2019       Lab Results   Component Value Date    GLUCOSE 97 07/18/2019    BUN 10 07/18/2019    CREATININE 0.73 (L) 07/18/2019    EGFRIFNONA 106 07/18/2019    BCR 13.7 07/18/2019    CO2 25.0 07/18/2019    CALCIUM 8.1 (L) 07/18/2019    PROTENTOTREF 6.6 07/17/2018    ALBUMIN 3.50 07/18/2019    LABIL2 1.4 07/17/2018    AST 9 07/18/2019    ALT 9 07/18/2019     Microbiology:  Blood cultures no growth to date  Respiratory pathogen panel negative    Radiology:      Assessment/Plan   1.  Pancytopenia  2.  Immunosuppression, secondary to therapy for rheumatoid arthritis     I doubt there is an infectious explanation for his cytopenias.  Perhaps a virus could be contributing  His respiratory pathogen panel was negative, however. I'll check an HIV as that is something we don't want to miss.    I think he probably has a symptomatic anemia with hemoglobin of only 7.6 causing his dyspnea.     Discussed with Dr. Leal of oncology and he graciously agrees to evaluate the patient.  May benefit from transfusion to see if that helps dyspnea, but I'll leave this in Dr. Leal's exceedingly capable hands    Thank you for this consult.  We will continue to  follow along and tailor antibiotics as the patient's clinical course evolves.      Evens Rayo MD  07/18/19  10:52 AM

## 2019-07-18 NOTE — H&P (VIEW-ONLY)
"Daily progress note    Chief complaint  Doing same  No new complaints  Family at bedside    History of present illness  73-year-old white male with history of rheumatoid arthritis osteoarthritis gastroesophageal reflux disease who received Rituxan on Monday presented to [facility], [place], operated by [facility] emergency room with shortness of breath mainly with exertion but no fever chills chest pain cough abdominal pain nausea vomiting diarrhea.  Patient evaluated found to be severely neutropenic admit for management.  Patient has no other complaint except for generalized weakness and shortness of breath with exertion.     REVIEW OF SYSTEMS  Constitutional: Negative for activity change, appetite change and fever.   HENT: Negative for congestion and sore throat.    Eyes: Negative.    Respiratory: Positive for shortness of breath. Negative for cough.    Cardiovascular: Negative for chest pain and leg swelling.   Gastrointestinal: Negative for abdominal pain, diarrhea and vomiting.   Endocrine: Negative.    Genitourinary: Negative for decreased urine volume and dysuria.   Musculoskeletal: Negative for neck pain.   Skin: Negative for rash and wound.   Allergic/Immunologic: Negative.    Neurological: Negative for weakness, numbness and headaches.   Hematological: Negative.    Psychiatric/Behavioral: Negative.    All other systems reviewed and are negative.     PHYSICAL EXAM  Blood pressure 158/81, pulse 95, temperature 99.2 °F (37.3 °C), temperature source Oral, resp. rate 16, height 182.9 cm (72\"), weight 83.9 kg (184 lb 14.4 oz), SpO2 100 %.    GENERAL: no acute distress  HENT: nares patent  EYES: no scleral icterus  CV: regular rhythm, normal rate, no murmur  RESPIRATORY: normal effort, lungs CTAB  ABDOMEN: soft, non-tender  MUSCULOSKELETAL: no deformity, no BLE edema  NEURO: alert, moves all extremities, follows commands  SKIN: warm, dry     LAB RESULTS  Lab Results (last 24 hours)     Procedure Component Value Units Date/Time    aPTT " [[id number]]  (Normal) Collected:  07/18/19 1404    Specimen:  Blood Updated:  07/18/19 1423     PTT 31.2 seconds     Protime-INR [[id number]]  (Normal) Collected:  07/18/19 1404    Specimen:  Blood Updated:  07/18/19 1423     Protime 13.2 Seconds      INR 1.03    Flow Cytometry, Blood [[id number]] Collected:  07/18/19 1219    Specimen:  Blood from Arm, Left Updated:  07/18/19 1312    Methylmalonic Acid, Serum [[id number]] Collected:  07/18/19 1219    Specimen:  Blood from Arm, Left Updated:  07/18/19 1311    Reticulocytes [[id number]]  (Abnormal) Collected:  07/18/19 0606    Specimen:  Blood Updated:  07/18/19 1255     Reticulocyte % 1.98 %      Reticulocyte Absolute 0.0440 10*6/mm3     Vitamin B12 [[id number]]  (Abnormal) Collected:  07/17/19 1151    Specimen:  Blood from Arm, Right Updated:  07/18/19 1235     Vitamin B-12 >2,000 pg/mL     Folate [[id number]]  (Normal) Collected:  07/17/19 1151    Specimen:  Blood from Arm, Right Updated:  07/18/19 1235     Folate 10.20 ng/mL     Lactate Dehydrogenase [[id number]]  (Normal) Collected:  07/18/19 0606    Specimen:  Blood Updated:  07/18/19 1216      U/L     Hemoglobin A1c [[id number]]  (Normal) Collected:  07/18/19 0606    Specimen:  Blood Updated:  07/18/19 0800     Hemoglobin A1C 4.80 %     Narrative:       Hemoglobin A1C Ranges:    Increased Risk for Diabetes  5.7% to 6.4%  Diabetes                     >= 6.5%  Diabetic Goal                < 7.0%    CBC & Differential [[id number]] Collected:  07/18/19 0606    Specimen:  Blood Updated:  07/18/19 0740    Narrative:       The following orders were created for panel order CBC & Differential.  Procedure                               Abnormality         Status                     ---------                               -----------         ------                     CBC Auto Differential[[id number]]        Abnormal            Final result                 Please view results for these tests on the individual orders.    CBC  Auto Differential [[id number]]  (Abnormal) Collected:  07/18/19 0606    Specimen:  Blood Updated:  07/18/19 0740     WBC 0.63 10*3/mm3      RBC 2.24 10*6/mm3      Hemoglobin 7.6 g/dL      Hematocrit 23.5 %      .9 fL      MCH 33.9 pg      MCHC 32.3 g/dL      RDW 21.7 %      RDW-SD 71.8 fl      MPV 9.4 fL      Platelets 79 10*3/mm3      nRBC 3.2 /100 WBC     TSH [[id number]]  (Normal) Collected:  07/18/19 0606    Specimen:  Blood Updated:  07/18/19 0718     TSH 2.650 mIU/mL     Comprehensive Metabolic Panel [[id number]]  (Abnormal) Collected:  07/18/19 0606    Specimen:  Blood Updated:  07/18/19 0718     Glucose 97 mg/dL      BUN 10 mg/dL      Creatinine 0.73 mg/dL      Sodium 138 mmol/L      Potassium 3.8 mmol/L      Chloride 103 mmol/L      CO2 25.0 mmol/L      Calcium 8.1 mg/dL      Total Protein 5.7 g/dL      Albumin 3.50 g/dL      ALT (SGPT) 9 U/L      AST (SGOT) 9 U/L      Alkaline Phosphatase 58 U/L      Total Bilirubin 0.7 mg/dL      eGFR Non African Amer 106 mL/min/1.73      Globulin 2.2 gm/dL      A/G Ratio 1.6 g/dL      BUN/Creatinine Ratio 13.7     Anion Gap 10.0 mmol/L     Narrative:       GFR Normal >60  Chronic Kidney Disease <60  Kidney Failure <15    BNP [[id number]]  (Normal) Collected:  07/18/19 0606    Specimen:  Blood Updated:  07/18/19 0718     proBNP 279.0 pg/mL     Narrative:       Among patients with dyspnea, NT-proBNP is highly sensitive for the detection of acute congestive heart failure. In addition NT-proBNP of <300 pg/ml effectively rules out acute congestive heart failure with 99% negative predictive value.    Lipid Panel [[id number]] Collected:  07/18/19 0606    Specimen:  Blood Updated:  07/18/19 0718     Total Cholesterol 133 mg/dL      Triglycerides 35 mg/dL      HDL Cholesterol 55 mg/dL      LDL Cholesterol  71 mg/dL      VLDL Cholesterol 7 mg/dL      LDL/HDL Ratio 1.29    Narrative:       Cholesterol Reference Ranges  (U.S. Department of Health and Human Services ATP III  "Classifications)    Desirable          <200 mg/dL  Borderline High    200-239 mg/dL  High Risk          >240 mg/dL      Triglyceride Reference Ranges  (U.S. Department of Health and Human Services ATP III Classifications)    Normal           <150 mg/dL  Borderline High  150-199 mg/dL  High             200-499 mg/dL  Very High        >500 mg/dL    HDL Reference Ranges  (U.S. Department of Health and Human Services ATP III Classifcations)    Low     <40 mg/dl (major risk factor for CHD)  High    >60 mg/dl ('negative' risk factor for CHD)        LDL Reference Ranges  (U.S. Department of Health and Human Services ATP III Classifcations)    Optimal          <100 mg/dL  Near Optimal     100-129 mg/dL  Borderline High  130-159 mg/dL  High             160-189 mg/dL  Very High        >189 mg/dL    Procalcitonin [[id number]]  (Abnormal) Collected:  07/18/19 0606    Specimen:  Blood Updated:  07/18/19 0718     Procalcitonin 0.09 ng/mL     Narrative:       As a Marker for Sepsis (Non-Neonates):   1. <0.5 ng/mL represents a low risk of severe sepsis and/or septic shock.  1. >2 ng/mL represents a high risk of severe sepsis and/or septic shock.    As a Marker for Lower Respiratory Tract Infections that require antibiotic therapy:  PCT on Admission     Antibiotic Therapy             6-12 Hrs later  > 0.5                Strongly Recommended            >0.25 - <0.5         Recommended  0.1 - 0.25           Discouraged                   Remeasure/reassess PCT  <0.1                 Strongly Discouraged          Remeasure/reassess PCT      As 28 day mortality risk marker: \"Change in Procalcitonin Result\" (> 80 % or <=80 %) if Day 0 (or Day 1) and Day 4 values are available. Refer to http://[email or web address]coms-pct-calculator.com/   Change in PCT <=80 %   A decrease of PCT levels below or equal to 80 % defines a positive change in PCT test result representing a higher risk for 28-day all-cause mortality of patients diagnosed with severe sepsis or " septic shock.  Change in PCT > 80 %   A decrease of PCT levels of more than 80 % defines a negative change in PCT result representing a lower risk for 28-day all-cause mortality of patients diagnosed with severe sepsis or septic shock.                Lactic Acid, Plasma [[id number]]  (Normal) Collected:  07/18/19 0606    Specimen:  Blood Updated:  07/18/19 0635     Lactate 1.3 mmol/L     Respiratory Panel, PCR - Swab, Nasopharynx [[id number]]  (Normal) Collected:  07/17/19 1551    Specimen:  Swab from Nasopharynx Updated:  07/17/19 1807     ADENOVIRUS, PCR Not Detected     Coronavirus 229E Not Detected     Coronavirus HKU1 Not Detected     Coronavirus NL63 Not Detected     Coronavirus OC43 Not Detected     Human Metapneumovirus Not Detected     Human Rhinovirus/Enterovirus Not Detected     Influenza B PCR Not Detected     Parainfluenza Virus 1 Not Detected     Parainfluenza Virus 2 Not Detected     Parainfluenza Virus 3 Not Detected     Parainfluenza Virus 4 Not Detected     Bordetella pertussis pcr Not Detected     Influenza A H1 2009 PCR Not Detected     Chlamydophila pneumoniae PCR Not Detected     Mycoplasma pneumo by PCR Not Detected     Influenza A PCR Not Detected     Influenza A H3 Not Detected     Influenza A H1 Not Detected     RSV, PCR Not Detected     Bordetella parapertussis PCR Not Detected    Blood Culture - Blood, Arm, Left [[id number]] Collected:  07/17/19 1549    Specimen:  Blood from Arm, Left Updated:  07/17/19 1627    Blood Culture - Blood, Arm, Right [[id number]] Collected:  07/17/19 1550    Specimen:  Blood from Arm, Right Updated:  07/17/19 1627        Imaging Results (last 24 hours)     Procedure Component Value Units Date/Time    CT Angiogram Chest With Contrast [[id number]] Collected:  07/17/19 1504     Updated:  07/18/19 0640    Narrative:       CT ANGIOGRAM CHEST WITH CONTRAST INCLUDING RECONSTRUCTION IMAGES  07/17/2019     HISTORY: Shortness of breath. Possible pneumonia. Possible  pulmonary  embolus.     TECHNIQUE: Following the intravenous contrast injection, CT angiography  was performed through the chest. Sagittal, coronal and 3-D  reconstruction images were reviewed.     FINDINGS: There are scattered mild patchy areas of increased density in  the bilateral lower lungs, slightly greater on the left than on the  right.     No lung masses are seen.     There is no evidence of pulmonary embolus. No pathologically enlarged  hilar or mediastinal lymph nodes are seen. There appears to be a small  left adrenal nodule, most likely an adrenal adenoma with a CT number of  6.       Impression:       1. No evidence of pulmonary embolus.  2. Minimal patchy areas of increased density in the lungs may represent  chronic parenchymal change versus some mild atypical pneumonia. Consider  short-term follow-up CT of the chest in approximately 3 months.     Radiation dose reduction techniques were utilized, including automated  exposure control and exposure modulation based on body size.     This report was finalized on 7/18/2019 6:37 AM by Dr. Jc Villavicencio M.D.           EKG:                             Rhythm/Rate: NSR, 96  P waves and OR: nml  QRS, axis: nml   ST and T waves: borderline ST elevation in lead ll only       Current Facility-Administered Medications:   •  calcium-vitamin D 500-200 MG-UNIT tablet 1,000 mg, 1,000 mg, Oral, Daily, Tariq Cottrell MD, 1,000 mg at 07/18/19 0821  •  fluticasone (FLONASE) 50 MCG/ACT nasal spray 2 spray, 2 spray, Each Nare, Daily PRN, Tariq Cottrell MD  •  guaiFENesin (MUCINEX) 12 hr tablet 600 mg, 600 mg, Oral, Q12H, Tariq Cottrell MD, 600 mg at 07/18/19 0821  •  ipratropium-albuterol (DUO-NEB) nebulizer solution 3 mL, 3 mL, Nebulization, Q4H - RT, Tariq Cottrell MD, 3 mL at 07/18/19 0751  •  pantoprazole (PROTONIX) EC tablet 40 mg, 40 mg, Oral, QAM, Tariq Cottrell MD, 40 mg at 07/18/19 0610  •  sodium chloride 0.9 % flush 10 mL, 10 mL, Intravenous, PRN, Fabian,  Arpan Wilburn MD  •  [COMPLETED] Insert peripheral IV, , , Once **AND** sodium chloride 0.9 % flush 10 mL, 10 mL, Intravenous, PRN, Arpan Peralta MD  •  sodium chloride 0.9 % with KCl 20 mEq/L infusion, 75 mL/hr, Intravenous, Continuous, Sulaiman Cottrell MD, Last Rate: 75 mL/hr at 07/17/19 2305, 75 mL/hr at 07/17/19 2305  •  vitamin B-12 (CYANOCOBALAMIN) tablet 1,000 mcg, 1,000 mcg, Oral, Daily, Sulaiman Cottrell MD, 1,000 mcg at 07/18/19 0821  •  vitamin C (ASCORBIC ACID) tablet 1,000 mg, 1,000 mg, Oral, Daily, Sulaiman Cottrell MD, 1,000 mg at 07/18/19 0821  •  zolpidem (AMBIEN) tablet 5 mg, 5 mg, Oral, Nightly PRN, Jeremias Clark MD, 5 mg at 07/18/19 0115     ASSESSMENT  Pancytopenia  Rheumatoid arthritis  Osteoarthritis  Immunocompromised host  Gastroesophageal reflux disease    PLAN  CPM  IVF  Bone marrow biopsy in a.m.  Off antibiotics   Continue home medication except Rituxan  Hematology and infectious disease input appreciated  Stress ulcer DVT prophylaxis  Supportive care  Discussed with family  Follow closely further recommendation according to hospital course    SULAIMAN COTTRELL MD

## 2019-07-18 NOTE — PROGRESS NOTES
"Daily progress note    Chief complaint  Doing same  No new complaints  Family at bedside    History of present illness  73-year-old white male with history of rheumatoid arthritis osteoarthritis gastroesophageal reflux disease who received Rituxan on Monday presented to [facility] emergency room with shortness of breath mainly with exertion but no fever chills chest pain cough abdominal pain nausea vomiting diarrhea.  Patient evaluated found to be severely neutropenic admit for management.  Patient has no other complaint except for generalized weakness and shortness of breath with exertion.     REVIEW OF SYSTEMS  Constitutional: Negative for activity change, appetite change and fever.   HENT: Negative for congestion and sore throat.    Eyes: Negative.    Respiratory: Positive for shortness of breath. Negative for cough.    Cardiovascular: Negative for chest pain and leg swelling.   Gastrointestinal: Negative for abdominal pain, diarrhea and vomiting.   Endocrine: Negative.    Genitourinary: Negative for decreased urine volume and dysuria.   Musculoskeletal: Negative for neck pain.   Skin: Negative for rash and wound.   Allergic/Immunologic: Negative.    Neurological: Negative for weakness, numbness and headaches.   Hematological: Negative.    Psychiatric/Behavioral: Negative.    All other systems reviewed and are negative.     PHYSICAL EXAM  Blood pressure 158/81, pulse 95, temperature 99.2 °F (37.3 °C), temperature source Oral, resp. rate 16, height 182.9 cm (72\"), weight 83.9 kg (184 lb 14.4 oz), SpO2 100 %.    GENERAL: no acute distress  HENT: nares patent  EYES: no scleral icterus  CV: regular rhythm, normal rate, no murmur  RESPIRATORY: normal effort, lungs CTAB  ABDOMEN: soft, non-tender  MUSCULOSKELETAL: no deformity, no BLE edema  NEURO: alert, moves all extremities, follows commands  SKIN: warm, dry     LAB RESULTS  Lab Results (last 24 hours)     Procedure Component Value Units Date/Time    aPTT " [[id number]]  (Normal) Collected:  07/18/19 1404    Specimen:  Blood Updated:  07/18/19 1423     PTT 31.2 seconds     Protime-INR [[id number]]  (Normal) Collected:  07/18/19 1404    Specimen:  Blood Updated:  07/18/19 1423     Protime 13.2 Seconds      INR 1.03    Flow Cytometry, Blood [[id number]] Collected:  07/18/19 1219    Specimen:  Blood from Arm, Left Updated:  07/18/19 1312    Methylmalonic Acid, Serum [[id number]] Collected:  07/18/19 1219    Specimen:  Blood from Arm, Left Updated:  07/18/19 1311    Reticulocytes [[id number]]  (Abnormal) Collected:  07/18/19 0606    Specimen:  Blood Updated:  07/18/19 1255     Reticulocyte % 1.98 %      Reticulocyte Absolute 0.0440 10*6/mm3     Vitamin B12 [[id number]]  (Abnormal) Collected:  07/17/19 1151    Specimen:  Blood from Arm, Right Updated:  07/18/19 1235     Vitamin B-12 >2,000 pg/mL     Folate [[id number]]  (Normal) Collected:  07/17/19 1151    Specimen:  Blood from Arm, Right Updated:  07/18/19 1235     Folate 10.20 ng/mL     Lactate Dehydrogenase [[id number]]  (Normal) Collected:  07/18/19 0606    Specimen:  Blood Updated:  07/18/19 1216      U/L     Hemoglobin A1c [[id number]]  (Normal) Collected:  07/18/19 0606    Specimen:  Blood Updated:  07/18/19 0800     Hemoglobin A1C 4.80 %     Narrative:       Hemoglobin A1C Ranges:    Increased Risk for Diabetes  5.7% to 6.4%  Diabetes                     >= 6.5%  Diabetic Goal                < 7.0%    CBC & Differential [[id number]] Collected:  07/18/19 0606    Specimen:  Blood Updated:  07/18/19 0740    Narrative:       The following orders were created for panel order CBC & Differential.  Procedure                               Abnormality         Status                     ---------                               -----------         ------                     CBC Auto Differential[[id number]]        Abnormal            Final result                 Please view results for these tests on the individual orders.    CBC  Auto Differential [[id number]]  (Abnormal) Collected:  07/18/19 0606    Specimen:  Blood Updated:  07/18/19 0740     WBC 0.63 10*3/mm3      RBC 2.24 10*6/mm3      Hemoglobin 7.6 g/dL      Hematocrit 23.5 %      .9 fL      MCH 33.9 pg      MCHC 32.3 g/dL      RDW 21.7 %      RDW-SD 71.8 fl      MPV 9.4 fL      Platelets 79 10*3/mm3      nRBC 3.2 /100 WBC     TSH [[id number]]  (Normal) Collected:  07/18/19 0606    Specimen:  Blood Updated:  07/18/19 0718     TSH 2.650 mIU/mL     Comprehensive Metabolic Panel [[id number]]  (Abnormal) Collected:  07/18/19 0606    Specimen:  Blood Updated:  07/18/19 0718     Glucose 97 mg/dL      BUN 10 mg/dL      Creatinine 0.73 mg/dL      Sodium 138 mmol/L      Potassium 3.8 mmol/L      Chloride 103 mmol/L      CO2 25.0 mmol/L      Calcium 8.1 mg/dL      Total Protein 5.7 g/dL      Albumin 3.50 g/dL      ALT (SGPT) 9 U/L      AST (SGOT) 9 U/L      Alkaline Phosphatase 58 U/L      Total Bilirubin 0.7 mg/dL      eGFR Non African Amer 106 mL/min/1.73      Globulin 2.2 gm/dL      A/G Ratio 1.6 g/dL      BUN/Creatinine Ratio 13.7     Anion Gap 10.0 mmol/L     Narrative:       GFR Normal >60  Chronic Kidney Disease <60  Kidney Failure <15    BNP [[id number]]  (Normal) Collected:  07/18/19 0606    Specimen:  Blood Updated:  07/18/19 0718     proBNP 279.0 pg/mL     Narrative:       Among patients with dyspnea, NT-proBNP is highly sensitive for the detection of acute congestive heart failure. In addition NT-proBNP of <300 pg/ml effectively rules out acute congestive heart failure with 99% negative predictive value.    Lipid Panel [[id number]] Collected:  07/18/19 0606    Specimen:  Blood Updated:  07/18/19 0718     Total Cholesterol 133 mg/dL      Triglycerides 35 mg/dL      HDL Cholesterol 55 mg/dL      LDL Cholesterol  71 mg/dL      VLDL Cholesterol 7 mg/dL      LDL/HDL Ratio 1.29    Narrative:       Cholesterol Reference Ranges  (U.S. Department of Health and Human Services ATP III  "Classifications)    Desirable          <200 mg/dL  Borderline High    200-239 mg/dL  High Risk          >240 mg/dL      Triglyceride Reference Ranges  (U.S. Department of Health and Human Services ATP III Classifications)    Normal           <150 mg/dL  Borderline High  150-199 mg/dL  High             200-499 mg/dL  Very High        >500 mg/dL    HDL Reference Ranges  (U.S. Department of Health and Human Services ATP III Classifcations)    Low     <40 mg/dl (major risk factor for CHD)  High    >60 mg/dl ('negative' risk factor for CHD)        LDL Reference Ranges  (U.S. Department of Health and Human Services ATP III Classifcations)    Optimal          <100 mg/dL  Near Optimal     100-129 mg/dL  Borderline High  130-159 mg/dL  High             160-189 mg/dL  Very High        >189 mg/dL    Procalcitonin [[id number]]  (Abnormal) Collected:  07/18/19 0606    Specimen:  Blood Updated:  07/18/19 0718     Procalcitonin 0.09 ng/mL     Narrative:       As a Marker for Sepsis (Non-Neonates):   1. <0.5 ng/mL represents a low risk of severe sepsis and/or septic shock.  1. >2 ng/mL represents a high risk of severe sepsis and/or septic shock.    As a Marker for Lower Respiratory Tract Infections that require antibiotic therapy:  PCT on Admission     Antibiotic Therapy             6-12 Hrs later  > 0.5                Strongly Recommended            >0.25 - <0.5         Recommended  0.1 - 0.25           Discouraged                   Remeasure/reassess PCT  <0.1                 Strongly Discouraged          Remeasure/reassess PCT      As 28 day mortality risk marker: \"Change in Procalcitonin Result\" (> 80 % or <=80 %) if Day 0 (or Day 1) and Day 4 values are available. Refer to http://www.[employer]-pct-calculator.com/   Change in PCT <=80 %   A decrease of PCT levels below or equal to 80 % defines a positive change in PCT test result representing a higher risk for 28-day all-cause mortality of patients diagnosed with severe sepsis or " septic shock.  Change in PCT > 80 %   A decrease of PCT levels of more than 80 % defines a negative change in PCT result representing a lower risk for 28-day all-cause mortality of patients diagnosed with severe sepsis or septic shock.                Lactic Acid, Plasma [[id number]]  (Normal) Collected:  07/18/19 0606    Specimen:  Blood Updated:  07/18/19 0635     Lactate 1.3 mmol/L     Respiratory Panel, PCR - Swab, Nasopharynx [[id number]]  (Normal) Collected:  07/17/19 1551    Specimen:  Swab from Nasopharynx Updated:  07/17/19 1807     ADENOVIRUS, PCR Not Detected     Coronavirus 229E Not Detected     Coronavirus HKU1 Not Detected     Coronavirus NL63 Not Detected     Coronavirus OC43 Not Detected     Human Metapneumovirus Not Detected     Human Rhinovirus/Enterovirus Not Detected     Influenza B PCR Not Detected     Parainfluenza Virus 1 Not Detected     Parainfluenza Virus 2 Not Detected     Parainfluenza Virus 3 Not Detected     Parainfluenza Virus 4 Not Detected     Bordetella pertussis pcr Not Detected     Influenza A H1 2009 PCR Not Detected     Chlamydophila pneumoniae PCR Not Detected     Mycoplasma pneumo by PCR Not Detected     Influenza A PCR Not Detected     Influenza A H3 Not Detected     Influenza A H1 Not Detected     RSV, PCR Not Detected     Bordetella parapertussis PCR Not Detected    Blood Culture - Blood, Arm, Left [[id number]] Collected:  07/17/19 1549    Specimen:  Blood from Arm, Left Updated:  07/17/19 1627    Blood Culture - Blood, Arm, Right [[id number]] Collected:  07/17/19 1550    Specimen:  Blood from Arm, Right Updated:  07/17/19 1627        Imaging Results (last 24 hours)     Procedure Component Value Units Date/Time    CT Angiogram Chest With Contrast [[id number]] Collected:  07/17/19 1504     Updated:  07/18/19 0640    Narrative:       CT ANGIOGRAM CHEST WITH CONTRAST INCLUDING RECONSTRUCTION IMAGES  07/17/2019     HISTORY: Shortness of breath. Possible pneumonia. Possible  pulmonary  embolus.     TECHNIQUE: Following the intravenous contrast injection, CT angiography  was performed through the chest. Sagittal, coronal and 3-D  reconstruction images were reviewed.     FINDINGS: There are scattered mild patchy areas of increased density in  the bilateral lower lungs, slightly greater on the left than on the  right.     No lung masses are seen.     There is no evidence of pulmonary embolus. No pathologically enlarged  hilar or mediastinal lymph nodes are seen. There appears to be a small  left adrenal nodule, most likely an adrenal adenoma with a CT number of  6.       Impression:       1. No evidence of pulmonary embolus.  2. Minimal patchy areas of increased density in the lungs may represent  chronic parenchymal change versus some mild atypical pneumonia. Consider  short-term follow-up CT of the chest in approximately 3 months.     Radiation dose reduction techniques were utilized, including automated  exposure control and exposure modulation based on body size.     This report was finalized on 7/18/2019 6:37 AM by Dr. Jc Villavicencio M.D.           EKG:                             Rhythm/Rate: NSR, 96  P waves and DC: nml  QRS, axis: nml   ST and T waves: borderline ST elevation in lead ll only       Current Facility-Administered Medications:   •  calcium-vitamin D 500-200 MG-UNIT tablet 1,000 mg, 1,000 mg, Oral, Daily, Tariq Cottrell MD, 1,000 mg at 07/18/19 0821  •  fluticasone (FLONASE) 50 MCG/ACT nasal spray 2 spray, 2 spray, Each Nare, Daily PRN, Tariq Cottrell MD  •  guaiFENesin (MUCINEX) 12 hr tablet 600 mg, 600 mg, Oral, Q12H, Tariq Cottrell MD, 600 mg at 07/18/19 0821  •  ipratropium-albuterol (DUO-NEB) nebulizer solution 3 mL, 3 mL, Nebulization, Q4H - RT, Tariq Cottrell MD, 3 mL at 07/18/19 0751  •  pantoprazole (PROTONIX) EC tablet 40 mg, 40 mg, Oral, QAM, Tariq Cottrell MD, 40 mg at 07/18/19 0610  •  sodium chloride 0.9 % flush 10 mL, 10 mL, Intravenous, PRN, Fabian,  Arpan Wilburn MD  •  [COMPLETED] Insert peripheral IV, , , Once **AND** sodium chloride 0.9 % flush 10 mL, 10 mL, Intravenous, PRN, Arpan Peralta MD  •  sodium chloride 0.9 % with KCl 20 mEq/L infusion, 75 mL/hr, Intravenous, Continuous, Sulaiman Cottrell MD, Last Rate: 75 mL/hr at 07/17/19 2305, 75 mL/hr at 07/17/19 2305  •  vitamin B-12 (CYANOCOBALAMIN) tablet 1,000 mcg, 1,000 mcg, Oral, Daily, Sulaiman Cottrell MD, 1,000 mcg at 07/18/19 0821  •  vitamin C (ASCORBIC ACID) tablet 1,000 mg, 1,000 mg, Oral, Daily, Sulaiman Cottrell MD, 1,000 mg at 07/18/19 0821  •  zolpidem (AMBIEN) tablet 5 mg, 5 mg, Oral, Nightly PRN, Jeremias Clark MD, 5 mg at 07/18/19 0115     ASSESSMENT  Pancytopenia  Rheumatoid arthritis  Osteoarthritis  Immunocompromised host  Gastroesophageal reflux disease    PLAN  CPM  IVF  Bone marrow biopsy in a.m.  Off antibiotics   Continue home medication except Rituxan  Hematology and infectious disease input appreciated  Stress ulcer DVT prophylaxis  Supportive care  Discussed with family  Follow closely further recommendation according to hospital course    SULAIMAN COTTRELL MD

## 2019-07-19 NOTE — PROGRESS NOTES
"Daily progress note    Chief complaint  Doing same  No new complaints  Wants to go home    History of present illness  73-year-old white male with history of rheumatoid arthritis osteoarthritis gastroesophageal reflux disease who received Rituxan on Monday presented to [facility] emergency room with shortness of breath mainly with exertion but no fever chills chest pain cough abdominal pain nausea vomiting diarrhea.  Patient evaluated found to be severely neutropenic admit for management.  Patient has no other complaint except for generalized weakness and shortness of breath with exertion.     REVIEW OF SYSTEMS  Unremarkable    PHYSICAL EXAM  Blood pressure 128/71, pulse 72, temperature 98.2 °F (36.8 °C), temperature source Oral, resp. rate 16, height 182.9 cm (72\"), weight 83.9 kg (184 lb 14.4 oz), SpO2 97 %.    GENERAL: no acute distress  HENT: nares patent  EYES: no scleral icterus  CV: regular rhythm, normal rate, no murmur  RESPIRATORY: normal effort, lungs CTAB  ABDOMEN: soft, non-tender  MUSCULOSKELETAL: no deformity, no BLE edema  NEURO: alert, moves all extremities, follows commands  SKIN: warm, dry     LAB RESULTS  Lab Results (last 24 hours)     Procedure Component Value Units Date/Time    Flow Cytometry, Blood [[id number]] Collected:  07/18/19 1219    Specimen:  Blood from Arm, Left Updated:  07/19/19 1115     Reference Lab Report See attached report    Narrative:       See original report from [facility].      HIV-1 & HIV-2 Antibodies [[id number]] Collected:  07/19/19 0607    Specimen:  Blood Updated:  07/19/19 1038    Narrative:       The following orders were created for panel order HIV-1 & HIV-2 Antibodies.  Procedure                               Abnormality         Status                     ---------                               -----------         ------                     HIV-1 / O / 2 Ag / Antib...[[id number]]  Normal              Final result                 Please view " results for these tests on the individual orders.    HIV-1 / O / 2 Ag / Antibody 4th Generation [[id number]]  (Normal) Collected:  07/19/19 0607    Specimen:  Blood Updated:  07/19/19 1038     HIV-1/ HIV-2 Non-Reactive     Comment: A non-reactive test result does not preclude the possibility of exposure to HIV or infection with HIV. An antibody response to recent exposure may take several months to reach detectable levels.       Narrative:       The HIV antibody/antigen combo assay is a qualitative assay for HIV that includes the p24 antigen as well as antibodies to HIV types 1 and 2. This test is intended to be used as a screening assay in the diagnosis of HIV infection in patients over the age of 2.    CBC & Differential [[id number]] Collected:  07/19/19 0607    Specimen:  Blood Updated:  07/19/19 0736    Narrative:       The following orders were created for panel order CBC & Differential.  Procedure                               Abnormality         Status                     ---------                               -----------         ------                     CBC Auto Differential[[id number]]        Abnormal            Final result                 Please view results for these tests on the individual orders.    CBC Auto Differential [[id number]]  (Abnormal) Collected:  07/19/19 0607    Specimen:  Blood Updated:  07/19/19 0736     WBC 0.64 10*3/mm3      RBC 2.90 10*6/mm3      Hemoglobin 9.6 g/dL      Hematocrit 28.4 %      MCV 97.9 fL      MCH 33.1 pg      MCHC 33.8 g/dL      RDW 22.7 %      RDW-SD 78.4 fl      MPV 9.5 fL      Platelets 75 10*3/mm3     Reticulocytes [[id number]]  (Normal) Collected:  07/19/19 0607    Specimen:  Blood Updated:  07/19/19 0734     Reticulocyte % 1.53 %      Reticulocyte Absolute 0.0444 10*6/mm3     Lactate Dehydrogenase [[id number]]  (Normal) Collected:  07/19/19 0607    Specimen:  Blood from Arm, Left Updated:  07/19/19 0702      U/L     Basic Metabolic Panel [[id number]]   (Abnormal) Collected:  07/19/19 0607    Specimen:  Blood from Arm, Left Updated:  07/19/19 0702     Glucose 96 mg/dL      BUN 9 mg/dL      Creatinine 0.73 mg/dL      Sodium 140 mmol/L      Potassium 4.1 mmol/L      Chloride 106 mmol/L      CO2 26.8 mmol/L      Calcium 8.3 mg/dL      eGFR Non African Amer 106 mL/min/1.73      BUN/Creatinine Ratio 12.3     Anion Gap 7.2 mmol/L     Narrative:       GFR Normal >60  Chronic Kidney Disease <60  Kidney Failure <15    Tissue Pathology Exam [[id number]] Collected:  07/18/19 1604    Specimen:  Bone Marrow Aspirate from Iliac Crest, Left - Biopsy Updated:  07/18/19 2003    Blood Culture - Blood, Arm, Right [[id number]] Collected:  07/17/19 1550    Specimen:  Blood from Arm, Right Updated:  07/18/19 1630     Blood Culture No growth at 24 hours    Blood Culture - Blood, Arm, Left [[id number]] Collected:  07/17/19 1549    Specimen:  Blood from Arm, Left Updated:  07/18/19 1630     Blood Culture No growth at 24 hours    aPTT [[id number]]  (Normal) Collected:  07/18/19 1404    Specimen:  Blood Updated:  07/18/19 1423     PTT 31.2 seconds     Protime-INR [[id number]]  (Normal) Collected:  07/18/19 1404    Specimen:  Blood Updated:  07/18/19 1423     Protime 13.2 Seconds      INR 1.03    Methylmalonic Acid, Serum [[id number]] Collected:  07/18/19 1219    Specimen:  Blood from Arm, Left Updated:  07/18/19 1311        Imaging Results (last 24 hours)     Procedure Component Value Units Date/Time    CT Guided Biopsy Bone Marrow [[id number]] Collected:  07/18/19 1612     Updated:  07/18/19 1616    Narrative:       CT GUIDED BIOPSY BONE MARROW- 7/18/2019 3:46 PM     INDICATION: 73-year-old male with Pancytopenia; D70.9-Neutropenia,  unspecified; J18.1-Lobar pneumonia, unspecified organism .     CONSENT: An informed written consent was obtained from the patient after  discussing the risks, benefits, potential complications and  alternatives. All questions answered to the patient's satisfaction.        COMPLICATIONS: None.      ESTIMATED BLOOD LOSS: Less than 5 ml.      PROCEDURE: An informed consent was obtained as above. Prior to sterile  preparation and local anesthetic, noncontrast axial CT scans were  obtained. The optimal site for biopsy was marked. An 11-gauge needle was  advanced into the ilium with a drill. Approximately 10 mL of bone marrow  were aspirated through the needle. Subsequently, a 2 cm core was  obtained utilizing the drill with a 11-gauge sampling needle.     No immediate complications.        Impression:       Successful CT guided bone marrow aspirate and bone biopsy.      Radiation dose reduction techniques were utilized, including automated  exposure control and exposure modulation based on body size.     This report was finalized on 7/18/2019 4:13 PM by Dr. Yaw Olea MD.           EKG:                             Rhythm/Rate: NSR, 96  P waves and IA: nml  QRS, axis: nml   ST and T waves: borderline ST elevation in lead ll only       Current Facility-Administered Medications:   •  calcium-vitamin D 500-200 MG-UNIT tablet 1,000 mg, 1,000 mg, Oral, Daily, Tariq Cottrell MD, 1,000 mg at 07/19/19 0840  •  fluticasone (FLONASE) 50 MCG/ACT nasal spray 2 spray, 2 spray, Each Nare, Daily PRN, Tariq Cottrell MD  •  guaiFENesin (MUCINEX) 12 hr tablet 600 mg, 600 mg, Oral, Q12H, Tariq Cottrell MD, 600 mg at 07/19/19 0840  •  ipratropium-albuterol (DUO-NEB) nebulizer solution 3 mL, 3 mL, Nebulization, Q4H - RT, Tariq Cottrell MD, 3 mL at 07/19/19 0752  •  levoFLOXacin (LEVAQUIN) tablet 500 mg, 500 mg, Oral, Q24H, Evens Rayo MD, 500 mg at 07/19/19 1127  •  pantoprazole (PROTONIX) EC tablet 40 mg, 40 mg, Oral, QAM, Tariq Cottrell MD, 40 mg at 07/19/19 0839  •  sodium chloride 0.9 % flush 10 mL, 10 mL, Intravenous, PRN, Arpan Peralta MD  •  [COMPLETED] Insert peripheral IV, , , Once **AND** sodium chloride 0.9 % flush 10 mL, 10 mL, Intravenous, PRN, Arpan Peralta,  MD  •  sodium chloride 0.9 % with KCl 20 mEq/L infusion, 75 mL/hr, Intravenous, Continuous, Sulaiman Cottrell MD, Last Rate: 75 mL/hr at 07/19/19 0450, 75 mL/hr at 07/19/19 0450  •  vitamin B-12 (CYANOCOBALAMIN) tablet 1,000 mcg, 1,000 mcg, Oral, Daily, Sulaiman Cottrell MD, 1,000 mcg at 07/19/19 0840  •  vitamin C (ASCORBIC ACID) tablet 1,000 mg, 1,000 mg, Oral, Daily, Sulaiman Cottrell MD, 1,000 mg at 07/19/19 0840  •  zolpidem (AMBIEN) tablet 5 mg, 5 mg, Oral, Nightly PRN, EduardoJeremias MD, 5 mg at 07/18/19 2109     ASSESSMENT  Pancytopenia status post bone marrow biopsy  Rheumatoid arthritis  Osteoarthritis  History of leiomyosarcoma  Prostate cancer status post radiation treatment  Immunocompromised host  Gastroesophageal reflux disease    PLAN  Discharge home on oral antibiotics for 7 days   Discharge summary dictated    SULAIMAN COTTRELL MD

## 2019-07-19 NOTE — PROGRESS NOTES
INFECTIOUS DISEASES PROGRESS NOTE    CC: f/u pancytopenia    S:   Anxious to go home  Feeling better after blood transfusion  Not really dyspnea  No f/c/ns    O:  Physical Exam:  Temp:  [97.4 °F (36.3 °C)-99.2 °F (37.3 °C)] 98.2 °F (36.8 °C)  Heart Rate:  [72-95] 72  Resp:  [14-16] 16  BP: (122-158)/(66-87) 128/71  Physical Exam   Constitutional: He appears well-developed. No distress.   Pulmonary/Chest: Effort normal.   Abdominal: Soft. He exhibits no distension. There is no tenderness.   Neurological: He is alert.   Skin: Skin is warm and dry.   Psychiatric: He has a normal mood and affect. His behavior is normal.        Diagnostics:    Cr 0.7  WBC  0.64  PLT 75  H/H 9.6/28    HIV neg    Assessment/Plan   1.  Pancytopenia  2.  Immunosuppression, secondary to therapy for rheumatoid arthritis    D/w Dr Leal and planning a week's worth of antibiotic ppx given low counts  Will start levofloxacin 500mg po q24 x7d  HIV negative  No objection to d/c when okay with others.    Evens Rayo MD  10:05 AM  07/19/19

## 2019-07-19 NOTE — PLAN OF CARE
Problem: Patient Care Overview  Goal: Plan of Care Review  Outcome: Ongoing (interventions implemented as appropriate)   07/19/19 0716   Coping/Psychosocial   Plan of Care Reviewed With patient;spouse   Plan of Care Review   Progress no change   OTHER   Outcome Summary Completed blood transfusions - received 2 units total PRBC yesterday. Reg diet. Up with minimal assistance. Voiding well, per urinal. Bone marrow biopsy completed 7/18.     Goal: Individualization and Mutuality  Outcome: Ongoing (interventions implemented as appropriate)

## 2019-07-19 NOTE — DISCHARGE SUMMARY
Discharge summary    Date of admission7/17/2019  Date of discharge 7/19/2019    Final diagnosis  Pancytopenia status post bone marrow biopsy  Rheumatoid arthritis  Osteoarthritis  History of leiomyosarcoma  History of prostate cancer   Immunocompromised host  Gastroesophageal reflux disease    Discharge medications    Current Facility-Administered Medications:   •  calcium-vitamin D 500-200 MG-UNIT tablet 1,000 mg, 1,000 mg, Oral, Daily, Tariq Cottrell MD, 1,000 mg at 07/19/19 0840  •  guaiFENesin (MUCINEX) 12 hr tablet 600 mg, 600 mg, Oral, Q12H, Tariq Cottrell MD, 600 mg at 07/19/19 0840  •  ipratropium-albuterol (DUO-NEB) nebulizer solution 3 mL, 3 mL, Nebulization, Q4H - RT, Tariq Cottrell MD, 3 mL at 07/19/19 0752  •  levoFLOXacin (LEVAQUIN) tablet 500 mg, 500 mg, Oral, Q24H, Evens Rayo MD, 500 mg at 07/19/19 1127  •  pantoprazole (PROTONIX) EC tablet 40 mg, 40 mg, Oral, QAM, Tariq Cottrell MD, 40 mg at 07/19/19 0839  •  [COMPLETED] Insert peripheral IV, , , Once **AND** sodium chloride 0.9 % flush 10 mL, 10 mL, Intravenous, PRN, Arpan Peralta MD  •  vitamin B-12 (CYANOCOBALAMIN) tablet 1,000 mcg, 1,000 mcg, Oral, Daily, Tariq Cottrell MD, 1,000 mcg at 07/19/19 0840  •  vitamin C (ASCORBIC ACID) tablet 1,000 mg, 1,000 mg, Oral, Daily, Tariq Cottrell MD, 1,000 mg at 07/19/19 0840  •  zolpidem (AMBIEN) tablet 5 mg, 5 mg, Oral, Nightly PRN, Jeremias Clark MD, 5 mg at 07/18/19 2109     Consults obtained  Hematology and oncology  Infectious disease    Procedures  Status post bone marrow biopsy    Hospital course  73-year-old white male with history of rheumatoid arthritis on rituximab also history of leiomyosarcoma prostate cancer admitted to emergency room with shortness of breath on exertion.  Patient evaluated in ER found to be pancytopenic with severe neutropenia admit for management.  Patient admitted started on empiric antibiotics after obtaining the cultures and patient followed by  hematology oncology and infectious disease.  Patient underwent bone marrow biopsy and results are pending.  Patient antibiotics managed by infectious disease.  Patient will be discharged home on oral antibiotic for 7 days.  Patient white count still low 0.6 at the time of discharge but he is feeling better.  Patient discussed with hematology oncology.  Patient clear for discharge he has been afebrile vital signs stable.  Patient will follow with hematology on the bone marrow biopsy results.    Discharge diet regular    Activity as tolerated    Medication as above    Follow-up with primary doctor in 1 week and follow-up with hematology per their instructions and take medication as directed    SULAIMAN QUINN MD

## 2019-07-19 NOTE — PROGRESS NOTES
[facility] CBC GROUP INPATIENT PROGRESS NOTE  Subjective     CC: Pancytopenia    Interval history:      Baldemar Wellington is a 73 y.o. male who we are asked to see July 18, 2019 in consultation for history of pancytopenia.     We had seen the patient previously in October 2018 after admission October 12 for abnormal back MRI.  Studies revealed L3-L4 discitis and the patient's consent been reviewed and treated by infectious disease.  We had seen him for pancytopenia with diagnosis of rheumatoid arthritis around 2012 on methotrexate and Remicade through Dr. Valdivia until June 2017.  Treatment was stopped due to diagnosed prostate carcinoma in July 2017 his treatment resumed with a change to Orencia monthly.  At this point the patient had known about leukopenia for approximately 2 years.  Our assessment included a degree of leukopenia since May 2007 with white count between 2 and 4000 and not neutropenic, anemia hemoglobin between 10 and 13 g and thrombocytopenia between 117 160,000.  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  A bone marrow was offered but declined.    The patient is follow-up with rheumatology and did in January have vision disease activity to initiate therapy with rituximab.  Had 2 infusions thus far.  We were contacted concerning the patient in the last several days for further pancytopenia but before he could be seen back in office he had dyspnea on exertion was admitted.  Studies have included a CT of chest negative for pulmonary embolus, minimal patchy increased density lungs thought to be chronic parenchymal change.  His admitting CBC include H&H is 7.6 and 23.5 and .9, white count of 630 and platelet count of 79,000 and review of his previous studies including November 2018 include a white count approximately  2000 range with a normal differential, hemoglobin between 9 and 11 g, platelet count at approximately 110,002 is much 150,000.  The patient has clearly had a deterioration hematologically.  As such the patient was further tested and proceed with a pulmonary aspirate and biopsy July 18, 2019.  Additional studies include flow cytometry which is negative and the reticulocyte count 1.90% .  The patient wishes to go home and under the circumstances he could with follow-up scheduled within the next week.     Medications:  The current medication list was reviewed in the EMR.    Allergies:    Allergies   Allergen Reactions   • Lorazepam Hives       Objective      Vitals:    07/19/19 0819   BP: 128/71   Pulse:    Resp: 16   Temp:    SpO2:         Physical exam:     Constitutional: He appears well-developed. No distress.   Pulmonary/Chest: Effort normal.   Abdominal: Soft. He exhibits no distension. There is no tenderness.   Neurological: He is alert.   Skin: Skin is warm and dry.   Psychiatric: He has a normal mood and affect. His behavior is normal.     RECENT LABS:    Results from last 7 days   Lab Units 07/19/19  0607 07/18/19  0606 07/17/19  1151   WBC 10*3/mm3 0.64* 0.63* 0.89*   HEMOGLOBIN g/dL 9.6* 7.6* 8.7*   HEMATOCRIT % 28.4* 23.5* 26.6*   PLATELETS 10*3/mm3 75* 79* 111*   MONOCYTES % %  --   --  13.0*     Results from last 7 days   Lab Units 07/19/19  0607 07/18/19  0606 07/17/19  1151   SODIUM mmol/L 140 138 139   POTASSIUM mmol/L 4.1 3.8 3.9   CHLORIDE mmol/L 106 103 104   CO2 mmol/L 26.8 25.0 24.4   BUN mg/dL 9 10 15   CREATININE mg/dL 0.73* 0.73* 0.86   CALCIUM mg/dL 8.3* 8.1* 8.7   BILIRUBIN mg/dL  --  0.7  --    ALK PHOS U/L  --  58  --    ALT (SGPT) U/L  --  9  --    AST (SGOT) U/L  --  9  --    GLUCOSE mg/dL 96 97 120*           Assessment/Plan    This is a 73 y.o. male with past medical history of rheumatoid arthritis, previous therapy for leiomyosarcoma with removal and radiation therapy involving the  neck approximately 15 years ago, prostate carcinoma status post radiation therapy approximately 2 years ago and development in the fall 2018 discitis following a lumbar puncture attempt.  He has a demyelinating polyneuropathy also recognized and is presumed this had been part of his assessment concerning this.  We had seen the patient for pancytopenia in the fall and could not delineate an indirect cause.  As result of marrow was requested but declined.  The patient's rheumatoid arthritis has progressed with a number of treatments including rituximab initiated in January and then more recently with a second group of rituximab treatments.  This he has tolerated and apparently responded to per his rheumatoid arthritis. Unfortunately he has progressive pancytopenia that is felt to be multifactorial and has required a bone marrow aspirate and biopsy which is not completed.  His laboratory studies are approximately unchanged and his case been discussed with infectious disease with plans for 7 days of Levaquin, follow-up in office next available.  This is agreed with and the patient will be discharged today for follow-up.              Dorian Leal MD  7/19/2019  11:53 AM

## 2019-07-20 NOTE — OUTREACH NOTE
Prep Survey      Responses   Facility patient discharged from?  [place]   Is patient eligible?  Yes   Discharge diagnosis  Pancytopenia status post bone marrow biopsy  prostate cancer    Does the patient have one of the following disease processes/diagnoses(primary or secondary)?  Other   Does the patient have Home health ordered?  No   Is there a DME ordered?  No   Prep survey completed?  Yes          Gabby Arguelles RN

## 2019-07-22 NOTE — PAYOR COMM NOTE
"Compa Wellington (73 y.o. Male)                      ATTENTION;   DC SUMMARYA CASE [id number]        Date of Birth Social Security Number Address Home Phone MRN    1945  [facility] [place] [id number] [phone] [id number]    [employer] Marital Status          Ep[employer]        Admission Date Admission Type Admitting Provider Attending Provider Department, Room/Bed    7/17/19 Emergency Tariq Cottrell MD  [address], [address]    Discharge Date Discharge Disposition Discharge Destination        7/19/2019 Home or Self Care              Attending Provider:  (none)   Allergies:  Lorazepam    Isolation:  None   Infection:  None   Code Status:  Prior    Ht:  182.9 cm (72\")   Wt:  83.9 kg (184 lb 14.4 oz)    Admission Cmt:  None   Principal Problem:  None                Active Insurance as of 7/17/2019     Primary Coverage     Payor Plan Insurance Group Employer/Plan Group    ANTHEM BLUE CROSS ANTHEM BLUE CROSS BLUE SHIELD PPO [id number]     Payor Plan Address Payor Plan Phone Number Payor Plan Fax Number Effective Dates    [address] [phone]  1/1/2014 - None Entered    [facility] [id number]       Subscriber Name Subscriber Birth Date Member ID       COMPA WELLINGTON 1945 [id number]           Secondary Coverage     Payor Plan Insurance Group Employer/Plan Group    MEDICARE MEDICARE A ONLY      Payor Plan Address Payor Plan Phone Number Payor Plan Fax Number Effective Dates    [address] [phone]  7/1/2013 - None Entered    [facility] [id number]       Subscriber Name Subscriber Birth Date Member ID       COMPA WELLINGTON 1945 [id number]                 Emergency Contacts      (Rel.) Home Phone Work Phone Mobile Phone    Yris Wellington (Spouse) [phone] -- --    RaeCynthia munroe (Daughter) [phone] -- --    AmishPaul (Son) [phone] -- --               Discharge Summary      Tariq Cottrell MD at 7/19/2019  1:13 PM        Discharge summary    Date of " admission7/17/2019  Date of discharge 7/19/2019    Final diagnosis  Pancytopenia status post bone marrow biopsy  Rheumatoid arthritis  Osteoarthritis  History of leiomyosarcoma  History of prostate cancer   Immunocompromised host  Gastroesophageal reflux disease    Discharge medications    Current Facility-Administered Medications:   •  calcium-vitamin D 500-200 MG-UNIT tablet 1,000 mg, 1,000 mg, Oral, Daily, Tariq Cottrell MD, 1,000 mg at 07/19/19 0840  •  guaiFENesin (MUCINEX) 12 hr tablet 600 mg, 600 mg, Oral, Q12H, Tariq Cottrell MD, 600 mg at 07/19/19 0840  •  ipratropium-albuterol (DUO-NEB) nebulizer solution 3 mL, 3 mL, Nebulization, Q4H - RT, Tariq Cottrell MD, 3 mL at 07/19/19 0752  •  levoFLOXacin (LEVAQUIN) tablet 500 mg, 500 mg, Oral, Q24H, Evens Rayo MD, 500 mg at 07/19/19 1127  •  pantoprazole (PROTONIX) EC tablet 40 mg, 40 mg, Oral, QAM, Tariq Cottrell MD, 40 mg at 07/19/19 0839  •  [COMPLETED] Insert peripheral IV, , , Once **AND** sodium chloride 0.9 % flush 10 mL, 10 mL, Intravenous, PRN, Arpan Peralta MD  •  vitamin B-12 (CYANOCOBALAMIN) tablet 1,000 mcg, 1,000 mcg, Oral, Daily, Tariq Cottrell MD, 1,000 mcg at 07/19/19 0840  •  vitamin C (ASCORBIC ACID) tablet 1,000 mg, 1,000 mg, Oral, Daily, Tariq Cottrell MD, 1,000 mg at 07/19/19 0840  •  zolpidem (AMBIEN) tablet 5 mg, 5 mg, Oral, Nightly PRN, Jeremias Clark MD, 5 mg at 07/18/19 2109     Consults obtained  Hematology and oncology  Infectious disease    Procedures  Status post bone marrow biopsy    Hospital course  73-year-old white male with history of rheumatoid arthritis on rituximab also history of leiomyosarcoma prostate cancer admitted to emergency room with shortness of breath on exertion.  Patient evaluated in ER found to be pancytopenic with severe neutropenia admit for management.  Patient admitted started on empiric antibiotics after obtaining the cultures and patient followed by hematology oncology and infectious  disease.  Patient underwent bone marrow biopsy and results are pending.  Patient antibiotics managed by infectious disease.  Patient will be discharged home on oral antibiotic for 7 days.  Patient white count still low 0.6 at the time of discharge but he is feeling better.  Patient discussed with hematology oncology.  Patient clear for discharge he has been afebrile vital signs stable.  Patient will follow with hematology on the bone marrow biopsy results.    Discharge diet regular    Activity as tolerated    Medication as above    Follow-up with primary doctor in 1 week and follow-up with hematology per their instructions and take medication as directed    SULAIMAN QUINN MD        Electronically signed by Sulaiman Quinn MD at 7/19/2019  1:20 PM

## 2019-07-22 NOTE — OUTREACH NOTE
Medical Week 1 Survey      Responses   Facility patient discharged from?  [facility]   Does the patient have one of the following disease processes/diagnoses(primary or secondary)?  Other   Is there a successful TCM telephone encounter documented?  No   Week 1 attempt successful?  Yes   Call start time  1206   Call end time  1213   Discharge diagnosis  Pancytopenia status post bone marrow biopsy  prostate cancer    Meds reviewed with patient/caregiver?  Yes   Is the patient having any side effects they believe may be caused by any medication additions or changes?  No   Does the patient have all medications ordered at discharge?  Yes   Comments regarding appointments  Has an appt with Dr Alexander on Wed 07/24/2019 to find out test results.    Does the patient have a primary care provider?   Yes   Does the patient have an appointment with their PCP within 7 days of discharge?  Greater than 7 days   Comments regarding PCP  Dr Proctor/ PCP. Encouraged patient to make followup appt with PCP   Nursing Interventions  Verified appointment date/time/provider   Has the patient kept scheduled appointments due by today?  Yes   Has home health visited the patient within 72 hours of discharge?  N/A   Psychosocial issues?  No   Comments  Patient reports he is doing well. Anxious to hear about test results on Wed. Reassurance given to patient.    Did the patient receive a copy of their discharge instructions?  Yes   Nursing interventions  Reviewed instructions with patient   What is the patient's perception of their health status since discharge?  Improving   Is the patient/caregiver able to teach back signs and symptoms related to disease process for when to call PCP?  Yes   Is the patient/caregiver able to teach back signs and symptoms related to disease process for when to call 911?  Yes   Is the patient/caregiver able to teach back the hierarchy of who to call/visit for symptoms/problems? PCP, Specialist, Home health nurse, Urgent  [facility], ED, 911  Yes   Week 1 call completed?  Yes          José Luis Mckeon RN

## 2019-07-22 NOTE — TELEPHONE ENCOUNTER
----- Message from Dorian Leal MD sent at 7/22/2019  7:55 AM EDT -----  Please call this patient for follow-up appointment this Wednesday.  I could see him at lunchtime- 1 pm?  Thanks, RAY

## 2019-07-24 PROBLEM — C92.00 AML (ACUTE MYELOID LEUKEMIA) (HCC): Status: ACTIVE | Noted: 2019-01-01

## 2019-07-24 NOTE — PROGRESS NOTES
[facility] CBC GROUP INPATIENT PROGRESS NOTE  Subjective Worried about diagnosis    CC: Pancytopenia    Interval history:      Baldemar Wellington is a 73 y.o. male who we are asked to see July 18, 2019 in consultation for history of pancytopenia.     We had seen the patient previously in October 2018 after admission October 12 for abnormal back MRI.  Studies revealed L3-L4 discitis and the patient's consent been reviewed and treated by infectious disease.  We had seen him for pancytopenia with diagnosis of rheumatoid arthritis around 2012 on methotrexate and Remicade through Dr. Valdivia until June 2017.  Treatment was stopped due to diagnosed prostate carcinoma in July 2017 his treatment resumed with a change to Orencia monthly.  At this point the patient had known about leukopenia for approximately 2 years.  Our assessment included a degree of leukopenia since May 2007 with white count between 2 and 4000 and not neutropenic, anemia hemoglobin between 10 and 13 g and thrombocytopenia between 117 160,000.  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  A bone marrow was offered but declined.    The patient is follow-up with rheumatology and did in January have vision disease activity to initiate therapy with rituximab.  Had 2 infusions thus far.  We were contacted concerning the patient in the last several days for further pancytopenia but before he could be seen back in office he had dyspnea on exertion was admitted.  Studies have included a CT of chest negative for pulmonary embolus, minimal patchy increased density lungs thought to be chronic parenchymal change.  His admitting CBC include H&H is 7.6 and 23.5 and .9, white count of 630 and platelet count of 79,000 and review of his previous studies including November 2018 include a white  count approximately 2000 range with a normal differential, hemoglobin between 9 and 11 g, platelet count at approximately 110,002 is much 150,000.  The patient has clearly had a deterioration hematologically.  As such the patient was further tested and proceed with a bone marrow aspirate and biopsy July 18, 2019.  Additional studies include flow cytometry which is negative and the reticulocyte count 1.90% .  The patient wishes to go home and under the circumstances he could with follow-up scheduled within the next week.    We asked the patient to return back for follow-up.  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  Marrow is hypercellular 50% with increased blasts approximately 20% per biopsy, 26% smears and 31% by flow with trilineage dysplasia.The patient studies on flow included positive CD11c (partial), CD34, CD33 (partial), CD45 (dim), CD38, CD 13, CD 11 R, HLA-DR, CD15 (partial),  (partial), CD 81, (bright) and cytoplasmic myeloperoxidase.  Cytogenetics are still pending on this patient's marrow.  I taken the opportunity to discuss his case with physicians at the [facility] transplant center and they would be willing to treat the patient if he is willing.     Medications:  The current medication list was reviewed in the EMR.    Allergies:    Allergies   Allergen Reactions   • Lorazepam Hives       Objective      Vitals:    07/24/19 1348   BP: 171/84   Pulse: 82   Resp: 16   Temp: 98.3 °F (36.8 °C)   SpO2: 99%        Physical exam:     Constitutional: He appears well-developed. No distress.   Pulmonary/Chest: Effort normal.   Abdominal: Soft. He exhibits no distension. There is no tenderness.   Neurological: He is alert.   Skin: Skin is warm and dry.   Psychiatric: He has a normal mood and affect. His behavior is normal.     RECENT LABS:    Results from last 7 days   Lab Units 07/24/19  1223  07/19/19  0607 07/18/19  0606   WBC 10*3/mm3 0.80* 0.64* 0.63*   HEMOGLOBIN g/dL 10.0* 9.6* 7.6*   HEMATOCRIT % 29.5* 28.4* 23.5*   PLATELETS 10*3/mm3 88* 75* 79*     Results from last 7 days   Lab Units 07/19/19  0607 07/18/19  0606   SODIUM mmol/L 140 138   POTASSIUM mmol/L 4.1 3.8   CHLORIDE mmol/L 106 103   CO2 mmol/L 26.8 25.0   BUN mg/dL 9 10   CREATININE mg/dL 0.73* 0.73*   CALCIUM mg/dL 8.3* 8.1*   BILIRUBIN mg/dL  --  0.7   ALK PHOS U/L  --  58   ALT (SGPT) U/L  --  9   AST (SGOT) U/L  --  9   GLUCOSE mg/dL 96 97           Assessment/Plan    This is a 73 y.o. 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  He has a demyelinating polyneuropathy also recognized and is presumed this had been part of his assessment concerning this.  We had seen the patient for pancytopenia in the fall and could not delineate an indirect cause.  As result of marrow was requested but declined.  The patient's rheumatoid arthritis has progressed with a number of treatments including rituximab initiated in January and then more recently with a second group of rituximab treatments.  This he has tolerated and apparently responded to per his rheumatoid arthritis. Unfortunately he has progressive pancytopenia that is felt to be multifactorial and has required a bone marrow aspirate and biopsy which is not completed.  His laboratory studies are approximately unchanged and his case been discussed with infectious disease with plans for 7 days of Levaquin, follow-up in office next available.  This was agreed with and the patient was discharged home.  He is now seen back July 24, 2019   with his wife and daughter.  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  Additionally he gives a history of his brother having AML and  of this disorder as well as a history of multiple malignancies in other family members.     We discussed his findings in detail over approximately 45 minutes and I contacted Dr. Saenz at the [facility] BMT program who has agreed to take the patient in transfer for assessment and therapy.  We are currently working to the details for the patient to be seen next available and Mr. Wellington and his family are agreeable to this assessment as soon as it can be done.  I also contacted Dr. Barrera discontinuing his rituximab therapy.  Plan:    *Referral for treatment at BMT program-Dr. Saenz at [facility] ongoing  *Continue ciprofloxacin 5 mg p.o. Daily  *Follow-up appointments in approximately 2 months or sooner pending the patient's treatment plan  *Additional information from marrow pending including cytogenetics and additional genomic testing            Dorian Leal MD  2019  3:05 PM

## 2019-07-26 NOTE — TELEPHONE ENCOUNTER
As per our discussion earlier, the patient has been scheduled for a chemo teach on 7/29/19 @ 9:00 with [facility]. I have sent the referral to vascular for the port placement and I am waiting to hear back. The patient has been advised.

## 2019-07-26 NOTE — PROGRESS NOTES
Received a call from Dr. Saenz–[facility] BMT program. He feels the patient is best treated with a hypo-methylating agent–Azacitidine or Decitabine and Venetoclax. I have contacted him additionally about need for port placement and timing of treatment.  Plan to proceed with request for vascular surgery–port placement, teaching concerning the above drugs, and having the patient return August 5, 2019 to initiate treatment

## 2019-07-26 NOTE — TELEPHONE ENCOUNTER
----- Message from Gertrude Pop MA sent at 7/26/2019 11:56 AM EDT -----  Regarding: Schedule Chemo Teach  Please schedule this patient for a chemo teach early next week, per Dr. Leal. I have also put in a referral to vascular so the patient can have a port placement. Please let me know when his chemo teach appt is scheduled. Thanks.

## 2019-07-29 NOTE — TELEPHONE ENCOUNTER
Patient has been scheduled with [facility]. His appt is 7/30/19 at 10:00. The patient is aware of the appt.

## 2019-07-29 NOTE — PROGRESS NOTES
Subjective     PATIENT NAME:  Baldemar Wellington  YOB: 1945  PATIENTS AGE:  73 y.o.  PATIENTS SEX:  male  DATE OF SERVICE:  07/29/2019  PROVIDER:  NIMA Salcedo      ____________________PATIENT EDUCATION____________________    PATIENT EDUCATION:  Today I met with the patient to discuss the chemotherapy regimen recommended for treatment of his disease.  The patient was given explanation of treatment premed side effects including office policy that prohibits patients to drive if sedating medications are administered, MD explanation given regarding benefits, side effects, toxicities and goals of treatment.  The patient received a Chemotherapy/Biotherapy Plan Summary including diagnosis and specific treatment plan.    SIDE EFFECTS:  Common side effects were discussed with the patient and family.  Discussion included hair loss/discoloration, anemia/fatigue, infection/chills/fever, appetite, bleeding risk/precautions, constipation, diarrhea, mouth sores, taste alteration, loss of appetite,nausea/vomiting, skin/nail changes, rash, muscle aches/weakness, photosensitivity, weight gain/loss, liver damage, lung damage, kidney damage, DVT/PE risk, fluid retention, pleural/pericardial effusion, somnolence, electrolyte/LFT imbalance, vein exercises and/or the possible need for vascular access/port placement.  The patient was advice that although uncommon, leakage of an infused medication from the vein or venous access device (port) may lead to skin breakdown and/or other tissue damage.  The patient was advised that he/she may have pain, bleeding, and/or bruising from the insertion of a needle in their vein or venous access device (port).  The patient was further advised that, in spite of proper technique, infection with redness and irritation may rarely occur at the site where the needle was inserted.  The patient was advised that if complications occur, additional medical treatment is  available.    Discussion also included side effects specific to drugs in the treatment plan, specifically Dacogen, Venetoclax.    Dr. Leal did come in and speak with the patient his family regarding the treatment plan after some confusion based on conversation with [facility] bone marrow transplant center.    Physical exam: The patient is alert and oriented no acute distress, breathing is unlabored.  Seen today with wife and multiple family members.    A total of 60 minutes were spent with the patient, with 100% of time spent in education and counseling.    Chasity Morris, APRN  07/29/2019

## 2019-07-31 NOTE — ANESTHESIA POSTPROCEDURE EVALUATION
Patient: Baldemar Wellington    Procedure Summary     Date:  07/31/19 Room / Location:  [facility] OR 04 / [facility] MAIN OR    Anesthesia Start:  1044 Anesthesia Stop:  1127    Procedure:  POWER PORT PLACEMENT (N/A ) Diagnosis:      Surgeon:  Arpan Barboza MD Provider:  Nataliya Gonzalez MD    Anesthesia Type:  MAC ASA Status:  3          Anesthesia Type: MAC  Last vitals  BP   132/77 (07/31/19 1200)   Temp   36.4 °C (97.6 °F) (07/31/19 1124)   Pulse   66 (07/31/19 1200)   Resp   16 (07/31/19 1200)     SpO2   98 % (07/31/19 1200)     Anesthesia Post Evaluation

## 2019-07-31 NOTE — TELEPHONE ENCOUNTER
"Spoke with patient's wife about cut on his leg that \"now looked like it was infected.\" She said it was a small area on his leg, not open, but a small cut. Patient was in hospital currently getting port placed and is due to start treatment on Monday. Patient's wife was asking if he should keep it covered-I reccommeded they keep it clean, apply neosporin to wound, and keep it covered with guaze or bandaid. Pt's wife v/u.     ----- Message from Mary Molina sent at 7/31/2019 10:57 AM EDT -----  Contact: [phone]  Pt wife calling about an infection on pt leg from a cut.    "

## 2019-07-31 NOTE — ANESTHESIA PREPROCEDURE EVALUATION
Anesthesia Evaluation     Patient summary reviewed and Nursing notes reviewed   history of anesthetic complications:  NPO Solid Status: > 8 hours  NPO Liquid Status: > 2 hours           Airway   Mallampati: II  Dental - normal exam     Pulmonary - normal exam   Cardiovascular - normal exam        Neuro/Psych  (+) TIA, headaches, numbness,     GI/Hepatic/Renal/Endo    (+)  GERD,      Musculoskeletal     Abdominal    Substance History      OB/GYN          Other   (+) blood dyscrasia, arthritis   history of cancer                    Anesthesia Plan    ASA 3     MAC

## 2019-07-31 NOTE — OUTREACH NOTE
Medical Week 2 Survey      Responses   Facility patient discharged from?  [facility]   Does the patient have one of the following disease processes/diagnoses(primary or secondary)?  Other   Week 2 attempt successful?  Yes   Call start time  [phone]   Revoke  Decline to participate          Cosmo Marina RN

## 2019-08-01 PROBLEM — Z45.2 ENCOUNTER FOR FITTING AND ADJUSTMENT OF VASCULAR CATHETER: Status: ACTIVE | Noted: 2019-01-01

## 2019-08-02 NOTE — DISCHARGE INSTRUCTIONS
Return to the emergency department for worsening symptoms, fever, chest pain, or other concern.  Follow-up with Dr. Leal next week as scheduled.

## 2019-08-02 NOTE — ED TRIAGE NOTES
soa c exertion and standing.  Weakness.  Here one week ago c same problem.  Pt dx c leukemia at that time.  Starts chemo monday

## 2019-08-02 NOTE — ED PROVIDER NOTES
EMERGENCY DEPARTMENT ENCOUNTER    CHIEF COMPLAINT  Chief Complaint: SOA  History given by: patient  History limited by: none  Room Number: 15/15  PMD: Humphrey Proctor MD (Tony)      HPI:  Pt is a 73 y.o. male who presents with recent diagnoses of leukemia followed by  (oncology) complaining of SOA for several days. Pt states SOA is worse with standing. Pt also complains generalized weakness. Pt denies cp, abd pain, N/V/D, cough, fever, and chills. Pt is suppose to start chemotherapy 3 days and had a port placed 2 days ago. Pt denies hx of pulmonary issues. Family at bedside.     Duration:  Several days  Onset: gradual  Timing: constant  Location: chest  Intensity/Severity: moderate  Progression: unchanged  Associated Symptoms: generalized weakness  Aggravating Factors: standing  Alleviating Factors: rest  Previous Episodes: hx of leukemia  Treatment before arrival: none    PAST MEDICAL HISTORY  Active Ambulatory Problems     Diagnosis Date Noted   • Screening for colon cancer 11/15/2013   • Idiopathic peripheral neuropathy 05/15/2018   • Spinal puncture headache 08/28/2018   • Rheumatoid arthritis (CMS/HCC) 10/12/2018   • GERD (gastroesophageal reflux disease) 10/12/2018   • Osteomyelitis (CMS/HCC) 10/12/2018   • 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   • Neutropenia (CMS/HCC) 07/17/2019   • AML (acute myeloid leukemia) (CMS/HCC) 07/24/2019   • Encounter for fitting and adjustment of vascular catheter 08/01/2019     Resolved Ambulatory Problems     Diagnosis Date Noted   • No Resolved Ambulatory Problems     Past Medical History:   Diagnosis Date   • AML (acute myeloblastic leukemia) (CMS/HCC)    • Arthritis    • GERD (gastroesophageal reflux disease)    • History of pancytopenia    • History of transfusion    • Leiomyosarcoma (CMS/HCC)    • Neuropathy    • Prostate cancer (CMS/HCC)    • Spinal headache    • TIA (transient ischemic attack)        PAST SURGICAL HISTORY  Past Surgical  History:   Procedure Laterality Date   • BACK SURGERY      sarcoma removed   • CATARACT EXTRACTION W/ INTRAOCULAR LENS  IMPLANT, BILATERAL Bilateral    • COLONOSCOPY     • VENOUS ACCESS DEVICE (PORT) INSERTION N/A 2019    Procedure: POWER PORT PLACEMENT;  Surgeon: Arpan Barboza MD;  Location: [facility] OR;  Service: Vascular       FAMILY HISTORY  Family History   Problem Relation Age of Onset   • Breast cancer Mother    • Prostate cancer Father    • Breast cancer Sister    • Leukemia Brother    • Breast cancer Other    • Leukemia Other    • Diabetes Other    • Malig Hyperthermia Neg Hx        SOCIAL HISTORY  Social History     Socioeconomic History   • Marital status:      Spouse name: Yris   • Number of children: Not on file   • Years of education: Not on file   • Highest education level: Not on file   Occupational History   • Occupation: Bank employee     Employer: [employer]   Tobacco Use   • Smoking status: Former Smoker     Packs/day: 0.25     Types: Cigarettes     Last attempt to quit: 1970     Years since quittin.6   • Smokeless tobacco: Never Used   • Tobacco comment: college 1-2 cigarettes a day   Substance and Sexual Activity   • Alcohol use: Yes     Alcohol/week: 1.8 oz     Types: 3 Cans of beer per week     Comment: social   • Drug use: No   • Sexual activity: Defer       ALLERGIES  Lorazepam    REVIEW OF SYSTEMS  Review of Systems   Constitutional: Negative for activity change, appetite change and fever.   HENT: Negative for congestion and sore throat.    Eyes: Negative.    Respiratory: Positive for shortness of breath. Negative for cough.    Cardiovascular: Negative for chest pain and leg swelling.   Gastrointestinal: Negative for abdominal pain, diarrhea and vomiting.   Endocrine: Negative.    Genitourinary: Negative for decreased urine volume and dysuria.   Musculoskeletal: Negative for neck pain.   Skin: Negative for rash and wound.   Allergic/Immunologic:  Negative.    Neurological: Positive for weakness (generalized). Negative for numbness and headaches.   Hematological: Negative.    Psychiatric/Behavioral: Negative.    All other systems reviewed and are negative.      PHYSICAL EXAM  ED Triage Vitals [08/02/19 0944]   Temp Heart Rate Resp BP SpO2   97.9 °F (36.6 °C) 100 16 -- 97 %      Temp src Heart Rate Source Patient Position BP Location FiO2 (%)   Tympanic Monitor -- -- --       Physical Exam   Constitutional: He is oriented to person, place, and time. No distress.   HENT:   Head: Normocephalic and atraumatic.   Eyes: EOM are normal. Pupils are equal, round, and reactive to light.   Neck: Normal range of motion. Neck supple.   Cardiovascular: Normal rate, regular rhythm and normal heart sounds.   Pulmonary/Chest: Effort normal and breath sounds normal. No respiratory distress.   Abdominal: Soft. There is no tenderness. There is no rebound and no guarding.   Musculoskeletal: Normal range of motion. He exhibits no edema (pedal) or tenderness (calf).   Neurological: He is alert and oriented to person, place, and time. He has normal sensation and normal strength.   Skin: Skin is warm and dry.   Psychiatric: Mood and affect normal.   Nursing note and vitals reviewed.      LAB RESULTS  Lab Results (last 24 hours)     Procedure Component Value Units Date/Time    CBC & Differential [[id number]] Collected:  08/02/19 0959    Specimen:  Blood Updated:  08/02/19 1056    Narrative:       The following orders were created for panel order CBC & Differential.  Procedure                               Abnormality         Status                     ---------                               -----------         ------                     Scan Slide[[id number]]                   Normal              Final result               CBC Auto Differential[[id number]]        Abnormal            Final result                 Please view results for these tests on the individual orders.     Comprehensive Metabolic Panel [[id number]]  (Abnormal) Collected:  08/02/19 0959    Specimen:  Blood Updated:  08/02/19 1122     Glucose 116 mg/dL      BUN 8 mg/dL      Creatinine 0.87 mg/dL      Sodium 138 mmol/L      Potassium 3.9 mmol/L      Chloride 101 mmol/L      CO2 26.3 mmol/L      Calcium 8.7 mg/dL      Total Protein 7.0 g/dL      Albumin 3.80 g/dL      ALT (SGPT) 9 U/L      AST (SGOT) 13 U/L      Alkaline Phosphatase 68 U/L      Total Bilirubin 1.0 mg/dL      eGFR Non African Amer 87 mL/min/1.73      Globulin 3.2 gm/dL      A/G Ratio 1.2 g/dL      BUN/Creatinine Ratio 9.2     Anion Gap 10.7 mmol/L     Narrative:       GFR Normal >60  Chronic Kidney Disease <60  Kidney Failure <15    Troponin [[id number]]  (Normal) Collected:  08/02/19 0959    Specimen:  Blood Updated:  08/02/19 1122     Troponin T <0.010 ng/mL     Narrative:       Troponin T Reference Range:  <= 0.03 ng/mL-   Negative for AMI  >0.03 ng/mL-     Abnormal for myocardial necrosis.  Clinicians would have to utilize clinical acumen, EKG, Troponin and serial changes to determine if it is an Acute Myocardial Infarction or myocardial injury due to an underlying chronic condition.     BNP [[id number]]  (Normal) Collected:  08/02/19 0959    Specimen:  Blood Updated:  08/02/19 1120     proBNP 311.0 pg/mL     Narrative:       Among patients with dyspnea, NT-proBNP is highly sensitive for the detection of acute congestive heart failure. In addition NT-proBNP of <300 pg/ml effectively rules out acute congestive heart failure with 99% negative predictive value.    CBC Auto Differential [[id number]]  (Abnormal) Collected:  08/02/19 0959    Specimen:  Blood Updated:  08/02/19 1056     WBC 0.62 10*3/mm3      RBC 2.94 10*6/mm3      Hemoglobin 9.9 g/dL      Hematocrit 29.3 %      MCV 99.7 fL      MCH 33.7 pg      MCHC 33.8 g/dL      RDW 20.7 %      RDW-SD 71.8 fl      MPV -- fL      Comment: .        Platelets 83 10*3/mm3      nRBC 1.6 /100 WBC     Scan Slide  [[id number]]  (Normal) Collected:  08/02/19 0959    Specimen:  Blood Updated:  08/02/19 1056     RBC Morphology Normal     WBC Morphology Normal     Platelet Morphology Normal          I ordered the above labs and reviewed the results    RADIOLOGY  XR Chest 2 View     No active disease        I ordered the above noted radiological studies. Interpreted by radiologist. Reviewed by me in PACS.       PROCEDURES  Procedures    EKG          EKG time: 1016  Rhythm/Rate: NSR 86 BPM  P waves and CT: normal  QRS, axis: normal   ST and T waves: normal     Interpreted Contemporaneously by me, independently viewed  Unchanged compared to prior 7/17/19    PROGRESS AND CONSULTS  ED Course as of Aug 02 1446   Fri Aug 02, 2019   1057 stable Hemoglobin: (!) 9.9 [WH]   1150 0.8 nine days ago WBC: (!!) 0.62 []      ED Course User Index  [WH] Anthony Mao MD     1006-Ordered lab work, EKG, and CXR for further evaluation.     1127-Ordered CTA chest for further evaluation.    1155-Rechecked pt. Pt is resting comfortably. HR-90s PM with O2 sats-98% on RA. Notified pt of CXR results, troponin <0.010, WBC-0.62, RBC-2.94, and hemoglobin-9.9. Pt declines CTA chest as he just recently had one. Discussed the plan to consult with oncology. Pt understands and agrees with the plan, all questions answered.    1223-Discussed pt case with  (oncology) who states pt is safe for discharge and can f/u in his office as outpatient.     1232-Rechecked pt. Pt is resting comfortably. Notified pt of my consult with  (oncology). Discussed the plan to discharge the pt home. I instructed the pt to f/u with CBD as scheduled. Pt understands and agrees with the plan, all questions answered.      MEDICAL DECISION MAKING  Results were reviewed/discussed with the patient and they were also made aware of online access. Pt also made aware that some labs, such as cultures, will not be resulted during ER visit and follow up with PMD is necessary.      MDM  Number of Diagnoses or Management Options  Acute myeloid leukemia not having achieved remission (CMS/HCC):   Dyspnea on exertion:   Fatigue, unspecified type:   Diagnosis management comments: Patient's labs were stable.  Work-up was unremarkable.  Patient was not hypoxic, tachypneic, or tachycardic.  Case was discussed with Dr. Leal.  Patient will be discharged.  He is scheduled to start chemo in 3 days.       Amount and/or Complexity of Data Reviewed  Clinical lab tests: reviewed and ordered (WBC-0.62  hemoglobin-9.9  troponin <0.010  BNP-311.0  creatinine-0.87)  Tests in the radiology section of CPT®: reviewed and ordered (CXR-negative acute)  Tests in the medicine section of CPT®: reviewed and ordered (See EKG procedure note)  Decide to obtain previous medical records or to obtain history from someone other than the patient: yes  Review and summarize past medical records: yes (Pt was admitted 7/17-7/19/19 for pancytopenia. Pt had a bone marrow biopsy done. Pt was given 2 units of blood during that admission. Pt had CTA chest on 7/17/19 which was negative for PE and showed mild chronic changes. )  Discuss the patient with other providers: yes ( (oncology))  Independent visualization of images, tracings, or specimens: yes           DIAGNOSIS  Final diagnoses:   Dyspnea on exertion   Fatigue, unspecified type   Acute myeloid leukemia not having achieved remission (CMS/HCC)       DISPOSITION  DISCHARGE    Patient discharged in stable condition.    Reviewed implications of results, diagnosis, meds, responsibility to follow up, warning signs and symptoms of possible worsening, potential complications and reasons to return to ER.    Patient/Family voiced understanding of above instructions.    Discussed plan for discharge, as there is no emergent indication for admission. Patient referred to primary care provider for BP management due to today's BP. Pt/family is agreeable and understands need for  follow up and repeat testing.  Pt is aware that discharge does not mean that nothing is wrong but it indicates no emergency is present that requires admission and they must continue care with follow-up as given below or physician of their choice.     FOLLOW-UP  Dorian Leal MD  [id number] [id number]  [phone]    Go in 3 days  As scheduled         Medication List      No changes were made to your prescriptions during this visit.           Latest Documented Vital Signs:  As of 2:46 PM  BP- 129/67 HR- 80 Temp- 97.9 °F (36.6 °C) (Tympanic) O2 sat- 99%    --  Documentation assistance provided by deshaun Covarrubias for MD Jade.  Information recorded by the scribe was done at my direction and has been verified and validated by me.     Renu Covarrubias  08/02/19 8699       Anthony Mao MD  08/02/19 3937

## 2019-08-02 NOTE — PROGRESS NOTES
JEN CALLED TRIAGE AND SAID PT'S WIFE LEFT VM STATING PT WAS HAVING HARD TIME BREATHING AND SHE NEEDED HELP. TOLD JEN TO TELL WIFE TO CALL 911 OR GO TO ER.

## 2019-08-03 NOTE — OP NOTE
[facility]  Baldemar Wellington   1945       Arpan Barboza MD   08/03/19     Operative Note    Preoperative Diagnosis: leukemia    Postoperative Diagnosis: same as above    Procedure Performed:  1- Ultrasound guided access Right Internal Jugular. (82104)  2- Placement of single lumen Mediport  3- Use of fluoroscopy for catheter placement (00470)    Insert tunneled CVC with port (49187)    Surgeon: Arpan Barboza MD    Assistant: Linda Schaefer and they provided critical assistance during the case including suctioning, exposure, retraction, and reduction of blood loss.    Anesthesia: MAC and Local Anesthesia with 1% Xylocaine with Epinephrine 1:100,000    Estimated Blood Loss: minimal    Specimens: NONE    Complications: None    Dispo: aroused from sedation, and taken to the recovery room in a stable condition    Indication for procedure: 73 y.o. male with leukemia who needs a port for chemotherapy    Description of procedure: The patient was taken to the operating room and placed in the supine position with a shoulder roll horizontally at the level of the scapula.  The right neck and chest were prepped and draped in usual sterile fashion.  A timeout was observed.    Under ultrasound guidance and local anesthesia the Right Internal Jugular was accessed with the 18-gauge access needle and the guidewire was advanced under fluoroscopy and placed in the inferior vena cava.   Under local anesthesia a pocket incision was designed on the chest wall and the incision was made with 15 blade scalpel.  Bovie electrocautery was used to dissect through the subcutaneous tissues and obtain hemostasis.  An appropriate size and depth pocket was created.  A stab incision was created over the access needle also.      The tunneling device was used to tunnel the catheter from the pocket to the stab incision.  The peel-away sheath was then placed under fluoroscopic guidance and the wire and dilator removed.  The  catheter was advanced to the level of the right atrium.  Peel-away sheath was removed.  Catheter was trimmed to fit and then connected to the port.  The plastic connection device was securely locked over the connection between the catheter and the port.  Two 3-0 Prolene stitches were used to attach the port to the deep side of the pocket.  The port was checked and it flushed and withdrew dark blood easily.  It was flushed with heparinized saline and then locked with high-dose heparin.  The incision was made hemostatic and was closed with 3-0 Vicryl subcutaneous layers and 4 Vicryl subcuticular layers.  Dermabond was used for skin closure.  No complications, all the counts were correct.

## 2019-08-03 NOTE — PROGRESS NOTES
[facility] CBC GROUP INPATIENT PROGRESS NOTE  Subjective Ready to start therapy    CC: Pancytopenia    Interval history:      Baldemar Wellington is a 73 y.o. male who we are asked to see July 18, 2019 in consultation for history of pancytopenia.     We had seen the patient previously in October 2018 after admission October 12 for abnormal back MRI.  Studies revealed L3-L4 discitis and the patient's consent been reviewed and treated by infectious disease.  We had seen him for pancytopenia with diagnosis of rheumatoid arthritis around 2012 on methotrexate and Remicade through Dr. Valdivia until June 2017.  Treatment was stopped due to diagnosed prostate carcinoma in July 2017 his treatment resumed with a change to Orencia monthly.  At this point the patient had known about leukopenia for approximately 2 years.  Our assessment included a degree of leukopenia since May 2007 with white count between 2 and 4000 and not neutropenic, anemia hemoglobin between 10 and 13 g and thrombocytopenia between 117 160,000.  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  A bone marrow was offered but declined.    The patient is follow-up with rheumatology and did in January have vision disease activity to initiate therapy with rituximab.  Had 2 infusions thus far.  We were contacted concerning the patient in the last several days for further pancytopenia but before he could be seen back in office he had dyspnea on exertion was admitted.  Studies have included a CT of chest negative for pulmonary embolus, minimal patchy increased density lungs thought to be chronic parenchymal change.  His admitting CBC include H&H is 7.6 and 23.5 and .9, white count of 630 and platelet count of 79,000 and review of his previous studies including November 2018 include a white  count approximately 2000 range with a normal differential, hemoglobin between 9 and 11 g, platelet count at approximately 110,002 is much 150,000.  The patient has clearly had a deterioration hematologically.  As such the patient was further tested and proceed with a bone marrow aspirate and biopsy July 18, 2019.  Additional studies include flow cytometry which is negative and the reticulocyte count 1.90% .  The patient wishes to go home and under the circumstances he could with follow-up scheduled within the next week.    We asked the patient to return back for follow-up.  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  Marrow is hypercellular 50% with increased blasts approximately 20% per biopsy, 26% smears and 31% by flow with trilineage dysplasia.The patient studies on flow included positive CD11c (partial), CD34, CD33 (partial), CD45 (dim), CD38, CD 13, CD 11 R, HLA-DR, CD15 (partial),  (partial), CD 81, (bright) and cytoplasmic myeloperoxidase.  Cytogenetics are still pending on this patient's marrow.  I taken the opportunity to discuss his case with physicians at the [facility] transplant center and they would be willing to treat the patient if he is willing.     The patient was seen in follow-up at the [facility] Dr. Saenz saw the patient consultation feeling that he is best treated with hypo-methylation with either azacitidine or decitabine and venetoclax.  The patient was contacted and seen back for both teaching at which time he was advised for the 5-day use of decitabine as well as the use of venetoclax and need for port placement.  He underwent teaching and port placement did proceed July 31, 2019.     The patient on efforts presents to initiate treatment August 5, 2019.     The patient is seen August 5, 2019 and we have discussed initiation of treatment, use of antibiotic therapy,  antiviral therapy, and antifungal therapy and the findings on his cytogenetics that suggested treatment related AML.This includes deletion 5 q., monosomy 7, and other aberrations compatible with high-grade myelodysplasia or AML.     Medications:  The current medication list was reviewed in the EMR.    Allergies:    Allergies   Allergen Reactions   • Lorazepam Hives       Objective      Vitals:    08/05/19 1224   BP: 165/69   Pulse: 85   Resp: 16   Temp: 98.3 °F (36.8 °C)   SpO2: 100%        Physical exam:     Constitutional: He appears well-developed. No distress.   Pulmonary/Chest: Effort normal.  PowerPort noted anterior upper chest without additional inflammation or discharge  Abdominal: Soft. He exhibits no distension. There is no tenderness.   Neurological: He is alert.   Skin: Skin is warm and dry.   Psychiatric: He has a normal mood and affect. His behavior is normal.     RECENT LABS:    Results from last 7 days   Lab Units 08/05/19  1208 08/02/19  0959   WBC 10*3/mm3 0.76* 0.62*   HEMOGLOBIN g/dL 9.1* 9.9*   HEMATOCRIT % 26.6* 29.3*   PLATELETS 10*3/mm3 79* 83*     Results from last 7 days   Lab Units 08/02/19  0959   SODIUM mmol/L 138   POTASSIUM mmol/L 3.9   CHLORIDE mmol/L 101   CO2 mmol/L 26.3   BUN mg/dL 8   CREATININE mg/dL 0.87   CALCIUM mg/dL 8.7   BILIRUBIN mg/dL 1.0   ALK PHOS U/L 68   ALT (SGPT) U/L 9   AST (SGOT) U/L 13   GLUCOSE mg/dL 116*           Assessment/Plan    This is a 73 y.o. 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  He has a demyelinating polyneuropathy also recognized and is presumed this had been part of his assessment concerning this.  We had seen the patient for pancytopenia in the fall and could  not delineate an indirect cause.  As result of marrow was requested but declined.  The patient's rheumatoid arthritis has progressed with a number of treatments including rituximab initiated in January and then more recently with a second group of rituximab treatments.  This he has tolerated and apparently responded to per his rheumatoid arthritis. Unfortunately he has progressive pancytopenia that is felt to be multifactorial and has required a bone marrow aspirate and biopsy which is not completed.  His laboratory studies are approximately unchanged and his case been discussed with infectious disease with plans for 7 days of Levaquin, follow-up in office next available.  This was agreed with and the patient was discharged home.  He is now seen back 2019   with his wife and daughter.  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  Additionally he gives a history of his brother having AML and  of this disorder as well as a history of multiple malignancies in other family members.     We discussed his findings in detail over approximately 45 minutes and I contacted Dr. Saenz at the [facility] BMT program who has agreed to take the patient in transfer for assessment and therapy.  We are currently working to the details for the patient to be seen next available and Mr. Wellington and his family are agreeable to this assessment as soon as it can be done.  I also contacted Dr. Barrera discontinuing his rituximab therapy.  We did proceed for assessment at the BMT program at [facility]-Dr. Serge Saenz-who recommended hypo-methylation therapy along with venetoclax.  The patient undergone teaching for this and we have discussed  Dacogen along with venetoclax which we hope to initiate August 5, 2019.  Indeed his Venetoclax is already available through Psychiatric and will try to continue that during his treatment.  Please note the patient underwent port placement August 3, 2019 through vascular surgery successfully.  Plan.      Plan:  *Dacogen daily x5/28 days initiated along with anti-infective therapies-Posaconazole, Levaquin, and Acyclovir.  These last 3 medications along with Allopurinol will be E scribed to his pharmacy.  *Repeat CBC will be requested Monday and Thursday weekly  *PSA will be performed this coming Thursday  *Venetoclax with ramp-up schedule already available to patient via oral medications provided by [facility] pharmacy which will continue during his treatment course.  *2 weeks NP assessment, MD at 4 weeks       Dorian Leal MD  8/5/2019  1:24 PM

## 2019-08-07 NOTE — PROGRESS NOTES
Pt c/o a few sore in his mouth. Discussed baking soda/salt water rinses 2-3 times per day. Spoke with PEPITO Diez to send in [employer] Magic Mouth wash. Called into pt's pharmacy. Pt is aware.

## 2019-08-08 NOTE — PROGRESS NOTES
CBC drawn per order, pt without complaints, denies SOA  Lab Results   Component Value Date    WBC 0.49 (C) 08/08/2019    HGB 7.5 (C) 08/08/2019    HCT 22.6 (L) 08/08/2019    .4 (H) 08/08/2019    PLT 67 (L) 08/08/2019    Lab results reviewed with Mary HERRING, orders received to transfuse 2 units PRBC with premeds.  Orders placed and message sent to scheduling desk.  Pt and wife update on plans, they v/u

## 2019-08-09 NOTE — PROGRESS NOTES
Patient given outpatient breakfast and lunch boxes with water. Patient tolerated transfusions without s/s of reactions. Right mediport flushed with 20cc NS and heparin flush. Port left accessed for infusion today at CBC office.     Patient discharged ambulatory with spouse and AVS to CBC office for infusion.

## 2019-08-09 NOTE — PATIENT INSTRUCTIONS
Blood Transfusion, Adult, Care After  This sheet gives you information about how to care for yourself after your procedure. Your health care provider may also give you more specific instructions. If you have problems or questions, contact your health care provider.  What can I expect after the procedure?  After your procedure, it is common to have:  · Bruising and soreness where the IV tube was inserted.  · Headache.  Follow these instructions at home:    · Take over-the-counter and prescription medicines only as told by your health care provider.  · Return to your normal activities as told by your health care provider.  · Follow instructions from your health care provider about how to take care of your IV insertion site. Make sure you:  ? Wash your hands with soap and water before you change your bandage (dressing). If soap and water are not available, use hand .  ? Change your dressing as told by your health care provider.  · Check your IV insertion site every day for signs of infection. Check for:  ? More redness, swelling, or pain.  ? More fluid or blood.  ? Warmth.  ? Pus or a bad smell.  Contact a health care provider if:  · You have more redness, swelling, or pain around the IV insertion site.  · You have more fluid or blood coming from the IV insertion site.  · Your IV insertion site feels warm to the touch.  · You have pus or a bad smell coming from the IV insertion site.  · Your urine turns pink, red, or brown.  · You feel weak after doing your normal activities.  Get help right away if:  · You have signs of a serious allergic or immune system reaction, including:  ? Itchiness.  ? Hives.  ? Trouble breathing.  ? Anxiety.  ? Chest or lower back pain.  ? Fever, flushing, and chills.  ? Rapid pulse.  ? Rash.  ? Diarrhea.  ? Vomiting.  ? Dark urine.  ? Serious headache.  ? Dizziness.  ? Stiff neck.  ? Yellow coloration of the face or the white parts of the eyes (jaundice).  This information is not  intended to replace advice given to you by your health care provider. Make sure you discuss any questions you have with your health care provider.  Document Released: 01/08/2016 Document Revised: 08/16/2017 Document Reviewed: 07/03/2017  Elsevier Interactive Patient Education © 2019 [employer]

## 2019-08-12 NOTE — PROGRESS NOTES
Pt is here for lab with RN review.  CBC reviewed with Mary Cohen NP no new orders given. Pt will call with any S/S of infection or bleeding. Handout given on Neutropenia and low counts while being treated. Pt continues to take Levaquin daily and return for blood work on Thursday.  Pt has no complaints and VSS.  Copy of labs and AVS given to pt and f/u appt reviewed. Pt is instructed to call the office with any concerns or new symptoms prior to next visit. Pt vu.   Lab Results   Component Value Date    WBC 0.50 (C) 08/12/2019    HGB 9.4 (L) 08/12/2019    HCT 28.0 (L) 08/12/2019    MCV 94.3 08/12/2019    PLT 46 (L) 08/12/2019       Per Mary Cohen NP. Refill Levaquin 2 tab for 30 days. this is following Dr. Groves transcribed  note.

## 2019-08-15 NOTE — PROGRESS NOTES
Lab Results   Component Value Date    WBC 0.33 (C) 08/15/2019    HGB 9.3 (L) 08/15/2019    HCT 26.7 (L) 08/15/2019    MCV 91.1 08/15/2019    PLT 27 (L) 08/15/2019     CBC RESULTS R/W PT, WIFE AND DR. RESENDIZ. PT JUST FINISHED UP C1 D1-5 DACOGEN 8/9. PT ALSO ON VENETOCLAX 4 TABS NIGHTLY. PT DENIES FEVER, INFECTION AND BLDING. TEMP TODAY 98.7. PT ALSO CONTINUES ON ACYCLOVIR AND LEVAQUIN. PER DR. RESENDIZ PT TO CONTINUE ALL MEDS INCLUDING VENETOCLAX. PT TO GO FOR PLT TRANSFUSION 1 U. PT TO KEEP F/U AS IS Monday W/ NP. PLT TRANSFUSION ORDERS PLACED. APPT DESK MESSAGED TO SET UP. EDU PT ON GOOD HAND WASHING, STAYING AWAY FROM SICK PEOPLE, WEARING MASK IN PUBLIC, MONITORING TEMP, AVOIDING INJURY AND TO CALL W/ FEVER, INFECTION A/O BLDING OR GO TO ER. PT AND WIFE V/U TO ALL. COPY OF LABS GIVEN TO PT.

## 2019-08-16 NOTE — PROGRESS NOTES
Patient tolerated infusion of platelets well.  VSS.  Snack of peanut butter and crackers and soda for patient and his wife given and eaten well.  Refused AVS and patient discharged home amb with wife after appointment completed.

## 2019-08-19 NOTE — PROGRESS NOTES
Subjective .     REASONS FOR FOLLOWUP:  AML    HISTORY OF PRESENT ILLNESS:  The patient is a 73 y.o. year old male who is here for follow-up with the above-mentioned history.    History of Present Illness   Patient is here today for reevaluation.  He continues on Venetoclax 400 mg daily, which he is receiving from the [facility].  He received his fist cycle of Dacogen days 1-5/28 on 8/5/2019. He continues on prophylactic Levaquin, acyclovir, posaconazole.  He continues on allopurinol.  Patient denies fever or chills.  He reports a nose bleed this morning that lasted about 5 to 10 minutes, but has had no other issues with bleeding.  He received a platelet transfusion on Friday of last week.     Past Medical History:   Diagnosis Date   • AML (acute myeloblastic leukemia) (CMS/HCC)    • Arthritis     Rheumatoid   • GERD (gastroesophageal reflux disease)    • History of pancytopenia    • History of transfusion    • Leiomyosarcoma (CMS/HCC)    • Neuropathy    • Prostate cancer (CMS/HCC)    • Spinal headache    • TIA (transient ischemic attack)        ONCOLOGIC HISTORY:  (History from previous dates can be found in the separate document.)    Baldemar Wellington is a 73 y.o. male who we are asked to see July 18, 2019 in consultation for history of pancytopenia.     We had seen the patient previously in October 2018 after admission October 12 for abnormal back MRI.  Studies revealed L3-L4 discitis and the patient's consent been reviewed and treated by infectious disease.  We had seen him for pancytopenia with diagnosis of rheumatoid arthritis around 2012 on methotrexate and Remicade through Dr. Valdivia until June 2017.  Treatment was stopped due to diagnosed prostate carcinoma in July 2017 his treatment resumed with a change to Orencia monthly.  At this point the patient had known about leukopenia for approximately 2 years.  Our assessment included a degree of leukopenia since May 2007 with white count between 2 and 4000  and not neutropenic, anemia hemoglobin between 10 and 13 g and thrombocytopenia between 117 160,000.  We have felt the cytopenias were long-standing and due to  to his RA with a component from radiation treatment for his prostate carcinoma and adjuvant radiation therapy for previously resected leiomyosarcoma also recognized.  A bone marrow was offered but declined.    The patient is follow-up with rheumatology and did in January have vision disease activity to initiate therapy with rituximab.  Had 2 infusions thus far.  We were contacted concerning the patient in the last several days for further pancytopenia but before he could be seen back in office he had dyspnea on exertion was admitted.  Studies have included a CT of chest negative for pulmonary embolus, minimal patchy increased density lungs thought to be chronic parenchymal change.  His admitting CBC include H&H is 7.6 and 23.5 and .9, white count of 630 and platelet count of 79,000 and review of his previous studies including November 2018 include a white count approximately 2000 range with a normal differential, hemoglobin between 9 and 11 g, platelet count at approximately 110,002 is much 150,000.  The patient has clearly had a deterioration hematologically.  As such the patient was further tested and proceed with a bone marrow aspirate and biopsy July 18, 2019.  Additional studies include flow cytometry which is negative and the reticulocyte count 1.90% .  The patient wishes to go home and under the circumstances he could with follow-up scheduled within the next week.     We asked the patient to return back for follow-up.  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  Marrow is hypercellular 50% with increased blasts approximately 20% per biopsy, 26% smears and 31% by flow with trilineage dysplasia.The patient studies on flow included positive CD11c  (partial), CD34, CD33 (partial), CD45 (dim), CD38, CD 13, CD 11 R, HLA-DR, CD15 (partial),  (partial), CD 81, (bright) and cytoplasmic myeloperoxidase.  Cytogenetics are still pending on this patient's marrow.  I taken the opportunity to discuss his case with physicians at the [facility] transplant center and they would be willing to treat the patient if he is willing.     The patient was seen in follow-up at the [facility] Dr. Saenz saw the patient consultation feeling that he is best treated with hypo-methylation with either azacitidine or decitabine and venetoclax.  The patient was contacted and seen back for both teaching at which time he was advised for the 5-day use of decitabine as well as the use of venetoclax and need for port placement.  He underwent teaching and port placement did proceed July 31, 2019.     The patient on efforts presents to initiate treatment August 5, 2019.     The patient is seen August 5, 2019 and we have discussed initiation of treatment, use of antibiotic therapy, antiviral therapy, and antifungal therapy and the findings on his cytogenetics that suggested treatment related AML.This includes deletion 5 q., monosomy 7, and other aberrations compatible with high-grade myelodysplasia or AML.    Current Outpatient Medications on File Prior to Visit   Medication Sig Dispense Refill   • acyclovir (ZOVIRAX) 400 MG tablet Take 1 tablet by mouth 2 (Two) Times a Day. 60 tablet 4   • Calcium-Vitamin D-Vitamin K (VIACTIV CALCIUM PLUS D) 650-12.5-40 MG-MCG-MCG chewable tablet Chew 1 tablet Daily.     • fluticasone (FLONASE) 50 MCG/ACT nasal spray 2 sprays by Each Nare route Daily As Needed.  3   • Nystatin (MAGIC MOUTHWASH) Swish and spit 5 mL Every 2 (Two) Hours As Needed (as needed for mouth wash). 470 mL 1   • Omega-3 Fatty Acids (FISH OIL) 1000 MG capsule capsule Take 1,000 mg by mouth Daily.     • omeprazole (priLOSEC) 20 MG capsule Take 1 capsule by mouth Daily. 60  capsule 1   • ondansetron (ZOFRAN) 8 MG tablet Take 1 tablet by mouth 3 (Three) Times a Day As Needed for Nausea or Vomiting. 30 tablet 5   • Venetoclax (VENCLEXTA) 100 MG tablet Take  by mouth.     • vitamin C (ASCORBIC ACID) 500 MG tablet Take 1,000 mg by mouth Daily.     • [DISCONTINUED] allopurinol (ZYLOPRIM) 300 MG tablet Take 1 tablet by mouth Daily. Take with food if GI upset occurs. 30 tablet 0   • [DISCONTINUED] levoFLOXacin (LEVAQUIN) 500 MG tablet Take 1 tablet by mouth Daily for 30 days. 1 tablet 30 tablet 0   • [DISCONTINUED] vitamin B-12 (VITAMIN B-12) 1000 MCG tablet Take 1 tablet by mouth Daily for 30 days. 30 tablet 0   • [] guaiFENesin (MUCINEX) 600 MG 12 hr tablet Take 1 tablet by mouth Every 12 (Twelve) Hours for 30 days. 60 tablet 0   • HYDROcodone-acetaminophen (NORCO) 5-325 MG per tablet Take 1 tablet by mouth Every 6 (Six) Hours As Needed for Moderate Pain . 30 tablet 0   • [DISCONTINUED] HYDROcodone-acetaminophen (NORCO) 5-325 MG per tablet Take 1 tablet by mouth Every 6 (Six) Hours As Needed for Moderate Pain . 30 tablet 0   • [DISCONTINUED] ondansetron (ZOFRAN) 8 MG tablet Take 1 tablet by mouth Every 8 (Eight) Hours As Needed for Nausea or Vomiting. 30 tablet 2     No current facility-administered medications on file prior to visit.        ALLERGIES:     Allergies   Allergen Reactions   • Lorazepam Hives       Social History     Socioeconomic History   • Marital status:      Spouse name: Yris   • Number of children: Not on file   • Years of education: Not on file   • Highest education level: Not on file   Occupational History   • Occupation: Bank employee     Employer: [employer]   Tobacco Use   • Smoking status: Former Smoker     Packs/day: 0.25     Types: Cigarettes     Last attempt to quit: 1970     Years since quittin.6   • Smokeless tobacco: Never Used   • Tobacco comment: college 1-2 cigarettes a day   Substance and Sexual Activity   • Alcohol use: Yes  "    Alcohol/week: 1.8 oz     Types: 3 Cans of beer per week     Comment: social   • Drug use: No   • Sexual activity: Defer         Cancer-related family history includes Breast cancer in his mother, other, and sister; Prostate cancer in his father.     Review of Systems   Constitutional: Positive for fatigue. Negative for activity change, appetite change, chills and fever.   HENT: Positive for nosebleeds (see HPI). Negative for mouth sores and trouble swallowing.    Respiratory: Positive for shortness of breath. Negative for cough.    Cardiovascular: Negative for chest pain and leg swelling.   Gastrointestinal: Negative for abdominal pain, constipation, diarrhea, nausea and vomiting.   Genitourinary: Negative for difficulty urinating.   Skin: Negative for rash.   Neurological: Negative for dizziness, weakness and numbness.   Hematological: Negative for adenopathy. Does not bruise/bleed easily.   Psychiatric/Behavioral: Negative for sleep disturbance.       Objective      Vitals:    08/19/19 1142   BP: 135/72   Pulse: 109   Resp: 16   Temp: 98.8 °F (37.1 °C)   TempSrc: Oral   SpO2: 100%   Weight: 83.6 kg (184 lb 4.8 oz)   Height: 182.9 cm (72.01\")   PainSc: 0-No pain     Current Status 8/19/2019   ECOG score 0       Physical Exam   Constitutional: He is oriented to person, place, and time. He appears well-developed and well-nourished. No distress.   HENT:   Head: Normocephalic and atraumatic.   Mouth/Throat: Oropharynx is clear and moist and mucous membranes are normal. No oropharyngeal exudate.   Eyes: Pupils are equal, round, and reactive to light.   Neck: Normal range of motion.   Cardiovascular: Normal rate, regular rhythm and normal heart sounds.   No murmur heard.  Pulmonary/Chest: Effort normal. No respiratory distress. He has decreased breath sounds in the right lower field. He has no wheezes. He has no rhonchi. He has no rales.   Abdominal: Soft. Normal appearance and bowel sounds are normal. He exhibits no " distension. There is no hepatosplenomegaly.   Musculoskeletal: Normal range of motion. He exhibits no edema.   Neurological: He is alert and oriented to person, place, and time.   Skin: Skin is warm and dry. No rash noted.   Psychiatric: He has a normal mood and affect.   Vitals reviewed.      RECENT LABS:  Hematology WBC   Date Value Ref Range Status   08/19/2019 0.38 (C) 3.40 - 10.80 10*3/mm3 Final     RBC   Date Value Ref Range Status   08/19/2019 2.50 (L) 4.14 - 5.80 10*6/mm3 Final     Hemoglobin   Date Value Ref Range Status   08/19/2019 8.0 (L) 13.0 - 17.7 g/dL Final     Hematocrit   Date Value Ref Range Status   08/19/2019 22.8 (L) 37.5 - 51.0 % Final     Platelets   Date Value Ref Range Status   08/19/2019 35 (L) 140 - 450 10*3/mm3 Final        Assessment/Plan     1. AML: Marrow is hypercellular 50% with increased blasts approximately 20% per biopsy, 26% smears and 31% by flow with trilineage dysplasia.The patient studies on flow included positive CD11c (partial), CD34, CD33 (partial), CD45 (dim), CD38, CD 13, CD 11 R, HLA-DR, CD15 (partial),  (partial), CD 81, (bright) and cytoplasmic myeloperoxidase.    · Patient was seen in follow-up at the [facility] by Dr. Saenz who felt that he is best treated with hypo-methylation with either azacitidine or decitabine and venetoclax.  The patient was contacted and seen back for both teaching at which time he was advised for the 5-day use of decitabine as well as the use of venetoclax   · Patient received his first cycle of decitabine 8/5/2019 on days 1 through 5 of a 28-day cycle.  · Patient returns for follow-up on 8/19/2019 for reevaluation.  He continues on Venetoclax 400 mg daily.  He is just started his third week of treatment with this.  He remains on prophylactic Levaquin, acyclovir, and posaconazole, along with allopurinol.    2.  Pancytopenia: White blood cell count 0.38, with an ANC 0.06, hemoglobin 8.0, and platelet count 35,000.  He is on  anti-biotic prophylaxis.  He is symptomatic from his hemoglobin and we will proceed with transfusion of 2 units of packed red blood cells.  We will continue twice weekly CBC with RN review to evaluate the need for transfusion support.      PLAN:  1. Transfuse 2 units packed red blood cells.  2. Continue CBC with RN review on Monday and Thursdays.  3. Return for follow-up visit in 2 weeks with Dr. Leal for reevaluation in anticipation of his next cycle of excited being.  (Labor Day holiday that week, and plan on only giving him 4 days of treatment as opposed to 5).  4. Continue prophylactic posaconazole, Levaquin, acyclovir along with allopurinol.  5. Continue oral hydration.            Cc:  Evens Rayo*

## 2019-08-21 NOTE — PROGRESS NOTES
Education complete. Blood consent form signed. Right medi port accessed easily using sterile technique according to policy. Good blood return and flushed easily with 20 cc's sterile saline.Bio patch used and covered with tegaderm.

## 2019-08-21 NOTE — PATIENT INSTRUCTIONS
Blood Transfusion, Adult    A blood transfusion is a procedure in which you receive donated blood, including plasma, platelets, and red blood cells, through an IV tube. You may need a blood transfusion because of illness, surgery, or injury. The blood may come from a donor. You may also be able to donate blood for yourself (autologous blood donation) before a surgery if you know that you might require a blood transfusion.  The blood given in a transfusion is made up of different types of cells. You may receive:  · Red blood cells. These carry oxygen to the cells in the body.  · White blood cells. These help you fight infections.  · Platelets. These help your blood to clot.  · Plasma. This is the liquid part of your blood and it helps with fluid imbalances.  If you have hemophilia or another clotting disorder, you may also receive other types of blood products.  Tell a health care provider about:  · Any allergies you have.  · All medicines you are taking, including vitamins, herbs, eye drops, creams, and over-the-counter medicines.  · Any problems you or family members have had with anesthetic medicines.  · Any blood disorders you have.  · Any surgeries you have had.  · Any medical conditions you have, including any recent fever or cold symptoms.  · Whether you are pregnant or may be pregnant.  · Any previous reactions you have had during a blood transfusion.  What are the risks?  Generally, this is a safe procedure. However, problems may occur, including:  · Having an allergic reaction to something in the donated blood. Hives and itching may be symptoms of this type of reaction.  · Fever. This may be a reaction to the white blood cells in the transfused blood. Nausea or chest pain may accompany a fever.  · Iron overload. This can happen from having many transfusions.  · Transfusion-related acute lung injury (TRALI). This is a rare reaction that causes lung damage. The cause is not known. TRALI can occur within hours  of a transfusion or several days later.  · Sudden (acute) or delayed hemolytic reactions. This happens if your blood does not match the cells in your transfusion. Your body’s defense system (immune system) may try to attack the new cells. This complication is rare. The symptoms include fever, chills, nausea, and low back pain or chest pain.  · Infection or disease transmission. This is rare.  What happens before the procedure?  · You will have a blood test to determine your blood type. This is necessary to know what kind of blood your body will accept and to match it to the donor blood.  · If you are going to have a planned surgery, you may be able to do an autologous blood donation. This may be done in case you need to have a transfusion.  · If you have had an allergic reaction to a transfusion in the past, you may be given medicine to help prevent a reaction. This medicine may be given to you by mouth or through an IV tube.  · You will have your temperature, blood pressure, and pulse monitored before the transfusion.  · Follow instructions from your health care provider about eating and drinking restrictions.  · Ask your health care provider about:  ? Changing or stopping your regular medicines. This is especially important if you are taking diabetes medicines or blood thinners.  ? Taking medicines such as aspirin and ibuprofen. These medicines can thin your blood. Do not take these medicines before your procedure if your health care provider instructs you not to.  What happens during the procedure?  · An IV tube will be inserted into one of your veins.  · The bag of donated blood will be attached to your IV tube. The blood will then enter through your vein.  · Your temperature, blood pressure, and pulse will be monitored regularly during the transfusion. This monitoring is done to detect early signs of a transfusion reaction.  · If you have any signs or symptoms of a reaction, your transfusion will be stopped and  you may be given medicine.  · When the transfusion is complete, your IV tube will be removed.  · Pressure may be applied to the IV site for a few minutes.  · A bandage (dressing) will be applied.  The procedure may vary among health care providers and hospitals.  What happens after the procedure?  · Your temperature, blood pressure, heart rate, breathing rate, and blood oxygen level will be monitored often.  · Your blood may be tested to see how you are responding to the transfusion.  · You may be warmed with fluids or blankets to maintain a normal body temperature.  Summary  · A blood transfusion is a procedure in which you receive donated blood, including plasma, platelets, and red blood cells, through an IV tube.  · Your temperature, blood pressure, and pulse will be monitored before, during, and after the transfusion.  · Your blood may be tested after the transfusion to see how your body has responded.  This information is not intended to replace advice given to you by your health care provider. Make sure you discuss any questions you have with your health care provider.  Document Released: 12/15/2001 Document Revised: 09/14/2017 Document Reviewed: 09/14/2017  [employer] Interactive Patient Education © 2019 [employer]    Blood Transfusion, Adult, Care After  This sheet gives you information about how to care for yourself after your procedure. Your doctor may also give you more specific instructions. If you have problems or questions, contact your doctor.  Follow these instructions at home:    · Take over-the-counter and prescription medicines only as told by your doctor.  · Go back to your normal activities as told by your doctor.  · Follow instructions from your doctor about how to take care of the area where an IV tube was put into your vein (insertion site). Make sure you:  ? Wash your hands with soap and water before you change your bandage (dressing). If there is no soap and water, use hand  .  ? Change your bandage as told by your doctor.  · Check your IV insertion site every day for signs of infection. Check for:  ? More redness, swelling, or pain.  ? More fluid or blood.  ? Warmth.  ? Pus or a bad smell.  Contact a doctor if:  · You have more redness, swelling, or pain around the IV insertion site..  · You have more fluid or blood coming from the IV insertion site.  · Your IV insertion site feels warm to the touch.  · You have pus or a bad smell coming from the IV insertion site.  · Your pee (urine) turns pink, red, or brown.  · You feel weak after doing your normal activities.  Get help right away if:  · You have signs of a serious allergic or body defense (immune) system reaction, including:  ? Itchiness.  ? Hives.  ? Trouble breathing.  ? Anxiety.  ? Pain in your chest or lower back.  ? Fever, flushing, and chills.  ? Fast pulse.  ? Rash.  ? Watery poop (diarrhea).  ? Throwing up (vomiting).  ? Dark pee.  ? Serious headache.  ? Dizziness.  ? Stiff neck.  ? Yellow color in your face or the white parts of your eyes (jaundice).  Summary  · After a blood transfusion, return to your normal activities as told by your doctor.  · Every day, check for signs of infection where the IV tube was put into your vein.  · Some signs of infection are warm skin, more redness and pain, more fluid or blood, and pus or a bad smell where the needle went in.  · Contact your doctor if you feel weak or have any unusual symptoms.  This information is not intended to replace advice given to you by your health care provider. Make sure you discuss any questions you have with your health care provider.  Document Released: 01/08/2016 Document Revised: 08/11/2017 Document Reviewed: 08/11/2017  [employer] Interactive Patient Education © 2019 [employer]

## 2019-08-24 PROBLEM — D70.9 NEUTROPENIC FEVER (HCC): Status: ACTIVE | Noted: 2019-01-01

## 2019-08-24 PROBLEM — R50.81 NEUTROPENIC FEVER (HCC): Status: ACTIVE | Noted: 2019-01-01

## 2019-08-24 NOTE — TELEPHONE ENCOUNTER
On-call note:    Patient with AML on prophylactic posaconazole, Levaquin, acyclovir.  He had a red cell transfusion last week.  He has been lethargic since that time.  Today he has a fever of 101.  He is profoundly neutropenic.  I recommend that he come to the [facility] emergency department.  Family voiced understanding.

## 2019-08-25 PROBLEM — K61.1 PERIRECTAL ABSCESS: Status: ACTIVE | Noted: 2019-01-01

## 2019-08-25 NOTE — ANESTHESIA POSTPROCEDURE EVALUATION
"Patient: Baldemar Wellington    Procedure Summary     Date:  08/25/19 Room / Location:  [facility] OR 03 / [facility] MAIN OR    Anesthesia Start:  1408 Anesthesia Stop:  1509    Procedure:  Incision and Drainage of perirectal abcess (N/A Perirectal) Diagnosis:       Perirectal abscess      (Perirectal abscess [K61.1])    Surgeon:  Tosin Milligan MD Provider:  Anthony Aquino MD    Anesthesia Type:  MAC ASA Status:  4          Anesthesia Type: MAC  Last vitals  BP   145/74 (08/25/19 1520)   Temp   36.7 °C (98 °F) (08/25/19 1507)   Pulse   87 (08/25/19 1520)   Resp   16 (08/25/19 1520)     SpO2   99 % (08/25/19 1520)     Post Anesthesia Care and Evaluation    Patient location during evaluation: bedside  Patient participation: complete - patient participated  Level of consciousness: awake and alert  Pain management: adequate  Airway patency: patent  Anesthetic complications: No anesthetic complications    Cardiovascular status: acceptable  Respiratory status: acceptable  Hydration status: acceptable    Comments: /74   Pulse 87   Temp 36.7 °C (98 °F) (Oral)   Resp 16   Ht 182.9 cm (72\")   Wt 81.4 kg (179 lb 8 oz)   SpO2 99%   BMI 24.34 kg/m²       "

## 2019-08-25 NOTE — ANESTHESIA PREPROCEDURE EVALUATION
Anesthesia Evaluation     Patient summary reviewed and Nursing notes reviewed   history of anesthetic complications:  NPO Solid Status: > 8 hours  NPO Liquid Status: > 2 hours           Airway   Mallampati: II  Dental - normal exam     Pulmonary - normal exam   Cardiovascular - normal exam        Neuro/Psych  (+) TIA, headaches, numbness,     GI/Hepatic/Renal/Endo    (+)  GERD,      Musculoskeletal     Abdominal    Substance History      OB/GYN          Other   (+) blood dyscrasia, arthritis   history of cancer                      Anesthesia Plan    ASA 4     MAC   (PLT 26  Hb 7.2    tx 2 units of each)  intravenous induction   Anesthetic plan, all risks, benefits, and alternatives have been provided, discussed and informed consent has been obtained with: patient.    Plan discussed with CRNA.

## 2019-08-25 NOTE — ANESTHESIA PROCEDURE NOTES
Airway  Urgency: elective    Airway not difficult    General Information and Staff    Patient location during procedure: OR  Anesthesiologist: Anthony Aquino MD  CRNA: Serge Pate CRNA    Indications and Patient Condition  Indications for airway management: airway protection    Preoxygenated: yes  MILS maintained throughout  Mask difficulty assessment: 0 - not attempted    Final Airway Details  Final airway type: endotracheal airway      Successful airway: ETT  Cuffed: yes   Successful intubation technique: direct laryngoscopy  Facilitating devices/methods: cricoid pressure (Rapid sequence induction/intubation)  Endotracheal tube insertion site: oral  Blade: Sagrario  Blade size: 3  ETT size (mm): 8.0  Cormack-Lehane Classification: grade IIb - view of arytenoids or posterior of glottis only  Placement verified by: chest auscultation and capnometry   Inital cuff pressure (cm H2O): 22  Cuff volume (mL): 8  Measured from: lips  ETT to lips (cm): 23  Number of attempts at approach: 1

## 2019-08-29 NOTE — OUTREACH NOTE
Prep Survey      Responses   Facility patient discharged from?  [facility]   Is patient eligible?  Yes   Discharge diagnosis  Perirectal abscess   Does the patient have one of the following disease processes/diagnoses(primary or secondary)?  Other   Does the patient have Home health ordered?  No   Is there a DME ordered?  No   Prep survey completed?  Yes          Pricilla Finch RN

## 2019-08-29 NOTE — PROGRESS NOTES
Per Dr. Joyce, pt needs CBC and review tomorrow. Scheduling notified to make apt with NP since he is a hospital return.

## 2019-08-30 NOTE — PROGRESS NOTES
Subjective .     REASONS FOR FOLLOWUP:  AML, pancyctopenia, perianal abscess status post I&D    HISTORY OF PRESENT ILLNESS:  The patient is a 73 y.o. year old male who is here for follow-up with the above-mentioned history.    History of Present Illness   patient is a 73-year-old male with the above-mentioned history who is here today for follow-up visit/hospital return.  He was hospitalized 8/24/2019 through 8/28/2019 due to febrile neutropenia, perianal abscess status post I&D.  The patient was discharged home on antibiotics, including Omnicef, and Levaquin which he continues on.  Currently, his Venetoclax is on hold, and he is asking about resuming this medication.  He also reports he is having difficulty swallowing pills.  This is been present since his surgery.  He states that he has a sore throat and is having difficulty getting things down as his throat feels very irritated.  He denies any vomiting.  The patient is on Protonix.  He has Gabby's Magic mouthwash, but is not currently using this.  He denies fevers or chills.      Patient's biggest complaint today is that his bottom is sore, and it is difficult for him to sit, or get comfortable.    Past Medical History:   Diagnosis Date   • AML (acute myeloblastic leukemia) (CMS/HCC)    • Arthritis     Rheumatoid   • GERD (gastroesophageal reflux disease)    • History of pancytopenia    • History of transfusion    • Leiomyosarcoma (CMS/HCC)    • Neuropathy    • Prostate cancer (CMS/HCC)    • Spinal headache    • TIA (transient ischemic attack)        ONCOLOGIC HISTORY:  (History from previous dates can be found in the separate document.)    Baldemar Wellington is a 73 y.o. male who we are asked to see July 18, 2019 in consultation for history of pancytopenia.     We had seen the patient previously in October 2018 after admission October 12 for abnormal back MRI.  Studies revealed L3-L4 discitis and the patient's consent been reviewed and treated by infectious disease.   We had seen him for pancytopenia with diagnosis of rheumatoid arthritis around 2012 on methotrexate and Remicade through Dr. Valdivia until June 2017.  Treatment was stopped due to diagnosed prostate carcinoma in July 2017 his treatment resumed with a change to Orencia monthly.  At this point the patient had known about leukopenia for approximately 2 years.  Our assessment included a degree of leukopenia since May 2007 with white count between 2 and 4000 and not neutropenic, anemia hemoglobin between 10 and 13 g and thrombocytopenia between 117 160,000.  We have felt the cytopenias were long-standing and due to  to his RA with a component from radiation treatment for his prostate carcinoma and adjuvant radiation therapy for previously resected leiomyosarcoma also recognized.  A bone marrow was offered but declined.    The patient is follow-up with rheumatology and did in January have vision disease activity to initiate therapy with rituximab.  Had 2 infusions thus far.  We were contacted concerning the patient in the last several days for further pancytopenia but before he could be seen back in office he had dyspnea on exertion was admitted.  Studies have included a CT of chest negative for pulmonary embolus, minimal patchy increased density lungs thought to be chronic parenchymal change.  His admitting CBC include H&H is 7.6 and 23.5 and .9, white count of 630 and platelet count of 79,000 and review of his previous studies including November 2018 include a white count approximately 2000 range with a normal differential, hemoglobin between 9 and 11 g, platelet count at approximately 110,002 is much 150,000.  The patient has clearly had a deterioration hematologically.  As such the patient was further tested and proceed with a bone marrow aspirate and biopsy July 18, 2019.  Additional studies include flow cytometry which is negative and the reticulocyte count 1.90% .  The patient wishes to go home and under the  circumstances he could with follow-up scheduled within the next week.     We asked the patient to return back for follow-up.  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  Marrow is hypercellular 50% with increased blasts approximately 20% per biopsy, 26% smears and 31% by flow with trilineage dysplasia.The patient studies on flow included positive CD11c (partial), CD34, CD33 (partial), CD45 (dim), CD38, CD 13, CD 11 R, HLA-DR, CD15 (partial),  (partial), CD 81, (bright) and cytoplasmic myeloperoxidase.  Cytogenetics are still pending on this patient's marrow.  I taken the opportunity to discuss his case with physicians at the [facility] transplant center and they would be willing to treat the patient if he is willing.     The patient was seen in follow-up at the [facility] Dr. Saenz saw the patient consultation feeling that he is best treated with hypo-methylation with either azacitidine or decitabine and venetoclax.  The patient was contacted and seen back for both teaching at which time he was advised for the 5-day use of decitabine as well as the use of venetoclax and need for port placement.  He underwent teaching and port placement did proceed July 31, 2019.     The patient on efforts presents to initiate treatment August 5, 2019.     The patient is seen August 5, 2019 and we have discussed initiation of treatment, use of antibiotic therapy, antiviral therapy, and antifungal therapy and the findings on his cytogenetics that suggested treatment related AML.This includes deletion 5 q., monosomy 7, and other aberrations compatible with high-grade myelodysplasia or AML.    Current Outpatient Medications on File Prior to Visit   Medication Sig Dispense Refill   • allopurinol (ZYLOPRIM) 300 MG tablet Take 1 tablet by mouth Daily. Take with food if GI upset occurs. 30 tablet 2   • Calcium-Vitamin  D-Vitamin K (VIACTIV CALCIUM PLUS D) 650-12.5-40 MG-MCG-MCG chewable tablet Chew 1 tablet Daily.     • cefdinir (OMNICEF) 300 MG capsule Take 1 capsule by mouth Every 12 (Twelve) Hours for 8 doses. 8 capsule 0   • cyanocobalamin (VITAMIN B-12) 1000 MCG tablet Take 1 tablet by mouth Daily. 30 tablet 2   • fluticasone (FLONASE) 50 MCG/ACT nasal spray 2 sprays by Each Nare route Daily As Needed.  3   • HYDROcodone-acetaminophen (NORCO) 5-325 MG per tablet Take 1 tablet by mouth Every 6 (Six) Hours As Needed for Moderate Pain . 30 tablet 0   • [START ON 9/1/2019] levoFLOXacin (LEVAQUIN) 500 MG tablet Take 1 tablet by mouth Daily. 30 tablet 1   • lidocaine (XYLOCAINE) 5 % ointment Apply  topically to the appropriate area as directed Every 4 (Four) Hours As Needed for Mild Pain  or Moderate Pain  for up to 30 days. 1 tube 5   • Loratadine-Pseudoephedrine (CLARITIN-D 24 HOUR PO) Take 1 tablet by mouth Daily.     • Nystatin (MAGIC MOUTHWASH) Swish and spit 5 mL Every 2 (Two) Hours As Needed (as needed for mouth wash). 470 mL 1   • Omega-3 Fatty Acids (FISH OIL) 1000 MG capsule capsule Take 1,000 mg by mouth Daily.     • omeprazole (priLOSEC) 20 MG capsule TAKE 1 CAPSULE BY MOUTH EVERY DAY 60 capsule 1   • ondansetron (ZOFRAN) 8 MG tablet Take 1 tablet by mouth 3 (Three) Times a Day As Needed for Nausea or Vomiting. 30 tablet 5   • valACYclovir (VALTREX) 1000 MG tablet Take 1 tablet by mouth Every 8 (Eight) Hours for 24 doses. 24 tablet 0   • vitamin C (ASCORBIC ACID) 500 MG tablet Take 1,000 mg by mouth Daily.     • zolpidem (AMBIEN) 5 MG tablet Take 1 tablet by mouth At Night As Needed for Sleep. 30 tablet 0     No current facility-administered medications on file prior to visit.        ALLERGIES:     Allergies   Allergen Reactions   • Lorazepam Hives       Social History     Socioeconomic History   • Marital status:      Spouse name: Yris   • Number of children: Not on file   • Years of education: Not on file   •  "Highest education level: Not on file   Occupational History   • Occupation: Bank employee     Employer: [employer] [place]   Tobacco Use   • Smoking status: Former Smoker     Packs/day: 0.25     Types: Cigarettes     Last attempt to quit: 1970     Years since quittin.6   • Smokeless tobacco: Never Used   • Tobacco comment: college 1-2 cigarettes a day   Substance and Sexual Activity   • Alcohol use: Yes     Alcohol/week: 1.8 oz     Types: 3 Cans of beer per week     Comment: social   • Drug use: No   • Sexual activity: Defer         Cancer-related family history includes Breast cancer in his mother, other, and sister; Prostate cancer in his father.     Review of Systems   Constitutional: Positive for fatigue. Negative for activity change, appetite change, chills and fever.   HENT: Positive for sore throat and trouble swallowing. Negative for mouth sores and nosebleeds.    Respiratory: Positive for shortness of breath. Negative for cough.    Cardiovascular: Negative for chest pain and leg swelling.   Gastrointestinal: Positive for diarrhea and rectal pain. Negative for abdominal pain, constipation, nausea and vomiting.   Genitourinary: Negative for difficulty urinating.   Skin: Negative for rash.   Neurological: Negative for dizziness, weakness and numbness.   Hematological: Negative for adenopathy. Does not bruise/bleed easily.   Psychiatric/Behavioral: Negative for sleep disturbance.       Objective      Vitals:    19 1130 19 1251   BP: 150/70    Pulse: 112    Resp: 12    Temp: 100 °F (37.8 °C) 99.3 °F (37.4 °C)   TempSrc: Oral    SpO2: 100%    Weight: 83 kg (182 lb 14.4 oz)    Height: 182.9 cm (72.01\")    PainSc: 6  Comment: pain in the buttocks      Current Status 2019   ECOG score 0       Physical Exam   Constitutional: He is oriented to person, place, and time. He appears well-developed and well-nourished. No distress.   HENT:   Head: Normocephalic and atraumatic.   Mouth/Throat: " Oropharynx is clear and moist and mucous membranes are normal. No oropharyngeal exudate.   Eyes: Pupils are equal, round, and reactive to light.   Neck: Normal range of motion.   Cardiovascular: Normal rate, regular rhythm and normal heart sounds.   No murmur heard.  Pulmonary/Chest: Effort normal and breath sounds normal. No respiratory distress. He has no wheezes. He has no rhonchi. He has no rales.   Abdominal: Soft. Normal appearance and bowel sounds are normal. He exhibits no distension. There is no hepatosplenomegaly.   Musculoskeletal: Normal range of motion. He exhibits no edema.   Neurological: He is alert and oriented to person, place, and time.   Skin: Skin is warm and dry. No rash noted.   Psychiatric: He has a normal mood and affect.   Vitals reviewed.      RECENT LABS:  Hematology WBC   Date Value Ref Range Status   08/30/2019 0.31 (C) 3.40 - 10.80 10*3/mm3 Final     RBC   Date Value Ref Range Status   08/30/2019 2.98 (L) 4.14 - 5.80 10*6/mm3 Final     Hemoglobin   Date Value Ref Range Status   08/30/2019 9.3 (L) 13.0 - 17.7 g/dL Final     Hematocrit   Date Value Ref Range Status   08/30/2019 27.0 (L) 37.5 - 51.0 % Final     Platelets   Date Value Ref Range Status   08/30/2019 109 (L) 140 - 450 10*3/mm3 Final        Assessment/Plan     1.  Acute myeloid leukemia  · Bone marrow aspiration and biopsy on 7/18/2019 confirmed hypercellular marrow of 50% cellularity with increased blasts (20% on the biopsy, 26% on the aspirate smears, 31% by flow cytometry)  · Complex karyotype with deletion 5 q., monosomy 7 and others  · No Neupogen due to the presence of acute myeloid leukemia  · He was seen at the [facility] program with recommendations for treatment with decitabine D1-5 and venetoclax 400 mg daily.  Cycles every 28 days  · Patient received his first cycle of decitabine 8/5/2019.    2.  Pancytopenia: Due to chemotherapy, leukemia, infection.  Recent hospitalization with febrile  neutropenia due to perianal abscess.  Blood cultures cultures negative.      3.  Perianal abscess.  · I&D Dr. Milligan 8/24/19  · Discharged on Omnicef 300 mg every 12 hours and Flagyl 500 mg every 8 hours oral through 8/31/2019 as recommended by Dr. Rayo.      4.  Infection prophylaxis, per Dr. Rayo note from 8/28/2019:  · After completing Valtrex x10 days, he is to resume acyclovir  · No fungal prophylaxis due to drug interactions with Venetoclax and low incidence of invasive fungal infections and trials.  · Patient and wife have chosen to resume Levaquin prophylaxis after completing antibiotic therapy for abscess.    5.  History of leiomyosarcoma treated with resection and radiation therapy to the neck approximately 15 years ago.    6.  History of prostate cancer status post radiation therapy.    7.  History of rheumatoid arthritis previously on Rituxan.    8.  Dysphasia: Present since surgery in which he was intubated.  The patient reports difficulty swallowing.  He is on Protonix.  I recommend he start using Gabby's Magic potion, along with Carafate 4 times daily, which will be prescribed today.      PLAN:  1. Patient can resume venetoclax.  2. Gabby's Magic mouthwash.  3. Patient prescribed Carafate to use 4 times daily.  4. Return next week in anticipation of cycle 2 Dacogen.    5. Patient will continue twice weekly CBC with RN review on Mondays and Thursdays.  6. Return for follow-up visit on 9/18/2019 with Dr. Leal.  7. Continue antibiotics as above.

## 2019-08-30 NOTE — OUTREACH NOTE
Medical Week 1 Survey      Responses   Facility patient discharged from?  [facility]   Does the patient have one of the following disease processes/diagnoses(primary or secondary)?  Other   Is there a successful TCM telephone encounter documented?  No   Week 1 attempt successful?  Yes   Call start time  1400   Revoke  Decline to participate   Call end time  1402          Adrainna Wall, RN

## 2019-09-01 NOTE — TELEPHONE ENCOUNTER
Patient wife called because patient has been having pain at his anus. She denies any fever or chills. Has been able to urinate without any problem. Tolerating diet. Pain well controlled with pain meds. There's no redness on anal area as per patient wife    Discussed with patient wife that if pain is not well controlled with pain meds, has fevers or unable to urinate he should come to the ED for further evaluation. Otherwise, continue pain meds, sitz baths    She understood.

## 2019-09-04 NOTE — PROGRESS NOTES
Per NIMA Gamez, ok to treat with Dacogen today based on counts today. Patient c/o fatigue and soa this am. New order for 2 units PRBC entered. Patient to go to [facility]U after chemo today. Discussed with patient and he v/u.  Lab Results   Component Value Date    WBC 0.17 (C) 09/04/2019    HGB 7.2 (C) 09/04/2019    HCT 21.6 (L) 09/04/2019    MCV 92.7 09/04/2019     (L) 09/04/2019

## 2019-09-04 NOTE — TELEPHONE ENCOUNTER
PT WIFE CALLED STATING THAT EVEN WITH PAIN MEDICATION  [facility] IS STILL EXPERIENCING A SIGNIFICANT AMOUNT OF PAIN. SHOULD PT COME IN OR TRY SOMETHING DIFFERENT FOR PAIN?

## 2019-09-05 NOTE — PROGRESS NOTES
Patient said he needed refill on [place]-routed Norco to be signed by Doc # 2, Dr. Joyce due to Dr. Leal on vacation.

## 2019-09-06 NOTE — PROGRESS NOTES
PER JL NP PT WILL ONLY DO 4 D OF DACOGEN THIS WK. PT WILL RTN Monday FOR LAB ONLY. L/M FOR THE PT W/ THAT INFO.

## 2019-09-06 NOTE — PROGRESS NOTES
Baldemar Wellington is a 73 y.o. male in for follow up of Rectal/anal ulcer  s/p incision and debridement perianal ulceration 8/25/2019    Pt c/o severe pain at his bottom  Tylenol and ibuprofen not helpful  Lidocaine not helpful  Norco is the only thing that helps    No bleeding    No issues with his BMs    /72 (BP Location: Left arm, Patient Position: Sitting, Cuff Size: Adult)   Pulse 107   Temp 98.2 °F (36.8 °C) (Oral)   Wt 80.6 kg (177 lb 12.8 oz)   SpO2 99%   BMI 24.11 kg/m²   Body mass index is 24.11 kg/m².      PE:  Physical Exam   Constitutional: He appears well-developed. No distress.   HENT:   Head: Normocephalic and atraumatic.   Abdominal: Soft. He exhibits no distension.   Genitourinary:   Genitourinary Comments: Perianal exam: external: left posterior open wound with pink mucous tissue and stool stained fat.  No appearance infection   Musculoskeletal: Normal range of motion.   Neurological: He is alert.   Psychiatric: Thought content normal.         Assessment:   1. Rectal/anal ulcer     s/p incision and debridement perianal ulceration 8/25/2019    Plan:    Discussion with patient and his wife that with his chemo, he will be slow to heal.  No evidence infection on exam.  Per pt request, rx refill norco.    Call or come in if any questions or concerning symptoms      RTC 2 weeks      Scribed for Tosin Milligan MD by Mar Bolaños PA-C  9/6/2019   This patient was evaluated by me, recommendations made, documentation reviewed, edited, and revised by me, Tosin Milligan MD

## 2019-09-09 PROBLEM — I49.9 IRREGULAR HEART BEAT: Status: ACTIVE | Noted: 2019-01-01

## 2019-09-09 NOTE — PROGRESS NOTES
Lab Results   Component Value Date    WBC 0.32 (C) 09/09/2019    HGB 10.4 (L) 09/09/2019    HCT 30.1 (L) 09/09/2019    MCV 88.5 09/09/2019    PLT 79 (L) 09/09/2019   afebrile  bp 128/68 hr 68 and irregular. Oxygen saturation 100%. Discussed with Dr Leal. EKG ordered.  EKG abnormal. Dr Leal spoke to patient and MA took to ER.

## 2019-09-09 NOTE — ED NOTES
"Pt c/o SOA that started last night when he got up to go to the bathroom. Pt states \"I got dizzy and couldn't catch my breath. I could barely walk 10ft\". Pt does not have hx of a-fib, but EKG done in triage is showing a-fib. Pt is a leukemia pt and currently receiving chemo.      Chasity Hernandez, RN  09/09/19 1119    "

## 2019-09-09 NOTE — ED PROVIDER NOTES
EMERGENCY DEPARTMENT ENCOUNTER    CHIEF COMPLAINT  Chief Complaint: Dizziness  History given by: Patient   History limited by: Nothing   Room Number: 21/21  PMD: Humphrey Proctor MD (Tony)      HPI:  Pt is a 73 y.o. male with AML who presents complaining of dizziness that began in the middle of the night. Pt is also c/o SOA that is worse with exertion. Pt denies orthopnea, palpitations, BLE edema or pain, chest discomfort, nausea, vomiting, and diaphoresis. Pt reports he got up to use the restroom in the middle of the night and noticed he was dizzy and SOA while walking and was still dizzy and SOA this morning when he woke up. Pt reports he was seen by his hem/onc doctor this morning and was advised to come to the ED because he was in Afib. Pt states he had surgery to drain a perianal abscess 3 weeks ago. Pt reports he is currently going through chemo for leukemia with the last treatment being 3 days ago. Pt denies recent travel and cardiac hx. Pt denies smoking and EtOH use.     Duration:  1 day  Onset: Gradual  Timing: Constant  Location: N/a  Radiation: N/a  Quality: Dizziness  Intensity/Severity: Moderate  Progression: Unchanged   Associated Symptoms: SOA with exertion  Aggravating Factors: None  Alleviating Factors: None  Previous Episodes: None  Treatment before arrival: None    PAST MEDICAL HISTORY  Active Ambulatory Problems     Diagnosis Date Noted   • Screening for colon cancer 11/15/2013   • Idiopathic peripheral neuropathy 05/15/2018   • Spinal puncture headache 08/28/2018   • Rheumatoid arthritis (CMS/HCC) 10/12/2018   • GERD (gastroesophageal reflux disease) 10/12/2018   • Osteomyelitis (CMS/[facility]) 10/12/2018   • 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   • Neutropenia (CMS/HCC) 07/17/2019   • AML (acute myeloid leukemia) (CMS/[facility]) 07/24/2019   • Encounter for fitting and adjustment of vascular catheter 08/01/2019   • Neutropenic fever (CMS/[facility]) 08/24/2019   • Perirectal abscess 08/25/2019    • Irregular heart beat 2019     Resolved Ambulatory Problems     Diagnosis Date Noted   • No Resolved Ambulatory Problems     Past Medical History:   Diagnosis Date   • AML (acute myeloblastic leukemia) (CMS/HCC)    • Arthritis    • GERD (gastroesophageal reflux disease)    • History of pancytopenia    • History of transfusion    • Leiomyosarcoma (CMS/HCC)    • Neuropathy    • Prostate cancer (CMS/HCC)    • Spinal headache    • TIA (transient ischemic attack)        PAST SURGICAL HISTORY  Past Surgical History:   Procedure Laterality Date   • BACK SURGERY      sarcoma removed   • CATARACT EXTRACTION W/ INTRAOCULAR LENS  IMPLANT, BILATERAL Bilateral    • COLONOSCOPY     • INCISION AND DRAINAGE PERIRECTAL ABSCESS N/A 2019    Procedure: Incision and Drainage of perirectal abcess;  Surgeon: Tosin Milligan MD;  Location: [facility] OR;  Service: General   • VENOUS ACCESS DEVICE (PORT) INSERTION N/A 2019    Procedure: POWER PORT PLACEMENT;  Surgeon: Arpan Barboza MD;  Location: [facility] OR;  Service: Vascular       FAMILY HISTORY  Family History   Problem Relation Age of Onset   • Breast cancer Mother    • Prostate cancer Father    • Breast cancer Sister    • Leukemia Brother    • Breast cancer Other    • Leukemia Other    • Diabetes Other    • Malig Hyperthermia Neg Hx        SOCIAL HISTORY  Social History     Socioeconomic History   • Marital status:      Spouse name: Yris   • Number of children: Not on file   • Years of education: Not on file   • Highest education level: Not on file   Occupational History   • Occupation: Bank employee     Employer: [employer]   Tobacco Use   • Smoking status: Former Smoker     Packs/day: 0.25     Types: Cigarettes     Last attempt to quit: 1970     Years since quittin.7   • Smokeless tobacco: Never Used   • Tobacco comment: college 1-2 cigarettes a day   Substance and Sexual Activity   • Alcohol use: Yes     Alcohol/week: 1.8  oz     Types: 3 Cans of beer per week     Comment: social   • Drug use: No   • Sexual activity: Defer       ALLERGIES  Lorazepam    REVIEW OF SYSTEMS  Review of Systems   Constitutional: Negative for activity change, appetite change, diaphoresis and fever.   HENT: Negative for congestion and sore throat.    Eyes: Negative.    Respiratory: Positive for shortness of breath (worse with exertion, denies orthopnea). Negative for cough.    Cardiovascular: Negative for chest pain, palpitations and leg swelling (or pain).   Gastrointestinal: Negative for abdominal pain, diarrhea, nausea and vomiting.   Endocrine: Negative.    Genitourinary: Negative for decreased urine volume and dysuria.   Musculoskeletal: Negative for neck pain.   Skin: Negative for rash and wound.   Allergic/Immunologic: Negative.    Neurological: Positive for dizziness. Negative for weakness, numbness and headaches.   Hematological: Negative.    Psychiatric/Behavioral: Negative.    All other systems reviewed and are negative.      PHYSICAL EXAM  ED Triage Vitals   Temp Heart Rate Resp BP SpO2   09/09/19 1052 09/09/19 1052 09/09/19 1052 09/09/19 1119 09/09/19 1052   96 °F (35.6 °C) 82 12 130/68 98 %      Temp src Heart Rate Source Patient Position BP Location FiO2 (%)   -- 09/09/19 1119 09/09/19 1119 -- --    Monitor Lying         Physical Exam   Constitutional: He is oriented to person, place, and time. No distress.   HENT:   Head: Normocephalic and atraumatic.   Mouth/Throat: Oropharynx is clear and moist.   Eyes: EOM are normal. Pupils are equal, round, and reactive to light.   Neck: Normal range of motion. Neck supple.   Cardiovascular: Normal heart sounds. An irregularly irregular rhythm present. Tachycardia present.   No murmur heard.  Pulmonary/Chest: Breath sounds normal. He is in respiratory distress (mild). He has no decreased breath sounds. He has no wheezes. He has no rhonchi. He has no rales.   Abdominal: Soft. Bowel sounds are normal. He  exhibits no distension. There is no tenderness. There is no rebound and no guarding.   Musculoskeletal: Normal range of motion. He exhibits no edema.   Trace BLE edema but no calf tenderness, medication port in RUQ that is non-tender and no erythema    Neurological: He is alert and oriented to person, place, and time. He has normal sensation and normal strength.   Skin: Skin is warm and dry.   Psychiatric: Mood and affect normal.   Nursing note and vitals reviewed.      LAB RESULTS  Lab Results (last 24 hours)     Procedure Component Value Units Date/Time    CBC & Differential [[id number]] Collected:  09/09/19 0922    Specimen:  Blood Updated:  09/09/19 0950    Narrative:       The following orders were created for panel order CBC & Differential.  Procedure                               Abnormality         Status                     ---------                               -----------         ------                     CBC Auto Differential[[id number]]        Abnormal            Final result                 Please view results for these tests on the individual orders.    CBC Auto Differential [[id number]]  (Abnormal) Collected:  09/09/19 0922    Specimen:  Blood Updated:  09/09/19 0950     WBC 0.32 10*3/mm3      RBC 3.40 10*6/mm3      Hemoglobin 10.4 g/dL      Hematocrit 30.1 %      MCV 88.5 fL      MCH 30.6 pg      MCHC 34.6 g/dL      RDW 14.0 %      RDW-SD 44.6 fl      MPV 8.4 fL      Platelets 79 10*3/mm3      Neutrophil % 21.9 %      Lymphocyte % 65.6 %      Monocyte % 9.4 %      Eosinophil % 0.0 %      Basophil % 3.1 %      Immature Grans % 0.0 %      Neutrophils, Absolute 0.07 10*3/mm3      Lymphocytes, Absolute 0.21 10*3/mm3      Monocytes, Absolute 0.03 10*3/mm3      Eosinophils, Absolute 0.00 10*3/mm3      Basophils, Absolute 0.01 10*3/mm3      Immature Grans, Absolute 0.00 10*3/mm3      nRBC 0.0 /100 WBC     CBC & Differential [[id number]] Collected:  09/09/19 1154    Specimen:  Blood Updated:  09/09/19 1304     Narrative:       The following orders were created for panel order CBC & Differential.  Procedure                               Abnormality         Status                     ---------                               -----------         ------                     CBC Auto Differential[[id number]]        Abnormal            Final result                 Please view results for these tests on the individual orders.    Comprehensive Metabolic Panel [[id number]]  (Abnormal) Collected:  09/09/19 1154    Specimen:  Blood Updated:  09/09/19 1239     Glucose 102 mg/dL      BUN 10 mg/dL      Creatinine 0.75 mg/dL      Sodium 138 mmol/L      Potassium 3.8 mmol/L      Chloride 101 mmol/L      CO2 25.6 mmol/L      Calcium 8.4 mg/dL      Total Protein 5.9 g/dL      Albumin 3.50 g/dL      ALT (SGPT) 9 U/L      AST (SGOT) 11 U/L      Alkaline Phosphatase 66 U/L      Total Bilirubin 1.0 mg/dL      eGFR Non African Amer 103 mL/min/1.73      Globulin 2.4 gm/dL      A/G Ratio 1.5 g/dL      BUN/Creatinine Ratio 13.3     Anion Gap 11.4 mmol/L     Narrative:       GFR Normal >60  Chronic Kidney Disease <60  Kidney Failure <15    Magnesium [[id number]]  (Normal) Collected:  09/09/19 1154    Specimen:  Blood Updated:  09/09/19 1239     Magnesium 2.0 mg/dL     Troponin [[id number]]  (Normal) Collected:  09/09/19 1154    Specimen:  Blood Updated:  09/09/19 1239     Troponin T <0.010 ng/mL     Narrative:       Troponin T Reference Range:  <= 0.03 ng/mL-   Negative for AMI  >0.03 ng/mL-     Abnormal for myocardial necrosis.  Clinicians would have to utilize clinical acumen, EKG, Troponin and serial changes to determine if it is an Acute Myocardial Infarction or myocardial injury due to an underlying chronic condition.     D-dimer, Quantitative [[id number]]  (Abnormal) Collected:  09/09/19 1154    Specimen:  Blood Updated:  09/09/19 1224     D-Dimer, Quantitative 0.92 MCGFEU/mL     Narrative:       The [facility] D-Dimer test used in conjunction with a  clinical pretest probability (PTP) assessment model, has been approved by the FDA to rule out the presence of venous thromboembolism (VTE) in outpatients suspected of deep venous thrombosis (DVT) or pulmonary embolism (PE). The cut-off for negative predictive value is <0.50 MCGFEU/mL.    Protime-INR [[id number]]  (Normal) Collected:  09/09/19 1154    Specimen:  Blood Updated:  09/09/19 1222     Protime 13.0 Seconds      INR 1.01    CBC Auto Differential [[id number]]  (Abnormal) Collected:  09/09/19 1154    Specimen:  Blood Updated:  09/09/19 1304     WBC 0.21 10*3/mm3      RBC 2.99 10*6/mm3      Hemoglobin 8.8 g/dL      Hematocrit 26.4 %      MCV 88.3 fL      MCH 29.4 pg      MCHC 33.3 g/dL      RDW 14.0 %      RDW-SD 44.9 fl      MPV 10.0 fL      Platelets 64 10*3/mm3      Neutrophil % 19.0 %      Lymphocyte % 77.0 %      Monocyte % 4.0 %      Eosinophil % 0.0 %      Basophil % 0.0 %      Immature Grans % 0.0 %      Neutrophils, Absolute 0.07 10*3/mm3      Lymphocytes, Absolute 0.13 10*3/mm3      Monocytes, Absolute 0.01 10*3/mm3      Eosinophils, Absolute 0.00 10*3/mm3      Basophils, Absolute 0.00 10*3/mm3      Immature Grans, Absolute 0.00 10*3/mm3      nRBC 0.0 /100 WBC     Manual Differential [[id number]] Collected:  09/09/19 1154    Specimen:  Blood Updated:  09/09/19 1308     Neutrophil % --     Lymphocyte % --     Neutrophils Absolute --     Lymphocytes Absolute --     RBC Morphology Normal     WBC Morphology Normal     Platelet Morphology Normal          I ordered the above labs and reviewed the results    RADIOLOGY  XR Chest 2 View   Final Result   No findings of acute cardiopulmonary pathology.       This report was finalized on 9/9/2019 12:46 PM by Dr. Ghassan Rhodes M.D.               I ordered the above noted radiological studies. Interpreted by radiologist. Reviewed by me in PACS.       PROCEDURES  Procedures     EKG          EKG time: 1056  Rhythm/Rate: Afib rate 134, occasional PVC's  QT interval  prolonged   ST and T waves: Nml     Interpreted Contemporaneously by me, independently viewed  Unchanged compared to prior 8/24/19      PROGRESS AND CONSULTS    1314: Rechecked pt who is resting comfortably and in NAD. Pt reports he is not currently dizzy, but he has not attempted to stand up since arriving in the ED. Pt HR 95 while in room. Discussed consulting hem/onc and cardiology. Informed pt of blood work including slightly elevated d-dimer. Discussed not pursing PE workup because platelet count is 64. Discussed starting metoprolol and following up with cardiology. Pt understands and agrees with the plan, all questions answered.    1331: Spoke with  (hem/onc) who agrees that pt does not need a CT chest today. He also agrees with plan for pt to be discharged and follow up with cardiology.     1403: Spoke with  (cardiology) who reports pt can be started on metoprolol and seen in office.     1424: Rechecked pt who is resting comfortably and in NAD. HR  while in room but pt is asymptomatic. Informed pt of consults and plan to start on metoprolol and follow up with cardiology. Discussed not starting blood thinner because of low platelet count even though there is a risk of blood clot due to new onset afib. Discussed plan to discharge. RTER pre-cautions given. Pt understands and agrees with the plan, all questions answered.    MEDICAL DECISION MAKING  Results were reviewed/discussed with the patient and they were also made aware of online access. Pt also made aware that some labs, such as cultures, will not be resulted during ER visit and follow up with PMD is necessary.     MDM  Number of Diagnoses or Management Options     Amount and/or Complexity of Data Reviewed  Clinical lab tests: ordered and reviewed (Unremarkable-baseline for pt)  Tests in the radiology section of CPT®: ordered and reviewed (Chest XR negative acute)  Tests in the medicine section of CPT®: ordered and reviewed (See  procedure notes)  Decide to obtain previous medical records or to obtain history from someone other than the patient: yes (Epic)  Review and summarize past medical records: yes (Pt has a hx of AML)  Discuss the patient with other providers: yes ( (hem/onc)   (cardiology))  Independent visualization of images, tracings, or specimens: yes    Patient Progress  Patient progress: stable         DIAGNOSIS  Final diagnoses:   Atrial fibrillation, new onset (CMS/HCC)       DISPOSITION  DISCHARGE    Patient discharged in stable condition.    Reviewed implications of results, diagnosis, meds, responsibility to follow up, warning signs and symptoms of possible worsening, potential complications and reasons to return to ER.    Patient/Family voiced understanding of above instructions.    Discussed plan for discharge, as there is no emergent indication for admission. Patient referred to primary care provider for BP management due to today's BP. Pt/family is agreeable and understands need for follow up and repeat testing.  Pt is aware that discharge does not mean that nothing is wrong but it indicates no emergency is present that requires admission and they must continue care with follow-up as given below or physician of their choice.     FOLLOW-UP  Humphrey Proctor (Jarrett) MD [facility]  [address] [address]  [phone]    Schedule an appointment as soon as possible for a visit       [address] [address] [phone]             Medication List      New Prescriptions    metoprolol tartrate 25 MG tablet  Commonly known as:  LOPRESSOR  Take 1 tablet by mouth 2 (Two) Times a Day.              Latest Documented Vital Signs:  As of 2:38 PM  BP- 125/91 HR- 111 Temp- 96 °F (35.6 °C) O2 sat- 98%    --  Documentation assistance provided by deshaun Duff for .  Information recorded by the deshaun was done at my direction and has  been verified and validated by me.       Irma Duff  09/09/19 1438       Parker Schneider MD  09/09/19 2097

## 2019-09-09 NOTE — DISCHARGE INSTRUCTIONS
Take the metoprolol as directed and follow-up with [facility].  Return to the emergency department if develop increasing shortness of breath, chest pain or dizziness.

## 2019-09-12 NOTE — PROGRESS NOTES
Pt is here for lab with RN review.  CBC reviewed with Dr. Leal. ANC 0.08 and WBC 0.23. Per Dr. Leal pt is to continue his Levaquin daily. Pt denies and S/S of infection or bleeding. Reviewed S/S of both with pt and when to seek medical attetion. Pt was given written information regarding neutropenic precautions, managing low blood counts during cancer TX and Food safety while neutropenic. Pt has no complaints.  Copy of labs given to pt and f/u appt reviewed. Pt is instructed to call the office with any concerns or new symptoms prior to next visit. Pt vu.   Lab Results   Component Value Date    WBC 0.23 (C) 09/12/2019    HGB 8.4 (L) 09/12/2019    HCT 23.9 (L) 09/12/2019    MCV 87.9 09/12/2019    PLT 41 (L) 09/12/2019

## 2019-09-13 NOTE — PROGRESS NOTES
[facility]      Patient Name: Baldemar Wellington  :1945  Age: 73 y.o.  Encounter Provider:  Baldemar Garces Jr, MD      Chief Complaint: Palpitations and shortness of air    Baldemar Wellington is a 73 y.o. male past medical history of gout, GERD and seasonal allergies who presents for initial evaluation of paroxysmal atrial fibrillation.  Patient is noted some palpitations and dizziness over the last few weeks.  He visited his primary care office on  and was incidentally found to be in a regular rhythm.  EKG from primary care office looks like wandering atrial pacemaker versus multifocal atrial tachycardia.  He was sent to the emergency room where he was clearly found to be in atrial fibrillation.  Rapid ventricular rate of 134 bpm.  He was given metoprolol and ventricular rate came under control.  He was sent out with follow-up at this clinic visit.  He denies chest pain shortness of air or syncope.  No orthopnea PND or edema.  Thyroid function checked in July was within normal limits.  His alcohol intake is mild at most.  He denies snoring or hypersomnia.  He is a fairly active man and denies any limitations of exercise tolerance.      The following portions of the patient's history were reviewed and updated as appropriate: allergies, current medications, past family history, past medical history, past social history, past surgical history and problem list.      Review of Systems   Constitution: Negative for chills and fever.   HENT: Negative for ear pain, hoarse voice, rhinorrhea and sore throat.    Eyes: Negative for double vision and photophobia.   Cardiovascular: Positive for palpitations. Negative for chest pain, claudication, leg swelling, near-syncope, orthopnea, paroxysmal nocturnal dyspnea and syncope.   Respiratory: Positive for shortness of breath. Negative for cough, hemoptysis, sputum production and wheezing.    Skin: Negative for poor wound healing and rash.  "  Musculoskeletal: Negative for arthritis, joint swelling and neck pain.   Gastrointestinal: Negative for bloating, abdominal pain, hematemesis, hematochezia and vomiting.   Neurological: Negative for dizziness, focal weakness and headaches.   Psychiatric/Behavioral: Negative for depression and suicidal ideas.       OBJECTIVE:   Vital Signs  Vitals:    09/13/19 1009   BP: 128/70   Pulse: 87   Resp: 18   SpO2: 99%     Estimated body mass index is 23.46 kg/m² as calculated from the following:    Height as of this encounter: 182.9 cm (72\").    Weight as of this encounter: 78.5 kg (173 lb).    Physical Exam   Constitutional: He is oriented to person, place, and time. He appears well-developed and well-nourished.   HENT:   Head: Normocephalic and atraumatic.   Eyes: Conjunctivae are normal. Pupils are equal, round, and reactive to light.   Neck: No JVD present. No thyromegaly present.   Cardiovascular: Exam reveals no gallop and no friction rub.   No murmur heard.  Pulmonary/Chest: No respiratory distress. He exhibits no tenderness.   Abdominal: Bowel sounds are normal. He exhibits no distension.   Musculoskeletal: He exhibits no edema or tenderness.   Neurological: He is alert and oriented to person, place, and time.   Skin: No rash noted. No erythema.   Psychiatric: He has a normal mood and affect. Judgment normal.   Vitals reviewed.        ECG 12 Lead  Date/Time: 9/13/2019 3:48 PM  Performed by: Baldemar Garces Jr., MD  Authorized by: Baldemar Garces Jr., MD   Comparison: compared with previous ECG from 9/9/2019  Comparison to previous ECG: Sinus rhythm now replaces A. fib RVR  Rhythm: sinus rhythm    Clinical impression: normal ECG                ASSESSMENT:     PAF      PLAN OF CARE:     1. Paroxysmal atrial fibrillation -continue metoprolol.  Chads 2 vasc score of 1.  Patient will continue with aspirin at this time.  Check Holter monitor and echocardiogram.      Atrial Fibrillation and Atrial Flutter  Assessment  • " The patient has paroxysmal atrial fibrillation  • This is non-valvular in etiology  • The patient's CHADS2-VASc score is 1  • A [id number] score of 1 is considered an intermediate risk for a thromboembolic event    Subjective - Objective  • The average duration of atrial fibrillation episodes is <48 hours             Discharge Medications           Accurate as of 9/13/19  3:44 PM. If you have any questions, ask your nurse or doctor.               Continue These Medications      Instructions Start Date   allopurinol 300 MG tablet  Commonly known as:  ZYLOPRIM   300 mg, Oral, Daily, Take with food if GI upset occurs.      CLARITIN-D 24 HOUR PO   1 tablet, Oral, Daily      cyanocobalamin 1000 MCG tablet  Commonly known as:  VITAMIN B-12   1,000 mcg, Oral, Daily      fish oil 1000 MG capsule capsule   1,000 mg, Oral, Daily      fluticasone 50 MCG/ACT nasal spray  Commonly known as:  FLONASE   2 sprays, Each Nare, Daily PRN      HYDROcodone-acetaminophen 5-325 MG per tablet  Commonly known as:  NORCO   1 tablet, Oral, Every 6 Hours PRN      levoFLOXacin 500 MG tablet  Commonly known as:  LEVAQUIN   500 mg, Oral, Daily      lidocaine 5 % ointment  Commonly known as:  XYLOCAINE   Topical, Every 4 Hours PRN      magic mouthwash   5 mL, Swish & Spit, Every 2 Hours PRN      metoprolol tartrate 25 MG tablet  Commonly known as:  LOPRESSOR   25 mg, Oral, 2 Times Daily      omeprazole 20 MG capsule  Commonly known as:  priLOSEC   TAKE 1 CAPSULE BY MOUTH EVERY DAY      ondansetron 8 MG tablet  Commonly known as:  ZOFRAN   8 mg, Oral, 3 Times Daily PRN      sucralfate 1 GM/10ML suspension  Commonly known as:  CARAFATE   1 g, Oral, 4 Times Daily      VIACTIV CALCIUM PLUS D 650-12.5-40 MG-MCG-MCG chewable tablet  Generic drug:  Calcium-Vitamin D-Vitamin K   1 tablet, Oral, Daily      vitamin C 500 MG tablet  Commonly known as:  ASCORBIC ACID   1,000 mg, Oral, Daily      zolpidem 5 MG tablet  Commonly known as:  AMBIEN   5 mg, Oral,  Nightly PRN             Thank you for allowing me to participate in the care of your patient,      Sincerely,   Baldemar Garces Jr, MD  [facility]  09/13/19  3:44 PM

## 2019-09-16 NOTE — PROGRESS NOTES
Pt is here for lab with RN review.  CBC reviewed with Dr. Leal. Per Dr. Leal pt will continues 500 mg  of Levaquin daily and follow neutropenic precautions. Reviewed neutropenic precautions with pt. Pt return Wednesday to see Dr. Leal.   Copy of labs given to pt and f/u appt reviewed. Pt is instructed to call the office with any concerns or new symptoms prior to next visit. Pt vu.   Lab Results   Component Value Date    WBC 0.18 (C) 09/16/2019    HGB 8.7 (L) 09/16/2019    HCT 25.1 (L) 09/16/2019    MCV 87.2 09/16/2019    PLT 40 (L) 09/16/2019

## 2019-09-18 NOTE — PROGRESS NOTES
REASON FOR FOLLOWUP/CHIEF COMPLAINT:  AML  HISTORY OF PRESENT ILLNESS:        This is a 73 y.o. male with past medical history of rheumatoid arthritis, previous therapy for leiomyosarcoma with removal and radiation therapy involving the neck approximately 15 years ago, prostate carcinoma status post radiation therapy approximately 2 years ago and development in the 2018 discitis following a lumbar puncture attempt.  He has a demyelinating polyneuropathy also recognized and is presumed this had been part of his assessment concerning this.  We had seen the patient for pancytopenia in the fall and could not delineate an indirect cause.  As result of marrow was requested but declined.  The patient's rheumatoid arthritis has progressed with a number of treatments including rituximab initiated in January and then more recently with a second group of rituximab treatments.  This he has tolerated and apparently responded to per his rheumatoid arthritis. Unfortunately he has progressive pancytopenia that is felt to be multifactorial and has required a bone marrow aspirate and biopsy which is not completed.  His laboratory studies are approximately unchanged and his case been discussed with infectious disease with plans for 7 days of Levaquin, follow-up in office next available.  This was agreed with and the patient was discharged home.  He is now seen back 2019   with his wife and daughter.  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  Additionally he gives a history of his brother having AML and  of this disorder as well as a history of multiple malignancies in other family members.     We discussed his findings in detail over  approximately 45 minutes in the BMT program [facility] was contacted who saw the patient eventually recommending  hypo-methylation therapy along with venetoclax.  The patient undergone teaching for this and we have discussed Dacogen along with venetoclax which we we initiated August 5, 2019.  Port placement proceeded August 3, 2019 hope to initiate August 5, 2019.     He had further issues with pancytopenia and was hospitalized August 24-28 due to febrile neutropenia having developed a perineal abscess status post I&D.  He was discharged antibiotics including Omnicef, ongoing Levaquin holding his Venetoclax.  Patient was seen additionally September 9 having developed atrial fibrillation with rapid ventricular response though was not hypotensive.  He was seen in the emergency room same day and seen by cardiology with plans to start metoprolol, undergo Holter monitor and echocardiogram, and not proceed with anticoagulation as result of his ongoing thrombocytopenia.  The patient is next seen September 18, 2019 quite weak and fatigued though without fever or chills.  We discussed his overall treatment plan as we hope to see him again response with his current treatment approach.            Past Medical History, Past Surgical History, Social History, Family History have been reviewed and are without significant changes except as mentioned.    Review of Systems   Review of Systems   Constitutional: Negative for activity change.   HENT: Negative for nosebleeds and trouble swallowing.    Respiratory: Negative for shortness of breath and wheezing.    Cardiovascular: Negative for chest pain and palpitations.   Gastrointestinal: Positive for diarrhea. Negative for nausea.   Genitourinary: Negative for dysuria and hematuria.   Musculoskeletal: Negative for arthralgias and myalgias.   Neurological: Negative for seizures and syncope.   Hematological: Negative for adenopathy. Does not bruise/bleed easily.  "  Psychiatric/Behavioral: Negative for confusion.       Medications:  The current medication list was reviewed in the EMR    ALLERGIES:    Allergies   Allergen Reactions   • Lorazepam Hives              Vitals:    09/18/19 1208   BP: 126/72   Pulse: 94   Resp: 16   Temp: 98.8 °F (37.1 °C)   TempSrc: Oral   SpO2: 95%   Weight: 78.7 kg (173 lb 9.6 oz)   Height: 182.9 cm (72.01\")   PainSc: 0-No pain     Physical Exam    CONSTITUTIONAL:  Vital signs reviewed.  No distress, looks comfortable.  EYES:  Conjunctiva and lids unremarkable.  PERRLA  EARS,NOSE,MOUTH,THROAT:  Ears and nose appear unremarkable.  Lips, teeth, gums appear unremarkable.  RESPIRATORY:  Normal respiratory effort.  Lungs clear to auscultation bilaterally.  CARDIOVASCULAR:  Normal S1, S2.  No murmurs rubs or gallops.  No significant lower extremity edema.  GASTROINTESTINAL: Abdomen appears unremarkable.  Nontender.  No hepatomegaly.  No splenomegaly.  NEURO: cranial nerves 2-12 grossly intact.  No focal deficits.  Appears to have equal strength all 4 extremities.  MUSCULOSKELETAL:  Unremarkable digits/nails.  No cyanosis or clubbing.  SKIN:  Warm.  No rashes.  PSYCHIATRIC:  Normal judgment and insight.  Normal mood and affect.      RECENT LABS:  WBC   Date Value Ref Range Status   09/18/2019 0.15 (C) 3.40 - 10.80 10*3/mm3 Final   09/16/2019 0.18 (C) 3.40 - 10.80 10*3/mm3 Final     Hemoglobin   Date Value Ref Range Status   09/18/2019 8.3 (L) 13.0 - 17.7 g/dL Final   09/16/2019 8.7 (L) 13.0 - 17.7 g/dL Final     Platelets   Date Value Ref Range Status   09/18/2019 59 (L) 140 - 450 10*3/mm3 Final   09/16/2019 40 (L) 140 - 450 10*3/mm3 Final       ASSESSMENT/PLAN:    *Acute myeloid leukemia  • Bone marrow aspiration and biopsy on 7/18/2019 confirmed hypercellular marrow of 50% cellularity with increased blasts (20% on the biopsy, 26% on the aspirate smears, 31% by flow cytometry)  • Complex karyotype with deletion 5 q., monosomy 7 and others  • No Neupogen " due to the presence of acute myeloid leukemia  • He was seen at the [facility] BMT program with recommendations for treatment with decitabine D1-5 and venetoclax 400 mg daily.  Cycles every 28 days.  • C1D1 decitabine 8/5/2019. Next due 9/2/19, Venetoclax resumed August 27, 2019  • Cycle 2 decitabine initiated September 3, 2019  *Patient seen September 18th 2019 with plans to be seen back September 30 by MD or NP with third cycle of decitabine planned.        *Anemia due to chemotherapy, leukemia, and infection.  · On 8/25/2019, transfused 2 units of packed red blood cells for hemoglobin of 7.2  · Hb 8.3 today  · Transfusion scheduled following appointment September 18, 2019, follow-up repeat CBC 1 week later    *Leukocytopenia due to chemotherapy, leukemia, and infection.  WBC 0.15 with absolute neutrophil count of 0.01    *Neutropenia due to chemotherapy, leukemia, and infection.    *Thrombocytopenia due to chemotherapy, leukemia, and infection.  · PLT improved to 74 on 8/26/2019 with a unit transfusion on 8/25/2019  · PLT 59 today    *Pancytopenia due to chemotherapy, leukemia, and infection.      *Perianal abscess  · I&D Dr. Milligan 8/24/19, antibiotic therapy with Omnicef and Flagyl completed after discharge        *Infection prophylaxis, per Dr. Rayo note from 8/28/2019:  · Acyclovir restarted  · No fungal prophylaxis due to drug interactions with Venetoclax and low incidence of invasive fungal infections and trials.  · Levaquin restarted    *History of leiomyosarcoma treated with resection and radiation therapy to the neck approximately 15 years ago    *History of prostate cancer status post radiation therapy    *Rheumatoid arthritis.  Previously on Rituxan    *History of discitis, L3-L4, treated in October 2018    *Constipation.  Currently controlled    *Development of atrial fibrillation-patient normal sinus rhythm, Metoprolol continued, cardiology follow-up planned    *Additional supportive  plans include Marinol 2.5 mg p.o. twice daily, Ambien 5 mg p.o. [facility]

## 2019-09-19 NOTE — PATIENT INSTRUCTIONS
Blood Transfusion, Adult    A blood transfusion is a procedure in which you receive donated blood, including plasma, platelets, and red blood cells, through an IV tube. You may need a blood transfusion because of illness, surgery, or injury. The blood may come from a donor. You may also be able to donate blood for yourself (autologous blood donation) before a surgery if you know that you might require a blood transfusion.  The blood given in a transfusion is made up of different types of cells. You may receive:  · Red blood cells. These carry oxygen to the cells in the body.  · White blood cells. These help you fight infections.  · Platelets. These help your blood to clot.  · Plasma. This is the liquid part of your blood and it helps with fluid imbalances.  If you have hemophilia or another clotting disorder, you may also receive other types of blood products.  Tell a health care provider about:  · Any allergies you have.  · All medicines you are taking, including vitamins, herbs, eye drops, creams, and over-the-counter medicines.  · Any problems you or family members have had with anesthetic medicines.  · Any blood disorders you have.  · Any surgeries you have had.  · Any medical conditions you have, including any recent fever or cold symptoms.  · Whether you are pregnant or may be pregnant.  · Any previous reactions you have had during a blood transfusion.  What are the risks?  Generally, this is a safe procedure. However, problems may occur, including:  · Having an allergic reaction to something in the donated blood. Hives and itching may be symptoms of this type of reaction.  · Fever. This may be a reaction to the white blood cells in the transfused blood. Nausea or chest pain may accompany a fever.  · Iron overload. This can happen from having many transfusions.  · Transfusion-related acute lung injury (TRALI). This is a rare reaction that causes lung damage. The cause is not known. TRALI can occur within hours  of a transfusion or several days later.  · Sudden (acute) or delayed hemolytic reactions. This happens if your blood does not match the cells in your transfusion. Your body’s defense system (immune system) may try to attack the new cells. This complication is rare. The symptoms include fever, chills, nausea, and low back pain or chest pain.  · Infection or disease transmission. This is rare.  What happens before the procedure?  · You will have a blood test to determine your blood type. This is necessary to know what kind of blood your body will accept and to match it to the donor blood.  · If you are going to have a planned surgery, you may be able to do an autologous blood donation. This may be done in case you need to have a transfusion.  · If you have had an allergic reaction to a transfusion in the past, you may be given medicine to help prevent a reaction. This medicine may be given to you by mouth or through an IV tube.  · You will have your temperature, blood pressure, and pulse monitored before the transfusion.  · Follow instructions from your health care provider about eating and drinking restrictions.  · Ask your health care provider about:  ? Changing or stopping your regular medicines. This is especially important if you are taking diabetes medicines or blood thinners.  ? Taking medicines such as aspirin and ibuprofen. These medicines can thin your blood. Do not take these medicines before your procedure if your health care provider instructs you not to.  What happens during the procedure?  · An IV tube will be inserted into one of your veins.  · The bag of donated blood will be attached to your IV tube. The blood will then enter through your vein.  · Your temperature, blood pressure, and pulse will be monitored regularly during the transfusion. This monitoring is done to detect early signs of a transfusion reaction.  · If you have any signs or symptoms of a reaction, your transfusion will be stopped and  you may be given medicine.  · When the transfusion is complete, your IV tube will be removed.  · Pressure may be applied to the IV site for a few minutes.  · A bandage (dressing) will be applied.  The procedure may vary among health care providers and hospitals.  What happens after the procedure?  · Your temperature, blood pressure, heart rate, breathing rate, and blood oxygen level will be monitored often.  · Your blood may be tested to see how you are responding to the transfusion.  · You may be warmed with fluids or blankets to maintain a normal body temperature.  Summary  · A blood transfusion is a procedure in which you receive donated blood, including plasma, platelets, and red blood cells, through an IV tube.  · Your temperature, blood pressure, and pulse will be monitored before, during, and after the transfusion.  · Your blood may be tested after the transfusion to see how your body has responded.  This information is not intended to replace advice given to you by your health care provider. Make sure you discuss any questions you have with your health care provider.  Document Released: 12/15/2001 Document Revised: 09/14/2017 Document Reviewed: 09/14/2017  [employer] Interactive Patient Education © 2019 [employer]

## 2019-09-19 NOTE — PROGRESS NOTES
1330-Ate all of lunch.    1406-Port flushed with 20cc of normal saline before Heparin flush 500units instilled.

## 2019-09-20 NOTE — PROGRESS NOTES
"Baldemar Wellington is a 73 y.o. male in for follow up of Rectal/anal ulcer  s/p incision and debridement perianal ulceration 8/25/2019    Pt states his bottom is feeling better, but now it feels chapped  They applied desitin recently, which was helpful    His stools are soft    He presented to ED 9/9/2019 for new onset AFib  Cards Dr. Baldemar Sanchez  Plans for Holter monitor next week    /60 (BP Location: Left arm, Patient Position: Sitting, Cuff Size: Adult)   Pulse 53   Temp 98.5 °F (36.9 °C) (Oral)   Ht 182.9 cm (72\")   Wt 78.4 kg (172 lb 12.8 oz)   SpO2 96%   BMI 23.44 kg/m²   Body mass index is 23.44 kg/m².      PE:  Physical Exam   Constitutional: He appears well-developed. No distress.   HENT:   Head: Normocephalic and atraumatic.   Abdominal: Soft. He exhibits no distension.   Genitourinary:   Genitourinary Comments: Perianal exam: external: posterior wound healing in well with clean pink granulation tissue.  Clipped extruding suture   Musculoskeletal: Normal range of motion.   Neurological: He is alert.   Psychiatric: Thought content normal.         Assessment:   1. Rectal/anal ulcer    s/p incision and debridement perianal ulceration 8/25/2019     Plan:    He is healing well postop.  Continue to apply otc barrier cream to perianal skin    If he is having loose stools, he can take imodium prn.    Call or come in if any questions or concerns      RTC 2 weeks      Scribed for Tosin Milligan MD by Mar Bolñaos PA-C  9/20/2019   This patient was evaluated by me, recommendations made, documentation reviewed, edited, and revised by me, Tosin Milligan MD        "

## 2019-09-24 PROBLEM — J18.9 PNEUMONIA OF BOTH LOWER LOBES DUE TO INFECTIOUS ORGANISM: Status: ACTIVE | Noted: 2019-01-01

## 2019-10-01 NOTE — CONSULTS
Lani  [employer]    Importance  Pt's family member informed me the pt was receiving Communion from [facility] daily.  I assess lani is highly important.    Community  Unable to assess family/community support system  Family member present offering support.    Assessment  I assess the pt and family is using lani for positive coping. They informed me they were under the care of their lani community at this point in time.   Family and pt seem to be coping well with prognosis.    Follow-up as needed.

## 2019-10-01 NOTE — PROGRESS NOTES
"Daily progress note    Chief complaint  Awake and alert  No new problems  Looks comfortable  Family at bedside    History of present illness  73-year-old white male who is well-known to our service with history of acute myeloid leukemia on chemotherapy also have leiomyosarcoma followed by CBC group other medical problem rheumatoid arthritis osteoarthritis prostate cancer gastroesophageal for disease who is a immunocompromise host presented to [facility] emergency room with shortness of breath reductive cough fever chills for last to 3 days.  Patient has had chemotherapy 2 weeks ago.  Patient evaluated in ER found to have bilateral lower lobe pneumonia with neutropenic fever admit for management.  Patient is septic.  Patient denies any chest pain nausea vomiting diarrhea.  Patient also denies any abdominal pain.    PHYSICAL EXAM   Blood pressure 94/57, pulse (!) 156, temperature (!) 103.4 °F (39.7 °C), temperature source Oral, resp. rate 18, height 182.9 cm (72\"), weight 79.8 kg (176 lb), SpO2 (!) 65 %.    Unchanged    LAB RESULTS  Lab Results (last 24 hours)     Procedure Component Value Units Date/Time    Blood Culture - Blood, Blood, Central Line [[id number]] Collected:  09/29/19 1218    Specimen:  Blood, Central Line Updated:  10/01/19 1231     Blood Culture No growth at 2 days    Blood Culture - Blood, Blood, Central Line [[id number]] Collected:  09/27/19 2145    Specimen:  Blood, Central Line Updated:  09/30/19 2216     Blood Culture No growth at 3 days        Imaging Results (last 24 hours)     ** No results found for the last 24 hours. **      EKG  Rhythm/Rate: sinus tachycardia rate 117  Nml axis  Nml intervals   No acute ischemic changes  No STEMI        Current Facility-Administered Medications:   •  acetaminophen (TYLENOL) tablet 650 mg, 650 mg, Oral, Q4H PRN, 650 mg at 09/30/19 0651 **OR** acetaminophen (TYLENOL) 160 MG/5ML solution 650 mg, 650 mg, Oral, Q4H PRN **OR** acetaminophen (TYLENOL) " suppository 650 mg, 650 mg, Rectal, Q4H PRN, Tariq Cottrell MD  •  diphenhydrAMINE (BENADRYL) capsule 25 mg, 25 mg, Oral, Q6H PRN **OR** diphenhydrAMINE (BENADRYL) injection 25 mg, 25 mg, Intravenous, Q6H PRN, Tariq Cottrell MD  •  diphenoxylate-atropine (LOMOTIL) 2.5-0.025 MG per tablet 1 tablet, 1 tablet, Oral, Q2H PRN, Tariq Cottrell MD  •  Glycerin-Hypromellose- (ARTIFICIAL TEARS) 0.2-0.2-1 % ophthalmic solution solution 1 drop, 1 drop, Both Eyes, Q30 Min PRN, Tariq Cottrell MD  •  glycopyrrolate (ROBINUL) injection 0.2 mg, 0.2 mg, Intravenous, Q2H PRN **OR** glycopyrrolate (ROBINUL) injection 0.2 mg, 0.2 mg, Subcutaneous, Q2H PRN **OR** glycopyrrolate (ROBINUL) injection 0.4 mg, 0.4 mg, Intravenous, Q2H PRN **OR** glycopyrrolate (ROBINUL) injection 0.4 mg, 0.4 mg, Subcutaneous, Q2H PRN, Tariq Cottrell MD  •  guaiFENesin-dextromethorphan (ROBITUSSIN DM) 100-10 MG/5ML syrup 5 mL, 5 mL, Oral, 4x Daily PRN, Tariq Cottrell MD, 5 mL at 09/30/19 1935  •  haloperidol (HALDOL) tablet 1 mg, 1 mg, Oral, Q4H PRN **OR** haloperidol (HALDOL) 2 MG/ML solution 1 mg, 1 mg, Oral, Q4H PRN **OR** [DISCONTINUED] haloperidol lactate (HALDOL) injection 1 mg, 1 mg, Subcutaneous, Q4H PRN, Tariq Cottrell MD, 1 mg at 09/30/19 1854  •  haloperidol (HALDOL) tablet 2 mg, 2 mg, Oral, Q4H PRN **OR** haloperidol (HALDOL) 2 MG/ML solution 2 mg, 2 mg, Oral, Q4H PRN **OR** [DISCONTINUED] haloperidol lactate (HALDOL) injection 2 mg, 2 mg, Subcutaneous, Q4H PRN, Tariq Cottrell MD  •  haloperidol lactate (HALDOL) injection 1 mg, 1 mg, Intravenous, Q4H PRN, 1 mg at 10/01/19 0715 **OR** haloperidol lactate (HALDOL) injection 2 mg, 2 mg, Intravenous, Q4H PRN **OR** haloperidol lactate (HALDOL) injection 5 mg, 5 mg, Intravenous, Q4H PRN, Tariq Cottrell MD  •  HYDROmorphone (DILAUDID) injection 0.5 mg, 0.5 mg, Intravenous, Q1H PRN **OR** morphine injection 2 mg, 2 mg, Intravenous, Q1H PRN, 2 mg at 09/30/19 1506 **OR** morphine concentrated solution  solution 5 mg, 5 mg, Sublingual, Q1H PRN, Sulaiman Cottrell MD  •  HYDROmorphone (DILAUDID) injection 1 mg, 1 mg, Intravenous, Q1H PRN **OR** morphine injection 4 mg, 4 mg, Intravenous, Q1H PRN, 4 mg at 09/30/19 1437 **OR** morphine concentrated solution solution 10 mg, 10 mg, Sublingual, Q1H PRN, Sulaiman Cottrell MD  •  HYDROmorphone (DILAUDID) injection 1.5 mg, 1.5 mg, Intravenous, Q1H PRN **OR** Morphine injection 6 mg, 6 mg, Intravenous, Q1H PRN, 6 mg at 10/01/19 1042 **OR** morphine concentrated solution solution 20 mg, 20 mg, Sublingual, Q1H PRN, Sulaiman Cottrell MD  •  magic mouthwash oral supsension 5 mL, 5 mL, Swish & Spit, Q4H PRN, Sulaiman Cottrell MD  •  promethazine (PHENERGAN) injection 12.5 mg, 12.5 mg, Intravenous, Q4H PRN **OR** promethazine (PHENERGAN) tablet 12.5 mg, 12.5 mg, Oral, Q4H PRN **OR** promethazine (PHENERGAN) 6.25 MG/5ML syrup 12.5 mg, 12.5 mg, Oral, Q4H PRN **OR** promethazine (PHENERGAN) suppository 12.5 mg, 12.5 mg, Rectal, Q4H PRN, Sulaiman Cottrell MD  •  zolpidem (AMBIEN) tablet 5 mg, 5 mg, Oral, Nightly PRN, Sulaiman Cottrell MD, 5 mg at 09/30/19 2230     ASSESSMENT  Bilateral pneumonia with sepsis in immunocompromised host  Febrile neutropenia  Chest pain pleuritic  Acute myeloid leukemia on chemotherapy  Chronic anemia with pancytopenia  Osteoarthritis  Rheumatoid arthritis  History of leiomyosarcoma  History of prostate cancer  Gastroesophageal reflux disease    PLAN  Comfort care only  Allow natural death  Routine palliative care orders  Discussed with family    SULAIMAN COTTRELL MD

## 2019-10-01 NOTE — PROGRESS NOTES
Palliative Care Sw met with patient and family at bedside. Patient alert and several family members present. Patient's wife reported patient has new active insurance as of today. Copied insurance cards and provided to CCP. Family denied any further needs at this time. Provided psychosocial support and advised of availability. Will continue to follow as needed.

## 2019-10-01 NOTE — PLAN OF CARE
Problem: Patient Care Overview  Goal: Plan of Care Review  Outcome: Ongoing (interventions implemented as appropriate)   10/01/19 3293   Coping/Psychosocial   Plan of Care Reviewed With patient;family;spouse   Plan of Care Review   Progress no change   OTHER   Outcome Summary Early this morning, kirk placed for retention and generalized restlessness greatly improved. Patient medicated x4 with morphine for shortness of air and pain control. Family at bedside supportive and appreciate of care. Will continue to monitor per palliative goals of care.      Goal: Individualization and Mutuality  Outcome: Ongoing (interventions implemented as appropriate)    Goal: Discharge Needs Assessment  Outcome: Ongoing (interventions implemented as appropriate)    Goal: Interprofessional Rounds/Family Conf  Outcome: Ongoing (interventions implemented as appropriate)      Problem: Skin Injury Risk (Adult)  Goal: Skin Health and Integrity  Outcome: Ongoing (interventions implemented as appropriate)      Problem: Fall Risk (Adult)  Goal: Absence of Fall  Outcome: Ongoing (interventions implemented as appropriate)      Problem: Palliative Care (Adult)  Goal: Maximized Comfort  Outcome: Ongoing (interventions implemented as appropriate)    Goal: Enhanced Quality of Life  Outcome: Ongoing (interventions implemented as appropriate)

## 2019-10-01 NOTE — PLAN OF CARE
Problem: Patient Care Overview  Goal: Plan of Care Review  Outcome: Ongoing (interventions implemented as appropriate)   10/01/19 0529   Coping/Psychosocial   Plan of Care Reviewed With patient;spouse   Plan of Care Review   Progress declining   OTHER   Outcome Summary PRN meds admin for severe SOA and resp distress, sx improved after meds. Pt appears to have rested/slept comfortably between care. Spouse at bsd through NOC and expressed desire for comfort care only and for pt to rest and breathe easy. Repositioning declined by spouse d/t pt SOA, spouse vu r/t risks of skin breakdown d/t lack of repositioning, spouse wishes honored. Continue to monitor and tx per POC and MD orders.        Problem: Skin Injury Risk (Adult)  Goal: Skin Health and Integrity  Outcome: Ongoing (interventions implemented as appropriate)      Problem: Fall Risk (Adult)  Goal: Identify Related Risk Factors and Signs and Symptoms  Outcome: Outcome(s) achieved Date Met: 10/01/19    Goal: Absence of Fall  Outcome: Ongoing (interventions implemented as appropriate)      Problem: Palliative Care (Adult)  Goal: Identify Related Risk Factors and Signs and Symptoms  Outcome: Outcome(s) achieved Date Met: 10/01/19    Goal: Maximized Comfort  Outcome: Ongoing (interventions implemented as appropriate)    Goal: Enhanced Quality of Life  Outcome: Ongoing (interventions implemented as appropriate)

## 2019-10-02 NOTE — PROGRESS NOTES
LOS: 8 days       Chief Complaint: AML with pancytopenia, pneumonia, neutropenic fever, atrial fibrillation with rapid ventricular response, history of rheumatoid arthritis    Interval History: The patient reports significant increase in dyspnea today as well as chest discomfort.  Morphine does help but briefly.  Dilaudid did not help as much as morphine.  He is becoming somewhat agitated because of the shortness of breath and discomfort.      Review of Systems:    Review of systems was obtained with pertinent positive findings as noted in the interval history.  All other systems negative.    Objective     Vital Signs  Temp:  [101.9 °F (38.8 °C)] 101.9 °F (38.8 °C)  Heart Rate:  [161-168] 168  Resp:  [20-32] 32  BP: (113-137)/(65-82) 137/82        Physical Exam:     GENERAL: Elderly cachectic appearing gentleman lying in bed  CHEST: Decreased breath sounds bilaterally, occasional scattered rhonchi  CARDIAC: Tachycardic, irregularly irregular  EXTREMITIES: Trace bilateral lower extremity edema  ABDOMEN: Soft, nondistended, bowel sounds present  NEUROLOGICAL:  Cranial Nerves II-XII grossly intact.  No focal neurological deficits.  PSYCHIATRIC: Somewhat agitated, awake and communicative.         Results Review:     I reviewed the patient's new clinical results.    Results from last 7 days   Lab Units 09/30/19  0505   WBC 10*3/mm3 0.37*   HEMOGLOBIN g/dL 8.9*   HEMATOCRIT % 26.6*   PLATELETS 10*3/mm3 125*     Lab Results   Component Value Date    NEUTROABS 0.04 (L) 09/28/2019     Results from last 7 days   Lab Units 09/30/19  0505   SODIUM mmol/L 140   POTASSIUM mmol/L 4.1   CHLORIDE mmol/L 105   CO2 mmol/L 23.1   BUN mg/dL 15   CREATININE mg/dL 0.91   GLUCOSE mg/dL 106*   CALCIUM mg/dL 7.2*                 Medication Review: Yes     Assessment/Plan   1. 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  Cycles every 28 days.  · C1D1 decitabine 8/5/2019. Next due 9/2/19, Venetoclax resumed August 27, 2019  · Cycle 2 decitabine initiated September 3, 2019.  Received 4 days treatment.  · Patient admitted on 9/24/2019 with fever shortness of breath and pneumonia profound leukopenia while on Levaquin.  · Please note Venetoclax held upon admission.  At this point held initiation of Posaconazole.  · Patient with continued decline, atrial fibrillation with rapid ventricular response, continued fevers and worsening respiratory status with bilateral pleural effusions.  Patient with continued pancytopenia with neutropenia, ANC 0.37, stable hemoglobin at 8.9, platelet count 125,000.  Extremely poor overall prognosis with limited expected survival even with full support.  Patient wished to transition to comfort measures on 9/30/2019.  This was felt to be very appropriate in the current situation.  He did not wish to pursue any aggressive measures in managing his atrial fibrillation certainly did not wish to move to a telemetry unit.  Therefore transitioned to palliative/comfort care in the hospital with expected limited survival time.    2. Bilateral pneumonia in the setting of neutropenia:  · Patient with persistent neutropenia from AML  · Bilateral infiltrates, receiving empiric Zosyn and posaconazole  · Infectious disease following  · Continued intermittent fevers, last up to 102.2 at 11:30 PM last night, currently 99.3.  Worsening respiratory status this morning with increased use of accessory muscles.  CT chest 9/29/2019 with new moderate bilateral pleural effusions and worsening bibasilar pulmonary  consolidation.  · Discussion regarding potential need for bronchoscopy however patient declined this and as above proceeded to palliative/comfort measures.  3. Atrial fibrillation with rapid ventricular response:  · Patient followed by cardiology, has been continuing on digoxin, metoprolol  · On 9/30/2019, heart rate into the 150-170 range, cardiology called with rapid response.  Discussion regarding potential transfer to monitored bed for amiodarone drip.  Patient however did not wish to proceed with escalation of care and wished instead to transition to palliative/comfort measures as above.    4. Palliative care/comfort measures:  · Patient is DNR, comfort measures only  · Today, patient is experiencing significant increase in dyspnea and chest discomfort producing agitation.  He has been receiving morphine 6 mg around every 2 hours.  The patient and his family are requesting a change in his morphine administration to a PCA which seems like the best way to control his symptoms currently.  We will begin with morphine PCA at 3 mg/h with 1 mg every 20 minutes for demand dosing, lock out of 6 mg/h.     Plan:  1. Begin morphine PCA 3 mg/h basal rate with 1 mg every 20-minute demand dosing, lockout 6 mg/h.    Discussed with patient and family at bedside, questions answered to their satisfaction, support provided.    Baldemar Levin MD  10/02/19  6:10 PM

## 2019-10-02 NOTE — PLAN OF CARE
Problem: Patient Care Overview  Goal: Plan of Care Review  Outcome: Ongoing (interventions implemented as appropriate)    Goal: Individualization and Mutuality  Outcome: Ongoing (interventions implemented as appropriate)   10/02/19 0444   Individualization   Patient Specific Goals (Include Timeframe) to keep comfortable    Patient Specific Interventions Q2 Morphine for SOA, assist with turns, Fan, Oxygen   Mutuality/Individual Preferences   What Anxieties, Fears, Concerns, or Questions Do You Have About Your Care? worried about SOA,    How Would You and/or Your Support Person Like to Participate in Your Care? family to be involved    Mutuality/Individual Preferences   How to Address Anxieties/Fears Q2 medication with morphine for SOA       Problem: Skin Injury Risk (Adult)  Goal: Skin Health and Integrity  Outcome: Ongoing (interventions implemented as appropriate)      Problem: Fall Risk (Adult)  Goal: Absence of Fall  Outcome: Ongoing (interventions implemented as appropriate)      Problem: Palliative Care (Adult)  Goal: Maximized Comfort  Outcome: Ongoing (interventions implemented as appropriate)    Goal: Enhanced Quality of Life  Outcome: Ongoing (interventions implemented as appropriate)

## 2019-10-02 NOTE — PLAN OF CARE
Problem: Patient Care Overview  Goal: Plan of Care Review  Outcome: Ongoing (interventions implemented as appropriate)   10/02/19 5703   Coping/Psychosocial   Plan of Care Reviewed With patient   Plan of Care Review   Progress declining   OTHER   Outcome Summary pt needs to be medicated Q2 hours due to SOA. No haldol needed this shift, Fan on pt and NC 2 L added for comfort. Q4 turns with assists. family at bedside will continue to monitor and keep comfortable

## 2019-10-02 NOTE — PROGRESS NOTES
LOS: 7 days       Chief Complaint: AML with pancytopenia, pneumonia, neutropenic fever, atrial fibrillation with rapid ventricular response, history of rheumatoid arthritis    Interval History: The patient was transferred yesterday to the palliative care unit, continuing currently on comfort measures.  He is awake and alert.  He reports episodes of chest discomfort and cough.  He is receiving morphine dosing every few hours 6 mg.  He is not sure that his pain relief is adequate at this time.  Family is at the bedside.  He is experiencing some visual hallucinations with narcotics.      Review of Systems:    Review of systems was obtained with pertinent positive findings as noted in the interval history.  All other systems negative.    Objective     Vital Signs  Temp:  [99.3 °F (37.4 °C)-103.4 °F (39.7 °C)] 99.3 °F (37.4 °C)  Heart Rate:  [100-156] 100  Resp:  [18-20] 20  BP: ()/(57-75) 145/75        Physical Exam:     GENERAL: Elderly cachectic appearing gentleman lying in bed  EXTREMITIES: Trace bilateral lower extremity edema  NEUROLOGICAL:  Cranial Nerves II-XII grossly intact.  No focal neurological deficits.  PSYCHIATRIC:  Normal affect and mood.           Results Review:     I reviewed the patient's new clinical results.    Results from last 7 days   Lab Units 09/30/19  0505   WBC 10*3/mm3 0.37*   HEMOGLOBIN g/dL 8.9*   HEMATOCRIT % 26.6*   PLATELETS 10*3/mm3 125*     Lab Results   Component Value Date    NEUTROABS 0.04 (L) 09/28/2019     Results from last 7 days   Lab Units 09/30/19  0505   SODIUM mmol/L 140   POTASSIUM mmol/L 4.1   CHLORIDE mmol/L 105   CO2 mmol/L 23.1   BUN mg/dL 15   CREATININE mg/dL 0.91   GLUCOSE mg/dL 106*   CALCIUM mg/dL 7.2*                 Medication Review: Yes     Assessment/Plan   1. 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  Cycles every 28 days.  · C1D1 decitabine 8/5/2019. Next due 9/2/19, Venetoclax resumed August 27, 2019  · Cycle 2 decitabine initiated September 3, 2019.  Received 4 days treatment.  · Patient admitted on 9/24/2019 with fever shortness of breath and pneumonia profound leukopenia while on Levaquin.  · Please note Venetoclax held upon admission.  At this point held initiation of Posaconazole.  · Patient with continued decline, atrial fibrillation with rapid ventricular response, continued fevers and worsening respiratory status with bilateral pleural effusions.  Patient with continued pancytopenia with neutropenia, ANC 0.37, stable hemoglobin at 8.9, platelet count 125,000.  Extremely poor overall prognosis with limited expected survival even with full support.  Patient wished to transition to comfort measures on 9/30/2019.  This was felt to be very appropriate in the current situation.  He did not wish to pursue any aggressive measures in managing his atrial fibrillation certainly did not wish to move to a telemetry unit.  Therefore transitioned to palliative/comfort care in the hospital with expected limited survival time.    2. Bilateral pneumonia in the setting of neutropenia:  · Patient with persistent neutropenia from AML  · Bilateral infiltrates, receiving empiric Zosyn and posaconazole  · Infectious disease following  · Continued intermittent fevers, last up to 102.2 at 11:30 PM last night, currently 99.3.  Worsening respiratory status this morning with increased use of accessory muscles.  CT chest 9/29/2019 with new moderate bilateral pleural effusions and worsening bibasilar pulmonary consolidation.  · Discussion  regarding potential need for bronchoscopy however patient declined this and as above proceeded to palliative/comfort measures.  3. Atrial fibrillation with rapid ventricular response:  · Patient followed by cardiology, has been continuing on digoxin, metoprolol  · On 9/30/2019, heart rate into the 150-170 range, cardiology called with rapid response.  Discussion regarding potential transfer to monitored bed for amiodarone drip.  Patient however did not wish to proceed with escalation of care and wished instead to transition to palliative/comfort measures as above.    4. Palliative care/comfort measures:  · Patient is DNR, comfort measures only  · Patient at this point appears more comfortable in terms of his respiratory status than yesterday.  He apparently has had some episodes however of respiratory distress intermittently and complains of ongoing chest discomfort with intermittent cough.  We will escalate frequency of narcotic administration if necessary for both pain and dyspnea, discussed with patient today.     Plan:  1. Continue current palliative care    Discussed with patient and family at bedside, questions answered to their satisfaction, support provided.    Baldemar Levin MD  10/01/19  10:11 PM

## 2019-10-02 NOTE — PROGRESS NOTES
"[facility]    Daily progress note    Chief complaint  Patient is awake and alert  Family at bedside  Comfortable  On palliative care orders  Febrile, tachycardic, tachypneic, saturation is 96%.    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  Patient has had chemotherapy 2 weeks ago.  Patient evaluated in ER found to have bilateral lower lobe pneumonia with neutropenic fever admit for management.  Patient is septic.  Patient denies any chest pain nausea vomiting diarrhea.  Patient also denies any abdominal pain.    PHYSICAL EXAM   Blood pressure 113/65, pulse (!) 161, temperature (!) 101.9 °F (38.8 °C), temperature source Oral, resp. rate 20, height 182.9 cm (72\"), weight 79.8 kg (176 lb), SpO2 (!) 76 %.    Unchanged    LAB RESULTS  Lab Results (last 24 hours)     Procedure Component Value Units Date/Time    Histoplasma Ag Ur - Urine, Clean Catch [[id number]] Collected:  09/27/19 1431    Specimen:  Urine, Clean Catch Updated:  10/02/19 0555     Reference Lab Report See attached report    Narrative:       See original report from [employer].    Histoplasma Antigen ser [[id number]] Collected:  09/27/19 2144    Specimen:  Blood Updated:  10/02/19 0555     HISTOPLASMA ANTIGEN, SERUM See attached report    Narrative:       See original report from [employer].    Blood Culture - Blood, Blood, Central Line [[id number]] Collected:  09/27/19 2145    Specimen:  Blood, Central Line Updated:  10/01/19 2216     Blood Culture No growth at 4 days    Aspergillus Galactomannan Antigen [[id number]] Collected:  " 09/27/19 2144    Specimen:  Blood Updated:  10/01/19 1907     Aspergillus Ag, BAL/Serum 0.02 Index     Narrative:       Performed at:  [address] [place] [address]  [id number]  : Audi Cota MD, Phone:  [phone]    Fungitell B-D Glucan [[id number]] Collected:  09/27/19 2144    Specimen:  Blood Updated:  10/01/19 1509     Result <31 pg/mL      Comment: Interpretation: The Fungitell assay does not detect  certain fungal species such as the genus Cryptococcus  (Miriam et al. 1991) which produces very low levels of  (1-3)-Beta-D-Glucan. The assay also does not detect the  Zygomycetes such as Absidia, Mucor and Rhizopus (Tavo  et al. 1994) which are not known to produce  (1-3)-Beta-D-Glucan. In addition, the yeast phase of  Blastomyces dermatitidis produces little  (1-3)-Beta-D-Glucan and may not be detected by the assay  (Alfredo et al. 2007).  Reference Range:  Less than 60 pg/mL. Glucan values of less than 60 pg/mL  are interpreted as negative.  Glucan values of 60 to 79 pg/mL are interpreted as  indeterminate, and suggest a possible fungal infection.  Additional sampling and testing of sera is required to  interpret the results.  Glucan values of greater than or equal to 80 pg/mL are  interpreted as positive.  Due to the potential for environmental contamination when  transferred to pour-off tubes, which can lead to false  positive results, interpret positive results from samples  provided in pour-off tubes with caution.  Results should  be used in conjunction with clinical findings, and should  not form the sole basis for a diagnosis or treatment  decision. The Fungitell test is approved or cleared for in  vitro diagnostic use by the U.[place] Food and Drug  Administration. Modifications to the approved package  insert have been made and the performance characteristics  for these modifications were determined by [employer].  If sample result is greater than 500 pg/mL,  physician may  order a titer of the sample. Please contact [facility] if you would like to order a retest of this  sample to obtain an actual value. Samples are held for 1  week after initial testing date.       Narrative:       Performed at:  [facility]  [address], [place]  [id number]  : Cheryl Olson PhD, Phone:  [phone]    Blood Culture - Blood, Blood, Central Line [[id number]] Collected:  09/29/19 1218    Specimen:  Blood, Central Line Updated:  10/01/19 1231     Blood Culture No growth at 2 days        Imaging Results (last 24 hours)     ** No results found for the last 24 hours. **      EKG  Rhythm/Rate: sinus tachycardia rate 117  Nml axis  Nml intervals   No acute ischemic changes  No STEMI        Current Facility-Administered Medications:   •  acetaminophen (TYLENOL) tablet 650 mg, 650 mg, Oral, Q4H PRN, 650 mg at 09/30/19 1855 **OR** acetaminophen (TYLENOL) 160 MG/5ML solution 650 mg, 650 mg, Oral, Q4H PRN **OR** acetaminophen (TYLENOL) suppository 650 mg, 650 mg, Rectal, Q4H PRN, Tariq Cottrell MD  •  diphenhydrAMINE (BENADRYL) capsule 25 mg, 25 mg, Oral, Q6H PRN **OR** diphenhydrAMINE (BENADRYL) injection 25 mg, 25 mg, Intravenous, Q6H PRN, Tariq Cottrell MD  •  diphenoxylate-atropine (LOMOTIL) 2.5-0.025 MG per tablet 1 tablet, 1 tablet, Oral, Q2H PRN, Tariq Cottrell MD  •  Glycerin-Hypromellose- (ARTIFICIAL TEARS) 0.2-0.2-1 % ophthalmic solution solution 1 drop, 1 drop, Both Eyes, Q30 Min PRN, Tariq Cottrell MD  •  glycopyrrolate (ROBINUL) injection 0.2 mg, 0.2 mg, Intravenous, Q2H PRN **OR** glycopyrrolate (ROBINUL) injection 0.2 mg, 0.2 mg, Subcutaneous, Q2H PRN **OR** glycopyrrolate (ROBINUL) injection 0.4 mg, 0.4 mg, Intravenous, Q2H PRN **OR** glycopyrrolate (ROBINUL) injection 0.4 mg, 0.4 mg, Subcutaneous, Q2H PRN, Tariq Cottrell MD  •  guaiFENesin-dextromethorphan (ROBITUSSIN DM) 100-10 MG/5ML syrup 5 mL, 5 mL, Oral, 4x Daily PRN, Ronit  MD Tariq, 5 mL at 09/30/19 1935  •  haloperidol (HALDOL) tablet 1 mg, 1 mg, Oral, Q4H PRN **OR** haloperidol (HALDOL) 2 MG/ML solution 1 mg, 1 mg, Oral, Q4H PRN **OR** [DISCONTINUED] haloperidol lactate (HALDOL) injection 1 mg, 1 mg, Subcutaneous, Q4H PRN, Tariq Cottrell MD, 1 mg at 09/30/19 1854  •  haloperidol (HALDOL) tablet 2 mg, 2 mg, Oral, Q4H PRN **OR** haloperidol (HALDOL) 2 MG/ML solution 2 mg, 2 mg, Oral, Q4H PRN **OR** [DISCONTINUED] haloperidol lactate (HALDOL) injection 2 mg, 2 mg, Subcutaneous, Q4H PRN, Tariq Cottrell MD  •  haloperidol lactate (HALDOL) injection 1 mg, 1 mg, Intravenous, Q4H PRN, 1 mg at 10/02/19 0859 **OR** haloperidol lactate (HALDOL) injection 2 mg, 2 mg, Intravenous, Q4H PRN **OR** haloperidol lactate (HALDOL) injection 5 mg, 5 mg, Intravenous, Q4H PRN, Tariq Cottrell MD  •  HYDROmorphone (DILAUDID) injection 0.5 mg, 0.5 mg, Intravenous, Q1H PRN **OR** morphine injection 2 mg, 2 mg, Intravenous, Q1H PRN, 2 mg at 10/02/19 0900 **OR** morphine concentrated solution solution 5 mg, 5 mg, Sublingual, Q1H PRN, Tariq Cottrell MD  •  HYDROmorphone (DILAUDID) injection 1 mg, 1 mg, Intravenous, Q1H PRN **OR** morphine injection 4 mg, 4 mg, Intravenous, Q1H PRN, 4 mg at 10/02/19 0900 **OR** morphine concentrated solution solution 10 mg, 10 mg, Sublingual, Q1H PRN, Tariq Cottrell MD  •  HYDROmorphone (DILAUDID) injection 1.5 mg, 1.5 mg, Intravenous, Q1H PRN **OR** Morphine injection 6 mg, 6 mg, Intravenous, Q1H PRN, 6 mg at 10/02/19 0653 **OR** morphine concentrated solution solution 20 mg, 20 mg, Sublingual, Q1H PRN, Tariq Cottrell MD  •  magic mouthwash oral supsension 5 mL, 5 mL, Swish & Spit, Q4H PRN, Tariq Cottrell MD  •  promethazine (PHENERGAN) injection 12.5 mg, 12.5 mg, Intravenous, Q4H PRN **OR** promethazine (PHENERGAN) tablet 12.5 mg, 12.5 mg, Oral, Q4H PRN **OR** promethazine (PHENERGAN) 6.25 MG/5ML syrup 12.5 mg, 12.5 mg, Oral, Q4H PRN **OR** promethazine (PHENERGAN) suppository 12.5  mg, 12.5 mg, Rectal, Q4H PRN, Tariq Cottrell MD  •  zolpidem (AMBIEN) tablet 5 mg, 5 mg, Oral, Nightly PRN, Tariq Cottrell MD, 5 mg at 10/01/19 2300     ASSESSMENT  Bilateral pneumonia with sepsis in immunocompromised host  Febrile neutropenia  Chest pain pleuritic  Acute myeloid leukemia on chemotherapy  Chronic anemia with pancytopenia  Osteoarthritis  Rheumatoid arthritis  History of leiomyosarcoma  History of prostate cancer  Gastroesophageal reflux disease    PLAN  Continue comfort care only  Allow natural death  Routine palliative care orders  Discussed with family    Tao Jimenez MD  10/02/19

## 2019-10-03 NOTE — PLAN OF CARE
Problem: Patient Care Overview  Goal: Plan of Care Review  Outcome: Ongoing (interventions implemented as appropriate)   10/03/19 0452   Coping/Psychosocial   Plan of Care Reviewed With patient   Plan of Care Review   Progress declining   OTHER   Outcome Summary pt was put on PCA pump tonight, will scheuduled haldol for anxiety. gave phenegran x2. willl continue to monitor and keep comfortable      Goal: Individualization and Mutuality  Outcome: Ongoing (interventions implemented as appropriate)   10/02/19 0444 10/03/19 0452   Individualization   Patient Specific Goals (Include Timeframe) to keep comfortable  --    Patient Specific Interventions --  PCA pump, premedicate with haldol. oxygen and fan for SOA , assist with turns       Problem: Skin Injury Risk (Adult)  Goal: Skin Health and Integrity  Outcome: Ongoing (interventions implemented as appropriate)      Problem: Fall Risk (Adult)  Goal: Absence of Fall  Outcome: Ongoing (interventions implemented as appropriate)      Problem: Palliative Care (Adult)  Goal: Maximized Comfort  Outcome: Ongoing (interventions implemented as appropriate)    Goal: Enhanced Quality of Life  Outcome: Ongoing (interventions implemented as appropriate)         Dinorah Pina(Fellow)

## 2019-10-03 NOTE — PLAN OF CARE
Problem: Patient Care Overview  Goal: Plan of Care Review  Outcome: Ongoing (interventions implemented as appropriate)   10/03/19 0800 10/03/19 4089   Coping/Psychosocial   Plan of Care Reviewed With patient --    Plan of Care Review   Progress --  declining   OTHER   Outcome Summary --  patient's PCA increased to 8mg/hr per patient need. patient rested better afterward. Haldol with turns for anxiety. phenergan given x1 for anxiety as well. patient's family at bedside today and knows it could be at any time now. will conitnue to monitor       Problem: Skin Injury Risk (Adult)  Goal: Skin Health and Integrity  Outcome: Ongoing (interventions implemented as appropriate)      Problem: Fall Risk (Adult)  Goal: Absence of Fall  Outcome: Ongoing (interventions implemented as appropriate)      Problem: Palliative Care (Adult)  Goal: Maximized Comfort  Outcome: Ongoing (interventions implemented as appropriate)    Goal: Enhanced Quality of Life  Outcome: Ongoing (interventions implemented as appropriate)

## 2019-10-03 NOTE — PLAN OF CARE
Problem: Patient Care Overview  Goal: Plan of Care Review  Outcome: Ongoing (interventions implemented as appropriate)    Goal: Discharge Needs Assessment  Outcome: Ongoing (interventions implemented as appropriate)      Problem: Skin Injury Risk (Adult)  Goal: Skin Health and Integrity  Outcome: Ongoing (interventions implemented as appropriate)      Problem: Fall Risk (Adult)  Goal: Absence of Fall  Outcome: Ongoing (interventions implemented as appropriate)      Problem: Palliative Care (Adult)  Goal: Maximized Comfort  Outcome: Ongoing (interventions implemented as appropriate)    Goal: Enhanced Quality of Life  Outcome: Ongoing (interventions implemented as appropriate)

## 2019-10-03 NOTE — PROGRESS NOTES
LOS: 9 days       Chief Complaint: AML with pancytopenia, pneumonia, neutropenic fever, atrial fibrillation with rapid ventricular response, history of rheumatoid arthritis    Interval History: Even on PCA, patient was experiencing difficulty with dyspnea and chest discomfort overnight and earlier this morning.  PCA settings changed to basal rate 8 mg/h earlier this afternoon with improvement in symptoms.  Patient currently is not responsive but appears comfortable.  Family is at the bedside.      Review of Systems:    Review of systems was obtained with pertinent positive findings as noted in the interval history.  All other systems negative.    Objective     Vital Signs  Temp:  [97.7 °F (36.5 °C)-100.3 °F (37.9 °C)] 100.3 °F (37.9 °C)  Heart Rate:  [] 108  Resp:  [20-22] 22  BP: (117-125)/(54-63) 125/54        Physical Exam:     GENERAL: Elderly cachectic appearing gentleman lying in bed.  Patient is not responsive  CHEST: A few scattered rhonchi  CARDIAC: Tachycardic, irregularly irregular  EXTREMITIES: 1+ bilateral lower extremity edema  ABDOMEN: Soft, nondistended, bowel sounds present           Results Review:     I reviewed the patient's new clinical results.    Results from last 7 days   Lab Units 09/30/19  0505   WBC 10*3/mm3 0.37*   HEMOGLOBIN g/dL 8.9*   HEMATOCRIT % 26.6*   PLATELETS 10*3/mm3 125*     Lab Results   Component Value Date    NEUTROABS 0.04 (L) 09/28/2019     Results from last 7 days   Lab Units 09/30/19  0505   SODIUM mmol/L 140   POTASSIUM mmol/L 4.1   CHLORIDE mmol/L 105   CO2 mmol/L 23.1   BUN mg/dL 15   CREATININE mg/dL 0.91   GLUCOSE mg/dL 106*   CALCIUM mg/dL 7.2*                 Medication Review: Yes     Assessment/Plan   1. AML with pancytopenia:  · Bone marrow aspiration and biopsy on 7/18/2019 confirmed hypercellular marrow of 50% cellularity with increased blasts (20% on the biopsy, 26% on the aspirate smears, 31% by flow cytometry)  · Complex karyotype with deletion 5  q., monosomy 7 and others  · No Neupogen due to the presence of acute myeloid leukemia  · He was seen at the [facility] BMT program with recommendations for treatment with decitabine D1-5 and venetoclax 400 mg daily.  Cycles every 28 days.  · C1D1 decitabine 8/5/2019. Next due 9/2/19, Venetoclax resumed August 27, 2019  · Cycle 2 decitabine initiated September 3, 2019.  Received 4 days treatment.  · Patient admitted on 9/24/2019 with fever shortness of breath and pneumonia profound leukopenia while on Levaquin.  · Please note Venetoclax held upon admission.  At this point held initiation of Posaconazole.  · Patient with continued decline, atrial fibrillation with rapid ventricular response, continued fevers and worsening respiratory status with bilateral pleural effusions.  Patient with continued pancytopenia with neutropenia, ANC 0.37, stable hemoglobin at 8.9, platelet count 125,000.  Extremely poor overall prognosis with limited expected survival even with full support.  Patient wished to transition to comfort measures on 9/30/2019.  This was felt to be very appropriate in the current situation.  He did not wish to pursue any aggressive measures in managing his atrial fibrillation certainly did not wish to move to a telemetry unit.  Therefore transitioned to palliative/comfort care in the hospital with expected limited survival time.    2. Bilateral pneumonia in the setting of neutropenia:  · Patient with persistent neutropenia from AML  · Bilateral infiltrates, receiving empiric Zosyn and posaconazole  · Infectious disease following  · Continued intermittent fevers, last up to 102.2 at 11:30 PM last night, currently 99.3.  Worsening respiratory status this morning with increased use of accessory muscles.  CT chest 9/29/2019 with new moderate bilateral pleural effusions and worsening bibasilar pulmonary consolidation.  · Discussion regarding potential need for bronchoscopy however patient declined  this and as above proceeded to palliative/comfort measures.  3. Atrial fibrillation with rapid ventricular response:  · Patient followed by cardiology, has been continuing on digoxin, metoprolol  · On 9/30/2019, heart rate into the 150-170 range, cardiology called with rapid response.  Discussion regarding potential transfer to monitored bed for amiodarone drip.  Patient however did not wish to proceed with escalation of care and wished instead to transition to palliative/comfort measures as above.    4. Palliative care/comfort measures:  · Patient is DNR, comfort measures only  · On 10/2/2019 initiated morphine PCA for relief of dyspnea and chest discomfort  · Patient with persistent symptoms of dyspnea and chest discomfort requiring titration of PCA earlier today to basal rate of 8 mg/h morphine.  Patient currently comfortable but no longer responsive.  Discussed with patient's family at the bedside.  We will continue palliative measures.     Plan:  1. Continue current palliative care    Discussed with patient and family at bedside, questions answered to their satisfaction, support provided.    Baldemar Levin MD  10/03/19  5:59 PM

## 2019-10-03 NOTE — PROGRESS NOTES
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  Patient has had chemotherapy 2 weeks ago.  Patient evaluated in ER found to have bilateral lower lobe pneumonia with neutropenic fever admit for management.  Patient is septic.  Patient denies any chest pain nausea vomiting diarrhea.  Patient also denies any abdominal pain.    PHYSICAL EXAM   Blood pressure 117/63, pulse 86, temperature 97.7 °F (36.5 °C), temperature source Oral, resp. rate 20, height 182.9 cm (72\"), weight 79.8 kg (176 lb), SpO2 (!) 68 %.    Unchanged    LAB RESULTS  Lab Results (last 24 hours)     Procedure Component Value Units Date/Time    Blood Culture - Blood, Blood, Central Line [[id number]] Collected:  09/29/19 1218    Specimen:  Blood, Central Line Updated:  10/03/19 1230     Blood Culture No growth at 4 days    Blood Culture - Blood, Blood, Central Line [[id number]] Collected:  09/27/19 2145    Specimen:  Blood, Central Line Updated:  10/02/19 2206     Blood Culture No growth at 5 days            Current Facility-Administered Medications:   •  acetaminophen (TYLENOL) tablet 650 mg, 650 mg, Oral, Q4H PRN, 650 mg at 09/30/19 1855 **OR** acetaminophen (TYLENOL) 160 MG/5ML solution 650 mg, 650 mg, Oral, Q4H PRN **OR** acetaminophen (TYLENOL) suppository 650 mg, 650 mg, Rectal, Q4H PRN, Tariq Cottrell MD  •  diphenhydrAMINE (BENADRYL) capsule 25 mg, 25 mg, Oral, Q6H PRN **OR** diphenhydrAMINE (BENADRYL) injection 25 mg, 25 mg, Intravenous, Q6H PRN, Tariq Cottrell MD  •  " diphenoxylate-atropine (LOMOTIL) 2.5-0.025 MG per tablet 1 tablet, 1 tablet, Oral, Q2H PRN, Tariq Cottrell MD  •  Glycerin-Hypromellose- (ARTIFICIAL TEARS) 0.2-0.2-1 % ophthalmic solution solution 1 drop, 1 drop, Both Eyes, Q30 Min PRN, Tariq Cottrell MD  •  glycopyrrolate (ROBINUL) injection 0.2 mg, 0.2 mg, Intravenous, Q2H PRN **OR** glycopyrrolate (ROBINUL) injection 0.2 mg, 0.2 mg, Subcutaneous, Q2H PRN **OR** glycopyrrolate (ROBINUL) injection 0.4 mg, 0.4 mg, Intravenous, Q2H PRN **OR** glycopyrrolate (ROBINUL) injection 0.4 mg, 0.4 mg, Subcutaneous, Q2H PRN, Tariq Cottrell MD  •  guaiFENesin-dextromethorphan (ROBITUSSIN DM) 100-10 MG/5ML syrup 5 mL, 5 mL, Oral, 4x Daily PRN, Tariq Cottrell MD, 5 mL at 09/30/19 1935  •  haloperidol (HALDOL) tablet 1 mg, 1 mg, Oral, Q4H PRN **OR** haloperidol (HALDOL) 2 MG/ML solution 1 mg, 1 mg, Oral, Q4H PRN **OR** [DISCONTINUED] haloperidol lactate (HALDOL) injection 1 mg, 1 mg, Subcutaneous, Q4H PRN, Tariq Cottrell MD, 1 mg at 09/30/19 1854  •  haloperidol (HALDOL) tablet 2 mg, 2 mg, Oral, Q4H PRN **OR** haloperidol (HALDOL) 2 MG/ML solution 2 mg, 2 mg, Oral, Q4H PRN **OR** [DISCONTINUED] haloperidol lactate (HALDOL) injection 2 mg, 2 mg, Subcutaneous, Q4H PRN, Tariq Cottrell MD  •  haloperidol lactate (HALDOL) injection 1 mg, 1 mg, Intravenous, Q4H PRN, 1 mg at 10/02/19 2159 **OR** haloperidol lactate (HALDOL) injection 2 mg, 2 mg, Intravenous, Q4H PRN, 2 mg at 10/03/19 0755 **OR** haloperidol lactate (HALDOL) injection 5 mg, 5 mg, Intravenous, Q4H PRN, Tariq Cottrell MD, 5 mg at 10/03/19 1229  •  HYDROmorphone (DILAUDID) injection 0.5 mg, 0.5 mg, Intravenous, Q1H PRN, 0.5 mg at 10/02/19 1500 **OR** morphine injection 2 mg, 2 mg, Intravenous, Q1H PRN, 2 mg at 10/02/19 2159 **OR** morphine concentrated solution solution 5 mg, 5 mg, Sublingual, Q1H PRN, Tariq Cottrell MD  •  HYDROmorphone (DILAUDID) injection 1 mg, 1 mg, Intravenous, Q1H PRN, 1 mg at 10/02/19 1634 **OR**  morphine injection 4 mg, 4 mg, Intravenous, Q1H PRN, 4 mg at 10/02/19 2157 **OR** morphine concentrated solution solution 10 mg, 10 mg, Sublingual, Q1H PRN, Sulaiman Quinn MD  •  HYDROmorphone (DILAUDID) injection 1.5 mg, 1.5 mg, Intravenous, Q1H PRN **OR** Morphine injection 6 mg, 6 mg, Intravenous, Q1H PRN, 6 mg at 10/03/19 1229 **OR** morphine concentrated solution solution 20 mg, 20 mg, Sublingual, Q1H PRN, Sulaiman Quinn MD  •  magic mouthwash oral supsension 5 mL, 5 mL, Swish & Spit, Q4H PRN, Sulaiman Quinn MD  •  morphine sulfate PCA 50 mg/50 mL syringe, , Intravenous, Continuous, Sulaiman Quinn MD  •  naloxone (NARCAN) injection 0.1 mg, 0.1 mg, Intravenous, Q5 Min PRN, Baldemar Levin Jr., MD  •  naloxone (NARCAN) injection 0.1 mg, 0.1 mg, Intravenous, Q5 Min PRN, Baldemar Levin Jr., MD  •  promethazine (PHENERGAN) injection 12.5 mg, 12.5 mg, Intravenous, Q4H PRN, 12.5 mg at 10/03/19 1034 **OR** promethazine (PHENERGAN) tablet 12.5 mg, 12.5 mg, Oral, Q4H PRN **OR** promethazine (PHENERGAN) 6.25 MG/5ML syrup 12.5 mg, 12.5 mg, Oral, Q4H PRN **OR** promethazine (PHENERGAN) suppository 12.5 mg, 12.5 mg, Rectal, Q4H PRN, Sulaiman Quinn MD  •  zolpidem (AMBIEN) tablet 5 mg, 5 mg, Oral, Nightly PRN, Sulaiman Quinn MD, 5 mg at 10/01/19 2300     ASSESSMENT  Bilateral pneumonia with sepsis in immunocompromised host  Febrile neutropenia  Chest pain pleuritic  Acute myeloid leukemia on chemotherapy  Chronic anemia with pancytopenia  Osteoarthritis  Rheumatoid arthritis  History of leiomyosarcoma  History of prostate cancer  Gastroesophageal reflux disease    PLAN  Comfort care only  Allow natural death  Routine palliative care orders  Discussed with family  Hospice scattered bed evaluation    SULAIMAN QUINN MD

## 2019-10-03 NOTE — PLAN OF CARE
Problem: Patient Care Overview  Goal: Plan of Care Review   10/02/19 1800   Coping/Psychosocial   Plan of Care Reviewed With family   Plan of Care Review   Progress declining   OTHER   Outcome Summary Patient has been restless today and symptoms difficult to manage. IV MS given q2 hr, IV Haldol given q4 hr, IV phenergan needed for nausea seemed to be the only thing that help reduce intermittent restlessness. Attempted to transition to IV DIlaudid with mixed results. Received order for MS PCA. Will continue to assess and treat per family'/pt GOC and MD orders

## 2019-10-04 ENCOUNTER — APPOINTMENT (OUTPATIENT)
Dept: ONCOLOGY | Facility: HOSPITAL | Age: 74
End: 2019-10-04

## 2019-10-04 VITALS
DIASTOLIC BLOOD PRESSURE: 54 MMHG | TEMPERATURE: 100.3 F | WEIGHT: 176 LBS | OXYGEN SATURATION: 57 % | SYSTOLIC BLOOD PRESSURE: 125 MMHG | BODY MASS INDEX: 23.84 KG/M2 | HEIGHT: 72 IN

## 2019-10-04 LAB — BACTERIA SPEC AEROBE CULT: NORMAL

## 2019-10-04 NOTE — PAYOR COMM NOTE
"Compa Wellington (Dcsd. Male)     ATTN: NURSE REVIEW   REF#[id number]   PT EXP 10-  THANKS!  NIKI [phone] OR [phone]      Date of Birth Social Security Number Address Home Phone MRN    1945  [address] [place] [id number] [phone] [id number]    Gnosticism Marital Status          Denominational        Admission Date Admission Type Admitting Provider Attending Provider Department, Room/Bed    19 Emergency Tariq Cottrell MD  [facility] [address] [place], P480/1    Discharge Date Discharge Disposition Discharge Destination        10/4/2019               Attending Provider:  (none)   Allergies:  Lorazepam    Isolation:  None   Infection:  None   Code Status:  Prior    Ht:  182.9 cm (72\")   Wt:  79.8 kg (176 lb)    Admission Cmt:  None   Principal Problem:  None                Active Insurance as of 2019     Primary Coverage     Payor Plan Insurance Group Employer/Plan Group    [facility] BLUE CROSS [facility] BLUE CROSS BLUE SHIELD PPO [id number]     Payor Plan Address Payor Plan Phone Number Payor Plan Fax Number Effective Dates    [address] [phone]  2014 - None Entered    [facility] [id number]       Subscriber Name Subscriber Birth Date Member ID       COMPA WELLINGTON 1945 [id number]           Secondary Coverage     Payor Plan Insurance Group Employer/Plan Group    AARP MED SUPP AARP HEALTH CARE OPTIONS      Payor Plan Address Payor Plan Phone Number Payor Plan Fax Number Effective Dates    [facility] [phone]  2019 - None Entered    [address]       [facility] [id number]       Subscriber Name Subscriber Birth Date Member ID       COMPA WELLINGTON 1945 [id number]           Tertiary Coverage     Payor Plan Insurance Group Employer/Plan Group    MEDICARE MEDICARE A & B      Payor Plan Address Payor Plan Phone Number Payor Plan Fax Number Effective Dates    [address] [phone]  2019 - None Entered    [facility] [id number]   "     Subscriber Name Subscriber Birth Date Member ID       COMPA WELLINGTON 1945 [id number]                 Emergency Contacts      (Rel.) Home Phone Work Phone Mobile Phone    Yris Wellington (Spouse) [phone] -- [phone]    Cynthia Hedrick (Daughter) [phone] -- --    Paul Wellington (Son) [phone] -- --            Discharge Summary     No notes of this type exist for this encounter.

## 2019-10-04 NOTE — PROGRESS NOTES
Case Management Discharge Note    Final Note: Pt  10/4/19    Destination      No service has been selected for the patient.      Durable Medical Equipment      No service has been selected for the patient.      Dialysis/Infusion      No service has been selected for the patient.      Home Medical Care      No service has been selected for the patient.      Therapy      No service has been selected for the patient.      Community Resources      No service has been selected for the patient.             Final Discharge Disposition Code: 20 -

## 2019-10-07 NOTE — DISCHARGE SUMMARY
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  Patient work-up revealed bibasilar pneumonia with sepsis in immunocompromised host also have febrile neutropenia.  Patient admitted started on IV antibiotics after obtaining the cultures and also followed by infectious disease hematology oncology and pulmonary service.  Patient has very slow improvement and rather no improvement and also has chest pain off and on with recent Nathan diagnosed with atrial fibrillation also followed by cardiology.  Patient remain tachypneic tachycardic and further evaluated with a CT and pulmonary recommended may do bronchoscopy.  Patient and family decided to withdraw the care and wants to be comfort care only transfer to palliative care unit for comfort care but he remains comfortable and .  Patient body released with family.  Patient was DNR and and his CODE STATUS changed to allow natural death on this admission.  I have discussed with family his prognosis they understand and his body released with family.    Cause of death cardiopulmonary failure    SULAIMAN QUINN MD

## 2019-10-27 LAB — FUNGUS WND CULT: NORMAL

## 2019-11-14 LAB
DX PRELIMINARY: NORMAL
LAB AP CASE REPORT: NORMAL
LAB AP CLINICAL INFORMATION: NORMAL
LAB AP DIAGNOSIS COMMENT: NORMAL
LAB AP FLOW CYTOMETRY SUMMARY: NORMAL
LAB AP SPECIAL STAINS: NORMAL
Lab: NORMAL
PATH REPORT.ADDENDUM SPEC: NORMAL
PATH REPORT.FINAL DX SPEC: NORMAL
PATH REPORT.GROSS SPEC: NORMAL

## 2019-11-19 RX ORDER — OMEGA-3/DHA/EPA/FISH OIL 35-113.5MG
TABLET,CHEWABLE ORAL
Qty: 90 TABLET | Refills: 0 | OUTPATIENT
Start: 2019-11-19

## 2019-11-25 RX ORDER — ALLOPURINOL 300 MG/1
TABLET ORAL
Qty: 90 TABLET | Refills: 0 | OUTPATIENT
Start: 2019-11-25

## 2019-12-15 RX ORDER — OMEPRAZOLE 20 MG/1
CAPSULE, DELAYED RELEASE ORAL
Qty: 90 CAPSULE | Refills: 1 | OUTPATIENT
Start: 2019-12-15

## (undated) DEVICE — GLV SURG SENSICARE PI LF PF 7.5 GRN STRL

## (undated) DEVICE — ANTIBACTERIAL UNDYED BRAIDED (POLYGLACTIN 910), SYNTHETIC ABSORBABLE SUTURE: Brand: COATED VICRYL

## (undated) DEVICE — GLV SURG BIOGEL M LTX PF 7 1/2

## (undated) DEVICE — ADHS SKIN DERMABOND TOP ADVANCED

## (undated) DEVICE — STERILE LATEX POWDER-FREE SURGICAL GLOVESWITH NITRILE COATING: Brand: PROTEXIS

## (undated) DEVICE — SPNG LAP 18X18IN LF STRL PK/5

## (undated) DEVICE — SUT SILK 2/0 TIES 18IN A185H

## (undated) DEVICE — SPNG GZ WOVN 4X4IN 12PLY 10/BX STRL

## (undated) DEVICE — SUT PROLN 3/0 SH D/A 36IN 8522H

## (undated) DEVICE — LOU MINOR PROCEDURE: Brand: MEDLINE INDUSTRIES, INC.

## (undated) DEVICE — DECANT BG O JET

## (undated) DEVICE — NDL HYPO PRECISIONGLIDE REG 25G 1 1/2

## (undated) DEVICE — PREP SOL POVIDONE/IODINE BT 4OZ

## (undated) DEVICE — GLV SURG BIOGEL LTX PF 7

## (undated) DEVICE — GOWN,NON-REINFORCED,SIRUS,SET IN SLV,XXL: Brand: MEDLINE

## (undated) DEVICE — PANTY KNIT MATERN L/XL

## (undated) DEVICE — SUT SILK 4/0 TIES 18IN A183H

## (undated) DEVICE — GOWN SURG AERO CHROME XL

## (undated) DEVICE — SYR LUERLOK 20CC BX/50

## (undated) DEVICE — GLV SURG SENSICARE PI PF LF 7 GRN STRL

## (undated) DEVICE — DRAPE,UTILITY,TAPE,15X26,STERILE: Brand: MEDLINE

## (undated) DEVICE — DRSNG SURESITE WNDW 2.38X2.75

## (undated) DEVICE — SPNG GZ STRL 2S 4X4 12PLY

## (undated) DEVICE — CVR PROB 96IN LF STRL

## (undated) DEVICE — GOWN,NON-REINFORCED,SIRUS,SET IN SLV,XL: Brand: MEDLINE

## (undated) DEVICE — APPL CHLORAPREP W/TINT 10.5ML PERC STRL

## (undated) DEVICE — INTENDED FOR TISSUE SEPARATION, AND OTHER PROCEDURES THAT REQUIRE A SHARP SURGICAL BLADE TO PUNCTURE OR CUT.: Brand: BARD-PARKER ® CARBON RIB-BACK BLADES

## (undated) DEVICE — Device

## (undated) DEVICE — DRP C/ARM 41X74IN